# Patient Record
Sex: FEMALE | Race: WHITE | Employment: OTHER | URBAN - METROPOLITAN AREA
[De-identification: names, ages, dates, MRNs, and addresses within clinical notes are randomized per-mention and may not be internally consistent; named-entity substitution may affect disease eponyms.]

---

## 2017-04-21 ENCOUNTER — OFFICE VISIT (OUTPATIENT)
Dept: NEUROLOGY | Age: 82
End: 2017-04-21

## 2017-04-21 VITALS
OXYGEN SATURATION: 96 % | SYSTOLIC BLOOD PRESSURE: 122 MMHG | BODY MASS INDEX: 22.86 KG/M2 | HEART RATE: 60 BPM | DIASTOLIC BLOOD PRESSURE: 56 MMHG | HEIGHT: 63 IN | WEIGHT: 129 LBS

## 2017-04-21 DIAGNOSIS — G40.209 LOCALIZATION-RELATED FOCAL EPILEPSY WITH COMPLEX PARTIAL SEIZURES (HCC): ICD-10-CM

## 2017-04-21 DIAGNOSIS — G40.209 COMPLEX PARTIAL SEIZURE EVOLVING TO GENERALIZED SEIZURE (HCC): Primary | ICD-10-CM

## 2017-04-21 DIAGNOSIS — Z86.011 H/O MENINGIOMA OF THE BRAIN: ICD-10-CM

## 2017-04-21 RX ORDER — PHENYTOIN SODIUM 100 MG/1
200 CAPSULE, EXTENDED RELEASE ORAL 2 TIMES DAILY
Qty: 360 CAP | Refills: 3 | Status: SHIPPED | OUTPATIENT
Start: 2017-04-21 | End: 2018-06-25 | Stop reason: SDUPTHER

## 2017-04-21 NOTE — PATIENT INSTRUCTIONS

## 2017-04-21 NOTE — LETTER
4/21/2017 10:16 AM 
 
Patient:  Kodak Jenkins YOB: 1932 Date of Visit: 4/21/2017 Dear No Recipients: Thank you for referring Ms. Anna Weaver to me for evaluation/treatment. Below are the relevant portions of my assessment and plan of care. Consult Subjective:  
 
Kodak Jenkins is a 80 y.o. Right-handed  female seen for evaluation of seizures upon referral by Dr. Lanie De Leon. Patient last seen 2 years ago and missed her follow-up visit and is now out of medication and was worked in to get urgent medication refill because she is going to [facility] soon and needs her medication to prevent seizures. Her last Dilantin level on the chart was noted to be 19 1 year ago, and 2 years ago she had a CT scan of the head that was stable postop changes. She has had no new neurological symptoms in the interim, and no seizures and is doing well on her medications. She did have a right hip replacement 8 months ago and still is walking with a mild limp from that. She has had no other new medical problems. Patient had a meningioma  resected from the left cerebral hemisphere in 2002 when she presented with a seizure. She had another seizure in 2005 and her medication was increased to four Dilantin a day. She has remained without seizures since. In 2006 her EEG showed mild slowing in the left temporal area, but no other lesions are recorded seizures. MRI of the brain done in 2011 then had shown no recurrent tumor, just postoperative changes. She has had no clear side effects, and no recent blood levels. She takes her medications regularly. She's had no increase in headaches, no new focal weakness or sensory loss, no new visual changes, cognitive disability, trauma, fever, and gait issues, or memory loss. She has had no new medical problems. She takes vitamins and vitamin D.  
She has one daughter who has had seizures because of roseola as a child still needs medication. No other family history. She had recent metabolic screening showing normal CBC and differential CMP, vitamin D level and thyroid. Past Medical History:  
Diagnosis Date  Arthritis  Cancer [facility]) 2002  
 brain tumor, benign (meningioma)  Diabetes ([facility] [address] questionable?  Gastrointestinal disorder   
 diverticulitis  Hypothyroid 5/11/2015  Localization-related (focal) (partial) epilepsy and epileptic syndromes with complex partial seizures, without mention of intractable epilepsy 5/11/2015  Other ill-defined conditions   
 loss of vision in left eye  Seizures ([facility] [address] 2002  
 caused by brain tumor  Thyroid disease Past Surgical History:  
Procedure Laterality Date  COLORECTAL SCRN; HI RISK IND  8/26/2014  HX BACK SURGERY  1970s  HX ORTHOPAEDIC  1990s  
 right wrist  
 NEUROLOGICAL PROCEDURE UNLISTED  2002  
 brain tumor removed Family History Problem Relation Age of Onset  Cancer Mother   
  breast  
 Alzheimer Mother  Heart Disease Father  Kidney Disease Sister   
  dialysis  Seizures Child Social History Substance Use Topics  Smoking status: Never Smoker  Smokeless tobacco: Never Used  Alcohol use Yes Comment: glass of wine occasionally Current Outpatient Prescriptions:  
  phenytoin ER (DILANTIN ER) 100 mg ER capsule, Take 2 Caps by mouth two (2) times a day., Disp: 360 Cap, Rfl: 3 
  calcium carbonate (OS-ANGUS) 500 mg calcium (1,250 mg) tablet, Take 1 Tab by mouth two (2) times a day., Disp: , Rfl:  
  cholecalciferol (VITAMIN D3) 1,000 unit tablet, Take 1,000 Units by mouth two (2) times a day., Disp: , Rfl:  
  amLODIPine (NORVASC) 5 mg tablet, Take 5 mg by mouth daily. , Disp: , Rfl:  
  cranberry extract 450 mg tab, Take 1 Tab by mouth daily. , Disp: , Rfl:  
  levothyroxine (SYNTHROID) 88 mcg tablet, , Disp: , Rfl:  
  MULTI-VITAMIN PO, Take 1 Cap by mouth daily. , Disp: , Rfl:  
 
 
 Allergies Allergen Reactions  Penicillins Itching Patient not sure she is allergic  Sulfa (Sulfonamide Antibiotics) Nausea and Vomiting Review of Systems: A comprehensive review of systems was negative except for: Musculoskeletal: positive for myalgias, arthralgias and stiff joints Neurological: positive for seizures Vitals:  
 04/21/17 8400 BP: 122/56 Pulse: 60 SpO2: 96% Weight: 129 lb (58.5 kg) Height: 5' 3\" (1.6 m) Objective: I 
 
 
NEUROLOGICAL EXAM: 
 
Appearance: The patient is well developed, well nourished, provides a coherent history and is in no acute distress. Mental Status: Oriented to time, place and person and the president, cognitive function is normal, speech is fluent. Mood and affect appropriate. Cranial Nerves:   Intact visual fields. Fundi are benign on the right and poorly seen on the left. Pupil on the right reacts, the left does not react well. 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 Facial sensation is normal. Corneal reflexes are not tested. Facial movement is symmetric. Hearing is abnormal bilaterally. Palate is midline with normal sternocleidomastoid and trapezius muscles are normal. Tongue is midline. Neck without meningismus or bruits Motor:  5/5 strength in upper and lower proximal and distal muscles. Normal bulk and tone. No fasciculations. Reflexes:   Deep tendon reflexes 2+/4 and symmetrical. 
No babinski or clonus present Sensory:   Normal to touch, pinprick and vibration. DSS is intact Gait:  Normal gait except she walks with a mild limp because of her right hip surgery and pain . Tremor:   No tremor noted. Cerebellar:  No cerebellar signs present. Neurovascular:  Normal heart sounds and regular rhythm, peripheral pulses decreased, and no carotid bruits. Assessment: ICD-10-CM ICD-9-CM 1. Complex partial seizure 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 seizure ([facility] [address] G40.209 345.40 phenytoin ER (DILANTIN ER) 100 mg ER capsule PHENYTOIN, TOTAL & FREE  
2. H/O meningioma of the brain, left brain meningioma Z86.011 V12.41 phenytoin ER (DILANTIN ER) 100 mg ER capsule PHENYTOIN, TOTAL & FREE 3. Localization-related focal epilepsy with complex partial seizures (HCC) G40.209 345.40 phenytoin ER (DILANTIN ER) 100 mg ER capsule PHENYTOIN, TOTAL & FREE Plan:  
 
Patient needs blood levels to determine how therapeutic her medication is. She does not want to do MRI of the brain to rule out recurrent disease or other new structural brain lesions because she feels she is stable She also does not want to repeat an EEG and just wants to stay on her medication Patient will call for results of her tests, and continue her medication at its current dose in the interim The that she had a breakthrough seizure 3 years after her surgery with seemed to be a poor prognostic sign for complete withdrawal of her Dilantin and she agrees to take We will await the tests first, and advised the patient she has about a 50% chance of successfully discontinuing her medication She is to continue her vitamins and vitamin D. And remain mentally and physically active in the interim. We discussed new medications with less side effects, but she was somewhat equivocal about these Followup in 12 months time or earlier if needed depending on the results of her tests and desire for change in therapy I spent one hour reviewing all her records, her previous tests, and discussing her diagnosis, prognosis and further testing and evaluation and treatment Signed By: Tayo Lee MD   
 April 21, 2017 This note will not be viewable in [address] If you have questions, please do not hesitate to call me. I look forward to following MsColby Ellie Brenton along with you.  
 
 
 
Sincerely, 
 
 
 Bennie Rosario MD

## 2017-04-21 NOTE — PROGRESS NOTES
Consult    Subjective:     Edwar Govea is a 80 y.o. Right-handed  female seen for evaluation of seizures upon referral by Dr. Renato Rojo. Patient last seen 2 years ago and missed her follow-up visit and is now out of medication and was worked in to get urgent medication refill because she is going to [facility] soon and needs her medication to prevent seizures. Her last Dilantin level on the chart was noted to be 19 1 year ago, and 2 years ago she had a CT scan of the head that was stable postop changes. She has had no new neurological symptoms in the interim, and no seizures and is doing well on her medications. She did have a right hip replacement 8 months ago and still is walking with a mild limp from that. She has had no other new medical problems. Patient had a meningioma  resected from the left cerebral hemisphere in 2002 when she presented with a seizure. She had another seizure in 2005 and her medication was increased to four Dilantin a day. She has remained without seizures since. In 2006 her EEG showed mild slowing in the left temporal area, but no other lesions are recorded seizures. MRI of the brain done in 2011 then had shown no recurrent tumor, just postoperative changes. She has had no clear side effects, and no recent blood levels. She takes her medications regularly. She's had no increase in headaches, no new focal weakness or sensory loss, no new visual changes, cognitive disability, trauma, fever, and gait issues, or memory loss. She has had no new medical problems. She takes vitamins and vitamin D. She has one daughter who has had seizures because of roseola as a child still needs medication. No other family history. She had recent metabolic screening showing normal CBC and differential CMP, vitamin D level and thyroid. Past Medical History:   Diagnosis Date    Arthritis     Cancer [facility]) 2002    brain tumor, benign (meningioma)    Diabetes ([facility] [address]     questionable?     Gastrointestinal disorder     diverticulitis    Hypothyroid 5/11/2015    Localization-related (focal) (partial) epilepsy and epileptic syndromes with complex partial seizures, without mention of intractable epilepsy 5/11/2015    Other ill-defined conditions     loss of vision in left eye    Seizures ([address] 2002    caused by brain tumor    Thyroid disease       Past Surgical History:   Procedure Laterality Date    COLORECTAL SCRN; HI RISK IND  8/26/2014         HX BACK SURGERY  1970s    HX ORTHOPAEDIC  1990s    right wrist    NEUROLOGICAL PROCEDURE UNLISTED  2002    brain tumor removed     Family History   Problem Relation Age of Onset   [facility] Cancer Mother      breast    Alzheimer Mother     Heart Disease Father     Kidney Disease Sister      dialysis    Seizures Child       Social History   Substance Use Topics    Smoking status: Never Smoker    Smokeless tobacco: Never Used    Alcohol use Yes      Comment: glass of wine occasionally         Current Outpatient Prescriptions:     phenytoin ER (DILANTIN ER) 100 mg ER capsule, Take 2 Caps by mouth two (2) times a day., Disp: 360 Cap, Rfl: 3    calcium carbonate (OS-ANGUS) 500 mg calcium (1,250 mg) tablet, Take 1 Tab by mouth two (2) times a day., Disp: , Rfl:     cholecalciferol (VITAMIN D3) 1,000 unit tablet, Take 1,000 Units by mouth two (2) times a day., Disp: , Rfl:     amLODIPine (NORVASC) 5 mg tablet, Take 5 mg by mouth daily. , Disp: , Rfl:     cranberry extract 450 mg tab, Take 1 Tab by mouth daily. , Disp: , Rfl:     levothyroxine (SYNTHROID) 88 mcg tablet, , Disp: , Rfl:     MULTI-VITAMIN PO, Take 1 Cap by mouth daily. , Disp: , Rfl:         Allergies   Allergen Reactions    Penicillins Itching     Patient not sure she is allergic    Sulfa (Sulfonamide Antibiotics) Nausea and Vomiting        Review of Systems:  A comprehensive review of systems was negative except for: Musculoskeletal: positive for myalgias, arthralgias and stiff joints  Neurological: positive for seizures   Vitals:    04/21/17 0946   BP: 122/56   Pulse: 60   SpO2: 96%   Weight: 129 lb (58.5 kg)   Height: 5' 3\" (1.6 m)     Objective:     I      NEUROLOGICAL EXAM:    Appearance: The patient is well developed, well nourished, provides a coherent history and is in no acute distress. Mental Status: Oriented to time, place and person and the president, cognitive function is normal, speech is fluent. Mood and affect appropriate. Cranial Nerves:   Intact visual fields. Fundi are benign on the right and poorly seen on the left. Pupil on the right reacts, the left does not react well. 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 Facial sensation is normal. Corneal reflexes are not tested. Facial movement is symmetric. Hearing is abnormal bilaterally. Palate is midline with normal sternocleidomastoid and trapezius muscles are normal. Tongue is midline. Neck without meningismus or bruits   Motor:  5/5 strength in upper and lower proximal and distal muscles. Normal bulk and tone. No fasciculations. Reflexes:   Deep tendon reflexes 2+/4 and symmetrical.  No babinski or clonus present   Sensory:   Normal to touch, pinprick and vibration. DSS is intact   Gait:  Normal gait except she walks with a mild limp because of her right hip surgery and pain . Tremor:   No tremor noted. Cerebellar:  No cerebellar signs present. Neurovascular:  Normal heart sounds and regular rhythm, peripheral pulses decreased, and no carotid bruits. Assessment:       ICD-10-CM ICD-9-CM    1. Complex partial seizure 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 seizure ([facility] [address] G40.209 345.40 phenytoin ER (DILANTIN ER) 100 mg ER capsule      PHENYTOIN, TOTAL & FREE   2.  H/O meningioma of the brain, left brain meningioma Z86.011 V12.41 phenytoin ER (DILANTIN ER) 100 mg ER capsule      PHENYTOIN, TOTAL & FREE   3. Localization-related focal epilepsy with complex partial seizures ([facility] [address] G40.209 345.40 phenytoin ER (DILANTIN ER) 100 mg ER capsule      PHENYTOIN, TOTAL & FREE         Plan:     Patient needs blood levels to determine how therapeutic her medication is. She does not want to do MRI of the brain to rule out recurrent disease or other new structural brain lesions because she feels she is stable  She also does not want to repeat an EEG and just wants to stay on her medication  Patient will call for results of her tests, and continue her medication at its current dose in the interim  The that she had a breakthrough seizure 3 years after her surgery with seemed to be a poor prognostic sign for complete withdrawal of her Dilantin and she agrees to take  We will await the tests first, and advised the patient she has about a 50% chance of successfully discontinuing her medication  She is to continue her vitamins and vitamin D. And remain mentally and physically active in the interim. We discussed new medications with less side effects, but she was somewhat equivocal about these  Followup in 12 months time or earlier if needed depending on the results of her tests and desire for change in therapy  I spent one hour reviewing all her records, her previous tests, and discussing her diagnosis, prognosis and further testing and evaluation and treatment    Signed By: Ricardo Raines MD     April 21, 2017       This note will not be viewable in [address]

## 2017-04-21 NOTE — MR AVS SNAPSHOT
Visit Information Date & Time Provider Department Dept. Phone Encounter #  
 4/21/2017  9:40 AM Tayo Lee MD [facility] at [facility] [phone] [id number] Follow-up Instructions Return in about 1 year (around 4/21/2018). Upcoming Health Maintenance Date Due ZOSTER VACCINE AGE 60> 9/5/1992 GLAUCOMA SCREENING Q2Y 9/5/1997 Pneumococcal 65+ Low/Medium Risk (1 of 2 - PCV13) 9/5/1997 MEDICARE YEARLY EXAM 9/5/1997 DTaP/Tdap/Td series (1 - Tdap) 9/12/2010 INFLUENZA AGE 9 TO ADULT 8/1/2016 Allergies as of 4/21/2017  Review Complete On: 4/21/2017 By: Tayo Lee MD  
  
 Severity Noted Reaction Type Reactions Penicillins  09/01/2010    Itching Patient not sure she is allergic Sulfa (Sulfonamide Antibiotics)  09/01/2010    Nausea and Vomiting Current Immunizations  Reviewed on 8/17/2016 Name Date  
 TD Vaccine 9/11/2010  8:00 PM  
  
 Not reviewed this visit You Were Diagnosed With   
  
 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 
ICD-9-CM: V12.41 Localization-related focal epilepsy with complex partial seizures ([facility]     ICD-10-CM: H11.170 ICD-9-CM: 345.40 Vitals BP Pulse Height(growth percentile) Weight(growth percentile) SpO2 BMI  
 122/56 60 5' 3\" (1.6 m) 129 lb (58.5 kg) 96% 22.85 kg/m2 OB Status Smoking Status Postmenopausal Never Smoker Vitals History BMI and BSA Data Body Mass Index Body Surface Area  
 22.85 kg/m 2 1.61 m 2 Preferred Pharmacy Pharmacy Name Phone [address] [place] [address] [address] [place] [address] [phone] Your Updated Medication List  
  
   
This list is accurate as of: 4/21/17 10:06 AM.  Always use your most recent med list.  
  
  
  
  
 amLODIPine 5 mg tablet Commonly known as:  Benjiman Floro Take 5 mg by mouth daily. calcium carbonate 500 mg calcium (1,250 mg) tablet Commonly known as:  OS-ANGUS Take 1 Tab by mouth two (2) times a day. cranberry extract 450 mg Tab Take 1 Tab by mouth daily. levothyroxine 88 mcg tablet Commonly known as:  SYNTHROID  
  
 MULTI-VITAMIN PO Take 1 Cap by mouth daily. phenytoin  mg ER capsule Commonly known as:  DILANTIN ER Take 2 Caps by mouth two (2) times a day. VITAMIN D3 1,000 unit tablet Generic drug:  cholecalciferol Take 1,000 Units by mouth two (2) times a day. Prescriptions Sent to Pharmacy Refills  
 phenytoin ER (DILANTIN ER) 100 mg ER capsule 3 Sig: Take 2 Caps by mouth two (2) times a day. Class: Normal  
 Pharmacy: [address] [place] [address], [address] [place] [address] #: [phone] Route: Oral  
  
We Performed the Following PHENYTOIN, TOTAL & FREE [id number] CPT(R)] Follow-up Instructions Return in about 1 year (around 4/21/2018). Patient Instructions A Healthy Lifestyle: Care Instructions Your Care Instructions A healthy lifestyle can help you feel good, stay at a healthy weight, and have plenty of energy for both work and play. A healthy lifestyle is something you can share with your whole family. A healthy lifestyle also can lower your risk for serious health problems, such as high blood pressure, heart disease, and diabetes. You can follow a few steps listed below to improve your health and the health of your family. Follow-up care is a key part of your treatment and safety. Be sure to make and go to all appointments, and call your doctor if you are having problems. Its also a good idea to know your test results and keep a list of the medicines you take. How can you care for yourself at home? · Do not eat too much sugar, fat, or fast foods.  You can still have dessert and treats now and then. The goal is moderation. · Start small to improve your eating habits. Pay attention to portion sizes, drink less juice and soda pop, and eat more fruits and vegetables. ¨ Eat a healthy amount of food. A 3-ounce serving of meat, for example, is about the size of a deck of cards. Fill the rest of your plate with vegetables and whole grains. ¨ Limit the amount of soda and sports drinks you have every day. Drink more water when you are thirsty. ¨ Eat at least 5 servings of fruits and vegetables every day. It may seem like a lot, but it is not hard to reach this goal. A serving or helping is 1 piece of fruit, 1 cup of vegetables, or 2 cups of leafy, raw vegetables. Have an apple or some carrot sticks as an afternoon snack instead of a candy bar. Try to have fruits and/or vegetables at every meal. 
· Make exercise part of your daily routine. You may want to start with simple activities, such as walking, bicycling, or slow swimming. Try to be active 30 to 60 minutes every day. You do not need to do all 30 to 60 minutes all at once. For example, you can exercise 3 times a day for 10 or 20 minutes. Moderate exercise is safe for most people, but it is always a good idea to talk to your doctor before starting an exercise program. 
· Keep moving. [place] the lawn, work in the garden, or [facility]. Take the stairs instead of the elevator at work. · If you smoke, quit. People who smoke have an increased risk for heart attack, stroke, cancer, and other lung illnesses. Quitting is hard, but there are ways to boost your chance of quitting tobacco for good. ¨ Use nicotine gum, patches, or lozenges. ¨ Ask your doctor about stop-smoking programs and medicines. ¨ Keep trying.  
In addition to reducing your risk of diseases in the future, you will notice some benefits soon after you stop using tobacco. If you have shortness of breath or asthma symptoms, they will likely get better within a few weeks after you quit. · Limit how much alcohol you drink. Moderate amounts of alcohol (up to 2 drinks a day for men, 1 drink a day for women) are okay. But drinking too much can lead to liver problems, high blood pressure, and other health problems. Family health If you have a family, there are many things you can do together to improve your health. · Eat meals together as a family as often as possible. · Eat healthy foods. This includes fruits, vegetables, lean meats and dairy, and whole grains. · Include your family in your fitness plan. Most people think of activities such as jogging or tennis as the way to fitness, but there are many ways you and your family can be more active. Anything that makes you breathe hard and gets your heart pumping is exercise. Here are some tips: 
¨ Walk to do errands or to take your child to school or the bus. ¨ Go for a family bike ride after dinner instead of watching TV. Where can you learn more? Go to http://oneal-delisa.info/. Enter I465 in the search box to learn more about \"A Healthy Lifestyle: Care Instructions. \" Current as of: July 26, 2016 Content Version: 11.2 © 1259-4877 [employer]. Care instructions adapted under license by [employer] (which disclaims liability or warranty for this information). If you have questions about a medical condition or this instruction, always ask your healthcare professional. [place] [address] any warranty or liability for your use of this information. Introducing [facility]! Charisse Pandya introduces [employer] patient portal. Now you can access parts of your medical record, email your doctor's office, and request medication refills online. 1. In your internet browser, go to [email or web address] 2. Click on the First Time User? Click Here link in the Sign In box. You will see the New Member Sign Up page. 3. Enter your [employer] Access Code exactly as it appears below. You will not need to use this code after youve completed the sign-up process. If you do not sign up before the expiration date, you must request a new code. · [employer] Access Code: [email or web address] 
Expires: 7/20/2017  9:59 AM 
 
4. Enter the last four digits of your Social Security Number (xxxx) and Date of Birth (mm/dd/yyyy) as indicated and click Submit. You will be taken to the next sign-up page. 5. Create a [employer] ID. This will be your [employer] login ID and cannot be changed, so think of one that is secure and easy to remember. 6. Create a [employer] password. You can change your password at any time. 7. Enter your Password Reset Question and Answer. This can be used at a later time if you forget your password. 8. Enter your e-mail address. You will receive e-mail notification when new information is available in [address] 9. Click Sign Up. You can now view and download portions of your medical record. 10. Click the Download Summary menu link to download a portable copy of your medical information. If you have questions, please visit the Frequently Asked Questions section of the [employer] website. Remember, [employer] is NOT to be used for urgent needs. For medical emergencies, dial 911. Now available from your iPhone and Android! Please provide this summary of care documentation to your next provider. Your primary care clinician is listed as Michael Iraheta. If you have any questions after today's visit, please call [phone].

## 2017-04-25 LAB
PHENYTOIN FREE SERPL-MCNC: 1.1 UG/ML (ref 1–2)
PHENYTOIN SERPL-MCNC: 15.7 UG/ML (ref 10–20)

## 2017-06-21 ENCOUNTER — HOSPITAL ENCOUNTER (OUTPATIENT)
Dept: GENERAL RADIOLOGY | Age: 82
Discharge: HOME | End: 2017-06-21
Payer: MEDICARE

## 2017-06-21 DIAGNOSIS — R06.02 SOB (SHORTNESS OF BREATH): ICD-10-CM

## 2017-06-21 PROCEDURE — 71020 XR CHEST PA LAT: CPT

## 2018-06-25 DIAGNOSIS — G40.209 LOCALIZATION-RELATED FOCAL EPILEPSY WITH COMPLEX PARTIAL SEIZURES (HCC): ICD-10-CM

## 2018-06-25 DIAGNOSIS — G40.209 COMPLEX PARTIAL SEIZURE EVOLVING TO GENERALIZED SEIZURE (HCC): ICD-10-CM

## 2018-06-25 DIAGNOSIS — Z86.011 H/O MENINGIOMA OF THE BRAIN: ICD-10-CM

## 2018-06-28 RX ORDER — PHENYTOIN SODIUM 100 MG/1
200 CAPSULE, EXTENDED RELEASE ORAL 2 TIMES DAILY
Qty: 360 CAP | Refills: 0 | Status: SHIPPED | OUTPATIENT
Start: 2018-06-28 | End: 2018-10-26 | Stop reason: SDUPTHER

## 2018-06-28 NOTE — TELEPHONE ENCOUNTER
No future appointments. Last Appointment My Department:  4/21/17    Please advise of refill below. Requested Prescriptions     Pending Prescriptions Disp Refills    phenytoin ER (DILANTIN ER) 100 mg ER capsule [Pharmacy Med Name: PHENYTOIN SODIUM EXTENDED 100 MG Capsule] 360 Cap 0     Sig: Take 2 Caps by mouth two (2) times a day.  PATIENT MUST MAKE APPOINTMENT FOR FURTHER REFILLS

## 2018-10-25 DIAGNOSIS — G40.209 COMPLEX PARTIAL SEIZURE EVOLVING TO GENERALIZED SEIZURE (HCC): ICD-10-CM

## 2018-10-25 DIAGNOSIS — G40.209 LOCALIZATION-RELATED FOCAL EPILEPSY WITH COMPLEX PARTIAL SEIZURES (HCC): ICD-10-CM

## 2018-10-25 DIAGNOSIS — Z86.011 H/O MENINGIOMA OF THE BRAIN: ICD-10-CM

## 2018-10-26 RX ORDER — PHENYTOIN SODIUM 100 MG/1
CAPSULE, EXTENDED RELEASE ORAL
Qty: 360 CAP | Refills: 0 | OUTPATIENT
Start: 2018-10-26

## 2018-10-26 NOTE — TELEPHONE ENCOUNTER
No future appointments. Last Appointment My Department:  4/21/17    Please advise of refill below. Called and scheduled patient  Requested Prescriptions     Pending Prescriptions Disp Refills    phenytoin ER (DILANTIN ER) 100 mg ER capsule 360 Cap 1     Sig: Take 2 Caps by mouth two (2) times a day. Refused Prescriptions Disp Refills    phenytoin ER (DILANTIN ER) 100 mg ER capsule [Pharmacy Med Name: PHENYTOIN SODIUM EXTENDED 100 MG Capsule] 360 Cap 0     Sig: TAKE 2 CAPSULES TWICE DAILY.   MUST MAKE APPOINTMENT FOR FURTHER REFILLS     Refused By: Harry Shafer     Reason for Refusal: Patient never under prescriber care

## 2018-10-29 RX ORDER — PHENYTOIN SODIUM 100 MG/1
200 CAPSULE, EXTENDED RELEASE ORAL 2 TIMES DAILY
Qty: 360 CAP | Refills: 1 | Status: SHIPPED | OUTPATIENT
Start: 2018-10-29 | End: 2019-04-27 | Stop reason: SDUPTHER

## 2019-01-13 ENCOUNTER — APPOINTMENT (OUTPATIENT)
Dept: GENERAL RADIOLOGY | Age: 84
DRG: 481 | End: 2019-01-13
Attending: EMERGENCY MEDICINE
Payer: MEDICARE

## 2019-01-13 ENCOUNTER — HOSPITAL ENCOUNTER (INPATIENT)
Age: 84
LOS: 6 days | Discharge: NURSING, REHAB OR LONG-TERM CARE | DRG: 481 | End: 2019-01-19
Attending: EMERGENCY MEDICINE | Admitting: INTERNAL MEDICINE
Payer: MEDICARE

## 2019-01-13 DIAGNOSIS — S72.002A CLOSED FRACTURE OF LEFT HIP, INITIAL ENCOUNTER (HCC): Primary | ICD-10-CM

## 2019-01-13 PROBLEM — S72.92XA FEMUR FRACTURE, LEFT (HCC): Status: ACTIVE | Noted: 2019-01-13

## 2019-01-13 PROBLEM — E11.9 DIABETES (HCC): Status: ACTIVE | Noted: 2019-01-13

## 2019-01-13 LAB
ABO + RH BLD: NORMAL
ALBUMIN SERPL-MCNC: 3.8 G/DL (ref 3.5–5)
ALBUMIN/GLOB SERPL: 1.1 {RATIO} (ref 1.1–2.2)
ALP SERPL-CCNC: 89 U/L (ref 45–117)
ALT SERPL-CCNC: 20 U/L (ref 12–78)
ANION GAP SERPL CALC-SCNC: 6 MMOL/L (ref 5–15)
APPEARANCE UR: ABNORMAL
AST SERPL-CCNC: 19 U/L (ref 15–37)
ATRIAL RATE: 53 BPM
BACTERIA URNS QL MICRO: ABNORMAL /HPF
BASOPHILS # BLD: 0 K/UL (ref 0–0.1)
BASOPHILS NFR BLD: 0 % (ref 0–1)
BILIRUB SERPL-MCNC: 0.6 MG/DL (ref 0.2–1)
BILIRUB UR QL: NEGATIVE
BLOOD GROUP ANTIBODIES SERPL: NORMAL
BUN SERPL-MCNC: 14 MG/DL (ref 6–20)
BUN/CREAT SERPL: 19 (ref 12–20)
CALCIUM SERPL-MCNC: 9.1 MG/DL (ref 8.5–10.1)
CALCULATED P AXIS, ECG09: 70 DEGREES
CALCULATED R AXIS, ECG10: -42 DEGREES
CALCULATED T AXIS, ECG11: 83 DEGREES
CHLORIDE SERPL-SCNC: 105 MMOL/L (ref 97–108)
CO2 SERPL-SCNC: 28 MMOL/L (ref 21–32)
COLOR UR: ABNORMAL
CREAT SERPL-MCNC: 0.73 MG/DL (ref 0.55–1.02)
DIAGNOSIS, 93000: NORMAL
DIFFERENTIAL METHOD BLD: ABNORMAL
EOSINOPHIL # BLD: 0 K/UL (ref 0–0.4)
EOSINOPHIL NFR BLD: 0 % (ref 0–7)
EPITH CASTS URNS QL MICRO: ABNORMAL /LPF
ERYTHROCYTE [DISTWIDTH] IN BLOOD BY AUTOMATED COUNT: 12.7 % (ref 11.5–14.5)
GLOBULIN SER CALC-MCNC: 3.5 G/DL (ref 2–4)
GLUCOSE SERPL-MCNC: 119 MG/DL (ref 65–100)
GLUCOSE UR STRIP.AUTO-MCNC: NEGATIVE MG/DL
HCT VFR BLD AUTO: 40.5 % (ref 35–47)
HGB BLD-MCNC: 13.6 G/DL (ref 11.5–16)
HGB UR QL STRIP: NEGATIVE
HYALINE CASTS URNS QL MICRO: ABNORMAL /LPF (ref 0–5)
IMM GRANULOCYTES # BLD AUTO: 0 K/UL (ref 0–0.04)
IMM GRANULOCYTES NFR BLD AUTO: 0 % (ref 0–0.5)
INR PPP: 1 (ref 0.9–1.1)
KETONES UR QL STRIP.AUTO: NEGATIVE MG/DL
LEUKOCYTE ESTERASE UR QL STRIP.AUTO: NEGATIVE
LYMPHOCYTES # BLD: 0.9 K/UL (ref 0.8–3.5)
LYMPHOCYTES NFR BLD: 17 % (ref 12–49)
MCH RBC QN AUTO: 33.3 PG (ref 26–34)
MCHC RBC AUTO-ENTMCNC: 33.6 G/DL (ref 30–36.5)
MCV RBC AUTO: 99 FL (ref 80–99)
MONOCYTES # BLD: 0.4 K/UL (ref 0–1)
MONOCYTES NFR BLD: 8 % (ref 5–13)
NEUTS SEG # BLD: 3.7 K/UL (ref 1.8–8)
NEUTS SEG NFR BLD: 74 % (ref 32–75)
NITRITE UR QL STRIP.AUTO: POSITIVE
NRBC # BLD: 0 K/UL (ref 0–0.01)
NRBC BLD-RTO: 0 PER 100 WBC
P-R INTERVAL, ECG05: 134 MS
PH UR STRIP: 8 [PH] (ref 5–8)
PLATELET # BLD AUTO: 137 K/UL (ref 150–400)
PMV BLD AUTO: 12 FL (ref 8.9–12.9)
POTASSIUM SERPL-SCNC: 4.1 MMOL/L (ref 3.5–5.1)
PROT SERPL-MCNC: 7.3 G/DL (ref 6.4–8.2)
PROT UR STRIP-MCNC: ABNORMAL MG/DL
PROTHROMBIN TIME: 10.4 SEC (ref 9–11.1)
Q-T INTERVAL, ECG07: 446 MS
QRS DURATION, ECG06: 76 MS
QTC CALCULATION (BEZET), ECG08: 418 MS
RBC # BLD AUTO: 4.09 M/UL (ref 3.8–5.2)
RBC #/AREA URNS HPF: ABNORMAL /HPF (ref 0–5)
SODIUM SERPL-SCNC: 139 MMOL/L (ref 136–145)
SP GR UR REFRACTOMETRY: 1.01 (ref 1–1.03)
SPECIMEN EXP DATE BLD: NORMAL
TSH SERPL DL<=0.05 MIU/L-ACNC: 2.21 UIU/ML (ref 0.36–3.74)
UA: UC IF INDICATED,UAUC: ABNORMAL
UROBILINOGEN UR QL STRIP.AUTO: 0.2 EU/DL (ref 0.2–1)
VENTRICULAR RATE, ECG03: 53 BPM
WBC # BLD AUTO: 5 K/UL (ref 3.6–11)
WBC URNS QL MICRO: ABNORMAL /HPF (ref 0–4)

## 2019-01-13 PROCEDURE — 85025 COMPLETE CBC W/AUTO DIFF WBC: CPT

## 2019-01-13 PROCEDURE — 74011250636 HC RX REV CODE- 250/636

## 2019-01-13 PROCEDURE — 71045 X-RAY EXAM CHEST 1 VIEW: CPT

## 2019-01-13 PROCEDURE — 84443 ASSAY THYROID STIM HORMONE: CPT

## 2019-01-13 PROCEDURE — 87077 CULTURE AEROBIC IDENTIFY: CPT

## 2019-01-13 PROCEDURE — 81001 URINALYSIS AUTO W/SCOPE: CPT

## 2019-01-13 PROCEDURE — 85610 PROTHROMBIN TIME: CPT

## 2019-01-13 PROCEDURE — 94760 N-INVAS EAR/PLS OXIMETRY 1: CPT

## 2019-01-13 PROCEDURE — 86900 BLOOD TYPING SEROLOGIC ABO: CPT

## 2019-01-13 PROCEDURE — 74011250636 HC RX REV CODE- 250/636: Performed by: EMERGENCY MEDICINE

## 2019-01-13 PROCEDURE — 65270000029 HC RM PRIVATE

## 2019-01-13 PROCEDURE — 74011250636 HC RX REV CODE- 250/636: Performed by: INTERNAL MEDICINE

## 2019-01-13 PROCEDURE — 80053 COMPREHEN METABOLIC PANEL: CPT

## 2019-01-13 PROCEDURE — 73552 X-RAY EXAM OF FEMUR 2/>: CPT

## 2019-01-13 PROCEDURE — 87186 SC STD MICRODIL/AGAR DIL: CPT

## 2019-01-13 PROCEDURE — 87086 URINE CULTURE/COLONY COUNT: CPT

## 2019-01-13 PROCEDURE — 74011250636 HC RX REV CODE- 250/636: Performed by: NURSE PRACTITIONER

## 2019-01-13 PROCEDURE — 93005 ELECTROCARDIOGRAM TRACING: CPT

## 2019-01-13 PROCEDURE — 74011250637 HC RX REV CODE- 250/637: Performed by: NURSE PRACTITIONER

## 2019-01-13 PROCEDURE — 96374 THER/PROPH/DIAG INJ IV PUSH: CPT

## 2019-01-13 PROCEDURE — 96372 THER/PROPH/DIAG INJ SC/IM: CPT

## 2019-01-13 PROCEDURE — 36415 COLL VENOUS BLD VENIPUNCTURE: CPT

## 2019-01-13 PROCEDURE — 74011250637 HC RX REV CODE- 250/637: Performed by: INTERNAL MEDICINE

## 2019-01-13 PROCEDURE — 99284 EMERGENCY DEPT VISIT MOD MDM: CPT

## 2019-01-13 RX ORDER — HYDROMORPHONE HYDROCHLORIDE 1 MG/ML
0.5 INJECTION, SOLUTION INTRAMUSCULAR; INTRAVENOUS; SUBCUTANEOUS ONCE
Status: COMPLETED | OUTPATIENT
Start: 2019-01-13 | End: 2019-01-13

## 2019-01-13 RX ORDER — SODIUM CHLORIDE 9 MG/ML
75 INJECTION, SOLUTION INTRAVENOUS CONTINUOUS
Status: DISCONTINUED | OUTPATIENT
Start: 2019-01-13 | End: 2019-01-15

## 2019-01-13 RX ORDER — SODIUM CHLORIDE 0.9 % (FLUSH) 0.9 %
5-40 SYRINGE (ML) INJECTION AS NEEDED
Status: DISCONTINUED | OUTPATIENT
Start: 2019-01-13 | End: 2019-01-14

## 2019-01-13 RX ORDER — MELATONIN
1000 2 TIMES DAILY
Status: DISCONTINUED | OUTPATIENT
Start: 2019-01-13 | End: 2019-01-19 | Stop reason: HOSPADM

## 2019-01-13 RX ORDER — ACETAMINOPHEN 325 MG/1
650 TABLET ORAL EVERY 6 HOURS
Status: DISCONTINUED | OUTPATIENT
Start: 2019-01-13 | End: 2019-01-14

## 2019-01-13 RX ORDER — NALOXONE HYDROCHLORIDE 0.4 MG/ML
0.4 INJECTION, SOLUTION INTRAMUSCULAR; INTRAVENOUS; SUBCUTANEOUS AS NEEDED
Status: DISCONTINUED | OUTPATIENT
Start: 2019-01-13 | End: 2019-01-14

## 2019-01-13 RX ORDER — HYDROMORPHONE HYDROCHLORIDE 2 MG/ML
0.5 INJECTION, SOLUTION INTRAMUSCULAR; INTRAVENOUS; SUBCUTANEOUS
Status: DISCONTINUED | OUTPATIENT
Start: 2019-01-13 | End: 2019-01-14

## 2019-01-13 RX ORDER — ONDANSETRON 2 MG/ML
2 INJECTION INTRAMUSCULAR; INTRAVENOUS
Status: DISCONTINUED | OUTPATIENT
Start: 2019-01-13 | End: 2019-01-13

## 2019-01-13 RX ORDER — OXYCODONE HYDROCHLORIDE 5 MG/1
2.5 TABLET ORAL
Status: DISCONTINUED | OUTPATIENT
Start: 2019-01-13 | End: 2019-01-14

## 2019-01-13 RX ORDER — SODIUM CHLORIDE 0.9 % (FLUSH) 0.9 %
5-40 SYRINGE (ML) INJECTION EVERY 8 HOURS
Status: DISCONTINUED | OUTPATIENT
Start: 2019-01-13 | End: 2019-01-14

## 2019-01-13 RX ORDER — SODIUM CHLORIDE 0.9 % (FLUSH) 0.9 %
5-40 SYRINGE (ML) INJECTION EVERY 8 HOURS
Status: DISCONTINUED | OUTPATIENT
Start: 2019-01-13 | End: 2019-01-15

## 2019-01-13 RX ORDER — ONDANSETRON 2 MG/ML
4 INJECTION INTRAMUSCULAR; INTRAVENOUS
Status: COMPLETED | OUTPATIENT
Start: 2019-01-13 | End: 2019-01-13

## 2019-01-13 RX ORDER — PHENYTOIN SODIUM 100 MG/1
200 CAPSULE, EXTENDED RELEASE ORAL 2 TIMES DAILY
Status: DISCONTINUED | OUTPATIENT
Start: 2019-01-13 | End: 2019-01-19 | Stop reason: HOSPADM

## 2019-01-13 RX ORDER — NICOTINE 7MG/24HR
1 PATCH, TRANSDERMAL 24 HOURS TRANSDERMAL
Status: DISCONTINUED | OUTPATIENT
Start: 2019-01-13 | End: 2019-01-19 | Stop reason: HOSPADM

## 2019-01-13 RX ORDER — MORPHINE SULFATE 10 MG/ML
4 INJECTION, SOLUTION INTRAMUSCULAR; INTRAVENOUS
Status: COMPLETED | OUTPATIENT
Start: 2019-01-13 | End: 2019-01-13

## 2019-01-13 RX ORDER — AMLODIPINE BESYLATE 5 MG/1
5 TABLET ORAL DAILY
Status: DISCONTINUED | OUTPATIENT
Start: 2019-01-13 | End: 2019-01-19 | Stop reason: HOSPADM

## 2019-01-13 RX ORDER — BISACODYL 5 MG
5 TABLET, DELAYED RELEASE (ENTERIC COATED) ORAL DAILY PRN
Status: DISCONTINUED | OUTPATIENT
Start: 2019-01-13 | End: 2019-01-19 | Stop reason: HOSPADM

## 2019-01-13 RX ORDER — NALOXONE HYDROCHLORIDE 0.4 MG/ML
0.4 INJECTION, SOLUTION INTRAMUSCULAR; INTRAVENOUS; SUBCUTANEOUS AS NEEDED
Status: DISCONTINUED | OUTPATIENT
Start: 2019-01-13 | End: 2019-01-14 | Stop reason: SDUPTHER

## 2019-01-13 RX ORDER — ACETAMINOPHEN 500 MG
500 TABLET ORAL
Status: DISCONTINUED | OUTPATIENT
Start: 2019-01-13 | End: 2019-01-19 | Stop reason: HOSPADM

## 2019-01-13 RX ORDER — HYDROCODONE BITARTRATE AND ACETAMINOPHEN 5; 325 MG/1; MG/1
1 TABLET ORAL
Status: DISCONTINUED | OUTPATIENT
Start: 2019-01-13 | End: 2019-01-14 | Stop reason: SDUPTHER

## 2019-01-13 RX ORDER — LEVOTHYROXINE SODIUM 88 UG/1
88 TABLET ORAL
Status: DISCONTINUED | OUTPATIENT
Start: 2019-01-14 | End: 2019-01-19 | Stop reason: HOSPADM

## 2019-01-13 RX ORDER — OXYCODONE HYDROCHLORIDE 5 MG/1
5 TABLET ORAL
Status: DISCONTINUED | OUTPATIENT
Start: 2019-01-13 | End: 2019-01-14

## 2019-01-13 RX ORDER — CALCIUM CARBONATE 500(1250)
500 TABLET ORAL 2 TIMES DAILY
Status: DISCONTINUED | OUTPATIENT
Start: 2019-01-13 | End: 2019-01-15

## 2019-01-13 RX ORDER — ONDANSETRON 2 MG/ML
INJECTION INTRAMUSCULAR; INTRAVENOUS
Status: COMPLETED
Start: 2019-01-13 | End: 2019-01-13

## 2019-01-13 RX ORDER — AMOXICILLIN 250 MG
2 CAPSULE ORAL 2 TIMES DAILY
Status: DISCONTINUED | OUTPATIENT
Start: 2019-01-13 | End: 2019-01-14 | Stop reason: SDUPTHER

## 2019-01-13 RX ORDER — ONDANSETRON 2 MG/ML
4 INJECTION INTRAMUSCULAR; INTRAVENOUS
Status: DISCONTINUED | OUTPATIENT
Start: 2019-01-13 | End: 2019-01-15

## 2019-01-13 RX ORDER — DEXTROSE MONOHYDRATE AND SODIUM CHLORIDE 5; .45 G/100ML; G/100ML
100 INJECTION, SOLUTION INTRAVENOUS CONTINUOUS
Status: DISPENSED | OUTPATIENT
Start: 2019-01-13 | End: 2019-01-14

## 2019-01-13 RX ORDER — HYDROMORPHONE HYDROCHLORIDE 1 MG/ML
0.5 INJECTION, SOLUTION INTRAMUSCULAR; INTRAVENOUS; SUBCUTANEOUS
Status: COMPLETED | OUTPATIENT
Start: 2019-01-13 | End: 2019-01-13

## 2019-01-13 RX ADMIN — PHENYTOIN SODIUM 200 MG: 100 CAPSULE ORAL at 16:06

## 2019-01-13 RX ADMIN — Medication 10 ML: at 13:49

## 2019-01-13 RX ADMIN — SODIUM CHLORIDE 75 ML/HR: 900 INJECTION, SOLUTION INTRAVENOUS at 13:45

## 2019-01-13 RX ADMIN — CALCIUM 500 MG: 500 TABLET ORAL at 14:08

## 2019-01-13 RX ADMIN — STANDARDIZED SENNA CONCENTRATE AND DOCUSATE SODIUM 2 TABLET: 8.6; 5 TABLET, FILM COATED ORAL at 17:55

## 2019-01-13 RX ADMIN — HYDROMORPHONE HYDROCHLORIDE 0.5 MG: 1 INJECTION, SOLUTION INTRAMUSCULAR; INTRAVENOUS; SUBCUTANEOUS at 08:21

## 2019-01-13 RX ADMIN — ONDANSETRON 4 MG: 2 INJECTION INTRAMUSCULAR; INTRAVENOUS at 10:24

## 2019-01-13 RX ADMIN — Medication 10 ML: at 22:00

## 2019-01-13 RX ADMIN — CALCIUM 500 MG: 500 TABLET ORAL at 17:55

## 2019-01-13 RX ADMIN — VITAMIN D, TAB 1000IU (100/BT) 1000 UNITS: 25 TAB at 17:55

## 2019-01-13 RX ADMIN — VITAMIN D, TAB 1000IU (100/BT) 1000 UNITS: 25 TAB at 14:08

## 2019-01-13 RX ADMIN — STANDARDIZED SENNA CONCENTRATE AND DOCUSATE SODIUM 2 TABLET: 8.6; 5 TABLET, FILM COATED ORAL at 14:08

## 2019-01-13 RX ADMIN — HYDROMORPHONE HYDROCHLORIDE 0.5 MG: 2 INJECTION INTRAMUSCULAR; INTRAVENOUS; SUBCUTANEOUS at 18:36

## 2019-01-13 RX ADMIN — HYDROMORPHONE HYDROCHLORIDE 0.5 MG: 1 INJECTION, SOLUTION INTRAMUSCULAR; INTRAVENOUS; SUBCUTANEOUS at 12:28

## 2019-01-13 RX ADMIN — ONDANSETRON 4 MG: 2 INJECTION INTRAMUSCULAR; INTRAVENOUS at 17:51

## 2019-01-13 RX ADMIN — AMLODIPINE BESYLATE 5 MG: 5 TABLET ORAL at 11:07

## 2019-01-13 RX ADMIN — HYDROCODONE BITARTRATE AND ACETAMINOPHEN 1 TABLET: 5; 325 TABLET ORAL at 11:07

## 2019-01-13 RX ADMIN — PHENYTOIN SODIUM 200 MG: 100 CAPSULE ORAL at 20:05

## 2019-01-13 RX ADMIN — ACETAMINOPHEN 650 MG: 325 TABLET ORAL at 14:08

## 2019-01-13 RX ADMIN — MORPHINE SULFATE 4 MG: 10 INJECTION INTRAVENOUS at 07:45

## 2019-01-13 RX ADMIN — ACETAMINOPHEN 650 MG: 325 TABLET ORAL at 18:00

## 2019-01-13 RX ADMIN — ONDANSETRON 2 MG: 2 INJECTION INTRAMUSCULAR; INTRAVENOUS at 12:53

## 2019-01-13 NOTE — ED NOTES
Pt arrives via EMS c/o left leg/hip pain after GLF. Fall was mechanical. Pt has hx of sx on right hip after a fx. Pt usually uses cane at baseline. Pt A&Ox4. Will continue to monitor. Call bell within reach.

## 2019-01-13 NOTE — CONSULTS
ORTHOPAEDIC CONSULT NOTE    Subjective:     Date of Consultation:  January 13, 2019      Delano Tripp is a 80 y.o. female who is being seen for L hip pain s/p GLF this AM. Pt lives at home and is independent with ADLs. Work up has reveled a left intertroch fx. Pt's family at bedside during exam. Plan of care discussed with pt and family, all questions/concerns addressed and pt and family verbalized understanding of plan of care. Patient Active Problem List    Diagnosis Date Noted    Diabetes ([address] 01/13/2019    Femur fracture, left ([address] 01/13/2019    Complex partial seizure 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 seizure ([address] 04/21/2017    Closed right hip fracture ([address] 08/17/2016    Lumbar post-laminectomy syndrome 05/11/2015    Hypothyroid 05/11/2015    H/O meningioma of the brain, left brain meningioma 05/11/2015     Family History   Problem Relation Age of Onset    Cancer Mother         breast    Alzheimer Mother     Heart Disease Father     Kidney Disease Sister         dialysis    Seizures Child       Social History     Tobacco Use    Smoking status: Never Smoker    Smokeless tobacco: Never Used   Substance Use Topics    Alcohol use: Yes     Comment: glass of wine occasionally     Past Medical History:   Diagnosis Date    Arthritis     Cancer ([address] 2002    brain tumor, benign (meningioma)    Diabetes ([address]     questionable?     Gastrointestinal disorder     diverticulitis    Hypothyroid 5/11/2015    Localization-related (focal) (partial) epilepsy and epileptic syndromes with complex partial seizures, without mention of intractable epilepsy 5/11/2015    Other ill-defined conditions     loss of vision in left eye    Seizures ([address] 2002    caused by brain tumor    Thyroid disease       Past Surgical History:   Procedure Laterality Date    COLORECTAL SCRN; HI RISK IND  8/26/2014         HX BACK SURGERY  1970s    HX ORTHOPAEDIC  1990s    right wrist    NEUROLOGICAL PROCEDURE UNLISTED  2002 brain tumor removed      Prior to Admission medications    Medication Sig Start Date End Date Taking? Authorizing Provider   phenytoin ER (DILANTIN ER) 100 mg ER capsule Take 2 Caps by mouth two (2) times a day. 10/29/18   Gail Culver MD   calcium carbonate (OS-ANGUS) 500 mg calcium (1,250 mg) tablet Take 1 Tab by mouth two (2) times a day. Eloy Hernandez MD   cholecalciferol (VITAMIN D3) 1,000 unit tablet Take 1,000 Units by mouth two (2) times a day. Eloy Hernandez MD   amLODIPine (NORVASC) 5 mg tablet Take 5 mg by mouth daily. Eloy Hernandez MD   cranberry extract 450 mg tab Take 1 Tab by mouth daily. Eloy Hernandez MD   levothyroxine (SYNTHROID) 88 mcg tablet  7/28/15   Eloy Hernandez MD   MULTI-VITAMIN PO Take 1 Cap by mouth daily. Eloy Hernandez MD     Current Facility-Administered Medications   Medication Dose Route Frequency    amLODIPine (NORVASC) tablet 5 mg  5 mg Oral DAILY    calcium carbonate (OS-ANGUS) tablet 500 mg [elemental]  500 mg Oral BID    cholecalciferol (VITAMIN D3) tablet 1,000 Units  1,000 Units Oral BID    . PHARMACY TO SUBSTITUTE PER PROTOCOL (Reordered from: cranberry extract 450 mg tab)    Per Protocol    [START ON 1/14/2019] levothyroxine (SYNTHROID) tablet 88 mcg  88 mcg Oral 6am    . PHARMACY TO SUBSTITUTE PER PROTOCOL (Reordered from: MULTI-VITAMIN PO)    Per Protocol    phenytoin ER (DILANTIN ER) ER capsule 200 mg  200 mg Oral BID    sodium chloride (NS) flush 5-40 mL  5-40 mL IntraVENous Q8H    sodium chloride (NS) flush 5-40 mL  5-40 mL IntraVENous PRN    acetaminophen (TYLENOL) tablet 500 mg  500 mg Oral Q4H PRN    HYDROcodone-acetaminophen (NORCO) 5-325 mg per tablet 1 Tab  1 Tab Oral Q6H PRN    naloxone (NARCAN) injection 0.4 mg  0.4 mg IntraVENous PRN    ondansetron (ZOFRAN) injection 2 mg  2 mg IntraVENous Q6H PRN    bisacodyl (DULCOLAX) tablet 5 mg  5 mg Oral DAILY PRN    nicotine (NICODERM CQ) 7 mg/24 hr patch 1 Patch  1 Patch TransDERmal DAILY PRN    dextrose 5 % - 0.45% NaCl infusion  100 mL/hr IntraVENous CONTINUOUS    0.9% sodium chloride infusion  75 mL/hr IntraVENous CONTINUOUS    sodium chloride (NS) flush 5-40 mL  5-40 mL IntraVENous Q8H    sodium chloride (NS) flush 5-40 mL  5-40 mL IntraVENous PRN    acetaminophen (TYLENOL) tablet 650 mg  650 mg Oral Q6H    oxyCODONE IR (ROXICODONE) tablet 2.5 mg  2.5 mg Oral Q4H PRN    oxyCODONE IR (ROXICODONE) tablet 5 mg  5 mg Oral Q4H PRN    naloxone (NARCAN) injection 0.4 mg  0.4 mg IntraVENous PRN    ondansetron (ZOFRAN) injection 4 mg  4 mg IntraVENous Q6H PRN    senna-docusate (PERICOLACE) 8.6-50 mg per tablet 2 Tab  2 Tab Oral BID    HYDROmorphone (DILAUDID) injection 0.5 mg  0.5 mg IntraVENous Q3H PRN      Allergies   Allergen Reactions    Bactrim [Sulfamethoprim] Unable to Obtain    Penicillins Itching     Patient not sure she is allergic    Sulfa (Sulfonamide Antibiotics) Nausea and Vomiting        Review of Systems:  A comprehensive review of systems was negative except for that written in the HPI. Mental Status: no dementia    Objective:     Patient Vitals for the past 8 hrs:   BP Temp Pulse Resp SpO2 Height Weight   19 1130 158/48 97.5 °F (36.4 °C) (!) 53 16 99 % -- --   19 1045 153/52 -- -- 13 92 % -- --   19 1000 (!) 156/92 -- -- 15 96 % -- --   19 0930 162/49 -- -- 15 94 % -- --   19 0830 147/60 -- -- 17 97 % -- --   19 0715 171/71 -- -- 18 99 % -- --   19 0709 (!) 132/110 97.5 °F (36.4 °C) 70 18 100 % 5' 4\" (1.626 m) 56.7 kg (125 lb)     Temp (24hrs), Av.5 °F (36.4 °C), Min:97.5 °F (36.4 °C), Max:97.5 °F (36.4 °C)      Gen: Well-developed,  in no acute distress   Musc: LLE - in position of comfort, no skin tenting noted or open fracture, NVI    Skin: No skin breakdown noted.  Skin warm, pink, dry  Neuro: Cranial nerves are grossly intact, no focal motor weakness, follows commands appropriately   Psych: Good insight, oriented to person, place and time, alert    Imaging Review:   Final result                Impression:    IMPRESSION: There is an acute, comminuted intertrochanteric\subtrochanteric  fracture; there is one shaft width medial displacement of the distal fracture  fragment and the proximal fracture fragment is angled laterally. The osseous  structures are diffusely demineralized. Narrative:    INDICATION: Trauma. Ground-level fall, left lower extremity pain. AP and lateral views of the left femur.                          Labs:   Recent Results (from the past 24 hour(s))   CBC WITH AUTOMATED DIFF    Collection Time: 01/13/19  7:52 AM   Result Value Ref Range    WBC 5.0 3.6 - 11.0 K/uL    RBC 4.09 3.80 - 5.20 M/uL    HGB 13.6 11.5 - 16.0 g/dL    HCT 40.5 35.0 - 47.0 %    MCV 99.0 80.0 - 99.0 FL    MCH 33.3 26.0 - 34.0 PG    MCHC 33.6 30.0 - 36.5 g/dL    RDW 12.7 11.5 - 14.5 %    PLATELET 548 (L) 061 - 400 K/uL    MPV 12.0 8.9 - 12.9 FL    NRBC 0.0 0  WBC    ABSOLUTE NRBC 0.00 0.00 - 0.01 K/uL    NEUTROPHILS 74 32 - 75 %    LYMPHOCYTES 17 12 - 49 %    MONOCYTES 8 5 - 13 %    EOSINOPHILS 0 0 - 7 %    BASOPHILS 0 0 - 1 %    IMMATURE GRANULOCYTES 0 0.0 - 0.5 %    ABS. NEUTROPHILS 3.7 1.8 - 8.0 K/UL    ABS. LYMPHOCYTES 0.9 0.8 - 3.5 K/UL    ABS. MONOCYTES 0.4 0.0 - 1.0 K/UL    ABS. EOSINOPHILS 0.0 0.0 - 0.4 K/UL    ABS. BASOPHILS 0.0 0.0 - 0.1 K/UL    ABS. IMM.  GRANS. 0.0 0.00 - 0.04 K/UL    DF AUTOMATED     METABOLIC PANEL, COMPREHENSIVE    Collection Time: 01/13/19  7:52 AM   Result Value Ref Range    Sodium 139 136 - 145 mmol/L    Potassium 4.1 3.5 - 5.1 mmol/L    Chloride 105 97 - 108 mmol/L    CO2 28 21 - 32 mmol/L    Anion gap 6 5 - 15 mmol/L    Glucose 119 (H) 65 - 100 mg/dL    BUN 14 6 - 20 MG/DL    Creatinine 0.73 0.55 - 1.02 MG/DL    BUN/Creatinine ratio 19 12 - 20      GFR est AA >60 >60 ml/min/1.73m2    GFR est non-AA >60 >60 ml/min/1.73m2    Calcium 9.1 8.5 - 10.1 MG/DL    Bilirubin, total 0.6 0.2 - 1.0 MG/DL    ALT (SGPT) 20 12 - 78 U/L    AST (SGOT) 19 15 - 37 U/L    Alk.  phosphatase 89 45 - 117 U/L    Protein, total 7.3 6.4 - 8.2 g/dL    Albumin 3.8 3.5 - 5.0 g/dL    Globulin 3.5 2.0 - 4.0 g/dL    A-G Ratio 1.1 1.1 - 2.2     PROTHROMBIN TIME + INR    Collection Time: 01/13/19  7:52 AM   Result Value Ref Range    INR 1.0 0.9 - 1.1      Prothrombin time 10.4 9.0 - 11.1 sec   TYPE & SCREEN    Collection Time: 01/13/19  7:52 AM   Result Value Ref Range    Crossmatch Expiration 01/16/2019     ABO/Rh(D) O NEGATIVE     Antibody screen NEG    TSH 3RD GENERATION    Collection Time: 01/13/19  7:52 AM   Result Value Ref Range    TSH 2.21 0.36 - 3.74 uIU/mL   EKG, 12 LEAD, INITIAL    Collection Time: 01/13/19  9:05 AM   Result Value Ref Range    Ventricular Rate 53 BPM    Atrial Rate 53 BPM    P-R Interval 134 ms    QRS Duration 76 ms    Q-T Interval 446 ms    QTC Calculation (Bezet) 418 ms    Calculated P Axis 70 degrees    Calculated R Axis -42 degrees    Calculated T Axis 83 degrees    Diagnosis       Sinus bradycardia  Left axis deviation  When compared with ECG of 17-AUG-2016 12:32,  QT has shortened     URINALYSIS W/ REFLEX CULTURE    Collection Time: 01/13/19  9:45 AM   Result Value Ref Range    Color YELLOW/STRAW      Appearance CLOUDY (A) CLEAR      Specific gravity 1.010 1.003 - 1.030      pH (UA) 8.0 5.0 - 8.0      Protein TRACE (A) NEG mg/dL    Glucose NEGATIVE  NEG mg/dL    Ketone NEGATIVE  NEG mg/dL    Bilirubin NEGATIVE  NEG      Blood NEGATIVE  NEG      Urobilinogen 0.2 0.2 - 1.0 EU/dL    Nitrites POSITIVE (A) NEG      Leukocyte Esterase NEGATIVE  NEG      WBC 0-4 0 - 4 /hpf    RBC 0-5 0 - 5 /hpf    Epithelial cells FEW FEW /lpf    Bacteria 4+ (A) NEG /hpf    UA:UC IF INDICATED URINE CULTURE ORDERED (A) CNI      Hyaline cast 0-2 0 - 5 /lpf         Impression:     Patient Active Problem List    Diagnosis Date Noted    Diabetes ([address] 01/13/2019    Femur fracture, left ([address] 01/13/2019    Complex partial seizure 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 seizure ([facility] 04/21/2017    Closed right hip fracture ([facility] 08/17/2016    Lumbar post-laminectomy syndrome 05/11/2015    Hypothyroid 05/11/2015    H/O meningioma of the brain, left brain meningioma 05/11/2015     Principal Problem:    Femur fracture, left ([address] (1/13/2019)    Active Problems:    Hypothyroid (5/11/2015)      H/O meningioma of the brain, left brain meningioma (5/11/2015)      Complex partial seizure 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 seizure ([address] (4/21/2017)      Diabetes ([facility] (1/13/2019)      Overview: questionable? Plan:   -  Plan for TFN of femur fracture with Dr. Ester Walters in AM  -  NPO after midnight, bedrest, ortiz, IVF, pain management  -  Pt cleared per medicine   -  DVT Prophylaxis - SCDs pre op    Wilfred Mesa, NP  Orthopedic Nurse Practitioner   Karel Baker     This patient was seen and examined by be me personally with the findings as documented above by the NP/PA with the following changes/additions:    NONE - sig displaced L inter/sub troch hip fracture - plan for IMN. All pertinent medical history, medications, and test results and imaging were reviewed by me personally. Plan for treatment is as described and formulated by me after discussion with the patient and family personally.

## 2019-01-13 NOTE — H&P
History and Physical          Pt Name  Sonia Rizzo   Date of Birth 9/5/1932   Medical Record Number  [id number]      Age  80 y.o. PCP Travis Miller MD   Admit date:  1/13/2019    Room Number  ER25/25  @ [facility]   Date of Service  1/13/2019     Admission Diagnoses:  Femur fracture, left [facility])          Pre-op cardiac risk assessment:     PROCEED  with surgery without further cardiac testing if the following risk is acceptable by surgical team and/or anesthesiologist.            Revised Cardiac Risk Index   Type of Surgery Low      Calculated cardiovascular risk   Low     Risk of Major complications 1.4%   Patient/Family discussion This risk has been discussed with the patient and/or family   The patient/family wish to proceed. Other Further risk reduction will involve medical management of other comorbid conditions in the perioperative period. 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 · Admit to remote tele bed   · See above Pre-operative risk evaluation   · Stat Orthopedic consultation requested by ER doctors   · Bed rest   · IVF   · NPO   · IV pain medications   · Standard femur repair orders per orthopedic surgery protocol     Bradycardia -the pt was seen and evaluated by Dr. Merlinda Ferrara last year. Pt is completely asymptomatic.    · Monitor on remote tele bed   · Check TSH     Remote history of Meningioma s/p resection in 2002   Remote history of seizure from tumor -pt remains seizure free since 2002 - follows regularly with Dr. Alesia Larsen   · Continue phenytoin as was PTA   · Check level     Generalized muscle weakness -pt went through OP rehab at [facility]   · We will have PT OT evaluate her after repair of her femur     Hypothyroid -  · No change     HTN -  · Continue Norvasc as was PTA     Body mass index is 21.46 kg/m². -       CODE STATUS  Full         Functional Status  Pt lives with her son   She is able to ambulate with a bradley at home   She has had previous falls, and her Right femur was repaired about two years ago. Surrogate decision maker: Pt's son    Prophylaxis  Defer to orthopedic team; for now we will place her on SCDs    Discharge Plan: SNF/LTC,    There are currently no Active Isolations [place]or: [place] MED[place]ARE / Plan: [place] MEDICARE PART A & B / Product Type: Medicare /    Social issues  Date Comment    01/13/19   10:30 AM  I met with pt's son in the ER room who provided most of the clinical information and participated in plan of care. Prognosis  Fair      Subjective Data         History of Present Illness : The pt was in her usual state of health. Early this morning she went outside to help take care of her plans in the patio. She returned without any problem, but soon after she lost her balance and sustained a ground level fall in the living room. She didn't lose consciousness. This was a clear mechanical loss of balance leading to the fall.       Review of Systems - History obtained from child and the patient  General ROS: negative for - chills, fatigue, fever, weight gain or weight loss  Psychological ROS: negative  Ophthalmic ROS: negative for - blurry vision or decreased vision  Respiratory ROS: no cough, shortness of breath, or wheezing  Cardiovascular ROS: no chest pain or dyspnea on exertion  Gastrointestinal ROS: no abdominal pain, change in bowel habits, or black or bloody stools  Genito-Urinary ROS: no dysuria, trouble voiding, or hematuria  Musculoskeletal ROS: positive for - gait disturbance and muscular weakness; new hip pain obviously   negative for - joint swelling  Neurological ROS: no TIA or stroke symptoms  ROS       Past Medical History:   Diagnosis Date    Arthritis     [facility] 2002    brain tumor, benign (meningioma)    Diabetes ([facility]     questionable?  Gastrointestinal disorder     diverticulitis    Hypothyroid 5/11/2015    Localization-related (focal) (partial) epilepsy and epileptic syndromes with complex partial seizures, without mention of intractable epilepsy 5/11/2015    Other ill-defined conditions     loss of vision in left eye    Seizures ([facility] [facility] 2002    caused by brain tumor    Thyroid disease          Past Surgical History:   Procedure Laterality Date    COLORECTAL SCRN; HI RISK IND  8/26/2014         HX BACK SURGERY  1970s    HX ORTHOPAEDIC  1990s    right wrist    NEUROLOGICAL PROCEDURE UNLISTED  2002    brain tumor removed         Social History     Tobacco Use    Smoking status: Never Smoker    Smokeless tobacco: Never Used   Substance Use Topics    Alcohol use: Yes     Comment: glass of wine occasionally         Family History   Problem Relation Age of Onset    Cancer Mother         breast    Alzheimer Mother     Heart Disease Father     Kidney Disease Sister         dialysis    Seizures Child               Objective data     Comment:  elderly lady lying on her right lateral side; eyes closed. Partially sedated from recent pain medications   Her son is in the room      Patient Vitals for the past 24 hrs:   Temp   01/13/19 0709 97.5 °F (36.4 °C)      Patient Vitals for the past 24 hrs:   Pulse   01/13/19 0709 70      Patient Vitals for the past 24 hrs:   Resp   01/13/19 0830 17   01/13/19 0715 18   01/13/19 0709 18      Patient Vitals for the past 24 hrs:   BP   01/13/19 0830 147/60   01/13/19 0715 171/71   01/13/19 0709 (!) 132/110        SpO2 Readings from Last 6 Encounters:   01/13/19 97%   04/21/17 96%   08/20/16 90%   05/11/16 99%   10/14/15 99%   07/30/15 98%         O2 Device: Room air   Body mass index is 21.46 kg/m².  - Wt Readings from Last 10 Encounters: 01/13/19 56.7 kg (125 lb)   04/21/17 58.5 kg (129 lb)   08/17/16 61.7 kg (136 lb)   05/11/16 63 kg (138 lb 14.2 oz)   10/14/15 64.7 kg (142 lb 9.6 oz)   07/30/15 62.9 kg (138 lb 10.7 oz)   05/11/15 61.2 kg (135 lb)   08/26/14 61.3 kg (135 lb 4 oz)   06/11/12 61.6 kg (135 lb 12.9 oz)   12/15/10 61.4 kg (135 lb 5.8 oz)          Physical Exam:             General:  Cooperative,   well noursished,   well developed,   appears stated age    Ears/Eyes:  Hearing intact  Sclera anicteric. Pupils equal   Mouth/Throat:  Mucous membranes normal pink and dry   Oral pharynx clear    Neck:     Lungs:  Trachea midline  Chest excursion symmetrical   Auscultation B/L Symmetrical with   Vesicular breath sounds     No Crepitations noted   Percussion note resonant on mid Clavicular line; no sign of pneumothorax        CVS:  Regular rate and rhythm   no  murmur,   No click, rub or gallop  S1 normal   S2 normal   Pedal pulses  b/l symmetrical   Abdomen:    Soft, non-tender  Bowel sounds normal  No distension   Percussion note tympanitic   Extremities:    No cyanosis, jaundice  Trace  edema noted on both legs   No sign of DVT/cord like lesion on palpation  No sign of acute trauma   Age appropriate OA changes noted. Skin:    Skin color, texture, turgor normal. no acute rash or lesions    Lymph nodes:     Musculoskeletal I didn't move her lower extremities due to excruciating pain. Muscle bulk B/L symmetrical   Neuro Cranial nerves are intact,   motor movement b/l symmetrical,   Sensory evaluation b/l symmetrical    Psych:  Alert and oriented, normal mood & affect given the clinical scenario          Medications reviewed     No current facility-administered medications for this encounter. Current Outpatient Medications   Medication Sig    phenytoin ER (DILANTIN ER) 100 mg ER capsule Take 2 Caps by mouth two (2) times a day.  calcium carbonate (OS-ANGUS) 500 mg calcium (1,250 mg) tablet Take 1 Tab by mouth two (2) times a day.  cholecalciferol (VITAMIN D3) 1,000 unit tablet Take 1,000 Units by mouth two (2) times a day.  amLODIPine (NORVASC) 5 mg tablet Take 5 mg by mouth daily.  cranberry extract 450 mg tab Take 1 Tab by mouth daily.  levothyroxine (SYNTHROID) 88 mcg tablet     MULTI-VITAMIN PO Take 1 Cap by mouth daily. Relevant other informations: Other medical conditions listed in this following active hospital problem list section; all of these and other pertinent data were taken into consideration when treatment plan is developed and customized to this patient's unique overall circumstances and needs. We have reviewed available old medical records within the constraints of this admission process. 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 [facility]   H/O meningioma of the brain, left brain meningioma   Hypothyroid   Diabetes ([facility]   [facility]       Data Review:   Recent Days:  All Micro Results     Procedure Component Value Units Date/Time    CULTURE, URINE [[id number]] Collected:  01/13/19 0945    Order Status:  Completed Updated:  01/13/19 1002          Recent Labs     01/13/19  0752   WBC 5.0   HGB 13.6   HCT 40.5   *     Recent Labs     01/13/19  0752      K 4.1      CO2 28   *   BUN 14   CREA 0.73   CA 9.1   ALB 3.8   TBILI 0.6   SGOT 19   ALT 20   INR 1.0      No results found for: TSH, TSHEXT, TSHEXT      Care Plan discussed with: Patient/Family and Nurse   Other medical conditions are listed in the active hospital problem list section; these and other pertinent data were taken into consideration when the treatment plan was developed and customized to this patient's unique overall circumstances and needs. High complexity decision making was performed for this patient who is at high risk for decompensation with multiple organ involvement.      Today total floor/unit time was 65 minutes while caring for this patient and greater than 50% of that time was spent with patient (and/or family) coordinating patients clinical issues.      Polly Mcwilliams MD MPH  FACP                               1/13/2019       __________________________________________________________   FOR SOUND PHYSICIAN ADMINISTRATIVE USE ONLY   MDM       INPT 223     Matty Villanueva MD MPH FACP   10:21 AM  1/13/2019

## 2019-01-13 NOTE — ED PROVIDER NOTES
EMERGENCY DEPARTMENT HISTORY AND PHYSICAL EXAM      Date: 1/13/2019  Patient Name: Hao Mullen    History of Presenting Illness     Chief Complaint   Patient presents with    Fall     possible left femur/hip fx; pt fell in living room; pt denies syncope; was mechanical fall       History Provided By: Patient    HPI: Hao Mullen, 80 y.o. female with PMHx significant for DM, seizures, meningioma, R hip arthroplasty presents via EMS to the ED with cc of sudden onset, persistent left hip pain s/p GLF this AM. Pt states she was on her porch this morning to pull her flower arrangements and did not realize there was snow on the porch, causing pt to loose her balance and fall onto her left side. Pt denies hitting her head or LOC. She specifically denies any weakness, tingling, or numbness. There are no other complaints, changes, or physical findings at this time. PCP: Angeline Kaur MD    No current facility-administered medications on file prior to encounter. Current Outpatient Medications on File Prior to Encounter   Medication Sig Dispense Refill    phenytoin ER (DILANTIN ER) 100 mg ER capsule Take 2 Caps by mouth two (2) times a day. 360 Cap 1    calcium carbonate (OS-ANGUS) 500 mg calcium (1,250 mg) tablet Take 1 Tab by mouth two (2) times a day.  cholecalciferol (VITAMIN D3) 1,000 unit tablet Take 1,000 Units by mouth two (2) times a day.  amLODIPine (NORVASC) 5 mg tablet Take 5 mg by mouth daily.  cranberry extract 450 mg tab Take 1 Tab by mouth daily.  levothyroxine (SYNTHROID) 88 mcg tablet       MULTI-VITAMIN PO Take 1 Cap by mouth daily. Past History     Past Medical History:  Past Medical History:   Diagnosis Date    Arthritis     Cancer ([facility] [address] 2002    brain tumor, benign (meningioma)    Diabetes ([facility]     questionable?     Gastrointestinal disorder     diverticulitis    Hypothyroid 5/11/2015    Localization-related (focal) (partial) epilepsy and epileptic syndromes with complex partial seizures, without mention of intractable epilepsy 5/11/2015    Other ill-defined conditions     loss of vision in left eye    Seizures ([facility] [address] 2002    caused by brain tumor    Thyroid disease        Past Surgical History:  Past Surgical History:   Procedure Laterality Date    COLORECTAL SCRN; HI RISK IND  8/26/2014         HX BACK SURGERY  1970s    HX ORTHOPAEDIC  1990s    right wrist    NEUROLOGICAL PROCEDURE UNLISTED  2002    brain tumor removed       Family History:  Family History   Problem Relation Age of Onset    Cancer Mother         breast    Alzheimer Mother     Heart Disease Father     Kidney Disease Sister         dialysis    Seizures Child        Social History:  Social History     Tobacco Use    Smoking status: Never Smoker    Smokeless tobacco: Never Used   Substance Use Topics    Alcohol use: Yes     Comment: glass of wine occasionally    Drug use: No       Allergies: Allergies   Allergen Reactions    Bactrim [Sulfamethoprim] Unable to Obtain    Penicillins Itching     Patient not sure she is allergic    Sulfa (Sulfonamide Antibiotics) Nausea and Vomiting         Review of Systems   Review of Systems   Constitutional: Negative. Negative for chills, diaphoresis and fever. HENT: Negative. Negative for congestion, sore throat and trouble swallowing. Eyes: Negative. Negative for photophobia, pain and redness. Respiratory: Negative. Negative for cough, chest tightness, shortness of breath and wheezing. Cardiovascular: Negative. Negative for chest pain and palpitations. Gastrointestinal: Negative. Negative for abdominal pain, blood in stool, constipation, diarrhea, nausea and vomiting. Genitourinary: Negative for difficulty urinating, dysuria and frequency. Musculoskeletal: Positive for arthralgias (L hip). Negative for myalgias, neck pain and neck stiffness. Skin: Negative. Neurological: Negative.   Negative for dizziness, tremors, seizures, syncope, speech difficulty, weakness, light-headedness, numbness and headaches. - LOC   Psychiatric/Behavioral: Negative. Negative for confusion. The patient is not nervous/anxious. All other systems reviewed and are negative. Physical Exam   Physical Exam   Constitutional: She is oriented to person, place, and time. She appears well-developed and well-nourished. Patient laying on right side in fetal position   HENT:   Head: Normocephalic and atraumatic. Eyes: Conjunctivae and EOM are normal.   Neck: Normal range of motion. Neck supple. Cardiovascular: Normal rate and regular rhythm. Pulmonary/Chest: Effort normal and breath sounds normal. No respiratory distress. Abdominal: Soft. She exhibits no distension. There is no tenderness. Musculoskeletal:   Swelling to left thigh with obvious deformity. Unable to move left hip   Neurological: She is alert and oriented to person, place, and time. Skin: Skin is warm and dry. Psychiatric: She has a normal mood and affect. Nursing note and vitals reviewed. Diagnostic Study Results     Labs -     Recent Results (from the past 12 hour(s))   CBC WITH AUTOMATED DIFF    Collection Time: 01/13/19  7:52 AM   Result Value Ref Range    WBC 5.0 3.6 - 11.0 K/uL    RBC 4.09 3.80 - 5.20 M/uL    HGB 13.6 11.5 - 16.0 g/dL    HCT 40.5 35.0 - 47.0 %    MCV 99.0 80.0 - 99.0 FL    MCH 33.3 26.0 - 34.0 PG    MCHC 33.6 30.0 - 36.5 g/dL    RDW 12.7 11.5 - 14.5 %    PLATELET 063 (L) 345 - 400 K/uL    MPV 12.0 8.9 - 12.9 FL    NRBC 0.0 0  WBC    ABSOLUTE NRBC 0.00 0.00 - 0.01 K/uL    NEUTROPHILS 74 32 - 75 %    LYMPHOCYTES 17 12 - 49 %    MONOCYTES 8 5 - 13 %    EOSINOPHILS 0 0 - 7 %    BASOPHILS 0 0 - 1 %    IMMATURE GRANULOCYTES 0 0.0 - 0.5 %    ABS. NEUTROPHILS 3.7 1.8 - 8.0 K/UL    ABS. LYMPHOCYTES 0.9 0.8 - 3.5 K/UL    ABS. MONOCYTES 0.4 0.0 - 1.0 K/UL    ABS. EOSINOPHILS 0.0 0.0 - 0.4 K/UL    ABS. BASOPHILS 0.0 0.0 - 0.1 K/UL    ABS. IMM. GRANS. 0.0 0.00 - 0.04 K/UL    DF AUTOMATED     METABOLIC PANEL, COMPREHENSIVE    Collection Time: 01/13/19  7:52 AM   Result Value Ref Range    Sodium 139 136 - 145 mmol/L    Potassium 4.1 3.5 - 5.1 mmol/L    Chloride 105 97 - 108 mmol/L    CO2 28 21 - 32 mmol/L    Anion gap 6 5 - 15 mmol/L    Glucose 119 (H) 65 - 100 mg/dL    BUN 14 6 - 20 MG/DL    Creatinine 0.73 0.55 - 1.02 MG/DL    BUN/Creatinine ratio 19 12 - 20      GFR est AA >60 >60 ml/min/1.73m2    GFR est non-AA >60 >60 ml/min/1.73m2    Calcium 9.1 8.5 - 10.1 MG/DL    Bilirubin, total 0.6 0.2 - 1.0 MG/DL    ALT (SGPT) 20 12 - 78 U/L    AST (SGOT) 19 15 - 37 U/L    Alk. phosphatase 89 45 - 117 U/L    Protein, total 7.3 6.4 - 8.2 g/dL    Albumin 3.8 3.5 - 5.0 g/dL    Globulin 3.5 2.0 - 4.0 g/dL    A-G Ratio 1.1 1.1 - 2.2     PROTHROMBIN TIME + INR    Collection Time: 01/13/19  7:52 AM   Result Value Ref Range    INR 1.0 0.9 - 1.1      Prothrombin time 10.4 9.0 - 11.1 sec   EKG, 12 LEAD, INITIAL    Collection Time: 01/13/19  9:05 AM   Result Value Ref Range    Ventricular Rate 53 BPM    Atrial Rate 53 BPM    P-R Interval 134 ms    QRS Duration 76 ms    Q-T Interval 446 ms    QTC Calculation (Bezet) 418 ms    Calculated P Axis 70 degrees    Calculated R Axis -42 degrees    Calculated T Axis 83 degrees    Diagnosis       Sinus bradycardia  Left axis deviation  When compared with ECG of 17-AUG-2016 12:32,  QT has shortened         Radiologic Studies -   XR FEMUR LT 2 V   Final Result   IMPRESSION: There is an acute, comminuted intertrochanteric\subtrochanteric   fracture; there is one shaft width medial displacement of the distal fracture   fragment and the proximal fracture fragment is angled laterally. The osseous   structures are diffusely demineralized. XR CHEST SNGL V   Final Result   IMPRESSION: Grossly clear lungs.          XR PELV AP ONLY    (Results Pending)     CXR Results  (Last 48 hours)               01/13/19 0854  XR CHEST SNGL V Final result Impression:  IMPRESSION: Grossly clear lungs. Narrative:  INDICATION: Preop. Fall. Portable AP views of the chest.       Direct comparison made to prior chest x-ray dated June 2017. Cardiomediastinal silhouette is stable, given the degree of patient rotation. The lungs are grossly clear bilaterally. No pleural fluid is visualized. There   is no pneumothorax. Osseous structures are diffusely demineralized. Medical Decision Making   I am the first provider for this patient. I reviewed the vital signs, available nursing notes, past medical history, past surgical history, family history and social history. Vital Signs-Reviewed the patient's vital signs. Patient Vitals for the past 12 hrs:   Temp Pulse Resp BP SpO2   01/13/19 0830 -- -- 17 147/60 97 %   01/13/19 0715 -- -- 18 171/71 99 %   01/13/19 0709 97.5 °F (36.4 °C) 70 18 (!) 132/110 100 %       Pulse Oximetry Analysis - 99% on RA    Cardiac Monitor:   Rate: 70 bpm  Rhythm: Normal Sinus Rhythm      EKG interpretation: (Preliminary) 0905  Rhythm: sinus bradycardia; and regular . Rate (approx.): 53; Axis: normal; OR interval: normal; QRS interval: normal ; ST/T wave: normal  Written by Ann Banks ED scribe, as dictated by Salazar Newman M.D    Records Reviewed: Nursing Notes, Old Medical Records and Ambulance Run Sheet    Provider Notes (Medical Decision Making):   Patient presents after fall with left hip pain. Will get imaging to further evaluate for fracture vs. Dislocation vs. Contusion. Will treat with analgesics at this time and continue to monitor for changes in mentation. I have discussed with the patient how to reduce likelihood of falling at home by removing throw rugs, loose wires or other objects from floor, installing hand-rails in bathrooms, tubs and halls, and leaving some lights on at night. ED Course:   Initial assessment performed.  The patients presenting problems have been discussed, and they are in agreement with the care plan formulated and outlined with them. I have encouraged them to ask questions as they arise throughout their visit. Medications   morphine 10 mg/ml injection 4 mg (4 mg IntraMUSCular Given 1/13/19 6876)   HYDROmorphone (PF) (DILAUDID) injection 0.5 mg (0.5 mg IntraVENous Given 1/13/19 5127)     CONSULT NOTE:   9:09 AM  Severiano Shim, M.D spoke with Sarah Hall NP   Specialty: ortho  Discussed pt's hx, disposition, and available diagnostic and imaging results. Reviewed care plans. Consultant agrees with plans as outlined. Sarah Hall NP states to admit pt to medicine and she will come evaluate. Written by COLETTE Call, as dictated by Severiano Shim, M.D.    Kassi Santana NOTE:   Carlos Eduardo Rangel M.D spoke with Malika Hargrove MD   Specialty: Hospitalist  Discussed pt's hx, disposition, and available diagnostic and imaging results. Reviewed care plans. Consultant will evaluate pt for admission. Written by COLETTE Call, as dictated by Severiano Shim, M.D. Critical Care Time: 0    Disposition:  ADMIT NOTE:  9:16 AM  The patient is being admitted to the hospital by Malika Hargrove MD.  The results of their tests and reasons for their admission have been discussed with the patient and/or available family. They convey agreement and understanding for the need to be admitted and for their admission diagnosis. PLAN:  1. Admit to hospitalist     Diagnosis     Clinical Impression:   1. Closed fracture of left hip, initial encounter [facility])        Attestations: This note is prepared by Mustapha Gonzales, acting as Scribe for Severiano Shim, MD Severiano Shim, MD: The scribe's documentation has been prepared under my direction and personally reviewed by me in its entirety. I confirm that the note above accurately reflects all work, treatment, procedures, and medical decision making performed by me.

## 2019-01-13 NOTE — PROGRESS NOTES
Reason for Admission:  Femur fracture, left                     RRAT Score: 10                    Plan for utilizing home health: Pending PT/OT evaluation post-operation  - HH vs SNF vs Inpatient Rehab                       Likelihood of Readmission: Low                         Transition of Care Plan:  Pending PT/OT evaluation post-operation, follow-up care appointments    Pt is an 81 yo  female admitted on 1/13/19 for a Left Femur Fracture. Pt reportedly had gone out on the front deck, slipped on slow/ice and fell. Pt lives in a one-level house (4 ANDREW front, 5 ANDREW back) in [place] with her son Drew Wei and daughter Torri Wilson. DTR is able to provide 24/7 supervision, son reported he's home \"90% of the time\" outside of work. PTA, pt independent in ADLs/IADLs to include driving. Pt has hx of [facility] services ([facility]) and Outpatient therapy ([facility]), no reported hx of SNF, acute inpatient rehab. Pt has a cane, crutches, RW, and BSC at home. Pt's family able to provide transportation at discharge pending progress/dispostion (possible BLS). Pt's family to provide transportation to follow-up care appointments. Pt's referred Rx is [facility] ([address]), however son informed CM that particular pharmacy will be closing down on 2/13/19 (will transfer to another [place] [address] on [address]). CM met with pt to verify demographic info and complete initial assessment, dc planning. Pt currently asleep in room, son is at bedside. Pt scheduled for surgery tomorrow. Pt sees Dr. Camelia Mooney (PCP) and Dr. Christofer Parson (Neurology) outpatient. PT/OT consults pending at this time, to evaluate after surgery for any discharge needs. Pt's son reported that they would prefer for her to discharge home if possible, will have 24/7 supervision. CM will continue to follow-up to ensure additional CM needs are met.                   Care Management Interventions  PCP Verified by CM: Yes  Palliative Care Criteria Met (RRAT>21 & CHF Dx)?: No  Mode of Transport at Discharge:  Other (see comment)(BLS vs by private vehicle with family pending progress)  Transition of Care Consult (CM Consult): Discharge Planning  Discharge Durable Medical Equipment: No(Cane, crutches, RW, BSC at home)  Health Maintenance Reviewed: Yes  Physical Therapy Consult: Yes  Occupational Therapy Consult: Yes  Speech Therapy Consult: No  Current Support Network: Lives with Caregiver, Family Lives [place], Other(One-level house (4 ANDREW front, 5 ANDREW back) with son and DTR, DTR able to provide 24/7 supervision)  Confirm Follow Up Transport: Family  Plan discussed with Pt/Family/Caregiver: Yes  Discharge Location  Discharge Placement: (TBD)    CHARMAINE Thao Supervisee in Social Work, [address] [place] [address]  [phone]

## 2019-01-13 NOTE — ED NOTES
TRANSFER - OUT REPORT:    Verbal report given to Javier Henderson RN (name) on Hao Mullen  being transferred to [facility] (unit) for routine progression of care       Report consisted of patients Situation, Background, Assessment and   Recommendations(SBAR). Information from the following report(s) SBAR was reviewed with the receiving nurse. Lines:   Peripheral IV 01/13/19 Left Antecubital (Active)   Site Assessment Clean, dry, & intact 1/13/2019  8:01 AM   Phlebitis Assessment 0 1/13/2019  8:01 AM   Infiltration Assessment 0 1/13/2019  8:01 AM   Dressing Status Clean, dry, & intact 1/13/2019  8:01 AM        Opportunity for questions and clarification was provided.

## 2019-01-13 NOTE — PROGRESS NOTES
TRANSFER - IN REPORT:    Verbal report received from DayanRN(name) on Rosana Coleman  being received from ER(unit) for routine progression of care      Report consisted of patients Situation, Background, Assessment and   Recommendations(SBAR). Information from the following report(s) SBAR, Kardex, ED Summary, Intake/Output, MAR and Recent Results was reviewed with the receiving nurse. Opportunity for questions and clarification was provided. Assessment completed upon patients arrival to unit and care assumed.

## 2019-01-13 NOTE — ED NOTES
Pt is requesting to be \"left alone for a little while\" after receiving her pain medications-radiology is aware. Will continue to monitor.

## 2019-01-13 NOTE — ED NOTES
Bedside shift change report given to Yasmine Rojas RN (oncoming nurse) by Ani Miguel RN (offgoing nurse). Report included the following information SBAR, Kardex, ED Summary, Intake/Output, MAR and Recent Results.

## 2019-01-14 ENCOUNTER — ANESTHESIA EVENT (OUTPATIENT)
Dept: SURGERY | Age: 84
DRG: 481 | End: 2019-01-14
Payer: MEDICARE

## 2019-01-14 ENCOUNTER — ANESTHESIA (OUTPATIENT)
Dept: SURGERY | Age: 84
DRG: 481 | End: 2019-01-14
Payer: MEDICARE

## 2019-01-14 ENCOUNTER — APPOINTMENT (OUTPATIENT)
Dept: GENERAL RADIOLOGY | Age: 84
DRG: 481 | End: 2019-01-14
Attending: ORTHOPAEDIC SURGERY
Payer: MEDICARE

## 2019-01-14 LAB
ANION GAP SERPL CALC-SCNC: 9 MMOL/L (ref 5–15)
BUN SERPL-MCNC: 17 MG/DL (ref 6–20)
BUN/CREAT SERPL: 25 (ref 12–20)
CALCIUM SERPL-MCNC: 9.4 MG/DL (ref 8.5–10.1)
CHLORIDE SERPL-SCNC: 105 MMOL/L (ref 97–108)
CO2 SERPL-SCNC: 22 MMOL/L (ref 21–32)
CREAT SERPL-MCNC: 0.68 MG/DL (ref 0.55–1.02)
GLUCOSE SERPL-MCNC: 116 MG/DL (ref 65–100)
POTASSIUM SERPL-SCNC: 4.5 MMOL/L (ref 3.5–5.1)
SODIUM SERPL-SCNC: 136 MMOL/L (ref 136–145)

## 2019-01-14 PROCEDURE — 0QS736Z REPOSITION LEFT UPPER FEMUR WITH INTRAMEDULLARY INTERNAL FIXATION DEVICE, PERCUTANEOUS APPROACH: ICD-10-PCS | Performed by: ORTHOPAEDIC SURGERY

## 2019-01-14 PROCEDURE — 74011250637 HC RX REV CODE- 250/637: Performed by: NURSE PRACTITIONER

## 2019-01-14 PROCEDURE — C1713 ANCHOR/SCREW BN/BN,TIS/BN: HCPCS | Performed by: ORTHOPAEDIC SURGERY

## 2019-01-14 PROCEDURE — 74011250636 HC RX REV CODE- 250/636

## 2019-01-14 PROCEDURE — 74011000250 HC RX REV CODE- 250

## 2019-01-14 PROCEDURE — 36415 COLL VENOUS BLD VENIPUNCTURE: CPT

## 2019-01-14 PROCEDURE — 76060000034 HC ANESTHESIA 1.5 TO 2 HR: Performed by: ORTHOPAEDIC SURGERY

## 2019-01-14 PROCEDURE — 76210000017 HC OR PH I REC 1.5 TO 2 HR: Performed by: ORTHOPAEDIC SURGERY

## 2019-01-14 PROCEDURE — 76010000153 HC OR TIME 1.5 TO 2 HR: Performed by: ORTHOPAEDIC SURGERY

## 2019-01-14 PROCEDURE — 74011250636 HC RX REV CODE- 250/636: Performed by: PHYSICIAN ASSISTANT

## 2019-01-14 PROCEDURE — 65270000029 HC RM PRIVATE

## 2019-01-14 PROCEDURE — 73552 X-RAY EXAM OF FEMUR 2/>: CPT

## 2019-01-14 PROCEDURE — 77030020788: Performed by: ORTHOPAEDIC SURGERY

## 2019-01-14 PROCEDURE — 77030018846 HC SOL IRR STRL H20 ICUM -A: Performed by: ORTHOPAEDIC SURGERY

## 2019-01-14 PROCEDURE — C1769 GUIDE WIRE: HCPCS | Performed by: ORTHOPAEDIC SURGERY

## 2019-01-14 PROCEDURE — 77030031139 HC SUT VCRL2 J&J -A: Performed by: ORTHOPAEDIC SURGERY

## 2019-01-14 PROCEDURE — 76000 FLUOROSCOPY <1 HR PHYS/QHP: CPT

## 2019-01-14 PROCEDURE — 74011250637 HC RX REV CODE- 250/637: Performed by: INTERNAL MEDICINE

## 2019-01-14 PROCEDURE — 77030032490 HC SLV COMPR SCD KNE COVD -B: Performed by: ORTHOPAEDIC SURGERY

## 2019-01-14 PROCEDURE — 77030008467 HC STPLR SKN COVD -B: Performed by: ORTHOPAEDIC SURGERY

## 2019-01-14 PROCEDURE — 77030014405 HC GD ROD RMR SYNT -C: Performed by: ORTHOPAEDIC SURGERY

## 2019-01-14 PROCEDURE — 80048 BASIC METABOLIC PNL TOTAL CA: CPT

## 2019-01-14 PROCEDURE — 77030020782 HC GWN BAIR PAWS FLX 3M -B

## 2019-01-14 PROCEDURE — 74011000258 HC RX REV CODE- 258

## 2019-01-14 PROCEDURE — 77030022704 HC SUT VLOC COVD -B: Performed by: ORTHOPAEDIC SURGERY

## 2019-01-14 PROCEDURE — 74011250636 HC RX REV CODE- 250/636: Performed by: ORTHOPAEDIC SURGERY

## 2019-01-14 PROCEDURE — 77030008684 HC TU ET CUF COVD -B: Performed by: ANESTHESIOLOGY

## 2019-01-14 PROCEDURE — 77030018836 HC SOL IRR NACL ICUM -A: Performed by: ORTHOPAEDIC SURGERY

## 2019-01-14 PROCEDURE — 77030000031 HC BIT DRL QC SYNT -C: Performed by: ORTHOPAEDIC SURGERY

## 2019-01-14 PROCEDURE — 74011250636 HC RX REV CODE- 250/636: Performed by: NURSE PRACTITIONER

## 2019-01-14 PROCEDURE — 74011250637 HC RX REV CODE- 250/637: Performed by: PHYSICIAN ASSISTANT

## 2019-01-14 PROCEDURE — 77030026438 HC STYL ET INTUB CARD -A: Performed by: ANESTHESIOLOGY

## 2019-01-14 DEVICE — NAIL IM L360MM DIA11MM 130DEG LNG L PROX FEM GRN TI CANN: Type: IMPLANTABLE DEVICE | Site: FEMUR | Status: FUNCTIONAL

## 2019-01-14 DEVICE — SCREW BNE L100MM DIA10.35MM G TI CANN PERF FOR PROX FEM: Type: IMPLANTABLE DEVICE | Site: FEMUR | Status: FUNCTIONAL

## 2019-01-14 DEVICE — SCREW BNE L48MM DIA5MM LAT FEM LT GRN TI ST LOK FULL THRD: Type: IMPLANTABLE DEVICE | Site: FEMUR | Status: FUNCTIONAL

## 2019-01-14 DEVICE — SCREW BNE L40MM DIA5MM TIB LT GRN TI ST CANN LOK FULL THRD: Type: IMPLANTABLE DEVICE | Site: FEMUR | Status: FUNCTIONAL

## 2019-01-14 DEVICE — CABLE SURG DIA1.7MM S STL HA CERCLAGE W/ CRMP 29880101S] DEPUY SYNTHES USA]: Type: IMPLANTABLE DEVICE | Site: FEMUR | Status: FUNCTIONAL

## 2019-01-14 RX ORDER — SODIUM CHLORIDE 0.9 % (FLUSH) 0.9 %
5-40 SYRINGE (ML) INJECTION EVERY 8 HOURS
Status: DISCONTINUED | OUTPATIENT
Start: 2019-01-14 | End: 2019-01-15

## 2019-01-14 RX ORDER — CEFAZOLIN SODIUM 1 G/3ML
2 INJECTION, POWDER, FOR SOLUTION INTRAMUSCULAR; INTRAVENOUS ONCE
Status: COMPLETED | OUTPATIENT
Start: 2019-01-14 | End: 2019-01-14

## 2019-01-14 RX ORDER — MORPHINE SULFATE 4 MG/ML
1 INJECTION INTRAVENOUS
Status: ACTIVE | OUTPATIENT
Start: 2019-01-14 | End: 2019-01-15

## 2019-01-14 RX ORDER — LIDOCAINE HYDROCHLORIDE 10 MG/ML
0.1 INJECTION, SOLUTION EPIDURAL; INFILTRATION; INTRACAUDAL; PERINEURAL AS NEEDED
Status: DISCONTINUED | OUTPATIENT
Start: 2019-01-14 | End: 2019-01-19 | Stop reason: HOSPADM

## 2019-01-14 RX ORDER — ROCURONIUM BROMIDE 10 MG/ML
INJECTION, SOLUTION INTRAVENOUS AS NEEDED
Status: DISCONTINUED | OUTPATIENT
Start: 2019-01-14 | End: 2019-01-14 | Stop reason: HOSPADM

## 2019-01-14 RX ORDER — DEXAMETHASONE SODIUM PHOSPHATE 4 MG/ML
10 INJECTION, SOLUTION INTRA-ARTICULAR; INTRALESIONAL; INTRAMUSCULAR; INTRAVENOUS; SOFT TISSUE DAILY
Status: COMPLETED | OUTPATIENT
Start: 2019-01-15 | End: 2019-01-15

## 2019-01-14 RX ORDER — FENTANYL CITRATE 50 UG/ML
INJECTION, SOLUTION INTRAMUSCULAR; INTRAVENOUS AS NEEDED
Status: DISCONTINUED | OUTPATIENT
Start: 2019-01-14 | End: 2019-01-14 | Stop reason: HOSPADM

## 2019-01-14 RX ORDER — HYDROMORPHONE HYDROCHLORIDE 1 MG/ML
0.2 INJECTION, SOLUTION INTRAMUSCULAR; INTRAVENOUS; SUBCUTANEOUS
Status: DISCONTINUED | OUTPATIENT
Start: 2019-01-14 | End: 2019-01-14 | Stop reason: HOSPADM

## 2019-01-14 RX ORDER — SODIUM CHLORIDE, SODIUM LACTATE, POTASSIUM CHLORIDE, CALCIUM CHLORIDE 600; 310; 30; 20 MG/100ML; MG/100ML; MG/100ML; MG/100ML
INJECTION, SOLUTION INTRAVENOUS
Status: DISCONTINUED | OUTPATIENT
Start: 2019-01-14 | End: 2019-01-14 | Stop reason: HOSPADM

## 2019-01-14 RX ORDER — SODIUM CHLORIDE, SODIUM LACTATE, POTASSIUM CHLORIDE, CALCIUM CHLORIDE 600; 310; 30; 20 MG/100ML; MG/100ML; MG/100ML; MG/100ML
25 INJECTION, SOLUTION INTRAVENOUS CONTINUOUS
Status: DISCONTINUED | OUTPATIENT
Start: 2019-01-14 | End: 2019-01-14 | Stop reason: HOSPADM

## 2019-01-14 RX ORDER — FERROUS SULFATE, DRIED 160(50) MG
1 TABLET, EXTENDED RELEASE ORAL
Status: DISCONTINUED | OUTPATIENT
Start: 2019-01-15 | End: 2019-01-19 | Stop reason: HOSPADM

## 2019-01-14 RX ORDER — DEXAMETHASONE SODIUM PHOSPHATE 100 MG/10ML
INJECTION INTRAMUSCULAR; INTRAVENOUS AS NEEDED
Status: DISCONTINUED | OUTPATIENT
Start: 2019-01-14 | End: 2019-01-14 | Stop reason: HOSPADM

## 2019-01-14 RX ORDER — ACETAMINOPHEN 325 MG/1
650 TABLET ORAL EVERY 6 HOURS
Status: DISCONTINUED | OUTPATIENT
Start: 2019-01-14 | End: 2019-01-19 | Stop reason: HOSPADM

## 2019-01-14 RX ORDER — CEFAZOLIN SODIUM/WATER 2 G/20 ML
2 SYRINGE (ML) INTRAVENOUS EVERY 8 HOURS
Status: COMPLETED | OUTPATIENT
Start: 2019-01-14 | End: 2019-01-15

## 2019-01-14 RX ORDER — FENTANYL CITRATE 50 UG/ML
25 INJECTION, SOLUTION INTRAMUSCULAR; INTRAVENOUS
Status: DISCONTINUED | OUTPATIENT
Start: 2019-01-14 | End: 2019-01-14 | Stop reason: HOSPADM

## 2019-01-14 RX ORDER — FACIAL-BODY WIPES
10 EACH TOPICAL DAILY PRN
Status: DISCONTINUED | OUTPATIENT
Start: 2019-01-16 | End: 2019-01-19 | Stop reason: HOSPADM

## 2019-01-14 RX ORDER — ONDANSETRON 2 MG/ML
4 INJECTION INTRAMUSCULAR; INTRAVENOUS
Status: DISPENSED | OUTPATIENT
Start: 2019-01-14 | End: 2019-01-15

## 2019-01-14 RX ORDER — SUCCINYLCHOLINE CHLORIDE 20 MG/ML
INJECTION INTRAMUSCULAR; INTRAVENOUS AS NEEDED
Status: DISCONTINUED | OUTPATIENT
Start: 2019-01-14 | End: 2019-01-14 | Stop reason: HOSPADM

## 2019-01-14 RX ORDER — DIPHENHYDRAMINE HYDROCHLORIDE 50 MG/ML
12.5 INJECTION, SOLUTION INTRAMUSCULAR; INTRAVENOUS AS NEEDED
Status: DISCONTINUED | OUTPATIENT
Start: 2019-01-14 | End: 2019-01-14 | Stop reason: HOSPADM

## 2019-01-14 RX ORDER — GUAIFENESIN 100 MG/5ML
81 LIQUID (ML) ORAL 2 TIMES DAILY
Status: DISCONTINUED | OUTPATIENT
Start: 2019-01-14 | End: 2019-01-19 | Stop reason: HOSPADM

## 2019-01-14 RX ORDER — SODIUM CHLORIDE 0.9 % (FLUSH) 0.9 %
5-40 SYRINGE (ML) INJECTION AS NEEDED
Status: DISCONTINUED | OUTPATIENT
Start: 2019-01-14 | End: 2019-01-15

## 2019-01-14 RX ORDER — POLYETHYLENE GLYCOL 3350 17 G/17G
17 POWDER, FOR SOLUTION ORAL DAILY
Status: DISCONTINUED | OUTPATIENT
Start: 2019-01-15 | End: 2019-01-15

## 2019-01-14 RX ORDER — SODIUM CHLORIDE 0.9 % (FLUSH) 0.9 %
5-40 SYRINGE (ML) INJECTION EVERY 8 HOURS
Status: DISCONTINUED | OUTPATIENT
Start: 2019-01-14 | End: 2019-01-14 | Stop reason: HOSPADM

## 2019-01-14 RX ORDER — FAMOTIDINE 20 MG/1
20 TABLET, FILM COATED ORAL 2 TIMES DAILY
Status: DISCONTINUED | OUTPATIENT
Start: 2019-01-14 | End: 2019-01-19 | Stop reason: HOSPADM

## 2019-01-14 RX ORDER — PROPOFOL 10 MG/ML
INJECTION, EMULSION INTRAVENOUS AS NEEDED
Status: DISCONTINUED | OUTPATIENT
Start: 2019-01-14 | End: 2019-01-14 | Stop reason: HOSPADM

## 2019-01-14 RX ORDER — HYDROMORPHONE HYDROCHLORIDE 2 MG/ML
INJECTION, SOLUTION INTRAMUSCULAR; INTRAVENOUS; SUBCUTANEOUS AS NEEDED
Status: DISCONTINUED | OUTPATIENT
Start: 2019-01-14 | End: 2019-01-14 | Stop reason: HOSPADM

## 2019-01-14 RX ORDER — OXYCODONE HYDROCHLORIDE 5 MG/1
5 TABLET ORAL
Status: DISCONTINUED | OUTPATIENT
Start: 2019-01-14 | End: 2019-01-19 | Stop reason: HOSPADM

## 2019-01-14 RX ORDER — SODIUM CHLORIDE 9 MG/ML
125 INJECTION, SOLUTION INTRAVENOUS CONTINUOUS
Status: DISPENSED | OUTPATIENT
Start: 2019-01-14 | End: 2019-01-15

## 2019-01-14 RX ORDER — DIPHENHYDRAMINE HCL 12.5MG/5ML
12.5 ELIXIR ORAL
Status: DISPENSED | OUTPATIENT
Start: 2019-01-14 | End: 2019-01-15

## 2019-01-14 RX ORDER — AMOXICILLIN 250 MG
1 CAPSULE ORAL 2 TIMES DAILY
Status: DISCONTINUED | OUTPATIENT
Start: 2019-01-14 | End: 2019-01-19 | Stop reason: HOSPADM

## 2019-01-14 RX ORDER — SODIUM CHLORIDE 0.9 % (FLUSH) 0.9 %
5-40 SYRINGE (ML) INJECTION EVERY 8 HOURS
Status: DISCONTINUED | OUTPATIENT
Start: 2019-01-14 | End: 2019-01-19 | Stop reason: HOSPADM

## 2019-01-14 RX ORDER — POLYETHYLENE GLYCOL 3350 17 G/17G
17 POWDER, FOR SOLUTION ORAL DAILY
Status: DISCONTINUED | OUTPATIENT
Start: 2019-01-15 | End: 2019-01-19 | Stop reason: HOSPADM

## 2019-01-14 RX ORDER — FACIAL-BODY WIPES
10 EACH TOPICAL DAILY PRN
Status: DISCONTINUED | OUTPATIENT
Start: 2019-01-16 | End: 2019-01-14 | Stop reason: SDUPTHER

## 2019-01-14 RX ORDER — ADHESIVE BANDAGE
30 BANDAGE TOPICAL DAILY PRN
Status: DISCONTINUED | OUTPATIENT
Start: 2019-01-15 | End: 2019-01-19 | Stop reason: HOSPADM

## 2019-01-14 RX ORDER — OXYCODONE HYDROCHLORIDE 5 MG/1
2.5 TABLET ORAL
Status: DISCONTINUED | OUTPATIENT
Start: 2019-01-14 | End: 2019-01-15 | Stop reason: ALTCHOICE

## 2019-01-14 RX ORDER — FENTANYL CITRATE 50 UG/ML
INJECTION, SOLUTION INTRAMUSCULAR; INTRAVENOUS
Status: COMPLETED
Start: 2019-01-14 | End: 2019-01-14

## 2019-01-14 RX ORDER — NALOXONE HYDROCHLORIDE 0.4 MG/ML
0.4 INJECTION, SOLUTION INTRAMUSCULAR; INTRAVENOUS; SUBCUTANEOUS AS NEEDED
Status: DISCONTINUED | OUTPATIENT
Start: 2019-01-14 | End: 2019-01-19 | Stop reason: HOSPADM

## 2019-01-14 RX ORDER — SODIUM CHLORIDE 0.9 % (FLUSH) 0.9 %
5-40 SYRINGE (ML) INJECTION AS NEEDED
Status: DISCONTINUED | OUTPATIENT
Start: 2019-01-14 | End: 2019-01-19 | Stop reason: HOSPADM

## 2019-01-14 RX ORDER — ONDANSETRON 4 MG/1
4 TABLET, ORALLY DISINTEGRATING ORAL
Status: DISCONTINUED | OUTPATIENT
Start: 2019-01-14 | End: 2019-01-19 | Stop reason: HOSPADM

## 2019-01-14 RX ORDER — SODIUM CHLORIDE 0.9 % (FLUSH) 0.9 %
5-40 SYRINGE (ML) INJECTION AS NEEDED
Status: DISCONTINUED | OUTPATIENT
Start: 2019-01-14 | End: 2019-01-14 | Stop reason: HOSPADM

## 2019-01-14 RX ORDER — ACETAMINOPHEN 10 MG/ML
INJECTION, SOLUTION INTRAVENOUS AS NEEDED
Status: DISCONTINUED | OUTPATIENT
Start: 2019-01-14 | End: 2019-01-14 | Stop reason: HOSPADM

## 2019-01-14 RX ADMIN — AMLODIPINE BESYLATE 5 MG: 5 TABLET ORAL at 08:27

## 2019-01-14 RX ADMIN — HYDROMORPHONE HYDROCHLORIDE 0.5 MG: 2 INJECTION INTRAMUSCULAR; INTRAVENOUS; SUBCUTANEOUS at 05:38

## 2019-01-14 RX ADMIN — STANDARDIZED SENNA CONCENTRATE AND DOCUSATE SODIUM 1 TABLET: 8.6; 5 TABLET, FILM COATED ORAL at 17:21

## 2019-01-14 RX ADMIN — ONDANSETRON 4 MG: 2 INJECTION INTRAMUSCULAR; INTRAVENOUS at 05:39

## 2019-01-14 RX ADMIN — ACETAMINOPHEN 1000 MG: 10 INJECTION, SOLUTION INTRAVENOUS at 10:39

## 2019-01-14 RX ADMIN — ONDANSETRON 4 MG: 2 INJECTION INTRAMUSCULAR; INTRAVENOUS at 16:15

## 2019-01-14 RX ADMIN — MULTIPLE VITAMINS W/ MINERALS TAB 1 TABLET: TAB at 08:27

## 2019-01-14 RX ADMIN — ACETAMINOPHEN 650 MG: 325 TABLET ORAL at 17:21

## 2019-01-14 RX ADMIN — FAMOTIDINE 20 MG: 20 TABLET, FILM COATED ORAL at 17:21

## 2019-01-14 RX ADMIN — Medication 2 G: at 16:15

## 2019-01-14 RX ADMIN — FENTANYL CITRATE 25 MCG: 50 INJECTION, SOLUTION INTRAMUSCULAR; INTRAVENOUS at 10:46

## 2019-01-14 RX ADMIN — PROPOFOL 150 MG: 10 INJECTION, EMULSION INTRAVENOUS at 10:24

## 2019-01-14 RX ADMIN — CEFAZOLIN 2 G: 1 INJECTION, POWDER, FOR SOLUTION INTRAMUSCULAR; INTRAVENOUS; PARENTERAL at 10:30

## 2019-01-14 RX ADMIN — OXYCODONE HYDROCHLORIDE 5 MG: 5 TABLET ORAL at 02:36

## 2019-01-14 RX ADMIN — PHENYTOIN SODIUM 200 MG: 100 CAPSULE ORAL at 20:37

## 2019-01-14 RX ADMIN — FENTANYL CITRATE 25 MCG: 50 INJECTION, SOLUTION INTRAMUSCULAR; INTRAVENOUS at 13:23

## 2019-01-14 RX ADMIN — SUCCINYLCHOLINE CHLORIDE 100 MG: 20 INJECTION INTRAMUSCULAR; INTRAVENOUS at 10:25

## 2019-01-14 RX ADMIN — ACETAMINOPHEN 650 MG: 325 TABLET ORAL at 06:06

## 2019-01-14 RX ADMIN — CALCIUM 500 MG: 500 TABLET ORAL at 17:21

## 2019-01-14 RX ADMIN — ROCURONIUM BROMIDE 10 MG: 10 INJECTION, SOLUTION INTRAVENOUS at 10:23

## 2019-01-14 RX ADMIN — FENTANYL CITRATE 25 MCG: 50 INJECTION, SOLUTION INTRAMUSCULAR; INTRAVENOUS at 12:33

## 2019-01-14 RX ADMIN — PHENYTOIN SODIUM 200 MG: 100 CAPSULE ORAL at 08:26

## 2019-01-14 RX ADMIN — LEVOTHYROXINE SODIUM 88 MCG: 88 TABLET ORAL at 06:07

## 2019-01-14 RX ADMIN — Medication 10 ML: at 14:37

## 2019-01-14 RX ADMIN — FENTANYL CITRATE 25 MCG: 50 INJECTION, SOLUTION INTRAMUSCULAR; INTRAVENOUS at 11:30

## 2019-01-14 RX ADMIN — SODIUM CHLORIDE, SODIUM LACTATE, POTASSIUM CHLORIDE, CALCIUM CHLORIDE: 600; 310; 30; 20 INJECTION, SOLUTION INTRAVENOUS at 10:18

## 2019-01-14 RX ADMIN — SODIUM CHLORIDE 125 ML/HR: 900 INJECTION, SOLUTION INTRAVENOUS at 14:38

## 2019-01-14 RX ADMIN — ASPIRIN 81 MG 81 MG: 81 TABLET ORAL at 17:21

## 2019-01-14 RX ADMIN — HYDROMORPHONE HYDROCHLORIDE 0.2 MG: 2 INJECTION, SOLUTION INTRAMUSCULAR; INTRAVENOUS; SUBCUTANEOUS at 12:14

## 2019-01-14 RX ADMIN — ACETAMINOPHEN 650 MG: 325 TABLET ORAL at 00:39

## 2019-01-14 RX ADMIN — HYDROMORPHONE HYDROCHLORIDE 0.2 MG: 2 INJECTION, SOLUTION INTRAMUSCULAR; INTRAVENOUS; SUBCUTANEOUS at 10:18

## 2019-01-14 RX ADMIN — DEXAMETHASONE SODIUM PHOSPHATE 5 MG: 100 INJECTION INTRAMUSCULAR; INTRAVENOUS at 10:33

## 2019-01-14 RX ADMIN — Medication 10 ML: at 06:08

## 2019-01-14 RX ADMIN — FENTANYL CITRATE 50 MCG: 50 INJECTION, SOLUTION INTRAMUSCULAR; INTRAVENOUS at 10:23

## 2019-01-14 RX ADMIN — CALCIUM 500 MG: 500 TABLET ORAL at 08:27

## 2019-01-14 RX ADMIN — VITAMIN D, TAB 1000IU (100/BT) 1000 UNITS: 25 TAB at 17:21

## 2019-01-14 RX ADMIN — VITAMIN D, TAB 1000IU (100/BT) 1000 UNITS: 25 TAB at 08:27

## 2019-01-14 NOTE — ANESTHESIA PREPROCEDURE EVALUATION
Anesthetic History No history of anesthetic complications Review of Systems / Medical History Patient summary reviewed, nursing notes reviewed and pertinent labs reviewed Pulmonary Within defined limits Neuro/Psych  
 
seizures Cardiovascular Within defined limits Exercise tolerance: >4 METS 
  
GI/Hepatic/Renal 
Within defined limits Endo/Other Diabetes Hypothyroidism Arthritis and cancer (brain tumor) Other Findings Comments: Lumbar post-laminectomy syndrome H/O meningioma of the brain, left brain meningioma Physical Exam 
 
Airway Mallampati: III 
TM Distance: < 4 cm Neck ROM: decreased range of motion Mouth opening: Diminished (comment) Cardiovascular Regular rate and rhythm,  S1 and S2 normal,  no murmur, click, rub, or gallop Dental 
 
Dentition: Caps/crowns and [facility] Pulmonary Breath sounds clear to auscultation Abdominal 
GI exam deferred Other Findings Anesthetic Plan ASA: 3 Anesthesia type: general 
 
Monitoring Plan: BIS Anesthetic plan and risks discussed with: Patient Have glidescope available

## 2019-01-14 NOTE — PROGRESS NOTES
Bedside shift change report given to [facility] (oncoming nurse) by Shellie Caruso (offgoing nurse). Report included the following information SBAR, Kardex, Procedure Summary, Intake/Output, MAR and Recent Results.

## 2019-01-14 NOTE — PROGRESS NOTES
Primary nurse was unable to attain [facility]. Thus, the charge nurse and other staff also tried but were unsuccessful.  Will report to day shift  Nurse

## 2019-01-14 NOTE — PERIOP NOTES
Discussed patient's allergy and antibiotic order with Dr. Zhang Joseph. New order given for Ancef 2 gm on call to OR and dc vancomycin order. Orders entered into [facility] care. Bumper pads to emerald.

## 2019-01-14 NOTE — PROGRESS NOTES
Occupational Therapy Note:    Orders acknowledged and chart reviewed. Note pt currently on bed rest and plan for surgery per ortho NP. Will continue to follow and attempt OT evaluation when cleared for therapy with specific weightbearing status. Thank you.     Anne-Marie Ramirez OTR/L

## 2019-01-14 NOTE — PROGRESS NOTES
Bedside shift change report given to Nila (oncoming nurse) by Colton Corbett (offgoing nurse). Report included the following information SBAR, Kardex, Intake/Output, MAR, Accordion, Recent Results and Med Rec Status.

## 2019-01-14 NOTE — PROGRESS NOTES
Problem: Falls - Risk of  Goal: *Absence of Falls  Document Mendez Fall Risk and appropriate interventions in the flowsheet. Outcome: Progressing Towards Goal  Fall Risk Interventions:  Mobility Interventions: Patient to call before getting OOB         Medication Interventions: Patient to call before getting OOB    Elimination Interventions: Call light in reach, Toileting schedule/hourly rounds    History of Falls Interventions:  Investigate reason for fall

## 2019-01-14 NOTE — PERIOP NOTES
TRANSFER - IN REPORT:    Verbal report received from [address] on [place]  being received from 3243 for ordered procedure      Report consisted of patients Situation, Background, Assessment and   Recommendations(SBAR). Information from the following report(s) SBAR, ED Summary, Intake/Output and MAR was reviewed with the receiving nurse. Opportunity for questions and clarification was provided. Assessment completed upon patients arrival to unit and care assumed.

## 2019-01-14 NOTE — ANESTHESIA POSTPROCEDURE EVALUATION
Procedure(s): LEFT FEMUR INSERTION INTRAMEDULLARY NAIL. Anesthesia Post Evaluation Patient location during evaluation: PACU Note status: Adequate. Level of consciousness: responsive to verbal stimuli and sleepy but conscious Pain management: satisfactory to patient Airway patency: patent Anesthetic complications: no 
Cardiovascular status: acceptable Respiratory status: acceptable Hydration status: acceptable Comments: +Post-Anesthesia Evaluation and Assessment Patient: Jane Curtis MRN: [id number]  SSN: [id number] YOB: 1932  Age: 80 y.o. Sex: female Cardiovascular Function/Vital Signs /65   Pulse 82   Temp 36.5 °C (97.7 °F)   Resp 13   Ht 5' 4\" (1.626 m)   Wt 56.7 kg (125 lb)   SpO2 97%   BMI 21.46 kg/m² Patient is status post Procedure(s): LEFT FEMUR INSERTION INTRAMEDULLARY NAIL. Nausea/Vomiting: Controlled. Postoperative hydration reviewed and adequate. Pain: 
Pain Scale 1: FLACC (01/14/19 1215) Pain Intensity 1: 0 (01/14/19 0954) Managed. Neurological Status:  
Neuro (WDL): Within Defined Limits (01/14/19 1008) At baseline. Mental Status and Level of Consciousness: Arousable. Pulmonary Status:  
O2 Device: Nasal cannula (01/14/19 1217) Adequate oxygenation and airway patent. Complications related to anesthesia: None Post-anesthesia assessment completed. No concerns. Signed By: Wm Oliavs MD  
 1/14/2019 Post anesthesia nausea and vomiting:  controlled Visit Vitals /65 Pulse 82 Temp 36.5 °C (97.7 °F) Resp 13 Ht 5' 4\" (1.626 m) Wt 56.7 kg (125 lb) SpO2 97% BMI 21.46 kg/m²

## 2019-01-14 NOTE — PROGRESS NOTES
Initial Nutrition Assessment:    INTERVENTIONS/RECOMMENDATIONS:   · Continue regular diet  · Ensure TID (strawberry)     ASSESSMENT:   Chart reviewed, medically noted for s/p INSERTION INTRAMEDULLARY NAIL and PMH shown below. Pt was sleeping during visit attempt. Family member in room reports pt consumed 2-3 Ensure shakes per day at home, will add to diet order. Past Medical History:   Diagnosis Date    Arthritis     Cancer [facility]) 2002    brain tumor, benign (meningioma)    Diabetes ([facility] [address] [address]     questionable? 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   Pertinent Medications: [x]Reviewed: NS, os-alisa, vit D3, MVI w/ Fe, zofran  Pertinent Labs: [x]Reviewed:   Food Allergies: [x]NKFA  []Other   Last BM: 1/12  Edema:        []RUE   []LUE   []RLE   []LLE      Pressure Injury:      [] Stage I   [] Stage II   [] Stage III   [] Stage IV      Wt Readings from Last 30 Encounters:   01/13/19 56.7 kg (125 lb)   04/21/17 58.5 kg (129 lb)   08/17/16 61.7 kg (136 lb)   05/11/16 63 kg (138 lb 14.2 oz)   10/14/15 64.7 kg (142 lb 9.6 oz)   07/30/15 62.9 kg (138 lb 10.7 oz)   05/11/15 61.2 kg (135 lb)   08/26/14 61.3 kg (135 lb 4 oz)   06/11/12 61.6 kg (135 lb 12.9 oz)   12/15/10 61.4 kg (135 lb 5.8 oz)   09/11/10 61.2 kg (135 lb)   09/01/10 61.3 kg (135 lb 2.3 oz)       Anthropometrics:   Height: 5' 4\" (162.6 cm) Weight: 56.7 kg (125 lb)   IBW (%IBW):   ( ) UBW (%UBW):   (  %)   Last Weight Metrics:  Weight Loss Metrics 1/13/2019 4/21/2017 8/17/2016 5/11/2016 10/14/2015 7/30/2015 5/11/2015   Today's Wt 125 lb 129 lb 136 lb 138 lb 14.2 oz 142 lb 9.6 oz 138 lb 10.7 oz 135 lb   BMI 21.46 kg/m2 22.85 kg/m2 24.09 kg/m2 24.21 kg/m2 25.27 kg/m2 24.57 kg/m2 23.92 kg/m2       BMI: Body mass index is 21.46 kg/m². This BMI is indicative of:   []Underweight    [x]Normal    []Overweight    [] Obesity   [] Extreme Obesity (BMI>40)     Estimated Nutrition Needs (Based on):   1300 Kcals/day(BMR: 1000 x 1.3) , 65 g(1.2 g/kg) Protein  Carbohydrate: At Least 130 g/day  Fluids: 1300 mL/day (1ml/kcal) or per primary team    NUTRITION DIAGNOSES:   Problem:  Increased nutrient needs      Etiology: related to fracture healing      Signs/Symptoms: as evidenced by s/p LEFT FEMUR INSERTION INTRAMEDULLARY NAIL. NUTRITION INTERVENTIONS:  Meals/Snacks: General/healthful diet   Supplements: Commercial supplement              GOAL:   consume >50% of meals and ONS in 3-5 days    LEARNING NEEDS (Diet, Food/Nutrient-Drug Interaction):    [x] None Identified   [] Identified and Education Provided/Documented   [] Identified and Pt declined/was not appropriate     Cultureal, Spiritism, OR Ethnic Dietary Needs:    [x] None Identified   [] Identified and Addressed     [x] Interdisciplinary Care Plan Reviewed/Documented    [x] Discharge Planning:  General healthy diet, continue home ensure consumption      MONITORING /EVALUATION:      Food/Nutrient Intake Outcomes:  Total energy intake  Physical Signs/Symptoms Outcomes: Weight/weight change, Electrolyte and renal profile, GI    NUTRITION RISK:    [] High              [x] Moderate     to      [x]  Low  []  Minimal/Uncompromised    PT SEEN FOR:    [x]  MD Consult: []Calorie Count      []Diabetic Diet Education        []Diet Education     []Electrolyte Management     [x]General Nutrition Management and Supplements     []Management of Tube Feeding     []TPN Recommendations    []  RN Referral:  []MST score >=2     []Enteral/Parenteral Nutrition PTA     []Pregnant: Gestational DM or Multigestation     []Pressure Ulcer/Wound Care needs        []  Low BMI  []  LOS Referral       Nadir JALEN BaezN  Pager [phone]  Weekend Pager [phone]

## 2019-01-14 NOTE — PROGRESS NOTES
Order received and acknowledged from hospitalist. Pt bed rest and planned for surgery per ortho NP note. Will await for new order from ortho with specific weightbearing status.

## 2019-01-15 PROBLEM — S72.22XA CLOSED LEFT SUBTROCHANTERIC FEMUR FRACTURE, INITIAL ENCOUNTER (HCC): Status: ACTIVE | Noted: 2019-01-15

## 2019-01-15 LAB
ANION GAP SERPL CALC-SCNC: 6 MMOL/L (ref 5–15)
BACTERIA SPEC CULT: ABNORMAL
BASOPHILS # BLD: 0 K/UL (ref 0–0.1)
BASOPHILS NFR BLD: 0 % (ref 0–1)
BUN SERPL-MCNC: 17 MG/DL (ref 6–20)
BUN/CREAT SERPL: 30 (ref 12–20)
CALCIUM SERPL-MCNC: 8.5 MG/DL (ref 8.5–10.1)
CC UR VC: ABNORMAL
CHLORIDE SERPL-SCNC: 104 MMOL/L (ref 97–108)
CO2 SERPL-SCNC: 27 MMOL/L (ref 21–32)
CREAT SERPL-MCNC: 0.57 MG/DL (ref 0.55–1.02)
DIFFERENTIAL METHOD BLD: ABNORMAL
EOSINOPHIL # BLD: 0 K/UL (ref 0–0.4)
EOSINOPHIL NFR BLD: 0 % (ref 0–7)
ERYTHROCYTE [DISTWIDTH] IN BLOOD BY AUTOMATED COUNT: 12.6 % (ref 11.5–14.5)
GLUCOSE SERPL-MCNC: 140 MG/DL (ref 65–100)
HCT VFR BLD AUTO: 23.5 % (ref 35–47)
HCT VFR BLD AUTO: 25.1 % (ref 35–47)
HGB BLD-MCNC: 7.9 G/DL (ref 11.5–16)
HGB BLD-MCNC: 8.4 G/DL (ref 11.5–16)
IMM GRANULOCYTES # BLD AUTO: 0 K/UL (ref 0–0.04)
IMM GRANULOCYTES NFR BLD AUTO: 0 % (ref 0–0.5)
LYMPHOCYTES # BLD: 1.2 K/UL (ref 0.8–3.5)
LYMPHOCYTES NFR BLD: 13 % (ref 12–49)
MCH RBC QN AUTO: 33.5 PG (ref 26–34)
MCHC RBC AUTO-ENTMCNC: 33.5 G/DL (ref 30–36.5)
MCV RBC AUTO: 100 FL (ref 80–99)
MONOCYTES # BLD: 1.3 K/UL (ref 0–1)
MONOCYTES NFR BLD: 14 % (ref 5–13)
NEUTS SEG # BLD: 6.4 K/UL (ref 1.8–8)
NEUTS SEG NFR BLD: 72 % (ref 32–75)
NRBC # BLD: 0 K/UL (ref 0–0.01)
NRBC BLD-RTO: 0 PER 100 WBC
PLATELET # BLD AUTO: 145 K/UL (ref 150–400)
PMV BLD AUTO: 12.4 FL (ref 8.9–12.9)
POTASSIUM SERPL-SCNC: 3.9 MMOL/L (ref 3.5–5.1)
RBC # BLD AUTO: 2.51 M/UL (ref 3.8–5.2)
SERVICE CMNT-IMP: ABNORMAL
SODIUM SERPL-SCNC: 137 MMOL/L (ref 136–145)
WBC # BLD AUTO: 8.9 K/UL (ref 3.6–11)

## 2019-01-15 PROCEDURE — 74011250637 HC RX REV CODE- 250/637: Performed by: INTERNAL MEDICINE

## 2019-01-15 PROCEDURE — 97530 THERAPEUTIC ACTIVITIES: CPT

## 2019-01-15 PROCEDURE — 74011250637 HC RX REV CODE- 250/637: Performed by: HOSPITALIST

## 2019-01-15 PROCEDURE — 74011000250 HC RX REV CODE- 250: Performed by: PHYSICIAN ASSISTANT

## 2019-01-15 PROCEDURE — 65270000029 HC RM PRIVATE

## 2019-01-15 PROCEDURE — 36415 COLL VENOUS BLD VENIPUNCTURE: CPT

## 2019-01-15 PROCEDURE — 97162 PT EVAL MOD COMPLEX 30 MIN: CPT

## 2019-01-15 PROCEDURE — 85018 HEMOGLOBIN: CPT

## 2019-01-15 PROCEDURE — 74011250636 HC RX REV CODE- 250/636: Performed by: PHYSICIAN ASSISTANT

## 2019-01-15 PROCEDURE — 74011250637 HC RX REV CODE- 250/637: Performed by: ORTHOPAEDIC SURGERY

## 2019-01-15 PROCEDURE — 94760 N-INVAS EAR/PLS OXIMETRY 1: CPT

## 2019-01-15 PROCEDURE — 97165 OT EVAL LOW COMPLEX 30 MIN: CPT | Performed by: OCCUPATIONAL THERAPIST

## 2019-01-15 PROCEDURE — 97535 SELF CARE MNGMENT TRAINING: CPT | Performed by: OCCUPATIONAL THERAPIST

## 2019-01-15 PROCEDURE — 97530 THERAPEUTIC ACTIVITIES: CPT | Performed by: OCCUPATIONAL THERAPIST

## 2019-01-15 PROCEDURE — 77010033678 HC OXYGEN DAILY

## 2019-01-15 PROCEDURE — 80048 BASIC METABOLIC PNL TOTAL CA: CPT

## 2019-01-15 PROCEDURE — 74011250637 HC RX REV CODE- 250/637: Performed by: PHYSICIAN ASSISTANT

## 2019-01-15 PROCEDURE — 85025 COMPLETE CBC W/AUTO DIFF WBC: CPT

## 2019-01-15 RX ORDER — LORAZEPAM 0.5 MG/1
0.5 TABLET ORAL ONCE
Status: COMPLETED | OUTPATIENT
Start: 2019-01-15 | End: 2019-01-15

## 2019-01-15 RX ORDER — LORAZEPAM 0.5 MG/1
0.5 TABLET ORAL
Status: DISCONTINUED | OUTPATIENT
Start: 2019-01-15 | End: 2019-01-15

## 2019-01-15 RX ORDER — LANOLIN ALCOHOL/MO/W.PET/CERES
1.5 CREAM (GRAM) TOPICAL
Status: DISCONTINUED | OUTPATIENT
Start: 2019-01-15 | End: 2019-01-16

## 2019-01-15 RX ORDER — OXYCODONE HCL 5 MG/5 ML
2.5 SOLUTION, ORAL ORAL
Status: DISCONTINUED | OUTPATIENT
Start: 2019-01-15 | End: 2019-01-19 | Stop reason: HOSPADM

## 2019-01-15 RX ADMIN — ACETAMINOPHEN 650 MG: 325 TABLET ORAL at 11:44

## 2019-01-15 RX ADMIN — Medication 10 ML: at 05:28

## 2019-01-15 RX ADMIN — Medication 10 ML: at 00:18

## 2019-01-15 RX ADMIN — CALCIUM CARBONATE 500 MG (1,250 MG)-VITAMIN D3 200 UNIT TABLET 1 TABLET: at 08:23

## 2019-01-15 RX ADMIN — ASPIRIN 81 MG 81 MG: 81 TABLET ORAL at 08:23

## 2019-01-15 RX ADMIN — LORAZEPAM 0.5 MG: 0.5 TABLET ORAL at 07:11

## 2019-01-15 RX ADMIN — Medication 10 ML: at 15:16

## 2019-01-15 RX ADMIN — POLYETHYLENE GLYCOL (3350) 17 G: 17 POWDER, FOR SOLUTION ORAL at 08:22

## 2019-01-15 RX ADMIN — PHENYTOIN SODIUM 200 MG: 100 CAPSULE ORAL at 09:54

## 2019-01-15 RX ADMIN — ACETAMINOPHEN 650 MG: 325 TABLET ORAL at 00:17

## 2019-01-15 RX ADMIN — CALCIUM CARBONATE 500 MG (1,250 MG)-VITAMIN D3 200 UNIT TABLET 1 TABLET: at 17:17

## 2019-01-15 RX ADMIN — OXYCODONE HYDROCHLORIDE 5 MG: 5 TABLET ORAL at 05:25

## 2019-01-15 RX ADMIN — SODIUM CHLORIDE 125 ML/HR: 900 INJECTION, SOLUTION INTRAVENOUS at 03:01

## 2019-01-15 RX ADMIN — OXYCODONE HYDROCHLORIDE 2.5 MG: 5 SOLUTION ORAL at 20:19

## 2019-01-15 RX ADMIN — MULTIPLE VITAMINS W/ MINERALS TAB 1 TABLET: TAB at 08:24

## 2019-01-15 RX ADMIN — STANDARDIZED SENNA CONCENTRATE AND DOCUSATE SODIUM 1 TABLET: 8.6; 5 TABLET, FILM COATED ORAL at 08:24

## 2019-01-15 RX ADMIN — VITAMIN D, TAB 1000IU (100/BT) 1000 UNITS: 25 TAB at 08:23

## 2019-01-15 RX ADMIN — ASPIRIN 81 MG 81 MG: 81 TABLET ORAL at 17:17

## 2019-01-15 RX ADMIN — DIPHENHYDRAMINE HYDROCHLORIDE 12.5 MG: 25 SOLUTION ORAL at 02:46

## 2019-01-15 RX ADMIN — LEVOTHYROXINE SODIUM 88 MCG: 88 TABLET ORAL at 05:25

## 2019-01-15 RX ADMIN — VITAMIN D, TAB 1000IU (100/BT) 1000 UNITS: 25 TAB at 17:17

## 2019-01-15 RX ADMIN — Medication 2 G: at 00:18

## 2019-01-15 RX ADMIN — PHENYTOIN SODIUM 200 MG: 100 CAPSULE ORAL at 20:48

## 2019-01-15 RX ADMIN — ACETAMINOPHEN 650 MG: 325 TABLET ORAL at 17:17

## 2019-01-15 RX ADMIN — CALCIUM CARBONATE 500 MG (1,250 MG)-VITAMIN D3 200 UNIT TABLET 1 TABLET: at 11:44

## 2019-01-15 RX ADMIN — AMLODIPINE BESYLATE 5 MG: 5 TABLET ORAL at 08:22

## 2019-01-15 RX ADMIN — STANDARDIZED SENNA CONCENTRATE AND DOCUSATE SODIUM 1 TABLET: 8.6; 5 TABLET, FILM COATED ORAL at 17:17

## 2019-01-15 RX ADMIN — FAMOTIDINE 20 MG: 20 TABLET, FILM COATED ORAL at 17:17

## 2019-01-15 RX ADMIN — DEXAMETHASONE SODIUM PHOSPHATE 10 MG: 4 INJECTION, SOLUTION INTRAMUSCULAR; INTRAVENOUS at 10:03

## 2019-01-15 RX ADMIN — FAMOTIDINE 20 MG: 20 TABLET, FILM COATED ORAL at 08:23

## 2019-01-15 NOTE — PROGRESS NOTES
Hospitalist Progress Note    NAME: Wally Jimenez   :  1932   MRN:  [id number]       Assessment / Plan:  Acute intertrochanteric LEFT femur fracture   GLF at home with Hx of prior falls  POD 0 s/p Lt femur intramedullary nail  Pain control and DVT PPx per ortho  PT/OT/case mgmnt consulted  Pain currently well-controlled     Hx Bradycardia   seen and evaluated by Dr. Venkata Noguera last year. Pt is completely asymptomatic. Monitor on remote tele bed   TSH nL, rate has been intermittently regular vs tachycardic  monitor     Remote history of Meningioma s/p resection in    Remote history of seizure from tumor   seizure free since  - follows with Dr. Emily Reece phenytoin as was PTA      Generalized muscle weakness   went [facility] at [facility]      Hypothyroid   Cont home synthroid     HTN   Continue Norvasc as was PTA      Body mass index is 21.46 kg/m². Subjective:     Chief Complaint / Reason for Physician Visit  Mild pain as expected postop, well-controlled at rest. No N/V. No other acute complaints    Review of Systems:  Symptom Y/N Comments  Symptom Y/N Comments   Fever/Chills n   Chest Pain n    Poor Appetite n   Edema n    Cough n   Abdominal Pain n    Sputum n   Joint Pain     SOB/DORSEY n   Pruritis/Rash     Nausea/vomit n   Tolerating PT/OT     Diarrhea n   Tolerating Diet     Constipation n   Other       Could NOT obtain due to:      Objective:     VITALS:   Last 24hrs VS reviewed since prior progress note.  Most recent are:  Patient Vitals for the past 24 hrs:   Temp Pulse Resp BP SpO2   19 1951 99.5 °F (37.5 °C) 88 16 127/58 97 %   19 1718 97.6 °F (36.4 °C) 90 16 130/73 100 %   19 1603 98 °F (36.7 °C) 88 16 125/70 100 %   19 1510 97.8 °F (36.6 °C) 90 16 128/55 100 %   19 1439 97.8 °F (36.6 °C) 73 14 121/59 100 %   19 1414 97.9 °F (36.6 °C) 80 12 130/53 97 %   19 1353 -- 99 17 -- 97 %   19 1345 -- 64 10 132/52 --   19 1330 -- 82 13 124/59 97 %   01/14/19 1323 -- 87 14 -- 98 %   01/14/19 1320 -- 95 15 131/65 96 %   01/14/19 1315 -- 94 16 142/56 --   01/14/19 1310 97.7 °F (36.5 °C) (!) 103 20 127/59 99 %   01/14/19 1305 -- 87 13 142/60 99 %   01/14/19 1300 -- 83 12 140/62 100 %   01/14/19 1255 -- 83 12 139/55 100 %   01/14/19 1250 -- 88 16 139/58 100 %   01/14/19 1245 -- 87 18 137/54 100 %   01/14/19 1240 -- 78 13 142/53 100 %   01/14/19 1235 -- 85 10 134/61 98 %   01/14/19 1234 -- 100 18 -- 100 %   01/14/19 1230 -- (!) 104 15 156/73 100 %   01/14/19 1225 -- (!) 105 18 146/79 100 %   01/14/19 1220 -- (!) 102 17 160/69 100 %   01/14/19 1217 98.5 °F (36.9 °C) 99 16 154/75 99 %   01/14/19 1216 -- (!) 101 16 154/75 99 %   01/14/19 1215 -- (!) 103 17 -- 98 %   01/14/19 0954 99 °F (37.2 °C) 85 17 158/70 --   01/14/19 0811 98.9 °F (37.2 °C) 83 16 141/50 97 %   01/14/19 0504 98.4 °F (36.9 °C) 82 16 149/55 98 %   01/14/19 0049 97.6 °F (36.4 °C) 95 16 157/83 98 %   01/14/19 0040 98.7 °F (37.1 °C) 78 16 147/62 100 %       Intake/Output Summary (Last 24 hours) at 1/14/2019 2319  Last data filed at 1/14/2019 1345  Gross per 24 hour   Intake 750 ml   Output 650 ml   Net 100 ml        PHYSICAL EXAM:  General: WD, WN. Alert, cooperative, no acute distress    EENT:  EOMI. Anicteric sclerae. MMM  Resp:  CTA bilaterally, no wheezing or rales. No accessory muscle use  CV:  Regular  rhythm,  No edema  GI:  Soft, Non distended, Non tender.  +Bowel sounds  Neurologic:  Alert and oriented X 3, normal speech,   Psych:   Good insight. Not anxious nor agitated  Skin:  No rashes.   No jaundice    Reviewed most current lab test results and cultures  YES  Reviewed most current radiology test results   YES  Review and summation of old records today    NO  Reviewed patient's current orders and MAR    YES  PMH/SH reviewed - no change compared to H&P  ________________________________________________________________________  Care Plan discussed with:    Comments   Patient x Family  x    RN x    Care Manager     Consultant                        Multidiciplinary team rounds were held today with , nursing, pharmacist and clinical coordinator. Patient's plan of care was discussed; medications were reviewed and discharge planning was addressed. ________________________________________________________________________  Total NON critical care TIME:  25   Minutes    Total CRITICAL CARE TIME Spent:   Minutes non procedure based      Comments   >50% of visit spent in counseling and coordination of care x    ________________________________________________________________________  Keri Better, DO     Procedures: see electronic medical records for all procedures/Xrays and details which were not copied into this note but were reviewed prior to creation of Plan. LABS:  I reviewed today's most current labs and imaging studies.   Pertinent labs include:  Recent Labs     01/13/19  0752   WBC 5.0   HGB 13.6   HCT 40.5   *     Recent Labs     01/14/19  0530 01/13/19  0752    139   K 4.5 4.1    105   CO2 22 28   * 119*   BUN 17 14   CREA 0.68 0.73   CA 9.4 9.1   ALB  --  3.8   TBILI  --  0.6   SGOT  --  19   ALT  --  20   INR  --  1.0       Signed: Keri Better, DO

## 2019-01-15 NOTE — CDMP QUERY
Account Number: [de-identified] MRN: [id number] Patient: Renita Landin Created: [id number]:00 Clinician Name: Bella De Leon : 
Please clarify if this patient is (was) being treated/managed for:  
 
=> Urinary tract infection, as evidenced by ua:  4+bact, +nit, few epith, cx: gnr req ivfs, labs 
=> Other explanation of clinical findings 
=> Clinically Undetermined (no explanation for clinical findings) The medical record reflects the following clinical findings, treatment, and risk factors. Risk Factors:  86F adm w/ left hip fx Clinical Indicators:  ua:  4+bact, +nit, few epith, cx: gnr 
Treatment:ivfs, labs Please clarify and document your clinical opinion in the progress notes and discharge summary including the definitive and/or presumptive diagnosis, (suspected or probable), related to the above clinical findings. Please include clinical findings supporting your diagnosis. Thank Sandoval Porter [id number]

## 2019-01-15 NOTE — PROGRESS NOTES
Problem: Mobility Impaired (Adult and Pediatric)  Goal: *Acute Goals and Plan of Care (Insert Text)  Physical Therapy Goals  Initiated 1/15/2019  1. Patient will move from supine to sit and sit to supine  in bed with minimal assistance/contact guard assist within 7 day(s). 2.  Patient will transfer from bed to chair and chair to bed with minimal assistance/contact guard assist using the least restrictive device within 7 day(s). 3.  Patient will perform sit to stand with minimal assistance/contact guard assist within 7 day(s). 4.  Patient will ambulate with minimal assistance/contact guard assist for 25 feet Toe touch wt bearing only with the least restrictive device within 7 day(s). 5.  Patient will need only supervision for basic isometric hip exercises and active ankle exercises within 7 days. physical Therapy EVALUATION  Patient: Bernabe Lizarraga (1 y.o. female)  Date: 1/15/2019  Primary Diagnosis: Femur fracture, left (HCC)  Procedure(s) (LRB):  LEFT FEMUR INSERTION INTRAMEDULLARY NAIL (Left) 1 Day Post-Op   Precautions: pt only has R eye,  TTWB    ASSESSMENT :Based on the objective data described below, the patient presents with significant deficits in strength, balance, transfers and gait post -op ORIF L hip fx. Pt fell at home. PMhx of hip fx R in 2016 and old benigh brain tumor with resection and loss of L eye. Pt was ambulating with a cane in home before her fall. She lives at home with her son and her daughter with a 3 step entry. Her son would really like for her to come home with home health, but understands she may need a brief rehab stay to enable them to more safely care for her at home. Patient was very confused today having had ativan at [address] [place] [address], therefore PT/OT worked with pt in supine>sit>stand with toe touch wb L. Pt stood with RW for approx 2 min,and returned to supine. While up, RN assisted with changing wet sheets and applying a brief on pt.   Pt will need max A given wb status, but did fairly well today given limitations of confusion and pain. Patient will benefit from skilled intervention to address the above impairments. Patients rehabilitation potential is considered to be Fair  Factors which may influence rehabilitation potential include:   []         None noted  []         Mental ability/status  [x]         Medical condition  [x]         Home/family situation and support systems  [x]         Safety awareness  [x]         Pain tolerance/management  []         Other:      PLAN :  Recommendations and Planned Interventions:  [x]           Bed Mobility Training             []    Neuromuscular Re-Education  [x]           Transfer Training                   []    Orthotic/Prosthetic Training  [x]           Gait Training                         []    Modalities  [x]           Therapeutic Exercises           []    Edema Management/Control  [x]           Therapeutic Activities            [x]    Patient and Family Training/Education  []           Other (comment):    Frequency/Duration: Patient will be followed by physical therapy  daily to address goals. Discharge Recommendations: Rehab and Skilled Nursing Facility  Further Equipment Recommendations for Discharge: to be determined by rehab facility     SUBJECTIVE:   Patient stated I'm at home  (pt confused).     OBJECTIVE DATA SUMMARY:   HISTORY:    Past Medical History:   Diagnosis Date    Arthritis     Cancer ([facility] [facility] 2002    brain tumor, benign (meningioma)    Diabetes ([facility] [address] [facility]     questionable?    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    Seizures ([facility] [address] 2002    caused by brain tumor    Thyroid disease      Past Surgical History:   Procedure Laterality Date    COLORECTAL SCRN; HI RISK IND  8/26/2014         HX BACK SURGERY  1970s    HX ORTHOPAEDIC  1990s    right wrist    NEUROLOGICAL PROCEDURE UNLISTED  2002    brain tumor removed     Prior Level of Function/Home Situation: independent - used cane after hip fx in 2016 (after RW)  Personal factors and/or comorbidities impacting plan of care:     Home Situation  Home Environment: Private residence  # Steps to Enter: 3  Rails to Enter: Yes  Hand Rails : Bilateral  One/Two Story Residence: One story  Living Alone: No  Support Systems: Family member(s)  Patient Expects to be Discharged to[de-identified] Private residence  Current DME Used/Available at Home: Commode, bedside, Crutches, Sherita Reek, rolling, Tommy Ma, quad, Grab bars  Tub or Shower Type: Shower    EXAMINATION/PRESENTATION/DECISION MAKING:   Critical Behavior:  Neurologic State: Alert, Confused  Orientation Level: Disoriented to place, Disoriented to time, Disoriented to situation, Oriented to person  Cognition: Decreased attention/concentration, Follows commands, Impaired decision making, Poor safety awareness  Safety/Judgement: Decreased awareness of environment, Decreased insight into deficits, Decreased awareness of need for safety, Decreased awareness of need for assistance  Hearing: Auditory  Auditory Impairment: None  Skin:  Wound L hip - non draining  Edema: min ankle L  Range Of Motion:  AROM: Generally decreased, functional                       Strength:    Strength: Generally decreased, functional(R hip/groin painful per son) this is pts previous hip fx which never regained full strength.                     Tone & Sensation:   Tone: Normal                              Coordination: nt     Vision:      Functional Mobility:  Bed Mobility:  Rolling: Maximum assistance;Assist x2  Supine to Sit: Maximum assistance;Assist x2  Sit to Supine: Assist x2;Maximum assistance  Scooting: Maximum assistance  Transfers:  Sit to Stand: Maximum assistance;Assist x2(PT needed to hold L LE)  Stand to Sit: Moderate assistance;Assist x2                       Balance:   Sitting: Impaired  Sitting - Static: Fair (occasional)  Sitting - Dynamic: Poor (constant support)  Standing: Impaired  Standing - Static: Poor  Standing - Dynamic : Poor  Ambulation/Gait Training:       n/a              Therapeutic Exercises: Ankle pumps B x 20    Functional Measure:  Barthel Index:    Bathin  Bladder: 0  Bowels: 5  Groomin  Dressin  Feedin  Mobility: 0  Stairs: 0  Toilet Use: 0  Transfer (Bed to Chair and Back): 5  Total: 15         The Barthel ADL Index: Guidelines  1. The index should be used as a record of what a patient does, not as a record of what a patient could do. 2. The main aim is to establish degree of independence from any help, physical or verbal, however minor and for whatever reason. 3. The need for supervision renders the patient not independent. 4. A patient's performance should be established using the best available evidence. Asking the patient, friends/relatives and nurses are the usual sources, but direct observation and common sense are also important. However direct testing is not needed. 5. Usually the patient's performance over the preceding 24-48 hours is important, but occasionally longer periods will be relevant. 6. Middle categories imply that the patient supplies over 50 per cent of the effort. 7. Use of aids to be independent is allowed. Rubio Hamm., Barthel, D.W. ([id number]). Functional evaluation: the Barthel Index. [facility] (14)2. CHILANGO Pearson, [place] Vira., Vince Lowe., [place], [address] [place] [address] (). Measuring the change indisability after inpatient rehabilitation; comparison of the responsiveness of the Barthel Index and Functional [place] Measure. Journal of Neurology, Neurosurgery, and Psychiatry, 66(4), 809-233.   Zaida Keene, N.J.A, Sirisha Castro  WColbyJ.ELO, & Cordell Todd MColbyA. (2004.) Assessment of post-stroke quality of life in cost-effectiveness studies: The usefulness of the Barthel Index and the EuroQoL-5D. Quality of Life Research, 15, 008-03          Physical Therapy Evaluation Charge Determination   History Examination Presentation Decision-Making   MEDIUM  Complexity : 1-2 comorbidities / personal factors will impact the outcome/ POC  MEDIUM Complexity : 3 Standardized tests and measures addressing body structure, function, activity limitation and / or participation in recreation  MEDIUM Complexity : Evolving with changing characteristics  MEDIUM Complexity : FOTO score of 26-74      Based on the above components, the patient evaluation is determined to be of the following complexity level: MEDIUM    Pain:  Pain Scale 1: Visual  Pain Intensity 1: 0  Pain Location 1: Hip  Pain Orientation 1: Left  Pain Description 1: Aching  Pain Intervention(s) 1: Declines  Activity Tolerance:   Good, vitals stable  Please refer to the flowsheet for vital signs taken during this treatment. After treatment:   []         Patient left in no apparent distress sitting up in chair  [x]         Patient left in no apparent distress in bed  [x]         Call bell left within reach  [x]         Nursing notified  [x]         Caregiver present  []         Bed alarm activated    COMMUNICATION/EDUCATION:   The patients plan of care was discussed with: Occupational Therapist, Registered Nurse and . [x]         Fall prevention education was provided and the patient/caregiver indicated understanding. []         Patient/family have participated as able in goal setting and plan of care. []         Patient/family agree to work toward stated goals and plan of care. []         Patient understands intent and goals of therapy, but is neutral about his/her participation. [x]         Patient is unable to participate in goal setting and plan of care.     Thank you for this referral.  Melani Cuevas, PT

## 2019-01-15 NOTE — PROGRESS NOTES
Bedside shift change report given to Blair Em RN (oncoming nurse) by Javier Goldsmith RN (offgoing nurse). Report included the following information SBAR, Kardex, OR Summary, Procedure Summary, Intake/Output, MAR, Accordion, Recent Results and Cardiac Rhythm NSR.

## 2019-01-15 NOTE — PROGRESS NOTES
Problem: Pressure Injury - Risk of  Goal: *Prevention of pressure injury  Document Jan Scale and appropriate interventions in the flowsheet. Outcome: Progressing Towards Goal  Pressure Injury Interventions:  Sensory Interventions: Assess changes in LOC    Moisture Interventions: Absorbent underpads    Activity Interventions: Pressure redistribution bed/mattress(bed type)    Mobility Interventions: Pressure redistribution bed/mattress (bed type)    Nutrition Interventions: Document food/fluid/supplement intake    Friction and Shear Interventions: Lift sheet               Problem: Falls - Risk of  Goal: *Absence of Falls  Document Mendez Fall Risk and appropriate interventions in the flowsheet. Outcome: Progressing Towards Goal  Fall Risk Interventions:  Mobility Interventions: Patient to call before getting OOB         Medication Interventions: Patient to call before getting OOB    Elimination Interventions: Call light in reach    History of Falls Interventions:  Investigate reason for fall

## 2019-01-15 NOTE — PROGRESS NOTES
Problem: Self Care Deficits Care Plan (Adult)  Goal: *Acute Goals and Plan of Care (Insert Text)  Occupational Therapy Goals  Initiated 1/15/2019  1. Patient will perform grooming seated EOB with supervision/set-up within 7 day(s). 2.  Patient will perform lower body bathing with moderate assistance  within 7 day(s). 3.  Patient will perform lower body dressing with moderate assistance  within 7 day(s). 4.  Patient will perform toilet transfers with moderate assistance  within 7 day(s). 5.  Patient will perform all aspects of toileting with moderate assistance  within 7 day(s). 6.  Patient will adhere to precautions and utilize ADL strategies with only Min cues during ADL within 7 day(s). Occupational Therapy EVALUATION  Patient: Rustam Cuellar (1 y.o. female)  Date: 1/15/2019  Primary Diagnosis: Femur fracture, left (HCC)  Procedure(s) (LRB):  LEFT FEMUR INSERTION INTRAMEDULLARY NAIL (Left) 1 Day Post-Op   Precautions:  TTWB    ASSESSMENT :  Based on the objective data described below, the patient presents with significant deficits in self-care, primarily due to decreased activity tolerance, pain, confusion, decreased sitting and standing balance, difficulty with new TTWB status for L LE, and general weakness. She lives at home with her son and her daughter. Her son would really like for her to come home with home health, but understands she may need a brief rehab stay to enable them to more safely care for her at home. Patient will benefit from skilled OT treatment to maximize independence and safety in basic ADL. Patient will benefit from skilled intervention to address the above impairments.   Patients rehabilitation potential is considered to be Good  Factors which may influence rehabilitation potential include:   []             None noted  []             Mental ability/status  []             Medical condition  []             Home/family situation and support systems  []             Safety awareness  []             Pain tolerance/management  []             Other:      PLAN :  Recommendations and Planned Interventions:  []               Self Care Training                  []        Therapeutic Activities  []               Functional Mobility Training    []        Cognitive Retraining  []               Therapeutic Exercises           []        Endurance Activities  []               Balance Training                   []        Neuromuscular Re-Education  []               Visual/Perceptual Training     []   Home Safety Training  []               Patient Education                 []        Family Training/Education  []               Other (comment):    Frequency/Duration: Patient will be followed by occupational therapy 5 times a week to address goals. Discharge Recommendations: Rehab (likely) vs Home Health (son's preference)  Further Equipment Recommendations for Discharge: To be determined     SUBJECTIVE:   Patient stated Thressa Grise do people keep coming in here today?     OBJECTIVE DATA SUMMARY:   HISTORY:   Past Medical History:   Diagnosis Date    Arthritis     Cancer ([facility] [address] 2002    brain tumor, benign (meningioma)    Diabetes ([facility] [address]     questionable? 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    Seizures ([address] 2002    caused by brain tumor    Thyroid disease      Past Surgical History:   Procedure Laterality Date    COLORECTAL SCRN; HI RISK IND  8/26/2014         HX BACK SURGERY  1970s    HX ORTHOPAEDIC  1990s    right wrist    NEUROLOGICAL PROCEDURE UNLISTED  2002    brain tumor removed       Prior Level of Function/Environment/Context: Independent with ADL and IADL, including driving (per chart as patient is a poor historian).   Has 24 hour supervision available; son and daughter both live with her. Expanded or extensive additional review of patient history:     Home Situation  Home Environment: Private residence  # Steps to Enter: 3  Rails to Enter: Yes  Hand Rails : Bilateral  One/Two Story Residence: One story  Living Alone: No  Support Systems: Family member(s)  Patient Expects to be Discharged to[de-identified] Private residence  Current DME Used/Available at Home: Commode, bedside, Crutches, Walker, rolling, Cane, quad, Grab bars  Tub or Shower Type: Shower    Hand dominance: Right    EXAMINATION OF PERFORMANCE DEFICITS:  Cognitive/Behavioral Status:  Neurologic State: Alert;Confused  Orientation Level: Disoriented to place; Disoriented to time;Disoriented to situation;Oriented to person  Cognition: Decreased attention/concentration; Follows commands; Impaired decision making;Poor safety awareness     Perseveration: No perseveration noted  Safety/Judgement: Decreased awareness of environment;Decreased insight into deficits; Decreased awareness of need for safety;Decreased awareness of need for assistance    Range of Motion:  AROM: Generally decreased, functional(except L LE)                         Strength:  Strength: Generally decreased, functional(except L LE)                Coordination:     Fine Motor Skills-Upper: Left Intact; Right Intact    Gross Motor Skills-Upper: Left Intact; Right Intact    Tone & Sensation:  Tone: Normal                         Balance:  Sitting: Impaired  Sitting - Static: Fair (occasional)  Sitting - Dynamic: Poor (constant support)  Standing: Impaired  Standing - Static: Poor  Standing - Dynamic : Poor    Functional Mobility and Transfers for ADLs:  Bed Mobility:  Rolling: Maximum assistance;Assist x2; Additional time  Supine to Sit: Maximum assistance;Assist x2  Sit to Supine: Maximum assistance;Assist x2  Scooting: Maximum assistance;Assist x2; Additional time    Transfers:  Sit to Stand: Maximum assistance;Assist x2  Stand to Sit: Maximum assistance;Assist x2  Toilet Transfer : Total assistance    ADL Assessment:  Feeding: Minimum assistance    Oral Facial Hygiene/Grooming: Minimum assistance    Bathing: Maximum assistance    Upper Body Dressing: Moderate assistance    Lower Body Dressing: Total assistance    Toileting: Total assistance;Assist x2                ADL Intervention and task modifications:  Educated on TTWB precaution. Patient indicates understanding, but requires cues to maintain during activity. She was incontinent of urine in the bed (purewick was not properly suctioning). She said she needed to use the bathroom. Attempted to have her roll to place the bedpan, but she was unable to tolerate rolling in either direction. Supine to sit completed with Max A of 2. Cleaned her up once she was sitting edge of bed. Initiated discussion/education of compensatory ADL techniques. Returned to supine at the end of the session. Left with phone and call bell in reach; son present. Cognitive Retraining  Safety/Judgement: Decreased awareness of environment;Decreased insight into deficits; Decreased awareness of need for safety;Decreased awareness of need for assistance    Functional Measure:  Barthel Index:    Bathin  Bladder: 0  Bowels: 5  Groomin  Dressin  Feedin  Mobility: 0  Stairs: 0  Toilet Use: 0  Transfer (Bed to Chair and Back): 5  Total: 15        The Barthel ADL Index: Guidelines  1. The index should be used as a record of what a patient does, not as a record of what a patient could do. 2. The main aim is to establish degree of independence from any help, physical or verbal, however minor and for whatever reason. 3. The need for supervision renders the patient not independent. 4. A patient's performance should be established using the best available evidence. Asking the patient, friends/relatives and nurses are the usual sources, but direct observation and common sense are also important. However direct testing is not needed. 5. Usually the patient's performance over the preceding 24-48 hours is important, but occasionally longer periods will be relevant. 6. Middle categories imply that the patient supplies over 50 per cent of the effort. 7. Use of aids to be independent is allowed. Georges Solorzano., Barthel, D.W. ([id number]). Functional evaluation: the Barthel Index. [facility] (14)2. CHILANGO Sylvester, [place]., Horace Wynne., Shivam Jobs, [facility] (1999). Measuring the change indisability after inpatient rehabilitation; comparison of the responsiveness of the Barthel Index and Functional [place] Measure. Journal of Neurology, Neurosurgery, and Psychiatry, 66(4), 124-468. Mayda Gage, N.J.A, DIONNE Min, & Danette Quintero MCELINA. (2004.) Assessment of post-stroke quality of life in cost-effectiveness studies: The usefulness of the Barthel Index and the EuroQoL-5D. Quality of Life Research, 15, 314-20       Occupational Therapy Evaluation Charge Determination   History Examination Decision-Making   LOW Complexity : Brief history review  HIGH Complexity : 5 or more performance deficits relating to physical, cognitive , or psychosocial skils that result in activity limitations and / or participation restrictions MEDIUM Complexity : Patient may present with comorbidities that affect occupational performnce. Miniml to moderate modification of tasks or assistance (eg, physical or verbal ) with assesment(s) is necessary to enable patient to complete evaluation       Based on the above components, the patient evaluation is determined to be of the following complexity level: LOW   Pain:  Pain Scale 1: Visual  Pain Intensity 1: 10 with movement  Pain Location 1: Hip  Pain Orientation 1: Left  Pain Description 1: Aching  Pain Intervention(s) 1: Nursing aware    Activity Tolerance:   Fair  Please refer to the flowsheet for vital signs taken during this treatment.   After treatment:   [] Patient left in no apparent distress sitting up in chair  [x] Patient left in no apparent distress in bed  [x] Call bell left within reach  [x] Nursing notified  [x] Caregiver present  [] Bed alarm activated    COMMUNICATION/EDUCATION:   The patients plan of care was discussed with: Physical Therapist and Registered Nurse.  [] Home safety education was provided and the patient/caregiver indicated understanding. [] Patient/family have participated as able in goal setting and plan of care. [] Patient/family agree to work toward stated goals and plan of care. [] Patient understands intent and goals of therapy, but is neutral about his/her participation. [x] Patient is unable to participate in goal setting and plan of care. This patients plan of care is appropriate for delegation to [facility].     Thank you for this referral.  Staci Quintanilla, OTR/L  Time Calculation: 39 mins

## 2019-01-15 NOTE — PROGRESS NOTES
Physical Therapy  Attempted to see patient this AM and she is quite confused telling the nurses they are doing Anabapt[employer]. Pt had ativan this am.  Spoke to son who informed this Patrick Billing of home situation. Pt lives with daughter and supportive son with all needed equipment and they hope to take patient home with them as they did after previous hipfx in 2016. Will see in afternoon.

## 2019-01-15 NOTE — PROGRESS NOTES
0800 - IV infiltrated    0830 - 2 orthopedic RNs attempted to place IV. Attempts unsuccessful. [id number] - PICC team called to attempt IV access.  Awaiting arrival.

## 2019-01-15 NOTE — PROGRESS NOTES
Orthopedic NP Progress Note  Post Op day: 1 Day Post-Op    January 15, 2019 [address]    Attending Physician: Treatment Team: Attending Provider: Xavier Zhang DO; Consulting Provider: Della Sanchez NP; Consulting Provider: Reema Patten MD; Consulting Provider: Esther Peña MD; Care Manager: Aung Almeida; Utilization Review: Gayla Ranies; Surgeon: Esther Peña MD     Vital Signs:    Patient Vitals for the past 8 hrs:   BP Temp Pulse Resp SpO2 Weight   01/15/19 0810 119/71 98.3 °F (36.8 °C) 93 16 97 % --   01/15/19 0604 126/50 98.4 °F (36.9 °C) 85 16 94 % --   01/15/19 0514 -- -- -- -- -- 57.8 kg (127 lb 6.8 oz)     BMI (calculated): 21.9 (01/15/19 0514)    Intake/Output:  No intake/output data recorded. 01/13 1901 - 01/15 0700  In: 750 [I.V.:750]  Out: 1700 [Urine:1400]    Pain Control:   Pain Assessment  Pain Scale 1: Visual  Pain Intensity 1: 0  Pain Onset 1: movement  Pain Location 1: Hip  Pain Orientation 1: Left  Pain Description 1: Aching  Pain Intervention(s) 1: Declines    LAB:    Recent Labs     01/15/19  0609   HCT 25.1*   HGB 8.4*     Lab Results   Component Value Date/Time    Sodium 137 01/15/2019 06:09 AM    Potassium 3.9 01/15/2019 06:09 AM    Chloride 104 01/15/2019 06:09 AM    CO2 27 01/15/2019 06:09 AM    Glucose 140 (H) 01/15/2019 06:09 AM    BUN 17 01/15/2019 06:09 AM    Creatinine 0.57 01/15/2019 06:09 AM    Calcium 8.5 01/15/2019 06:09 AM       Subjective:  Myesha Gunderson is a 80 y.o. female s/p a  Procedure(s):  LEFT FEMUR INSERTION INTRAMEDULLARY NAIL   Procedure(s):  LEFT FEMUR INSERTION INTRAMEDULLARY NAIL. Tolerating diet. C/o expected post op pain in LLE. + flatus      Objective: General: alert, cooperative, no distress. .  Gastrointestinal:  Soft, non-tender. Neuro/Vascular: CNS Intact. Sensation stable. Brisk cap refill, + pulses UE/LE  Musculoskeletal:  +ROM UE/LE. Aditya's sign negative in bilateral lower extremities.   Calves soft, supple, non-tender upon palpation or with passive stretch. Skin: Incision - clean, dry and intact. No significant erythema or swelling. Dressing: clean, dry, and intact    Rios - n  Drain - n       PT/OT:   Gait:                      Assessment:    s/p Procedure(s):  LEFT FEMUR INSERTION INTRAMEDULLARY NAIL    Principal Problem:    Femur fracture, left ([facility] (1/13/2019)    Active Problems:    Hypothyroid (5/11/2015)      H/O meningioma of the brain, left brain meningioma (5/11/2015)      Complex partial seizure 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 seizure ([facility] (4/21/2017)      Diabetes ([facility] (1/13/2019)      Overview: questionable? Plan:     -  Continue PT/OT - TTWB  -  Continue established methods of pain control  -  VTE Prophylaxes - TEDS &/or SCDs with ASA BID  -  GI Prophylaxes - pepcid       Discharge To:   Will likely need SNF       Signed By: Beata Romero NP    Orthopedic Nurse Practitioner

## 2019-01-15 NOTE — PROGRESS NOTES
Hospitalist Progress Note    NAME: Roseline Mcgraw   :  1932   MRN:  [id number]       Assessment / Plan:  Acute intertrochanteric LEFT femur fracture   Post op blood loss anemia  Hb noted 8.4 from 13.6 pre-op, cont to monitor, transfuse as needed  GLF at home with Hx of prior falls  POD 1 s/p Lt femur intramedullary nail  Pain control and DVT PPx per ortho  PT/OT/case mgmnt consulted  Still having some breakthrough pain  Hx of Rt closed hip fracture 2016 s/p Rt hip hemiarthroplasty 16     Hx Bradycardia   seen and evaluated by Dr. Jose G Orourke last year. Pt is completely asymptomatic. 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 Feldman Rivakatie phenytoin as was PTA      Generalized muscle weakness   went through OP rehab at [facility]   PT/OT pending, likely will require placement for short-term rehab     Hypothyroid   Cont home synthroid     HTN   Continue Norvasc as was PTA      Body mass index is 21.46 kg/m². Subjective:     Chief Complaint / Reason for Physician Visit  Patient confused this AM. Pt reportedly unable to achieve restful sleep last night and became agitated (claustrophobic per son). Review of Systems:  Symptom Y/N Comments  Symptom Y/N Comments   Fever/Chills n   Chest Pain n    Poor Appetite n   Edema n    Cough n   Abdominal Pain n    Sputum n   Joint Pain     SOB/DORSEY n   Pruritis/Rash     Nausea/vomit n   Tolerating PT/OT     Diarrhea n   Tolerating Diet     Constipation n   Other       Could NOT obtain due to:      Objective:     VITALS:   Last 24hrs VS reviewed since prior progress note.  Most recent are:  Patient Vitals for the past 24 hrs:   Temp Pulse Resp BP SpO2   01/15/19 0810 98.3 °F (36.8 °C) 93 16 119/71 97 %   01/15/19 0604 98.4 °F (36.9 °C) 85 16 126/50 94 %   01/15/19 0017 98 °F (36.7 °C) 100 16 127/52 100 %   01/14/19 1951 99.5 °F (37.5 °C) 88 16 127/58 97 %   01/14/19 1718 97.6 °F (36.4 °C) 90 16 130/73 100 %   01/14/19 1603 98 °F (36.7 °C) 88 16 125/70 100 %   01/14/19 1510 97.8 °F (36.6 °C) 90 16 128/55 100 %   01/14/19 1439 97.8 °F (36.6 °C) 73 14 121/59 100 %   01/14/19 1414 97.9 °F (36.6 °C) 80 12 130/53 97 %   01/14/19 1353 -- 99 17 -- 97 %   01/14/19 1345 -- 64 10 132/52 --   01/14/19 1330 -- 82 13 124/59 97 %   01/14/19 1323 -- 87 14 -- 98 %   01/14/19 1320 -- 95 15 131/65 96 %   01/14/19 1315 -- 94 16 142/56 --   01/14/19 1310 97.7 °F (36.5 °C) (!) 103 20 127/59 99 %   01/14/19 1305 -- 87 13 142/60 99 %   01/14/19 1300 -- 83 12 140/62 100 %   01/14/19 1255 -- 83 12 139/55 100 %   01/14/19 1250 -- 88 16 139/58 100 %   01/14/19 1245 -- 87 18 137/54 100 %   01/14/19 1240 -- 78 13 142/53 100 %   01/14/19 1235 -- 85 10 134/61 98 %   01/14/19 1234 -- 100 18 -- 100 %   01/14/19 1230 -- (!) 104 15 156/73 100 %   01/14/19 1225 -- (!) 105 18 146/79 100 %   01/14/19 1220 -- (!) 102 17 160/69 100 %   01/14/19 1217 98.5 °F (36.9 °C) 99 16 154/75 99 %   01/14/19 1216 -- (!) 101 16 154/75 99 %   01/14/19 1215 -- (!) 103 17 -- 98 %       Intake/Output Summary (Last 24 hours) at 1/15/2019 1021  Last data filed at 1/15/2019 0609  Gross per 24 hour   Intake 750 ml   Output 1500 ml   Net -750 ml        PHYSICAL EXAM:  General: WD, WN. Alert, cooperative, no acute distress    EENT:  EOMI. Anicteric sclerae. MMM  Resp:  CTA bilaterally, no wheezing or rales. No accessory muscle use  CV:  Regular  rhythm,  No edema  GI:  Soft, Non distended, Non tender.  +Bowel sounds  Neurologic:  Alert and oriented X 3, normal speech,   Psych:   Good insight. Not anxious nor agitated  Skin:  No rashes.   No jaundice    Reviewed most current lab test results and cultures  YES  Reviewed most current radiology test results   YES  Review and summation of old records today    NO  Reviewed patient's current orders and MAR    YES  PMH/SH reviewed - no change compared to H&P  ________________________________________________________________________  Care Plan discussed with:    Comments   Patient x    Family  x    RN x    Care Manager     Consultant                        Multidiciplinary team rounds were held today with , nursing, pharmacist and clinical coordinator. Patient's plan of care was discussed; medications were reviewed and discharge planning was addressed. ________________________________________________________________________  Total NON critical care TIME:  25   Minutes    Total CRITICAL CARE TIME Spent:   Minutes non procedure based      Comments   >50% of visit spent in counseling and coordination of care x    ________________________________________________________________________  Duncan Escudero DO     Procedures: see electronic medical records for all procedures/Xrays and details which were not copied into this note but were reviewed prior to creation of Plan. LABS:  I reviewed today's most current labs and imaging studies.   Pertinent labs include:  Recent Labs     01/15/19  0609 01/13/19  0752   WBC 8.9 5.0   HGB 8.4* 13.6   HCT 25.1* 40.5   * 137*     Recent Labs     01/15/19  0609 01/14/19  0530 01/13/19  0752    136 139   K 3.9 4.5 4.1    105 105   CO2 27 22 28   * 116* 119*   BUN 17 17 14   CREA 0.57 0.68 0.73   CA 8.5 9.4 9.1   ALB  --   --  3.8   TBILI  --   --  0.6   SGOT  --   --  19   ALT  --   --  20   INR  --   --  1.0       Signed: Duncan Escudero DO

## 2019-01-15 NOTE — PROGRESS NOTES
Bedside and Verbal shift change report given to Korina Lopez (oncoming nurse) by Opal Persaud RN (offgoing nurse). Report included the following information SBAR, Kardex, Intake/Output, MAR and Recent Results.

## 2019-01-16 LAB
ANION GAP SERPL CALC-SCNC: 6 MMOL/L (ref 5–15)
BASOPHILS # BLD: 0 K/UL (ref 0–0.1)
BASOPHILS NFR BLD: 0 % (ref 0–1)
BUN SERPL-MCNC: 12 MG/DL (ref 6–20)
BUN/CREAT SERPL: 26 (ref 12–20)
CALCIUM SERPL-MCNC: 8.2 MG/DL (ref 8.5–10.1)
CHLORIDE SERPL-SCNC: 107 MMOL/L (ref 97–108)
CO2 SERPL-SCNC: 27 MMOL/L (ref 21–32)
CREAT SERPL-MCNC: 0.46 MG/DL (ref 0.55–1.02)
DIFFERENTIAL METHOD BLD: ABNORMAL
EOSINOPHIL # BLD: 0 K/UL (ref 0–0.4)
EOSINOPHIL NFR BLD: 0 % (ref 0–7)
ERYTHROCYTE [DISTWIDTH] IN BLOOD BY AUTOMATED COUNT: 12.7 % (ref 11.5–14.5)
GLUCOSE SERPL-MCNC: 135 MG/DL (ref 65–100)
HCT VFR BLD AUTO: 22.3 % (ref 35–47)
HGB BLD-MCNC: 7.2 G/DL (ref 11.5–16)
IMM GRANULOCYTES # BLD AUTO: 0 K/UL (ref 0–0.04)
IMM GRANULOCYTES NFR BLD AUTO: 0 % (ref 0–0.5)
LYMPHOCYTES # BLD: 1.2 K/UL (ref 0.8–3.5)
LYMPHOCYTES NFR BLD: 13 % (ref 12–49)
MCH RBC QN AUTO: 33.2 PG (ref 26–34)
MCHC RBC AUTO-ENTMCNC: 32.3 G/DL (ref 30–36.5)
MCV RBC AUTO: 102.8 FL (ref 80–99)
MONOCYTES # BLD: 1.2 K/UL (ref 0–1)
MONOCYTES NFR BLD: 13 % (ref 5–13)
NEUTS SEG # BLD: 6.9 K/UL (ref 1.8–8)
NEUTS SEG NFR BLD: 74 % (ref 32–75)
NRBC # BLD: 0 K/UL (ref 0–0.01)
NRBC BLD-RTO: 0 PER 100 WBC
PLATELET # BLD AUTO: 113 K/UL (ref 150–400)
PMV BLD AUTO: 12.7 FL (ref 8.9–12.9)
POTASSIUM SERPL-SCNC: 3.5 MMOL/L (ref 3.5–5.1)
RBC # BLD AUTO: 2.17 M/UL (ref 3.8–5.2)
SODIUM SERPL-SCNC: 140 MMOL/L (ref 136–145)
WBC # BLD AUTO: 9.3 K/UL (ref 3.6–11)

## 2019-01-16 PROCEDURE — 94760 N-INVAS EAR/PLS OXIMETRY 1: CPT

## 2019-01-16 PROCEDURE — 74011000250 HC RX REV CODE- 250: Performed by: PHYSICIAN ASSISTANT

## 2019-01-16 PROCEDURE — 85025 COMPLETE CBC W/AUTO DIFF WBC: CPT

## 2019-01-16 PROCEDURE — 80048 BASIC METABOLIC PNL TOTAL CA: CPT

## 2019-01-16 PROCEDURE — 36415 COLL VENOUS BLD VENIPUNCTURE: CPT

## 2019-01-16 PROCEDURE — 74011250637 HC RX REV CODE- 250/637: Performed by: ORTHOPAEDIC SURGERY

## 2019-01-16 PROCEDURE — 74011250637 HC RX REV CODE- 250/637: Performed by: INTERNAL MEDICINE

## 2019-01-16 PROCEDURE — 74011250637 HC RX REV CODE- 250/637: Performed by: PHYSICIAN ASSISTANT

## 2019-01-16 PROCEDURE — 65270000029 HC RM PRIVATE

## 2019-01-16 RX ORDER — LANOLIN ALCOHOL/MO/W.PET/CERES
5 CREAM (GRAM) TOPICAL
Status: DISCONTINUED | OUTPATIENT
Start: 2019-01-16 | End: 2019-01-19 | Stop reason: HOSPADM

## 2019-01-16 RX ORDER — OXYCODONE HYDROCHLORIDE 5 MG/1
5 TABLET ORAL
Qty: 30 TAB | Refills: 0 | Status: SHIPPED | OUTPATIENT
Start: 2019-01-16 | End: 2019-01-19

## 2019-01-16 RX ORDER — OXYCODONE HCL 5 MG/5 ML
2.5 SOLUTION, ORAL ORAL
Qty: 40 ML | Refills: 0 | Status: SHIPPED | OUTPATIENT
Start: 2019-01-16 | End: 2019-01-17

## 2019-01-16 RX ORDER — GUAIFENESIN 100 MG/5ML
81 LIQUID (ML) ORAL 2 TIMES DAILY
Qty: 100 TAB | Refills: 0 | Status: SHIPPED | OUTPATIENT
Start: 2019-01-16 | End: 2019-06-03

## 2019-01-16 RX ADMIN — ACETAMINOPHEN 650 MG: 325 TABLET ORAL at 17:49

## 2019-01-16 RX ADMIN — POLYETHYLENE GLYCOL 3350 17 G: 17 POWDER, FOR SOLUTION ORAL at 10:01

## 2019-01-16 RX ADMIN — MULTIPLE VITAMINS W/ MINERALS TAB 1 TABLET: TAB at 10:02

## 2019-01-16 RX ADMIN — MELATONIN TAB 3 MG 4.5 MG: 3 TAB at 22:57

## 2019-01-16 RX ADMIN — PHENYTOIN SODIUM 200 MG: 100 CAPSULE ORAL at 10:01

## 2019-01-16 RX ADMIN — ACETAMINOPHEN 650 MG: 325 TABLET ORAL at 06:06

## 2019-01-16 RX ADMIN — ASPIRIN 81 MG 81 MG: 81 TABLET ORAL at 17:49

## 2019-01-16 RX ADMIN — PHENYTOIN SODIUM 200 MG: 100 CAPSULE ORAL at 20:36

## 2019-01-16 RX ADMIN — VITAMIN D, TAB 1000IU (100/BT) 1000 UNITS: 25 TAB at 17:49

## 2019-01-16 RX ADMIN — ACETAMINOPHEN 650 MG: 325 TABLET ORAL at 00:00

## 2019-01-16 RX ADMIN — ACETAMINOPHEN 650 MG: 325 TABLET ORAL at 11:33

## 2019-01-16 RX ADMIN — BISACODYL 10 MG: 10 SUPPOSITORY RECTAL at 21:47

## 2019-01-16 RX ADMIN — Medication 10 ML: at 22:58

## 2019-01-16 RX ADMIN — OXYCODONE HYDROCHLORIDE 2.5 MG: 5 SOLUTION ORAL at 13:57

## 2019-01-16 RX ADMIN — CALCIUM CARBONATE 500 MG (1,250 MG)-VITAMIN D3 200 UNIT TABLET 1 TABLET: at 17:49

## 2019-01-16 RX ADMIN — BISACODYL 5 MG: 5 TABLET, COATED ORAL at 15:04

## 2019-01-16 RX ADMIN — ONDANSETRON 4 MG: 4 TABLET, ORALLY DISINTEGRATING ORAL at 10:07

## 2019-01-16 RX ADMIN — OXYCODONE HYDROCHLORIDE 2.5 MG: 5 SOLUTION ORAL at 10:03

## 2019-01-16 RX ADMIN — FAMOTIDINE 20 MG: 20 TABLET, FILM COATED ORAL at 17:49

## 2019-01-16 RX ADMIN — LEVOTHYROXINE SODIUM 88 MCG: 88 TABLET ORAL at 06:06

## 2019-01-16 RX ADMIN — AMLODIPINE BESYLATE 5 MG: 5 TABLET ORAL at 10:02

## 2019-01-16 RX ADMIN — ASPIRIN 81 MG 81 MG: 81 TABLET ORAL at 10:02

## 2019-01-16 RX ADMIN — VITAMIN D, TAB 1000IU (100/BT) 1000 UNITS: 25 TAB at 10:02

## 2019-01-16 RX ADMIN — Medication 10 ML: at 13:57

## 2019-01-16 RX ADMIN — Medication 10 ML: at 10:02

## 2019-01-16 RX ADMIN — STANDARDIZED SENNA CONCENTRATE AND DOCUSATE SODIUM 1 TABLET: 8.6; 5 TABLET, FILM COATED ORAL at 10:02

## 2019-01-16 RX ADMIN — FAMOTIDINE 20 MG: 20 TABLET, FILM COATED ORAL at 10:02

## 2019-01-16 RX ADMIN — CALCIUM CARBONATE 500 MG (1,250 MG)-VITAMIN D3 200 UNIT TABLET 1 TABLET: at 10:02

## 2019-01-16 RX ADMIN — STANDARDIZED SENNA CONCENTRATE AND DOCUSATE SODIUM 1 TABLET: 8.6; 5 TABLET, FILM COATED ORAL at 17:49

## 2019-01-16 RX ADMIN — CALCIUM CARBONATE 500 MG (1,250 MG)-VITAMIN D3 200 UNIT TABLET 1 TABLET: at 11:33

## 2019-01-16 NOTE — PROGRESS NOTES
Occupational Therapy  Attempted to see patient for OT treatment this morning. Patient's son indicates that he just assisted her back to bed about 3 minutes prior. Recommended that he call a staff member to assist with transfers, especially considering her TTWB status. Attempted to have her work with therapy and then she could go back to bed, but she refused, despite encouragement. She never opened her eyes and indicated that she is too tired. Will defer for now but continue to follow.

## 2019-01-16 NOTE — PROGRESS NOTES
Bedside shift change report given to Leonid Hernandez RN (oncoming nurse) by Yadira Sanches RN (offgoing nurse). Report included the following information SBAR, Kardex, Procedure Summary, Intake/Output, MAR, Accordion, Recent Results and Cardiac Rhythm NSR. Bed alarm turned on. Patient no longer drowsy and reporting 6/10 pain. Pain medication given and ice packs applied.

## 2019-01-16 NOTE — PROGRESS NOTES
10:00 AM- CM met with pt and pt son to discuss discharge planning. CM educated pt and pt son that the recommendation is for SNF placement. Pt and pt son want pt to go home with [place]. Pt and pt son requesting to work with PT again this morning. PT informed. Pt son requesting for [facility] transportation to be arranged at time of discharge. CM informed PA of conversation with pt and pt family. 1:15 PM- CM met with pt and pt son to discuss discharge planning. CM again educated pt and pt son on the states of d/c home vs. Going to rehab. Pt and pt son understand and are requesting [facility] as it is close to their home. FOC completed. Referral sent. Waiting on response.       Cara Valentin, CHARMAINE, [address]   [phone]

## 2019-01-16 NOTE — PROGRESS NOTES
Attempted to see pt for PT tx. Discussed with nursing and cleared to mobilize pt. Nurse reported she had mobilized pt to the chair (son wanted to assist, but nurse insisted that only staff assist pt). Upon entering the room pt was back on bed. .son reported he has transferred her back top the bed and could not verify that she was able to maintain her TTWB, he only pointed to the walker and said they used that. Pt did not even open her eyes while talking to PT/OT. She refused to mobilize and reported \"I am too tired\" each time Isidor Poster encouraged and educated her on OOB mobility. Pt and son believe they can manage at home even though they do not have a wheelchair and have stairs to negotiate. Pt's son stated they were going to get ambulance transport to get pt home and that he could get a wheelchair. Pt refused multiple times to participate with therapy even when son encouraged. Session aborted/defered.

## 2019-01-16 NOTE — PROGRESS NOTES
Bedside shift change report given to [facility] (oncoming nurse) by Randi Llanos (offgoing nurse). Report included the following information SBAR, Kardex, Procedure Summary, Intake/Output, MAR and Recent Results.

## 2019-01-16 NOTE — PROGRESS NOTES
Problem: Pressure Injury - Risk of  Goal: *Prevention of pressure injury  Document Jan Scale and appropriate interventions in the flowsheet. Outcome: Progressing Towards Goal  Pressure Injury Interventions:  Sensory Interventions: Assess changes in LOC, Float heels, Keep linens dry and wrinkle-free, Minimize linen layers, Pressure redistribution bed/mattress (bed type)    Moisture Interventions: Absorbent underpads, Internal/External urinary devices, Check for incontinence Q2 hours and as needed    Activity Interventions: PT/OT evaluation, Pressure redistribution bed/mattress(bed type), Increase time out of bed    Mobility Interventions: Pressure redistribution bed/mattress (bed type), Float heels, PT/OT evaluation    Nutrition Interventions: Document food/fluid/supplement intake    Friction and Shear Interventions: Lift sheet, Lift team/patient mobility team, Minimize layers               Problem: Falls - Risk of  Goal: *Absence of Falls  Document Mendez Fall Risk and appropriate interventions in the flowsheet.   Outcome: Progressing Towards Goal  Fall Risk Interventions:  Mobility Interventions: Assess mobility with egress test, Communicate number of staff needed for ambulation/transfer, OT consult for ADLs, PT Consult for mobility concerns, Utilize walker, cane, or other assistive device, Utilize gait belt for transfers/ambulation    Mentation Interventions: Adequate sleep, hydration, pain control, Reorient patient    Medication Interventions: Patient to call before getting OOB, Evaluate medications/consider consulting pharmacy    Elimination Interventions: Call light in reach, Toileting schedule/hourly rounds    History of Falls Interventions: Consult care management for discharge planning, Door open when patient unattended, Bed/chair exit alarm, Room close to nurse's station, Investigate reason for fall, Utilize gait belt for transfer/ambulation, Evaluate medications/consider consulting pharmacy

## 2019-01-17 LAB
ERYTHROCYTE [DISTWIDTH] IN BLOOD BY AUTOMATED COUNT: 13.1 % (ref 11.5–14.5)
HCT VFR BLD AUTO: 19.5 % (ref 35–47)
HGB BLD-MCNC: 6.5 G/DL (ref 11.5–16)
MCH RBC QN AUTO: 33.9 PG (ref 26–34)
MCHC RBC AUTO-ENTMCNC: 33.3 G/DL (ref 30–36.5)
MCV RBC AUTO: 101.6 FL (ref 80–99)
NRBC # BLD: 0 K/UL (ref 0–0.01)
NRBC BLD-RTO: 0 PER 100 WBC
PLATELET # BLD AUTO: 116 K/UL (ref 150–400)
RBC # BLD AUTO: 1.92 M/UL (ref 3.8–5.2)
WBC # BLD AUTO: 10.6 K/UL (ref 3.6–11)

## 2019-01-17 PROCEDURE — 74011250637 HC RX REV CODE- 250/637: Performed by: INTERNAL MEDICINE

## 2019-01-17 PROCEDURE — 74011250637 HC RX REV CODE- 250/637: Performed by: PHYSICIAN ASSISTANT

## 2019-01-17 PROCEDURE — 65270000029 HC RM PRIVATE

## 2019-01-17 PROCEDURE — P9016 RBC LEUKOCYTES REDUCED: HCPCS

## 2019-01-17 PROCEDURE — P9040 RBC LEUKOREDUCED IRRADIATED: HCPCS

## 2019-01-17 PROCEDURE — 86923 COMPATIBILITY TEST ELECTRIC: CPT

## 2019-01-17 PROCEDURE — 97116 GAIT TRAINING THERAPY: CPT

## 2019-01-17 PROCEDURE — 74011000250 HC RX REV CODE- 250: Performed by: PHYSICIAN ASSISTANT

## 2019-01-17 PROCEDURE — 86900 BLOOD TYPING SEROLOGIC ABO: CPT

## 2019-01-17 PROCEDURE — 36430 TRANSFUSION BLD/BLD COMPNT: CPT

## 2019-01-17 PROCEDURE — 30233N1 TRANSFUSION OF NONAUTOLOGOUS RED BLOOD CELLS INTO PERIPHERAL VEIN, PERCUTANEOUS APPROACH: ICD-10-PCS | Performed by: INTERNAL MEDICINE

## 2019-01-17 PROCEDURE — 36415 COLL VENOUS BLD VENIPUNCTURE: CPT

## 2019-01-17 PROCEDURE — 85027 COMPLETE CBC AUTOMATED: CPT

## 2019-01-17 PROCEDURE — 97530 THERAPEUTIC ACTIVITIES: CPT | Performed by: OCCUPATIONAL THERAPIST

## 2019-01-17 PROCEDURE — 97535 SELF CARE MNGMENT TRAINING: CPT | Performed by: OCCUPATIONAL THERAPIST

## 2019-01-17 PROCEDURE — 97530 THERAPEUTIC ACTIVITIES: CPT

## 2019-01-17 RX ORDER — SODIUM CHLORIDE 9 MG/ML
250 INJECTION, SOLUTION INTRAVENOUS AS NEEDED
Status: DISCONTINUED | OUTPATIENT
Start: 2019-01-17 | End: 2019-01-19 | Stop reason: HOSPADM

## 2019-01-17 RX ORDER — CIPROFLOXACIN 500 MG/1
250 TABLET ORAL EVERY 12 HOURS
Status: DISCONTINUED | OUTPATIENT
Start: 2019-01-17 | End: 2019-01-17 | Stop reason: CLARIF

## 2019-01-17 RX ORDER — LEVOFLOXACIN 250 MG/1
250 TABLET ORAL EVERY 24 HOURS
Status: DISCONTINUED | OUTPATIENT
Start: 2019-01-17 | End: 2019-01-19 | Stop reason: HOSPADM

## 2019-01-17 RX ORDER — OXYCODONE HCL 5 MG/5 ML
2.5 SOLUTION, ORAL ORAL
Qty: 40 ML | Refills: 0 | Status: SHIPPED | OUTPATIENT
Start: 2019-01-17 | End: 2019-01-19

## 2019-01-17 RX ADMIN — ACETAMINOPHEN 650 MG: 325 TABLET ORAL at 06:11

## 2019-01-17 RX ADMIN — CALCIUM CARBONATE 500 MG (1,250 MG)-VITAMIN D3 200 UNIT TABLET 1 TABLET: at 09:35

## 2019-01-17 RX ADMIN — OXYCODONE HYDROCHLORIDE 5 MG: 5 TABLET ORAL at 18:06

## 2019-01-17 RX ADMIN — LEVOTHYROXINE SODIUM 88 MCG: 88 TABLET ORAL at 06:12

## 2019-01-17 RX ADMIN — OXYCODONE HYDROCHLORIDE 5 MG: 5 TABLET ORAL at 23:55

## 2019-01-17 RX ADMIN — OXYCODONE HYDROCHLORIDE 5 MG: 5 TABLET ORAL at 02:16

## 2019-01-17 RX ADMIN — CALCIUM CARBONATE 500 MG (1,250 MG)-VITAMIN D3 200 UNIT TABLET 1 TABLET: at 18:05

## 2019-01-17 RX ADMIN — POLYETHYLENE GLYCOL 3350 17 G: 17 POWDER, FOR SOLUTION ORAL at 09:35

## 2019-01-17 RX ADMIN — VITAMIN D, TAB 1000IU (100/BT) 1000 UNITS: 25 TAB at 18:05

## 2019-01-17 RX ADMIN — ASPIRIN 81 MG 81 MG: 81 TABLET ORAL at 09:35

## 2019-01-17 RX ADMIN — PHENYTOIN SODIUM 200 MG: 100 CAPSULE ORAL at 09:35

## 2019-01-17 RX ADMIN — FAMOTIDINE 20 MG: 20 TABLET, FILM COATED ORAL at 09:35

## 2019-01-17 RX ADMIN — PHENYTOIN SODIUM 200 MG: 100 CAPSULE ORAL at 19:42

## 2019-01-17 RX ADMIN — AMLODIPINE BESYLATE 5 MG: 5 TABLET ORAL at 09:35

## 2019-01-17 RX ADMIN — VITAMIN D, TAB 1000IU (100/BT) 1000 UNITS: 25 TAB at 09:35

## 2019-01-17 RX ADMIN — MULTIPLE VITAMINS W/ MINERALS TAB 1 TABLET: TAB at 09:35

## 2019-01-17 RX ADMIN — Medication 10 ML: at 22:16

## 2019-01-17 RX ADMIN — MELATONIN TAB 3 MG 4.5 MG: 3 TAB at 22:12

## 2019-01-17 RX ADMIN — ACETAMINOPHEN 650 MG: 325 TABLET ORAL at 18:05

## 2019-01-17 RX ADMIN — ACETAMINOPHEN 650 MG: 325 TABLET ORAL at 23:55

## 2019-01-17 RX ADMIN — LEVOFLOXACIN 250 MG: 250 TABLET, FILM COATED ORAL at 18:05

## 2019-01-17 RX ADMIN — OXYCODONE HYDROCHLORIDE 5 MG: 5 TABLET ORAL at 06:13

## 2019-01-17 RX ADMIN — CALCIUM CARBONATE 500 MG (1,250 MG)-VITAMIN D3 200 UNIT TABLET 1 TABLET: at 11:40

## 2019-01-17 RX ADMIN — ASPIRIN 81 MG 81 MG: 81 TABLET ORAL at 18:05

## 2019-01-17 RX ADMIN — Medication 10 ML: at 18:06

## 2019-01-17 RX ADMIN — ACETAMINOPHEN 650 MG: 325 TABLET ORAL at 11:40

## 2019-01-17 RX ADMIN — STANDARDIZED SENNA CONCENTRATE AND DOCUSATE SODIUM 1 TABLET: 8.6; 5 TABLET, FILM COATED ORAL at 18:05

## 2019-01-17 RX ADMIN — Medication 10 ML: at 06:12

## 2019-01-17 RX ADMIN — FAMOTIDINE 20 MG: 20 TABLET, FILM COATED ORAL at 18:05

## 2019-01-17 RX ADMIN — OXYCODONE HYDROCHLORIDE 5 MG: 5 TABLET ORAL at 11:40

## 2019-01-17 RX ADMIN — STANDARDIZED SENNA CONCENTRATE AND DOCUSATE SODIUM 1 TABLET: 8.6; 5 TABLET, FILM COATED ORAL at 09:35

## 2019-01-17 RX ADMIN — ACETAMINOPHEN 650 MG: 325 TABLET ORAL at 00:41

## 2019-01-17 NOTE — PROGRESS NOTES
Problem: Falls - Risk of  Goal: *Absence of Falls  Document Mendez Fall Risk and appropriate interventions in the flowsheet.   Outcome: Progressing Towards Goal  Fall Risk Interventions:  Mobility Interventions: Assess mobility with egress test    Mentation Interventions: Adequate sleep, hydration, pain control    Medication Interventions: Patient to call before getting OOB    Elimination Interventions: Call light in reach    History of Falls Interventions: Consult care management for discharge planning

## 2019-01-17 NOTE — PROGRESS NOTES
Problem: Mobility Impaired (Adult and Pediatric)  Goal: *Acute Goals and Plan of Care (Insert Text)  Physical Therapy Goals  Initiated 1/15/2019  1. Patient will move from supine to sit and sit to supine  in bed with minimal assistance/contact guard assist within 7 day(s). 2.  Patient will transfer from bed to chair and chair to bed with minimal assistance/contact guard assist using the least restrictive device within 7 day(s). 3.  Patient will perform sit to stand with minimal assistance/contact guard assist within 7 day(s). 4.  Patient will ambulate with minimal assistance/contact guard assist for 25 feet Toe touch wt bearing only with the least restrictive device within 7 day(s). 5.  Patient will need only supervision for basic isometric hip exercises and active ankle exercises within 7 days. physical Therapy TREATMENT  Patient: Lobo Lam (75 y.o. female)  Date: 1/17/2019  Diagnosis: Femur fracture, left ([facility] Closed left subtrochanteric femur fracture, initial encounter ([facility]  Procedure(s) (LRB):  LEFT FEMUR INSERTION INTRAMEDULLARY NAIL (Left) 3 Days Post-Op  Precautions: TTWB  Chart, physical therapy assessment, plan of care and goals were reviewed. ASSESSMENT:  Pt was received in supine and cleared by nursing to mobilize. She required min A and additional time to come to the EOB. Good sitting balance noted. Height of the bed was needed to be raised significantly. Mod A x 2 with RW to stand, pt with difficulty maintaining TTWB. She was able to take a few steps to the chair, she needed mod A and a significant amount of cues with RW. She was left sitting up in the chair and showed LE exercises. Not even 2 minutes after pt asking about what she can do in the chair. Within the same of 2 minutes she also asked 3 times what the worst thing she could do was, author responded by telling her to not put weight through there LLE. Recommending SNF.   Progression toward goals:  []    Improving appropriately and progressing toward goals  [x]    Improving slowly and progressing toward goals  []    Not making progress toward goals and plan of care will be adjusted     PLAN:  Patient continues to benefit from skilled intervention to address the above impairments. Continue treatment per established plan of care. Discharge Recommendations:  Philip Velazquez  Further Equipment Recommendations for Discharge:  TBD     SUBJECTIVE:   Patient stated what can I do sitting up here.     OBJECTIVE DATA SUMMARY:   Critical Behavior:  Neurologic State: Alert  Orientation Level: Oriented X4  Cognition: Decreased attention/concentration, Follows commands  Safety/Judgement: Decreased awareness of environment, Decreased insight into deficits, Decreased awareness of need for safety, Decreased awareness of need for assistance  Functional Mobility Training:  Bed Mobility:  Rolling: Minimum assistance  Supine to Sit: Minimum assistance     Scooting: Contact guard assistance        Transfers:  Sit to Stand: Moderate assistance;Assist x2(height of bed elevated)  Stand to Sit: Minimum assistance                             Balance:  Sitting: Impaired  Sitting - Static: Good (unsupported)  Sitting - Dynamic: Fair (occasional)  Standing: Impaired  Standing - Static: Fair;Constant support  Standing - Dynamic : Fair  Ambulation/Gait Training:  Distance (ft): 2 Feet (ft)  Assistive Device: Gait belt;Walker, rolling  Ambulation - Level of Assistance: Minimal assistance        Gait Abnormalities: Decreased step clearance; Antalgic; Step to gait        Base of Support: Shift to right     Speed/Brook: Pace decreased (<100 feet/min)  Step Length: Left shortened;Right shortened                   Therapeutic Exercises:    Ankle pumps, LAQ, marches  Pain:  Pain Scale 1: Numeric (0 - 10)  Pain Intensity 1: 8  Pain Location 1: Hip  Pain Orientation 1: Left  Pain Description 1: Aching  Pain Intervention(s) 1: Medication (see MAR)  Activity Tolerance:   WFL  Please refer to the flowsheet for vital signs taken during this treatment.   After treatment:   [x]    Patient left in no apparent distress sitting up in chair  []    Patient left in no apparent distress in bed  [x]    Call bell left within reach  [x]    Nursing notified  []    Caregiver present  []    Bed alarm activated    COMMUNICATION/COLLABORATION:   The patients plan of care was discussed with: Occupational Therapist and Registered Nurse    Ranjith Garvin, PT, DPT   Time Calculation: 23 mins

## 2019-01-17 NOTE — PROGRESS NOTES
Bedside shift change report given to Sherice Blackman (oncoming nurse) by Mechelle Enamorado (offgoing nurse). Report included the following information SBAR, Kardex, ED Summary, OR Summary, Procedure Summary, Intake/Output, MAR, Accordion, Recent Results, Med Rec Status, Alarm Parameters , Pre Procedure Checklist, Procedure Verification and Quality Measures.

## 2019-01-17 NOTE — PROGRESS NOTES
Spiritual Care Assessment/Progress Note  [facility]      NAME: Jimena Castillo      MRN: [id number]  AGE: 80 y.o.  SEX: female  Baptism Affiliation: No Denominational   Language: English     1/17/2019     Total Time (in minutes): 25     Spiritual Assessment begun in [facility] ORTHOPEDICS through conversation with:         [x]Patient        [] Family    [] Friend(s)        Reason for Consult: Affirmation of kenisha, Initial/Spiritual assessment, patient floor, Request by staff     Spiritual beliefs: (Please include comment if needed)     [x] Identifies with a kenisha tradition:  Gnosticist       [] Supported by a kenisha community:            [] Claims no spiritual orientation:           [] Seeking spiritual identity:                [] Adheres to an individual form of spirituality:           [] Not able to assess:                           Identified resources for coping:      [x] Prayer                               [] Music                  [] Guided Imagery     [x] Family/friends                 [] Pet visits     [] Devotional reading                         [] Unknown     [] Other:                                               Interventions offered during this visit: (See comments for more details)    Patient Interventions: Affirmation of emotions/emotional suffering, Affirmation of kenisha, Initial/Spiritual assessment, patient floor, Normalization of emotional/spiritual concerns           Plan of Care:     [] Support spiritual and/or cultural needs    [] Support AMD and/or advance care planning process      [] Support grieving process   [] Coordinate Rites and/or Rituals    [] Coordination with community clergy   [] No spiritual needs identified at this time   [x] Detailed Plan of Care below (See Comments)  [] Make referral to Music Therapy  [] Make referral to Pet Therapy     [] Make referral to Addiction services  [] Make referral to [facility]  [] Make referral to Spiritual Care Partner  [] No future visits requested        [] Follow up visits as needed     Comments: Initial spiritual assessment in orthopedics. Patient shared about being tired, broken bones, spiritual issues, and concerns. Provided spiritual care, effective listening, and emotional support. Consulted with Noé Cm and patient. Advised of  Availability. Plan of care is to follow up with patient on January 18, 2019. STEVE Matamoros  Paging Service [phone])

## 2019-01-17 NOTE — PROGRESS NOTES
Feels tired and weak today. Pain managed. hgb 6.5    Patient Vitals for the past 24 hrs:   Temp Pulse Resp BP SpO2   01/17/19 0733 98.7 °F (37.1 °C) 90 15 117/54 98 %   01/17/19 0400 98.4 °F (36.9 °C) 88 16 (!) 98/33 97 %   01/17/19 0041 98.8 °F (37.1 °C) 85 16 118/56 93 %   01/16/19 2040 98.9 °F (37.2 °C) -- -- -- --   01/16/19 2008 99.9 °F (37.7 °C) 98 16 142/58 96 %   01/16/19 1746 100.1 °F (37.8 °C) 97 15 154/55 100 %   01/16/19 1249 -- -- -- 148/65 --   01/16/19 1137 98.1 °F (36.7 °C) 98 16 (!) 135/35 100 %     Dressing clean and dry  Musculoskeletal: Aditya's sign negative in bilateral lower extremities. Calves soft, supple, non-tender upon palpation or with passive stretch.      Pt  ttwb  Will transfuse 1 uprbc  Plan to dc after

## 2019-01-17 NOTE — PROGRESS NOTES
5:06 PM- Pt has been accepted to go to [facility]. CM informed that pt had suicidal ideations and a psych consult was placed as well as a sitter. Pt will need to be sitter free for 24 hours prior to d/c. CM informed attending and RN. CM will continue to follow pt for discharge planning.      CHARMAINE Woodard, [employer]   [phone]

## 2019-01-17 NOTE — PROGRESS NOTES
Late entry, pt sedated . Not able to work on mobility today. Family wanting to take home but feeling is she will need to much assistance ,. Needs inpatient rehab.     Vs reviewed  Lab stable\      Encourage in pt  Plan for dc tomorrow

## 2019-01-17 NOTE — PROGRESS NOTES
Hospitalist Progress Note    NAME: Lyubov Weir   :  1932   MRN:  [id number]       Assessment / Plan:  Acute intertrochanteric LEFT femur fracture   Post op blood loss anemia  Hb noted 8.4 from 13.6 pre-op, cont to monitor, transfuse as needed  GLF at home with Hx of prior falls  POD 2 s/p Lt femur intramedullary nail  Pain control and DVT PPx per ortho  PT/OT/case mgmnt consulted  Still having some breakthrough pain  Hx of Rt closed hip fracture 2016 s/p Rt hip hemiarthroplasty 16     Hx Bradycardia   seen and evaluated by Dr. Jorge Phillips last year. Pt is completely asymptomatic. 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 Mendel Abquentin phenytoin as was PTA      Generalized muscle weakness   went through OP re[facility] at [facility]   PT/OT pending, likely will require placement for short-term rehab     Hypothyroid   Cont home synthroid     HTN   Continue Norvasc as was PTA      Body mass index is 21.46 kg/m². Subjective:     Chief Complaint / Reason for Physician Visit  Pt more alert this AM. No acute events overnight. Discussed with nursing. Review of Systems:  Symptom Y/N Comments  Symptom Y/N Comments   Fever/Chills n   Chest Pain n    Poor Appetite n   Edema n    Cough n   Abdominal Pain n    Sputum n   Joint Pain     SOB/DORSEY n   Pruritis/Rash     Nausea/vomit n   Tolerating PT/OT     Diarrhea n   Tolerating Diet     Constipation n   Other       Could NOT obtain due to:      Objective:     VITALS:   Last 24hrs VS reviewed since prior progress note.  Most recent are:  Patient Vitals for the past 24 hrs:   Temp Pulse Resp BP SpO2   19 2040 98.9 °F (37.2 °C) -- -- -- --   01/16/19 2008 99.9 °F (37.7 °C) 98 16 142/58 96 %   01/16/19 1746 100.1 °F (37.8 °C) 97 15 154/55 100 %   01/16/19 1249 -- -- -- 148/65 --   01/16/19 1137 98.1 °F (36.7 °C) 98 16 (!) 135/35 100 %   01/16/19 0748 98.3 °F (36.8 °C) 100 15 162/58 95 %   01/16/19 0415 98 °F (36.7 °C) (!) 103 16 146/51 90 %     No intake or output data in the 24 hours ending 01/16/19 7521     PHYSICAL EXAM:  General: WD, WN. Alert, cooperative, no acute distress    EENT:  EOMI. Anicteric sclerae. MMM  Resp:  CTA bilaterally, no wheezing or rales. No accessory muscle use  CV:  Regular  rhythm,  No edema  GI:  Soft, Non distended, Non tender.  +Bowel sounds  Neurologic:  Alert and oriented X 3, normal speech,   Psych:   Good insight. Not anxious nor agitated  Skin:  No rashes. No jaundice    Reviewed most current lab test results and cultures  YES  Reviewed most current radiology test results   YES  Review and summation of old records today    NO  Reviewed patient's current orders and MAR    YES  PMH/ reviewed - no change compared to H&P  ________________________________________________________________________  Care Plan discussed with:    Comments   Patient x    Family  x    RN x    Care Manager     Consultant                        Multidiciplinary team rounds were held today with , nursing, pharmacist and clinical coordinator. Patient's plan of care was discussed; medications were reviewed and discharge planning was addressed.      ________________________________________________________________________  Total NON critical care TIME:  25   Minutes    Total CRITICAL CARE TIME Spent:   Minutes non procedure based      Comments   >50% of visit spent in counseling and coordination of care x    ________________________________________________________________________  Keyur Pals, DO     Procedures: see electronic medical records for all procedures/Xrays and details which were not copied into this note but were reviewed prior to creation of Plan. LABS:  I reviewed today's most current labs and imaging studies.   Pertinent labs include:  Recent Labs     01/16/19  0644 01/15/19  1701 01/15/19  0609   WBC 9.3  --  8.9   HGB 7.2* 7.9* 8.4*   HCT 22.3* 23.5* 25.1*   *  --  145*     Recent Labs     01/16/19  0644 01/15/19  0609 01/14/19  0530    137 136   K 3.5 3.9 4.5    104 105   CO2 27 27 22   * 140* 116*   BUN 12 17 17   CREA 0.46* 0.57 0.68   CA 8.2* 8.5 9.4       Signed: Urvashi Mcgovern, DO

## 2019-01-17 NOTE — PROGRESS NOTES
Problem: Self Care Deficits Care Plan (Adult)  Goal: *Acute Goals and Plan of Care (Insert Text)  Occupational Therapy Goals  Initiated 1/15/2019  1. Patient will perform grooming seated EOB with supervision/set-up within 7 day(s). 2.  Patient will perform lower body bathing with moderate assistance  within 7 day(s). 3.  Patient will perform lower body dressing with moderate assistance  within 7 day(s). 4.  Patient will perform toilet transfers with moderate assistance  within 7 day(s). 5.  Patient will perform all aspects of toileting with moderate assistance  within 7 day(s). 6.  Patient will adhere to precautions and utilize ADL strategies with only Min cues during ADL within 7 day(s). Occupational Therapy TREATMENT  Patient: Sammy Garrett (43 y.o. female)  Date: 1/17/2019  Diagnosis: Femur fracture, left ([facility] Closed left subtrochanteric femur fracture, [facility]  Procedure(s) (LRB):  LEFT FEMUR INSERTION INTRAMEDULLARY NAIL (Left) 3 Days Post-Op  Precautions: TTWB  Chart, occupational therapy assessment, plan of care, and goals were reviewed. ASSESSMENT:  Patient was agreeable to therapy and apologized for declining yesterday, indicating she does not remember it. She requires A LOT of cues throughout to maintain TTWB on L LE. Patient's son was present during treatment. Patient asked the same series of questions at least 3 times this session. Informed her son that he will need to remind her throughout the day. Continue to recommend SNF for rehab to enable her to safely return home with family. Progression toward goals:  []       Improving appropriately and progressing toward goals  [x]       Improving slowly and progressing toward goals  []       Not making progress toward goals and plan of care will be adjusted     PLAN:  Patient continues to benefit from skilled intervention to address the above impairments. Continue treatment per established plan of care.   Discharge Recommendations:  Skilled Nursing Facility  Further Equipment Recommendations for Discharge:  Defer to facility     SUBJECTIVE:   Patient stated Diane Hoffman is the worst thing I can do that would harm my leg.     OBJECTIVE DATA SUMMARY:   Cognitive/Behavioral Status:  Neurologic State: Alert;Confused  Orientation Level: Oriented to person;Oriented to place;Oriented to situation  Cognition: Decreased attention/concentration; Follows commands; Impaired decision making;Memory loss;Poor safety awareness     Perseveration: No perseveration noted  Safety/Judgement: Awareness of environment;Decreased awareness of need for assistance;Decreased awareness of need for safety;Decreased insight into deficits    Functional Mobility and Transfers for ADLs:  Bed Mobility:  Rolling: Minimum assistance  Supine to Sit: Minimum assistance  Scooting: Contact guard assistance    Transfers:  Sit to Stand: Moderate assistance;Assist x2(height of bed elevated)          Balance:  Sitting: Impaired  Sitting - Static: Good (unsupported)  Sitting - Dynamic: Fair (occasional)  Standing: Impaired  Standing - Static: Fair;Constant support  Standing - Dynamic : Fair    ADL Intervention:       Grooming  Brushing/Combing Hair: Supervision/set-up(seated)  Cues: Verbal cues provided;Visual cues provided    Lower Body Dressing Assistance  Slip on Shoes with Back: Total assistance (dependent)  Position Performed: Supine         Cognitive Retraining  Problem Solving: Identifying the problem  Organizing/Sequencing: Breaking task down  Attention to Task: Distractibility  Following Commands:  Follows one step commands/directions  Safety/Judgement: Awareness of environment;Decreased awareness of need for assistance;Decreased awareness of need for safety;Decreased insight into deficits  Cues: Verbal cues provided;Visual cues provided    Pain:  Pain Scale 1: Numeric (0 - 10)  Pain Intensity 1: 8  Pain Location 1: Hip  Pain Orientation 1: Left  Pain Description 1: Aching    Activity Tolerance:   Fair  After treatment:   [x] Patient left in no apparent distress sitting up in chair  [] Patient left in no apparent distress in bed  [x] Call bell left within reach  [x] Nursing notified  [x] Caregiver present  [] Bed alarm activated    COMMUNICATION/COLLABORATION:   The patients plan of care was discussed with: Physical Therapist and Registered Nurse    Jeannette Quintero OTR/L  Time Calculation: 23 mins

## 2019-01-17 NOTE — PROGRESS NOTES
While in the room with the patient, the patient stated that she wished to commit suicide. When asked what her thoughts were she stated,\"I just want to know if I kill myself, can I still go to [facility] and be with my ? \" When asked if she had these thoughts before, she denied stating she had a wonderful life but recent events have become more than she can bear. Dr. Maria Antonia Reynoso was immediately paged, CCL was made aware of patient concerns. Patient was placed on suicide precaution, spiritual care was called to see the patient. Will continue to monitor.

## 2019-01-17 NOTE — PROGRESS NOTES
Pharmacy Automatic Renal Dosing Protocol - Antimicrobials  Indication for Antimicrobials: UTI    Current Regimen of Each Antimicrobial:  Cipro 250 mg po q 12h   (Start Date ; Day # 1 of 3)    Previous Antimicrobial Therapy:   to 1.15 - Perioperative Cefazolin    Significant Cultures:   19   E coli - (resistant to Ampicillin/Ampicillin-Sulbactam, Sensitive to Levaquin)    Radiology / Imaging results: (X-ray, CT scan or MRI):     Paralysis, amputations, malnutrition: NA    Labs:  Recent Labs     19  0520 19  0644 01/15/19  0609   CREA  --  0.46* 0.57   BUN  --  12 17   WBC 10.6 9.3 8.9     Temp (24hrs), Av °F (37.2 °C), Min:98.1 °F (36.7 °C), Max:100.1 °F (37.8 °C)    Creatinine Clearance (mL/min) or Dialysis: 76    Impression/Plan:   · Will change Cipro over to formulary Flouroquinolone Levaquin 250 mg po Daily as appropriate for current renal function, indication, and culture results. · Antimicrobial stop date:  3 days      Pharmacy will follow daily and adjust medications as appropriate for renal function and/or serum levels.     Thank you,  Kaycee Byrne, [facility]

## 2019-01-17 NOTE — PROGRESS NOTES
Nutrition Assessment:    INTERVENTIONS/RECOMMENDATIONS:   Continue current diet  Continue ensure TID     ASSESSMENT:   Chart reviewed. Pt reports poor meal consumption however she is consuming 100% of ensure TID (1050 kcal and 60g protein). This meets ~80% and 90% of estimated kcal and protein needs respectively. Pt asked me the same questions as she did to RN about if she killed herself, would she still go to [facility]. This has already been reported to MD and pt placed on suicide precautions. Will also add this to diet order. Pt had sitter in room. Diet Order: Regular  % Eaten:    Patient Vitals for the past 72 hrs:   % Diet Eaten   01/16/19 0845 90 %     Pertinent Medications: [x]Reviewed: os-alisa, VitD3, pepcid, MVI, miralax, pericolace  Pertinent Labs: [x]Reviewed:   Food Allergies: [x]NKFA  []Other   Last BM:  1/17  Edema:      []RUE   []LUE   []RLE   []LLE      Pressure Ulcer:      [] Stage I   [] Stage II   [] Stage III   [] Stage IV      Anthropometrics: Height: 5' 4\" (162.6 cm) Weight: 63 kg (138 lb 14.2 oz)    IBW (%IBW):   ( ) UBW (%UBW):   (  %)    BMI: Body mass index is 23.84 kg/m². This BMI is indicative of:  []Underweight   [x]Normal   []Overweight   [] Obesity   [] Extreme Obesity (BMI>40)  Last Weight Metrics:  Weight Loss Metrics 1/16/2019 4/21/2017 8/17/2016 5/11/2016 10/14/2015 7/30/2015 5/11/2015   Today's Wt 138 lb 14.2 oz 129 lb 136 lb 138 lb 14.2 oz 142 lb 9.6 oz 138 lb 10.7 oz 135 lb   BMI 23.84 kg/m2 22.85 kg/m2 24.09 kg/m2 24.21 kg/m2 25.27 kg/m2 24.57 kg/m2 23.92 kg/m2       Estimated Nutrition Needs (Based on): 1300 Kcals/day(BMR: 1000 x 1.3) , 65 g(1.2 g/kg) Protein  Carbohydrate: At Least 130 g/day  Fluids: 1300 mL/day or per primary team    NUTRITION DIAGNOSES:   Problem:  Increased nutrient needs      Etiology: related to fracture healing      Signs/Symptoms: as evidenced by s/p LEFT FEMUR INSERTION INTRAMEDULLARY NAIL.     Previous Nutrition Dx:  [] Resolved  [] Unresolved [x] Progressing    NUTRITION INTERVENTIONS:  Meals/Snacks: General/healthful diet   Supplements: Commercial supplement              GOAL:   consume >50% of meals and ONS in 3-5 days    NUTRITION MONITORING AND EVALUATION      Food/Nutrient Intake Outcomes:  Total energy intake  Physical Signs/Symptoms Outcomes: Weight/weight change, Electrolyte and renal profile, GI    Previous Goal Met:   [] Met              [x] Progressing Towards Goal              [] Not Progressing Towards Goal   Previous Recommendations:   [x] Implemented          [] Not Implemented          [] Not Applicable    LEARNING NEEDS (Diet, Food/Nutrient-Drug Interaction):    [x] None Identified   [] Identified and Education Provided/Documented   [] Identified and Pt declined/was not appropriate     Cultural, Mormonism, OR Ethnic Dietary Needs:    [x] None Identified   [] Identified and Addressed     [x] Interdisciplinary Care Plan Reviewed/Documented    [x] Discharge Planning: General healthy diet, continue ONS 2-3x per day    [] Participated in Interdisciplinary Rounds    NUTRITION RISK:    [] High              [x] Moderate           []  Low  []  Minimal/Uncompromised      Salvador Haddad RDN  Pager [phone]  Weekend Pager [phone]

## 2019-01-18 ENCOUNTER — APPOINTMENT (OUTPATIENT)
Dept: ULTRASOUND IMAGING | Age: 84
DRG: 481 | End: 2019-01-18
Attending: INTERNAL MEDICINE
Payer: MEDICARE

## 2019-01-18 LAB
ABO + RH BLD: NORMAL
ALBUMIN SERPL-MCNC: 2.8 G/DL (ref 3.5–5)
ALBUMIN/GLOB SERPL: 0.8 {RATIO} (ref 1.1–2.2)
ALP SERPL-CCNC: 85 U/L (ref 45–117)
ALT SERPL-CCNC: 30 U/L (ref 12–78)
ANION GAP SERPL CALC-SCNC: 9 MMOL/L (ref 5–15)
AST SERPL-CCNC: 37 U/L (ref 15–37)
BASOPHILS # BLD: 0 K/UL (ref 0–0.1)
BASOPHILS NFR BLD: 0 % (ref 0–1)
BILIRUB SERPL-MCNC: 1 MG/DL (ref 0.2–1)
BLD PROD TYP BPU: NORMAL
BLOOD GROUP ANTIBODIES SERPL: NORMAL
BPU ID: NORMAL
BUN SERPL-MCNC: 18 MG/DL (ref 6–20)
BUN/CREAT SERPL: 26 (ref 12–20)
CALCIUM SERPL-MCNC: 8.3 MG/DL (ref 8.5–10.1)
CHLORIDE SERPL-SCNC: 101 MMOL/L (ref 97–108)
CO2 SERPL-SCNC: 26 MMOL/L (ref 21–32)
CREAT SERPL-MCNC: 0.7 MG/DL (ref 0.55–1.02)
CROSSMATCH RESULT,%XM: NORMAL
DIFFERENTIAL METHOD BLD: ABNORMAL
EOSINOPHIL # BLD: 0 K/UL (ref 0–0.4)
EOSINOPHIL NFR BLD: 0 % (ref 0–7)
ERYTHROCYTE [DISTWIDTH] IN BLOOD BY AUTOMATED COUNT: 17.1 % (ref 11.5–14.5)
GLOBULIN SER CALC-MCNC: 3.5 G/DL (ref 2–4)
GLUCOSE BLD STRIP.AUTO-MCNC: 115 MG/DL (ref 65–100)
GLUCOSE BLD STRIP.AUTO-MCNC: 117 MG/DL (ref 65–100)
GLUCOSE BLD STRIP.AUTO-MCNC: 179 MG/DL (ref 65–100)
GLUCOSE BLD STRIP.AUTO-MCNC: 205 MG/DL (ref 65–100)
GLUCOSE SERPL-MCNC: 160 MG/DL (ref 65–100)
HCT VFR BLD AUTO: 29.1 % (ref 35–47)
HGB BLD-MCNC: 9.6 G/DL (ref 11.5–16)
IMM GRANULOCYTES # BLD AUTO: 0.1 K/UL (ref 0–0.04)
IMM GRANULOCYTES NFR BLD AUTO: 1 % (ref 0–0.5)
LYMPHOCYTES # BLD: 1.5 K/UL (ref 0.8–3.5)
LYMPHOCYTES NFR BLD: 12 % (ref 12–49)
MAGNESIUM SERPL-MCNC: 2.1 MG/DL (ref 1.6–2.4)
MCH RBC QN AUTO: 32.2 PG (ref 26–34)
MCHC RBC AUTO-ENTMCNC: 33 G/DL (ref 30–36.5)
MCV RBC AUTO: 97.7 FL (ref 80–99)
MONOCYTES # BLD: 1 K/UL (ref 0–1)
MONOCYTES NFR BLD: 8 % (ref 5–13)
NEUTS SEG # BLD: 9.5 K/UL (ref 1.8–8)
NEUTS SEG NFR BLD: 79 % (ref 32–75)
NRBC # BLD: 0.04 K/UL (ref 0–0.01)
NRBC BLD-RTO: 0.3 PER 100 WBC
PLATELET # BLD AUTO: 207 K/UL (ref 150–400)
PMV BLD AUTO: 12.9 FL (ref 8.9–12.9)
POTASSIUM SERPL-SCNC: 3.6 MMOL/L (ref 3.5–5.1)
PROT SERPL-MCNC: 6.3 G/DL (ref 6.4–8.2)
RBC # BLD AUTO: 2.98 M/UL (ref 3.8–5.2)
SERVICE CMNT-IMP: ABNORMAL
SODIUM SERPL-SCNC: 136 MMOL/L (ref 136–145)
SPECIMEN EXP DATE BLD: NORMAL
STATUS OF UNIT,%ST: NORMAL
UNIT DIVISION, %UDIV: 0
WBC # BLD AUTO: 12.1 K/UL (ref 3.6–11)

## 2019-01-18 PROCEDURE — 74011250637 HC RX REV CODE- 250/637: Performed by: PHYSICIAN ASSISTANT

## 2019-01-18 PROCEDURE — 93970 EXTREMITY STUDY: CPT

## 2019-01-18 PROCEDURE — 74011250637 HC RX REV CODE- 250/637: Performed by: INTERNAL MEDICINE

## 2019-01-18 PROCEDURE — 85025 COMPLETE CBC W/AUTO DIFF WBC: CPT

## 2019-01-18 PROCEDURE — 82962 GLUCOSE BLOOD TEST: CPT

## 2019-01-18 PROCEDURE — 74011000250 HC RX REV CODE- 250: Performed by: PHYSICIAN ASSISTANT

## 2019-01-18 PROCEDURE — 36415 COLL VENOUS BLD VENIPUNCTURE: CPT

## 2019-01-18 PROCEDURE — 97530 THERAPEUTIC ACTIVITIES: CPT

## 2019-01-18 PROCEDURE — 65270000029 HC RM PRIVATE

## 2019-01-18 PROCEDURE — 97535 SELF CARE MNGMENT TRAINING: CPT

## 2019-01-18 PROCEDURE — 80053 COMPREHEN METABOLIC PANEL: CPT

## 2019-01-18 PROCEDURE — 83735 ASSAY OF MAGNESIUM: CPT

## 2019-01-18 RX ORDER — HYDROMORPHONE HYDROCHLORIDE 2 MG/ML
0.5 INJECTION, SOLUTION INTRAMUSCULAR; INTRAVENOUS; SUBCUTANEOUS
Status: DISCONTINUED | OUTPATIENT
Start: 2019-01-18 | End: 2019-01-19 | Stop reason: HOSPADM

## 2019-01-18 RX ADMIN — AMLODIPINE BESYLATE 5 MG: 5 TABLET ORAL at 08:15

## 2019-01-18 RX ADMIN — VITAMIN D, TAB 1000IU (100/BT) 1000 UNITS: 25 TAB at 08:15

## 2019-01-18 RX ADMIN — STANDARDIZED SENNA CONCENTRATE AND DOCUSATE SODIUM 1 TABLET: 8.6; 5 TABLET, FILM COATED ORAL at 17:36

## 2019-01-18 RX ADMIN — CALCIUM CARBONATE 500 MG (1,250 MG)-VITAMIN D3 200 UNIT TABLET 1 TABLET: at 11:17

## 2019-01-18 RX ADMIN — PHENYTOIN SODIUM 200 MG: 100 CAPSULE ORAL at 08:15

## 2019-01-18 RX ADMIN — POLYETHYLENE GLYCOL 3350 17 G: 17 POWDER, FOR SOLUTION ORAL at 08:15

## 2019-01-18 RX ADMIN — VITAMIN D, TAB 1000IU (100/BT) 1000 UNITS: 25 TAB at 17:36

## 2019-01-18 RX ADMIN — LEVOFLOXACIN 250 MG: 250 TABLET, FILM COATED ORAL at 14:45

## 2019-01-18 RX ADMIN — Medication 10 ML: at 14:45

## 2019-01-18 RX ADMIN — Medication 10 ML: at 22:31

## 2019-01-18 RX ADMIN — ACETAMINOPHEN 650 MG: 325 TABLET ORAL at 06:06

## 2019-01-18 RX ADMIN — OXYCODONE HYDROCHLORIDE 5 MG: 5 TABLET ORAL at 18:29

## 2019-01-18 RX ADMIN — ASPIRIN 81 MG 81 MG: 81 TABLET ORAL at 08:15

## 2019-01-18 RX ADMIN — OXYCODONE HYDROCHLORIDE 5 MG: 5 TABLET ORAL at 22:29

## 2019-01-18 RX ADMIN — OXYCODONE HYDROCHLORIDE 5 MG: 5 TABLET ORAL at 14:45

## 2019-01-18 RX ADMIN — STANDARDIZED SENNA CONCENTRATE AND DOCUSATE SODIUM 1 TABLET: 8.6; 5 TABLET, FILM COATED ORAL at 08:15

## 2019-01-18 RX ADMIN — CALCIUM CARBONATE 500 MG (1,250 MG)-VITAMIN D3 200 UNIT TABLET 1 TABLET: at 08:15

## 2019-01-18 RX ADMIN — ACETAMINOPHEN 650 MG: 325 TABLET ORAL at 17:36

## 2019-01-18 RX ADMIN — PHENYTOIN SODIUM 200 MG: 100 CAPSULE ORAL at 19:59

## 2019-01-18 RX ADMIN — LEVOTHYROXINE SODIUM 88 MCG: 88 TABLET ORAL at 06:06

## 2019-01-18 RX ADMIN — FAMOTIDINE 20 MG: 20 TABLET, FILM COATED ORAL at 08:15

## 2019-01-18 RX ADMIN — ASPIRIN 81 MG 81 MG: 81 TABLET ORAL at 17:36

## 2019-01-18 RX ADMIN — MELATONIN TAB 3 MG 4.5 MG: 3 TAB at 21:21

## 2019-01-18 RX ADMIN — OXYCODONE HYDROCHLORIDE 5 MG: 5 TABLET ORAL at 11:17

## 2019-01-18 RX ADMIN — CALCIUM CARBONATE 500 MG (1,250 MG)-VITAMIN D3 200 UNIT TABLET 1 TABLET: at 17:36

## 2019-01-18 RX ADMIN — ACETAMINOPHEN 500 MG: 500 TABLET ORAL at 19:59

## 2019-01-18 RX ADMIN — Medication 10 ML: at 06:08

## 2019-01-18 RX ADMIN — FAMOTIDINE 20 MG: 20 TABLET, FILM COATED ORAL at 17:36

## 2019-01-18 RX ADMIN — ACETAMINOPHEN 650 MG: 325 TABLET ORAL at 11:17

## 2019-01-18 RX ADMIN — MULTIPLE VITAMINS W/ MINERALS TAB 1 TABLET: TAB at 08:15

## 2019-01-18 NOTE — PROGRESS NOTES
Bedside shift change report given to Cydney (oncoming nurse) by Luther Viveros (offgoing nurse).  Report included the following information SBAR, Kardex, Procedure Summary, Intake/Output, MAR, Accordion, Recent Results, Med Rec Status, Alarm Parameters  and Pre Procedure Checklist.

## 2019-01-18 NOTE — PROGRESS NOTES
Hospitalist Progress Note    NAME: Karin Watson   :  1932   MRN:  [id number]       Assessment / Plan:  Acute intertrochanteric LEFT femur fracture   Post op blood loss anemia  Hb noted 8.4 from 13.6 pre-op, cont to monitor, transfuse as needed  S/p transfusion 1unit RBCs   GLF at home with Hx of prior falls  POD 1 s/p Lt femur intramedullary nail  Pain control and DVT PPx per ortho  PT/OT/case mgmnt consulted  Not in distress pain is well managed  Hx of Rt closed hip fracture  s/p Rt hip hemiarthroplasty 16  Left leg is edematous will get Duplex to R/O DVT  Major depression/SI had been ruled out  Patient expressed intent to ingest pills at home  \"I've had a great life, a great , great children, and now this\"  Suicide precautions D/Joe, Psychiatry clear her  Hx Bradycardia   seen and evaluated by Dr. Bacilio Carrillo last year. Pt is completely asymptomatic. Monitor on remote tele bed   TSH nL, rate has been intermittently regular vs tachycardic  Monitor  UTI: c/w LVQ, E. Coli isolated. Anxiety/sleeplessness  Will try ativan qhs for restful sleep while in hospital  Fall precautions in patient with history of prior fall admitted with hip fracture/GLF  Remote history of Meningioma s/p resection in    Remote history of seizure from tumor   seizure free since  - follows with Dr. Demetri Carolina phenytoin as was PTA   Generalized muscle weakness   went through OP rehab at [facility]   PT/OT pending, likely will require placement for short-term rehab  Hypothyroid  Cont home synthroid  HTN  Continue Norvasc as was PTA   Body mass index is 21.46 kg/m².     D/C plan for tomorrow to SNF     Subjective:     Chief Complaint / Reason for Physician Visit  \"I havea great life, I do not want to kill myself\"    Review of Systems:  Symptom Y/N Comments  Symptom Y/N Comments   Fever/Chills n   Chest Pain n    Poor Appetite n   Edema n    Cough n   Abdominal Pain n    Sputum n   Joint Pain     SOB/DORSEY n Pruritis/Rash     Nausea/vomit n   Tolerating PT/OT     Diarrhea n   Tolerating Diet y    Constipation n   Other       Could NOT obtain due to:      Objective:     VITALS:   Last 24hrs VS reviewed since prior progress note. Most recent are:  Patient Vitals for the past 24 hrs:   Temp Pulse Resp BP SpO2   01/18/19 1208 97.3 °F (36.3 °C) 73 18 142/59 97 %   01/18/19 0812 97.9 °F (36.6 °C) 76 20 164/72 97 %   01/18/19 0304 97.8 °F (36.6 °C) 61 17 141/80 92 %   01/17/19 2325 98.3 °F (36.8 °C) 82 16 134/56 96 %   01/17/19 1942 98.4 °F (36.9 °C) 81 16 125/53 98 %   01/17/19 1837 -- 96 16 125/47 100 %   01/17/19 1808 98.9 °F (37.2 °C) 93 15 131/65 97 %   01/17/19 1738 99.1 °F (37.3 °C) 89 14 118/53 99 %   01/17/19 1727 99.4 °F (37.4 °C) 60 -- 135/56 99 %   01/17/19 1659 98.7 °F (37.1 °C) 90 15 93/52 100 %     No intake or output data in the 24 hours ending 01/18/19 1234     PHYSICAL EXAM:  General: WD, WN. Alert, cooperative, no acute distress    EENT:  EOMI. Anicteric sclerae. MMM  Resp:  CTA bilaterally, no wheezing or rales. No accessory muscle use  CV:  Regular  rhythm,  No edema  GI:  Soft, Non distended, Non tender.  +Bowel sounds  Neurologic:  Alert and oriented X 3, normal speech,   Psych:   Good insight. Not anxious nor agitated  Skin:  No rashes. No jaundice    Reviewed most current lab test results and cultures  YES  Reviewed most current radiology test results   YES  Review and summation of old records today    NO  Reviewed patient's current orders and MAR    YES  PMH/SH reviewed - no change compared to H&P  ________________________________________________________________________  Care Plan discussed with:    Comments   Patient x    Family  x    RN x    Care Manager     Consultant                        Multidiciplinary team rounds were held today with , nursing, pharmacist and clinical coordinator.   Patient's plan of care was discussed; medications were reviewed and discharge planning was addressed. ________________________________________________________________________  Total NON critical care TIME:  25   Minutes    Total CRITICAL CARE TIME Spent:   Minutes non procedure based      Comments   >50% of visit spent in counseling and coordination of care x    ________________________________________________________________________  Clement Delgadillo MD     Procedures: see electronic medical records for all procedures/Xrays and details which were not copied into this note but were reviewed prior to creation of Plan. LABS:  I reviewed today's most current labs and imaging studies.   Pertinent labs include:  Recent Labs     01/18/19  0833 01/17/19  0520 01/16/19  0644   WBC 12.1* 10.6 9.3   HGB 9.6* 6.5* 7.2*   HCT 29.1* 19.5* 22.3*    116* 113*     Recent Labs     01/18/19  0833 01/16/19  0644    140   K 3.6 3.5    107   CO2 26 27   * 135*   BUN 18 12   CREA 0.70 0.46*   CA 8.3* 8.2*   MG 2.1  --    ALB 2.8*  --    TBILI 1.0  --    SGOT 37  --    ALT 30  --        Signed: Clement Delgadillo MD

## 2019-01-18 NOTE — PROGRESS NOTES
Problem: Mobility Impaired (Adult and Pediatric)  Goal: *Acute Goals and Plan of Care (Insert Text)  Physical Therapy Goals  Initiated 1/15/2019  1. Patient will move from supine to sit and sit to supine  in bed with minimal assistance/contact guard assist within 7 day(s). 2.  Patient will transfer from bed to chair and chair to bed with minimal assistance/contact guard assist using the least restrictive device within 7 day(s). 3.  Patient will perform sit to stand with minimal assistance/contact guard assist within 7 day(s). 4.  Patient will ambulate with minimal assistance/contact guard assist for 25 feet Toe touch wt bearing only with the least restrictive device within 7 day(s). 5.  Patient will need only supervision for basic isometric hip exercises and active ankle exercises within 7 days. physical Therapy TREATMENT  Patient: Lobo Lam (24 y.o. female)  Date: 1/18/2019  Diagnosis: Femur fracture, left ([facility] Closed left subtrochanteric [facility]  Procedure(s) (LRB):  LEFT FEMUR INSERTION INTRAMEDULLARY NAIL (Left) 4 Days Post-Op  Precautions: TTWB  Chart, physical therapy assessment, plan of care and goals were reviewed. ASSESSMENT:  Pt was received in supine and continues to progress with therapy. Bed mobility required increased time to come to the EOB She was able to perform multiple transfers with min/mod a x 1-2 with RW. Went to chair then [facility] and then back to chair. She was left sitting up in the chair at the end of the session. Recommending SNF. Progression toward goals:  []    Improving appropriately and progressing toward goals  [x]    Improving slowly and progressing toward goals  []    Not making progress toward goals and plan of care will be adjusted     PLAN:  Patient continues to benefit from skilled intervention to address the above impairments. Continue treatment per established plan of care.   Discharge Recommendations:  Skilled Nursing Facility  Further Equipment Recommendations for Discharge:  TBD     SUBJECTIVE:   Patient stated Leida Bowens is the worst thing I could possible do to this leg.     OBJECTIVE DATA SUMMARY:   Critical Behavior:  Neurologic State: Alert  Orientation Level: Oriented to person, Oriented to place, Oriented to situation  Cognition: Follows commands  Safety/Judgement: Awareness of environment, Decreased awareness of need for assistance, Decreased awareness of need for safety, Decreased insight into deficits  Functional Mobility Training:  Bed Mobility:  Rolling: Minimum assistance  Supine to Sit: Minimum assistance     Scooting: Contact guard assistance        Transfers:  Sit to Stand: Moderate assistance;Assist x2  Stand to Sit: Minimum assistance;Assist x2                             Balance:  Sitting: Intact  Sitting - Static: Good (unsupported)  Sitting - Dynamic: Fair (occasional)  Standing: Impaired  Standing - Static: Good;Constant support  Standing - Dynamic : Fair  Ambulation/Gait Training:  Distance (ft): 3 Feet (ft)  Assistive Device: Gait belt;Walker, rolling  Ambulation - Level of Assistance: Minimal assistance; Moderate assistance        Gait Abnormalities: Decreased step clearance        Base of Support: Shift to right     Speed/Brook: Pace decreased (<100 feet/min)  Step Length: Left shortened;Right shortened                    Pain:  Pain Scale 1: Numeric (0 - 10)  Pain Intensity 1: 5  Pain Location 1: Hip  Pain Orientation 1: Left  Pain Description 1: Aching  Pain Intervention(s) 1: Declines  Activity Tolerance:   fair  Please refer to the flowsheet for vital signs taken during this treatment.   After treatment:   [x]    Patient left in no apparent distress sitting up in chair  []    Patient left in no apparent distress in bed  [x]    Call bell left within reach  [x]    Nursing notified  []    Caregiver present  []    Bed alarm activated    COMMUNICATION/COLLABORATION:   The patients plan of care was discussed with: Occupational Therapist    Le Bravo PT, DPT   Time Calculation: 41 mins

## 2019-01-18 NOTE — PROGRESS NOTES
Patient has been stating that she wants to kill herself once she leaves the hospital since midnight.  Patient does have a consult for Psych and Psych should be coming in to speak with her today

## 2019-01-18 NOTE — CONSULTS
Psychiatry Consult Note    Subjective:   Patient:  Rakesh Figueroa Age:  80 y.o. :  1932  Date of Evaluation:  2019    Reason for Referral:  Rakesh Figueroa was admitted for fall with left femur fracture and psych conmsulted for SI    History of Presenting Problem: Pt seen. She is alert and OX3. She has voiced SI stating she will take pills if she goes home. Pt mood is better today and stated she was overwhelmed with her current situation but does not mean to harm self. She has several positive things in her life and she loves herself too much. She has no previous self harm behavior and denies feeling hopeless. Her son is at the bedside who has no concern about her safety and she lives with them. She denies feeling worthless and helpless. Denies johan or psychosis. attention and recall is appropriate. Denies SI/HI/AVH. Social History:   Social History     Socioeconomic History    Marital status:      Spouse name: Not on file    Number of children: Not on file    Years of education: Not on file    Highest education level: Not on file   Tobacco Use    Smoking status: Never Smoker    Smokeless tobacco: Never Used   Substance and Sexual Activity    Alcohol use: Yes     Comment: glass of wine occasionally    Drug use: No       Legal: none    Family History:     Family History   Problem Relation Age of Onset    Cancer Mother         breast    Alzheimer Mother     Heart Disease Father     Kidney Disease Sister         dialysis    Seizures Child          Medical History:   Past Medical History:   Diagnosis Date    Arthritis     Cancer ([facility] 2002    brain tumor, benign (meningioma)    Diabetes ([facility]     questionable?     Gastrointestinal disorder     diverticulitis    Hypothyroid 2015    Localization-related (focal) (partial) epilepsy and epileptic syndromes with complex partial seizures, without mention of intractable epilepsy 2015    Other ill-defined conditions(799.89) loss of vision in left eye    Seizures ([address] 2002    caused by brain tumor    Thyroid disease        Psychiatric History: none reported      Substance Use History:   Social History     Substance and Sexual Activity   Alcohol Use Yes    Comment: glass of wine occasionally     Social History     Substance and Sexual Activity   Drug Use No           Objective:     Vitals/Physical Assessment:        Patient Vitals for the past 8 hrs:   BP Temp Pulse Resp SpO2   01/18/19 0812 164/72 97.9 °F (36.6 °C) 76 20 97 %   01/18/19 0304 141/80 97.8 °F (36.6 °C) 61 17 92 %           MENTAL STATUS EXAM:   FINDINGS WITHIN NORMAL LIMITS (WNL) UNLESS OTHERWISE STATED BELOW:    Sensorium  oriented to time, place and person   Relations passive   Appearance:  age appropriate   Motor Behavior:  within normal limits   Speech:  monotone   Thought Process: goal directed   Thought Content free of delusions, free of hallucinations and preoccupations   Suicidal ideations denies   Homicidal ideations none   Mood:  anxious, euthymic   Affect:  euthymic and mood-congruent   Memory recent  adequate   Memory remote:  adequate   Concentration:  adequate   Abstraction:  abstract   Insight:  age appropriate   Reliability fair   Judgment:  poor          Pertinant Data:  Patient Vitals for the past 8 hrs:   Temp Pulse Resp BP SpO2   01/18/19 0812 97.9 °F (36.6 °C) 76 20 164/72 97 %   01/18/19 0304 97.8 °F (36.6 °C) 61 17 141/80 92 %       Recent Results (from the past 24 hour(s))   TYPE + CROSSMATCH    Collection Time: 01/17/19 12:38 PM   Result Value Ref Range    Crossmatch Expiration 01/20/2019     ABO/Rh(D) O NEGATIVE     Antibody screen NEG     Unit number [id number]     Blood component type  LRIR     Unit division 00     Status of unit TRANSFUSED     Crossmatch result Compatible    GLUCOSE, POC    Collection Time: 01/18/19  8:10 AM   Result Value Ref Range    Glucose (POC) 179 (H) 65 - 100 mg/dL    Performed by Joseph Dasilva      No results found. Impression:      Principal Problem:    Closed left subtrochanteric femur fracture, initial encounter ([facility] (1/15/2019)    Active Problems:    Hypothyroid (5/11/2015)      H/O meningioma of the brain, left brain meningioma (5/11/2015)      Complex partial seizure 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 seizure ([facility] (4/21/2017)      Diabetes ([facility] (1/13/2019)      Overview: questionable?       Femur fracture, left (HCC) (1/13/2019)        Adjustment disorder with depressed mood    Plan:       Medications:  Current Facility-Administered Medications   Medication Dose Route Frequency    levoFLOXacin (LEVAQUIN) tablet 250 mg  250 mg Oral Q24H    0.9% sodium chloride infusion 250 mL  250 mL IntraVENous PRN    melatonin tablet 4.5 mg  4.5 mg Oral QHS    oxyCODONE (ROXICODONE) 5 mg/5 mL oral solution 2.5 mg  2.5 mg Oral Q4H PRN    lidocaine (PF) (XYLOCAINE) 10 mg/mL (1 %) injection 0.1 mL  0.1 mL SubCUTAneous PRN    sodium chloride (NS) flush 5-40 mL  5-40 mL IntraVENous Q8H    sodium chloride (NS) flush 5-40 mL  5-40 mL IntraVENous PRN    acetaminophen (TYLENOL) tablet 650 mg  650 mg Oral Q6H    oxyCODONE IR (ROXICODONE) tablet 5 mg  5 mg Oral Q4H PRN    naloxone (NARCAN) injection 0.4 mg  0.4 mg IntraVENous PRN    ondansetron (ZOFRAN ODT) tablet 4 mg  4 mg Oral Q4H PRN    calcium-vitamin D (OS-ANGUS) 500 mg-200 unit tablet  1 Tab Oral TID WITH MEALS    senna-docusate (PERICOLACE) 8.6-50 mg per tablet 1 Tab  1 Tab Oral BID    bisacodyl (DULCOLAX) suppository 10 mg  10 mg Rectal DAILY PRN    famotidine (PEPCID) tablet 20 mg  20 mg Oral BID    magnesium hydroxide (MILK OF MAGNESIA) 400 mg/5 mL oral suspension 30 mL  30 mL Oral DAILY PRN    polyethylene glycol (MIRALAX) packet 17 g  17 g Oral DAILY    aspirin chewable tablet 81 mg  81 mg Oral BID    amLODIPine (NORVASC) tablet 5 mg  5 mg Oral DAILY    cholecalciferol (VITAMIN D3) tablet 1,000 Units  1,000 Units Oral BID    levothyroxine (SYNTHROID) tablet 88 mcg  88 mcg Oral 6am    multivitamin, tx-iron-ca-min (THERA-M w/ IRON) tablet 1 Tab  1 Tab Oral DAILY    phenytoin ER (DILANTIN ER) ER capsule 200 mg  200 mg Oral BID    acetaminophen (TYLENOL) tablet 500 mg  500 mg Oral Q4H PRN    bisacodyl (DULCOLAX) tablet 5 mg  5 mg Oral DAILY PRN    nicotine (NICODERM CQ) 7 mg/24 hr patch 1 Patch  1 Patch TransDERmal DAILY PRN        Scheduled Medications:   Current Facility-Administered Medications   Medication Dose Route Frequency    levoFLOXacin (LEVAQUIN) tablet 250 mg  250 mg Oral Q24H    melatonin tablet 4.5 mg  4.5 mg Oral QHS    sodium chloride (NS) flush 5-40 mL  5-40 mL IntraVENous Q8H    acetaminophen (TYLENOL) tablet 650 mg  650 mg Oral Q6H    calcium-vitamin D (OS-ANGUS) 500 mg-200 unit tablet  1 Tab Oral TID WITH MEALS    senna-docusate (PERICOLACE) 8.6-50 mg per tablet 1 Tab  1 Tab Oral BID    famotidine (PEPCID) tablet 20 mg  20 mg Oral BID    polyethylene glycol (MIRALAX) packet 17 g  17 g Oral DAILY    aspirin chewable tablet 81 mg  81 mg Oral BID    amLODIPine (NORVASC) tablet 5 mg  5 mg Oral DAILY    cholecalciferol (VITAMIN D3) tablet 1,000 Units  1,000 Units Oral BID    levothyroxine (SYNTHROID) tablet 88 mcg  88 mcg Oral 6am    multivitamin, tx-iron-ca-min (THERA-M w/ IRON) tablet 1 Tab  1 Tab Oral DAILY    phenytoin ER (DILANTIN ER) ER capsule 200 mg  200 mg Oral BID            Interventions:  As per tx team        Recommendations for Treatment/Conditions:  Pt denies SI/HI/AVH and states she made comments out of frustration. Contracts for safety and her son with whom she lives agrees that his mother is safe. Dc 1:1- offer counseling but pt declines. Not appropriate for Inpatient psych.     Referral To:   Clay Walsh MD  1/18/2019 8:18 AM

## 2019-01-18 NOTE — PROGRESS NOTES
Problem: Self Care Deficits Care Plan (Adult)  Goal: *Acute Goals and Plan of Care (Insert Text)  Occupational Therapy Goals  Initiated 1/15/2019  1. Patient will perform grooming seated EOB with supervision/set-up within 7 day(s). 2.  Patient will perform lower body bathing with moderate assistance  within 7 day(s). 3.  Patient will perform lower body dressing with moderate assistance  within 7 day(s). 4.  Patient will perform toilet transfers with moderate assistance  within 7 day(s). 5.  Patient will perform all aspects of toileting with moderate assistance  within 7 day(s). 6.  Patient will adhere to precautions and utilize ADL strategies with only Min cues during ADL within 7 day(s). Occupational Therapy TREATMENT  Patient: Sonia Rizzo (78 y.o. female)  Date: 1/18/2019  Diagnosis: [facility]ur fracture[facility] Closed left subtrochanteric femur fracture, initial encounter ([facility]  Procedure(s) (LRB):  LEFT FEMUR INSERTION INTRAMEDULLARY NAIL (Left) 4 Days Post-Op  Precautions: TTWB, fall  Chart, occupational therapy assessment, plan of care, and goals were reviewed. ASSESSMENT:  Nursing cleared for therapy. Patient pleasant and jovial, joking with therapists through out session. Son present and supportive. Seen for toileting training, completed transfer x 2 trials secondary to need for attempt for BM. Able to urinate, no BM. SPT at [address] [place] [address] with min-mod A x2, demo increased independence with additional trials of transfer. Recommend SNF, patient and son in agreement. Patient reporting to therapists she made comment yesterday about wanting to die due to frustration of situation. Her dtr has had multiple surgeries, son has surgery and their cat has been ill. She reports desire and motivation for therapy to return home and states \"I am a doer. \"     Recommend with nursing patient to complete as able in order to maintain strength, endurance and independence: UB care, feeding with supervision/setup, OOB to chair 3x/day and mobilizing to the [facility] at RW level with mod A. Thank you for your assistance. Progression toward goals:  []       Improving appropriately and progressing toward goals  [x]       Improving slowly and progressing toward goals  []       Not making progress toward goals and plan of care will be adjusted     PLAN:  Patient continues to benefit from skilled intervention to address the above impairments. Continue treatment per established plan of care. Discharge Recommendations:  Skilled Nursing Facility  Further Equipment Recommendations for Discharge:  TBD     SUBJECTIVE:   Patient stated Pamula Door had a good time, how many patient's say that with therapy?     OBJECTIVE DATA SUMMARY:   Cognitive/Behavioral Status:  Neurologic State: Alert  Orientation Level: Oriented to person;Oriented to place;Oriented to situation  Cognition: Follows commands       Functional Mobility and Transfers for ADLs:  Bed Mobility:  Supine to Sit: Minimum assistance    Transfers:  Sit to Stand: Minimum assistance; Moderate assistance;Assist x2  Functional Transfers  Toilet Transfer : Moderate assistance(RW)       Balance:  Sitting: Intact  Sitting - Static: Good (unsupported)  Sitting - Dynamic: Fair (occasional)  Standing: Impaired  Standing - Static: Constant support; Fair  Standing - Dynamic : Fair    ADL Intervention:     Education on hand placement, body mechanics, TTWB LLE, sequencing and environmental set up for [facility] transfers. Completed bed > BSC> chair with mod Ax 2 then reporting potential need for BM, completed chair > BSC>chair with min A X 2 with increased independence. standing with BUE support for hygiene- total A. Left up in chair with son present. Lower Body Dressing Assistance  Slip on Shoes with Back: Total assistance (dependent)    Toileting  Bladder Hygiene: Total assistance (dependent)(standing)  Bowel Hygiene:  Total assistance (dependent)(standing)      Pain:  Pain Scale 1: Numeric (0 - 10)  Pain Intensity 1: 5  Pain Location 1: Hip  Pain Orientation 1: Left  Pain Description 1: Aching  Pain Intervention(s) 1: Declines    Activity Tolerance:   Good for toileting to [facility] transfer x 2 trials    After treatment:   [x] Patient left in no apparent distress sitting up in chair  [] Patient left in no apparent distress in bed  [x] Call bell left within reach  [x] Nursing notified  [] Caregiver present  [] Bed alarm activated    COMMUNICATION/COLLABORATION:   The patients plan of care was discussed with: Physical Therapist and Registered Nurse    Renato Trimble OT  Time Calculation: 40 mins

## 2019-01-18 NOTE — PROGRESS NOTES
Bedside shift change report given to [facility] (oncoming nurse) by Kam Mabry (offgoing nurse). Report included the following information SBAR, Kardex, OR Summary, Procedure Summary, Intake/Output and MAR.

## 2019-01-18 NOTE — PROGRESS NOTES
Hospitalist Progress Note    NAME: Hao Mullen   :  1932   MRN:  [id number]       Assessment / Plan:  Acute intertrochanteric LEFT femur fracture   Post op blood loss anemia  Hb noted 8.4 from 13.6 pre-op, cont to monitor, transfuse as needed  S/p transfusion 1unit RBCs   GLF at home with Hx of prior falls  POD 1 s/p Lt femur intramedullary nail  Pain control and DVT PPx per ortho  PT/OT/case mgmnt consulted  Still having some breakthrough pain  Hx of Rt closed hip fracture 2016 s/p Rt hip hemiarthroplasty 16    Major depression/SI  Patient expressed intent to ingest pills at home  \"I've had a great life, a great , great children, and now this\"  Suicide precautions, sitter  Psych consult requested     Hx Bradycardia   seen and evaluated by Dr. Ximena Goins last year. Pt is completely asymptomatic. 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 Stacy Shook phenytoin as was PTA      Generalized muscle weakness   went through OP rehab at [facility]   PT/OT pending, likely will require placement for short-term rehab     Hypothyroid   Cont home synthroid     HTN   Continue Norvasc as was PTA      Body mass index is 21.46 kg/m². Subjective:     Chief Complaint / Reason for Physician Visit  Patient denies acute pain. She admits depression, thoughts of self-harm.  Has no immediate plan but notes she has thought about \"ending things\" by taking pills at home    Review of Systems:  Symptom Y/N Comments  Symptom Y/N Comments   Fever/Chills n   Chest Pain n    Poor Appetite n   Edema n    Cough n   Abdominal Pain n    Sputum n   Joint Pain SOB/DORSEY n   Pruritis/Rash     Nausea/vomit n   Tolerating PT/OT     Diarrhea n   Tolerating Diet     Constipation n   Other       Could NOT obtain due to:      Objective:     VITALS:   Last 24hrs VS reviewed since prior progress note. Most recent are:  Patient Vitals for the past 24 hrs:   Temp Pulse Resp BP SpO2   01/17/19 2325 98.3 °F (36.8 °C) 82 16 134/56 96 %   01/17/19 1942 98.4 °F (36.9 °C) 81 16 125/53 98 %   01/17/19 1837 -- 96 16 125/47 100 %   01/17/19 1808 98.9 °F (37.2 °C) 93 15 131/65 97 %   01/17/19 1738 99.1 °F (37.3 °C) 89 14 118/53 99 %   01/17/19 1727 99.4 °F (37.4 °C) 60 -- 135/56 99 %   01/17/19 1659 98.7 °F (37.1 °C) 90 15 93/52 100 %   01/17/19 1146 100 °F (37.8 °C) 71 16 110/41 100 %   01/17/19 0733 98.7 °F (37.1 °C) 90 15 117/54 98 %   01/17/19 0400 98.4 °F (36.9 °C) 88 16 (!) 98/33 97 %   01/17/19 0041 98.8 °F (37.1 °C) 85 16 118/56 93 %     No intake or output data in the 24 hours ending 01/17/19 2347     PHYSICAL EXAM:  General: WD, WN. Alert, cooperative, no acute distress    EENT:  EOMI. Anicteric sclerae. MMM  Resp:  CTA bilaterally, no wheezing or rales. No accessory muscle use  CV:  Regular  rhythm,  No edema  GI:  Soft, Non distended, Non tender.  +Bowel sounds  Neurologic:  Alert and oriented X 3, normal speech,   Psych:   Good insight. Not anxious nor agitated  Skin:  No rashes. No jaundice    Reviewed most current lab test results and cultures  YES  Reviewed most current radiology test results   YES  Review and summation of old records today    NO  Reviewed patient's current orders and MAR    YES  PMH/SH reviewed - no change compared to H&P  ________________________________________________________________________  Care Plan discussed with:    Comments   Patient x    Family  x    RN x    Care Manager     Consultant                        Multidiciplinary team rounds were held today with , nursing, pharmacist and clinical coordinator.   Patient's plan of care was discussed; medications were reviewed and discharge planning was addressed. ________________________________________________________________________  Total NON critical care TIME:  25   Minutes    Total CRITICAL CARE TIME Spent:   Minutes non procedure based      Comments   >50% of visit spent in counseling and coordination of care x    ________________________________________________________________________  Ysabel Biswas DO     Procedures: see electronic medical records for all procedures/Xrays and details which were not copied into this note but were reviewed prior to creation of Plan. LABS:  I reviewed today's most current labs and imaging studies.   Pertinent labs include:  Recent Labs     01/17/19  0520 01/16/19  0644 01/15/19  1701 01/15/19  0609   WBC 10.6 9.3  --  8.9   HGB 6.5* 7.2* 7.9* 8.4*   HCT 19.5* 22.3* 23.5* 25.1*   * 113*  --  145*     Recent Labs     01/16/19  0644 01/15/19  0609    137   K 3.5 3.9    104   CO2 27 27   * 140*   BUN 12 17   CREA 0.46* 0.57   CA 8.2* 8.5       Signed: Ysabel Biswas DO

## 2019-01-18 NOTE — PROGRESS NOTES
0244 Patient informed the nurse that she was feeling very anxious and has been feeling like this all week . Nurse tried to reassure  and engage in therapeutic communication with the patient to facilitate a more comfortable astrosphere. However, patient still feel anxious. Therefore, a message was  sent to the tele hospitalist and requested a med for anxiety.    [id number] tele hospitalist call back and recommending not adding benzo to her due the fact that she is taking opioids

## 2019-01-18 NOTE — PROGRESS NOTES
11:00 AM- CM informed by attending pt is medically stable to d/c today to [facility]. CM spoke with [facility] who states they are unable to accept pt until she is 24 hours sitter free. [facility] not sure that they will have a bed available tomorrow but to contact in the morning. CM to meet with pt and pt son to discuss additional SNF's in the event [address] accept tomorrow. 5:00 PM- Pt family does not want pt to go elsewhere. Pt family has paid to reserve a room. [facility] willing to accept pt tomorrow. [facility] transportation arranged for 2:00 PM tomorrow 1/19/19. [facility] paperwork placed on chart. Pt and pt son aware. RN to cancel transportation if pt is no longer medically stable. RN call report number is [phone]. No further CM needs identified. Care Management Interventions  PCP Verified by CM: Yes  Palliative Care Criteria Met (RRAT>21 & CHF Dx)?: No  Mode of Transport at Discharge:  Other (see comment)(BLS vs by private vehicle with family pending progress)  Transition of Care Consult (CM Consult): Discharge Planning  Discharge Durable Medical Equipment: No(Cane, crutches, RW, BSC at home)  Health Maintenance Reviewed: Yes  Physical Therapy Consult: Yes  Occupational Therapy Consult: Yes  Speech Therapy Consult: No  Current Support Network: Lives with Caregiver, Family Lives [place], Other(One-level house (4 ANDREW front, 5 ANDREW back) with son and DTR, DTR able to provide 24/7 supervision)  Confirm Follow Up Transport: Family  Plan discussed with Pt/Family/Caregiver: Yes  Freedom of Choice Offered: Yes  Discharge Location  Discharge Placement: Skilled nursing facility    CHARMAINE Tate, [address] [facility]   [phone]

## 2019-01-18 NOTE — PROGRESS NOTES
ORTHO - Progress Note  Post Op day: 4 Days [address]     [id number]  female    80 y.o.    9/5/1932    Admit date: 1/13/2019  Date of Surgery: 1/14/2019   Procedures: Procedure(s):LEFT FEMUR INSERTION INTRAMEDULLARY NAIL  Admitting Physician: Babatunde Kitchen MD   Surgeon:  Gisel Chau) and Role:   Mariely Lopez MD - Primary    Consulting Physician(s): Treatment Team: Attending Provider: Tanna Estevez MD; Consulting Provider: Saul Andrade NP; Consulting Provider: Nelson Lira MD; Consulting Provider: Monse Samano MD; Care Manager: Enrique Escudero; Utilization Review: Reji Salazar; Surgeon: Monse Samano MD; Care Manager: Ronny Saha; Consulting Provider: Spenser Johnson MD    SUBJECTIVE:     Sammy Garrett is a 80 y.o. female s/p a LEFT FEMUR INSERTION INTRAMEDULLARY NAIL resting in the bed. Patient has complaints of appropriate post-op pain, tolerating PO pain medications OXY prn. State the reporting of SI was just her being overwhelmed and she has no intention of hurting herself and would like to forget it!! Denies F/C, nausea, vomiting, dizziness, lightheadedness, chest pain, or shortness of breath. OBJECTIVE:       Physical Exam:  General: alert, cooperative, no distress. Gastrointestinal:  non-distended . Cardiovascular: equal pulses in the lower extremities,  Brisk cap refill in all distal extremities   Genitourinary: Voiding independently   Respiratory: No respiratory distress   Neurological:Neurovascular exam within normal limits. Senstion intact: LE bilat. Motor: + DF/PF/EHL. Musculoskeletal: Aditya's sign negative in bilateral lower extremities. Calves soft, supple, non-tender upon palpation or with passive stretch. Dressing/Wound:  Clean, dry and intact. No significant erythema or swelling.     Vital Signs:         Patient Vitals for the past 8 hrs:   BP Temp Pulse Resp SpO2   01/18/19 1545 131/63 97.9 °F (36.6 °C) 73 18 97 %   01/18/19 1208 142/59 97.3 °F (36.3 °C) 73 18 97 %   19 0812 164/72 97.9 °F (36.6 °C) 76 20 97 %                                          Temp (24hrs), Av.4 °F (36.9 °C), Min:97.3 °F (36.3 °C), Max:99.4 °F (37.4 °C)       Labs:        Recent Labs     19  0833   HCT 29.1*   HGB 9.6*     PT/OT:        Gait  Base of Support: Shift to right  Speed/Brook: Pace decreased (<100 feet/min)  Step Length: Left shortened, Right shortened  Gait Abnormalities: Decreased step clearance  Ambulation - Level of Assistance: Minimal assistance, Moderate assistance  Distance (ft): 3 Feet (ft)  Assistive Device: Gait belt, Walker, rolling     ASSESSMENT / PLAN:   Principal Problem:    Closed left subtrochanteric femur fracture, initial encounter ([facility] (1/15/2019)    Active Problems:    Hypothyroid (2015)      H/O meningioma of the brain, left brain meningioma (2015)      Complex partial seizure 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 seizure ([facility] (2017)      Diabetes ([facility] (2019)      Overview: questionable?       Femur fracture, left ([facility] (2019)       -  HB 9.6 after x-fusion   -  Continue PT/OT TTWB  -  DVT prophylaxis- SCD w/ ASA 81MG BID  -  DC planning - SNF --- NO additional ortho concerns     Signed By: Cindy Becker PA-C

## 2019-01-18 NOTE — PROGRESS NOTES
Problem: Pressure Injury - Risk of  Goal: *Prevention of pressure injury  Document Jan Scale and appropriate interventions in the flowsheet.   Outcome: Progressing Towards Goal  Pressure Injury Interventions:  Sensory Interventions: Assess changes in LOC    Moisture Interventions: Absorbent underpads    Activity Interventions: PT/OT evaluation    Mobility Interventions: PT/OT evaluation    Nutrition Interventions: Document food/fluid/supplement intake    Friction and Shear Interventions: Minimize layers

## 2019-01-19 ENCOUNTER — TELEPHONE (OUTPATIENT)
Dept: CASE MANAGEMENT | Age: 84
End: 2019-01-19

## 2019-01-19 VITALS
BODY MASS INDEX: 23.71 KG/M2 | WEIGHT: 138.89 LBS | HEART RATE: 90 BPM | SYSTOLIC BLOOD PRESSURE: 156 MMHG | OXYGEN SATURATION: 100 % | HEIGHT: 64 IN | DIASTOLIC BLOOD PRESSURE: 59 MMHG | RESPIRATION RATE: 18 BRPM | TEMPERATURE: 98.1 F

## 2019-01-19 LAB
ALBUMIN SERPL-MCNC: 2.4 G/DL (ref 3.5–5)
ALBUMIN/GLOB SERPL: 0.7 {RATIO} (ref 1.1–2.2)
ALP SERPL-CCNC: 74 U/L (ref 45–117)
ALT SERPL-CCNC: 26 U/L (ref 12–78)
ANION GAP SERPL CALC-SCNC: 7 MMOL/L (ref 5–15)
AST SERPL-CCNC: 28 U/L (ref 15–37)
BASOPHILS # BLD: 0 K/UL (ref 0–0.1)
BASOPHILS NFR BLD: 0 % (ref 0–1)
BILIRUB SERPL-MCNC: 0.9 MG/DL (ref 0.2–1)
BUN SERPL-MCNC: 15 MG/DL (ref 6–20)
BUN/CREAT SERPL: 38 (ref 12–20)
CALCIUM SERPL-MCNC: 8.1 MG/DL (ref 8.5–10.1)
CHLORIDE SERPL-SCNC: 104 MMOL/L (ref 97–108)
CO2 SERPL-SCNC: 24 MMOL/L (ref 21–32)
CREAT SERPL-MCNC: 0.4 MG/DL (ref 0.55–1.02)
DIFFERENTIAL METHOD BLD: ABNORMAL
EOSINOPHIL # BLD: 0 K/UL (ref 0–0.4)
EOSINOPHIL NFR BLD: 0 % (ref 0–7)
ERYTHROCYTE [DISTWIDTH] IN BLOOD BY AUTOMATED COUNT: 16.7 % (ref 11.5–14.5)
GLOBULIN SER CALC-MCNC: 3.4 G/DL (ref 2–4)
GLUCOSE SERPL-MCNC: 119 MG/DL (ref 65–100)
HCT VFR BLD AUTO: 27.3 % (ref 35–47)
HGB BLD-MCNC: 9 G/DL (ref 11.5–16)
IMM GRANULOCYTES # BLD AUTO: 0.1 K/UL (ref 0–0.04)
IMM GRANULOCYTES NFR BLD AUTO: 1 % (ref 0–0.5)
LYMPHOCYTES # BLD: 1.3 K/UL (ref 0.8–3.5)
LYMPHOCYTES NFR BLD: 14 % (ref 12–49)
MAGNESIUM SERPL-MCNC: 2.1 MG/DL (ref 1.6–2.4)
MCH RBC QN AUTO: 32.8 PG (ref 26–34)
MCHC RBC AUTO-ENTMCNC: 33 G/DL (ref 30–36.5)
MCV RBC AUTO: 99.6 FL (ref 80–99)
MONOCYTES # BLD: 1 K/UL (ref 0–1)
MONOCYTES NFR BLD: 11 % (ref 5–13)
NEUTS SEG # BLD: 6.9 K/UL (ref 1.8–8)
NEUTS SEG NFR BLD: 75 % (ref 32–75)
NRBC # BLD: 0 K/UL (ref 0–0.01)
NRBC BLD-RTO: 0 PER 100 WBC
PLATELET # BLD AUTO: 178 K/UL (ref 150–400)
PMV BLD AUTO: 12.5 FL (ref 8.9–12.9)
POTASSIUM SERPL-SCNC: 3.7 MMOL/L (ref 3.5–5.1)
PROT SERPL-MCNC: 5.8 G/DL (ref 6.4–8.2)
RBC # BLD AUTO: 2.74 M/UL (ref 3.8–5.2)
SODIUM SERPL-SCNC: 135 MMOL/L (ref 136–145)
WBC # BLD AUTO: 9.2 K/UL (ref 3.6–11)

## 2019-01-19 PROCEDURE — 36415 COLL VENOUS BLD VENIPUNCTURE: CPT

## 2019-01-19 PROCEDURE — 74011250637 HC RX REV CODE- 250/637: Performed by: INTERNAL MEDICINE

## 2019-01-19 PROCEDURE — 74011250637 HC RX REV CODE- 250/637: Performed by: PHYSICIAN ASSISTANT

## 2019-01-19 PROCEDURE — 74011250636 HC RX REV CODE- 250/636: Performed by: INTERNAL MEDICINE

## 2019-01-19 PROCEDURE — 85025 COMPLETE CBC W/AUTO DIFF WBC: CPT

## 2019-01-19 PROCEDURE — 80053 COMPREHEN METABOLIC PANEL: CPT

## 2019-01-19 PROCEDURE — 83735 ASSAY OF MAGNESIUM: CPT

## 2019-01-19 RX ORDER — AMLODIPINE BESYLATE 5 MG/1
5 TABLET ORAL DAILY
Qty: 30 TAB | Refills: 1 | Status: ON HOLD | OUTPATIENT
Start: 2019-01-19 | End: 2019-06-03 | Stop reason: SDUPTHER

## 2019-01-19 RX ORDER — FAMOTIDINE 20 MG/1
20 TABLET, FILM COATED ORAL 2 TIMES DAILY
Qty: 60 TAB | Refills: 1 | Status: SHIPPED | OUTPATIENT
Start: 2019-01-19 | End: 2019-06-03

## 2019-01-19 RX ORDER — OXYCODONE HYDROCHLORIDE 5 MG/1
5 TABLET ORAL
Qty: 30 TAB | Refills: 0 | Status: SHIPPED | OUTPATIENT
Start: 2019-01-19 | End: 2020-02-28 | Stop reason: ALTCHOICE

## 2019-01-19 RX ORDER — NICOTINE 7MG/24HR
1 PATCH, TRANSDERMAL 24 HOURS TRANSDERMAL
Qty: 30 PATCH | Refills: 0 | Status: SHIPPED | OUTPATIENT
Start: 2019-01-19 | End: 2019-02-18

## 2019-01-19 RX ORDER — ACETAMINOPHEN 325 MG/1
650 TABLET ORAL
Qty: 40 TAB | Refills: 0 | Status: SHIPPED | OUTPATIENT
Start: 2019-01-19

## 2019-01-19 RX ORDER — POLYETHYLENE GLYCOL 3350 17 G/17G
17 POWDER, FOR SOLUTION ORAL DAILY
Qty: 30 EACH | Refills: 1 | Status: SHIPPED | OUTPATIENT
Start: 2019-01-19 | End: 2022-08-10 | Stop reason: ALTCHOICE

## 2019-01-19 RX ORDER — LEVOFLOXACIN 250 MG/1
250 TABLET ORAL EVERY 24 HOURS
Qty: 5 TAB | Refills: 0 | Status: SHIPPED | OUTPATIENT
Start: 2019-01-19 | End: 2019-01-24

## 2019-01-19 RX ADMIN — ACETAMINOPHEN 650 MG: 325 TABLET ORAL at 06:02

## 2019-01-19 RX ADMIN — HYDROMORPHONE HYDROCHLORIDE 0.5 MG: 2 INJECTION INTRAMUSCULAR; INTRAVENOUS; SUBCUTANEOUS at 01:36

## 2019-01-19 RX ADMIN — OXYCODONE HYDROCHLORIDE 5 MG: 5 TABLET ORAL at 08:38

## 2019-01-19 RX ADMIN — Medication 10 ML: at 06:03

## 2019-01-19 RX ADMIN — POLYETHYLENE GLYCOL 3350 17 G: 17 POWDER, FOR SOLUTION ORAL at 08:36

## 2019-01-19 RX ADMIN — LEVOTHYROXINE SODIUM 88 MCG: 88 TABLET ORAL at 06:02

## 2019-01-19 RX ADMIN — ASPIRIN 81 MG 81 MG: 81 TABLET ORAL at 08:37

## 2019-01-19 RX ADMIN — FAMOTIDINE 20 MG: 20 TABLET, FILM COATED ORAL at 08:37

## 2019-01-19 RX ADMIN — PHENYTOIN SODIUM 200 MG: 100 CAPSULE ORAL at 08:36

## 2019-01-19 RX ADMIN — AMLODIPINE BESYLATE 5 MG: 5 TABLET ORAL at 08:37

## 2019-01-19 NOTE — PROGRESS NOTES
Bedside and Verbal shift change report given to Ying ESCALANTE (oncoming nurse) by Trina Rutledge (offgoing nurse). Report included the following information SBAR, Kardex, Intake/Output, MAR and Recent Results.

## 2019-01-19 NOTE — DISCHARGE INSTRUCTIONS
[facility]    Discharge Instruction Sheet: Hip Fracture Repair    Dr. Kali Gilmore    Blood Clot Prevention  ASA 81mg BID    Pain control:  Medications   Typically, we will prescribe a narcotic usually 1 tab every  Four- six  hours as needed for your pain. Most patients need this only for the first week. You should discontinue this as the pain decreases.  Some of these medications contain a large dose of Tylenol (acetaminophen). Therefore, you should consult your pharmacist before taking any additional Tylenol at the same time. You should not drive while taking any narcotic pain medications. Take your pain medications with food to prevent nausea! Your last dose of pain medication in the hospital was given @ :__________    [address] will be discharged home with ice/gel packs.  Continue using these regularly to minimize pain and swelling, especially after physical activity. Constipation   Pain medication and anesthesia can be constipating-this can be prevented by gentle physical activity and drinking plenty of fluid.  It should be treated with over-the-counter medications such as Dulcolax, Miralax, Magnesium Citrate and/or Fleets enema.  You should have a bowel movement at least every other day following surgery. Incision care   You will be discharged with a transparent and waterproof dressing over your incision. o This should remain in place for 5-7 days after discharge.   You will be given one additional dressing to use at home in 5-7 days if you are still having drainage   You may shower with this dressing in place after you are discharged. o After showering pat the dressing/incision dry.  When the incision is no longer draining, it may be left open to the air.  DO NOT rub the incision.  DO NOT take a tub bath or go swimming until cleared by your doctor.     DO NOT apply lotions, oils, or creams to incision. To increase and promote healing:   Stop Smoking (or at least cut back on smoking).  Eat a well-balanced diet (high in protein and vitamin C)   If your appetite is poor, consider nutritional supplements like Ensure, Glucerna, or [employer] Instant Breakfast.   If you are diabetic, controlling you blood sugars is very important to prevent infection and promote wound healing. Nutrition:   If you were on a supplement such as Ensure or Glucerna) while in the hospital, please continue using them with each meal for the next 30 days.  Eat a well-balanced diet - High in protein, high in vitamins and minerals, especially vitamin C and zinc.     Home Health:   If you have staples a home health nurse will remove them in about 10 days.  Physical Therapy will come to your home to continue training for walking, transferring and exercises    Physical Activity     You can weight bear as tolerated on your new hip toe touch weight bear .  Bed-rest may slow your recovery.  Use your walker and take short walks about every 2 hours.  Increase your distance each day.  NO DRIVING until told to do so. Warning signs: Please call your physician immediately at [phone] if you have   Bleeding from incision that is constant.  Change in mental status (unusual behavior or confusion)   If your incision develops redness or swelling   Change in wound drainage (increase in amount, color, or foul odor)   Temperature over 101.5 degrees Fahrenheit    Pain in the calf of your leg   Tenderness or redness in the calf of your leg   Increased swelling of the thigh, ankle, calf, or foot.     Emergency: CALL 911 if you have   Shortness of breath   Chest pain   Localized chest pain when coughing or taking a deep breath    Follow-up    Please call your surgeons office at [phone] for a follow up appointment.   o You should call as soon as you get home or the next day after discharge. o Ask to make an appointment in 2 weeks.  You can return to work when cleared by a physician. During normal business hours you may reach Dr. Porsha Darnell' team directly at [phone] if you have concerns or questions. HOSPITALIST DISCHARGE INSTRUCTIONS    NAME: Lyubov Weir   :  1932   MRN:  [id number]     Date/Time:  2019 8:24 AM    ADMIT DATE: 2019   DISCHARGE DATE: 2019     Attending Physician: La Page MD    DISCHARGE DIAGNOSIS:  Left Hip Fracture, Depression, H/O Bradycardia, UTI, Anxiety, H/O Meningioma, H/O Seizures, Weakness, Hypothyroidism      Medications: Per above medication reconciliation. Pain Management: per above medications    Recommended diet: Regular Diet    Recommended activity: Activity as tolerated    Wound care: See surgical/procedure care instructions    Indwelling devices:  None    Supplemental Oxygen: None    Required Lab work: Per SNF routine    Glucose management:  None    Code status: Full        Outside physician follow up: Follow-up Information     Follow up With Specialties Details Why Contact Info    [address] [place] [address] [address] [facility]  [phone]      Julia Ambrose MD [address]  [phone]          F/U PCP  F/U Orthopedics       Skilled nursing facility/ SNF MD responsible for above on discharge. Information obtained by :  I understand that if any problems occur once I am at home I am to contact my physician. I understand and acknowledge receipt of the instructions indicated above.                                                                                                                                            Physician's or R.N.'s Signature                                                                  Date/Time Patient or Repres

## 2019-01-19 NOTE — OP NOTES
Varsha Valerio MD - Adult Reconstruction and Total Joint Replacement    Thelma Escudero - MRN [id number] - : 1932 (80 y.o.)      Date: 2019    PREOPERATIVE DIAGNOSIS: Left subtrochanteric hip fracture, comminuted, closed    POSTOPERATIVE DIAGNOSIS: same    OPERATIVE PROCEDURE: Intramedullary nail fixation    IMPLANTS USED:   Implant Name Type Inv. Item Serial No.  Lot No. LRB No. Used Action   NAIL SPEEDY 130D [id number] LT -- TFNA STRL - SN/A  NAIL SPEEDY 130D [id number] LT -- TFNA STRL N/A [employer] [place] [id number] Left 1 Implanted   CABLE W CRIMP [id number] SS -- STRL - SN/A  CABLE W CRIMP [id number] SS -- STRL N/A [employer] [place] [id number] Left 2 Implanted   SCR FEN 100MM GLD STRL -- TFNA - SN/A  SCR FEN 100MM GLD STRL -- TFNA N/A [employer] [place] [id number] Left 1 Implanted   SCR BNE LCK T25 F/IM NL 5X40MM --  - SN/A  SCR BNE LCK T25 F/IM NL 5X40MM --  N/A [employer] [place] N/A Left 1 Implanted   SCR BNE LCK T25 F/IM NL 5X48 S --  - SN/A  SCR BNE LCK T25 F/IM NL [id number] S --  N/A [employer] [place] N/A Left 1 Implanted       SURGEON: Varsha Valerio MD    ASSISTANT: ISIDRA Young    ANESTHESIA: General    ESTIMATED BLOOD LOSS: 275. DRAINS: None. SPECIMENS: None. COMPLICATIONS: None. CONDITION: Stable to PACU. INDICATIONS: Displaced hip fracture. The risks, benefits, and alternatives to IM nail fixation were explained to the patient and family and they wished to proceed. They understood no guarantees could be given about the outcome of the procedure. DESCRIPTION OF PROCEDURE: After being identified in the preoperative holding area and having their operative site marked, the patient was brought back to the operating room where the  underwent anesthesia to good effect. They were transferred to the fracture table. Preoperative antibiotics were administered. The operative extremity was placed in a traction boot set-up.  The uninjured lower extremity was placed in the well-leg díaz, being sure to pad all bony prominences, paying special attention to the area of the peroneal nerve. A preoperative reduction maneuver was performed under fluoroscopy. We then prepped and draped the operative extremity in the usual fashion using sterile technique. An appropriate time-out was performed. We began by marking the lateral trajectory of the femur using fluoroscopy and a guidewire. We then returned to the A/P view. A stab incision was made proximal to the tip of the greater trochanter. The curved cannulated awl was inserted into the proximal femur. The fracture was opened laterally and reduced. The guidewire was passed distally. We then used a 1-step reamer. The indirect measuring guide was used to determine nail length. Cerclage was placed. The long IM nail was inserted over the guidewire to the appropriate depth. The stacked trocars were used to place the guide pin for the lag screw in the center/center position. The indirect measuring guide was used to determine lag screw length. We drilled for this and placed the lag screw. We then removed the guide pin. Perfect circles technique was used for the distal interlocks,  placed in a static fashion. Final hardware placement was confirmed on orthogonal views. The wounds were then closed in layers with Vicryl. Final skin closure was performed with skin staples. Sterile compressive dressings were applied. The patient was awakened, moved to the stretcher, and taken to the recovery room in stable condition. At the conclusion of the procedure all counts were correct. There were no immediate complications.

## 2019-01-19 NOTE — TELEPHONE ENCOUNTER
CM received report from [facility] of pt discharging this morning from [facility]. Received report that pt's son left and took pt with him in her gown. Per third party report, CM informed that son had verbalized some concerning and inappropriate remarks and requests from facility. CM discussed with CM Supervisor. Recommendations provided to both Admissions Coordinator and Nursing Manager to make APS report, as the writer was not present. Care Transition Coordinator and NP also contacted.     CHARMAINE Grissom Supervisee in Social Work, [employer]  [phone]

## 2019-01-19 NOTE — PROGRESS NOTES
11:50 PM  Patient c/o pain 9/10 in left leg. Oxycodone 5 mg given, but patient states that it did not help with pain.  Order for dilaudid received from tele hospitalist.

## 2019-01-19 NOTE — PROGRESS NOTES
Report called to [address], all questions answered. IV access removed. Family at bedside aware of transport.     Laura Whalen RN

## 2019-01-19 NOTE — DISCHARGE SUMMARY
Hospitalist Discharge Summary     Patient ID:  Jose Guadalupe Hurley  [id number]  80 y.o.  9/5/1932    PCP on record: Bhavani Walker MD    Admit date: 1/13/2019  Discharge date and time: 1/19/2019      DISCHARGE DIAGNOSIS:    Left Hip Fracture, Depression, H/O Bradycardia, UTI, Anxiety, H/O Meningioma, H/O Seizures, Weakness, Hypothyroidism      CONSULTATIONS:  IP CONSULT TO ORTHOPEDIC SURGERY  IP CONSULT TO PSYCHIATRY    Excerpted HPI from H&P of Liv Sears MD:  The pt was in her usual state of health. Early this morning she went outside to help take care of her plans in the patio. She returned without any problem, but soon after she lost her balance and sustained a ground level fall in the living room. She didn't lose consciousness. This was a clear mechanical loss of balance leading to the fall.     ______________________________________________________________________  DISCHARGE SUMMARY/HOSPITAL COURSE:  for full details see H&P, daily progress notes, labs, consult notes. Acute intertrochanteric LEFT femur fracture   Post op blood loss anemia  Hb noted 8.4 from 13.6 pre-op, cont to monitor, transfuse as needed  S/p transfusion 1unit RBCs 1/17  GLF at home with Hx of prior falls  POD 1 s/p Lt femur intramedullary nail  Pain control and DVT PPx per ortho  PT/OT/case mgmnt consulted  Not in distress pain is well managed  Hx of Rt closed hip fracture 2016 s/p Rt hip hemiarthroplasty 8/18/16  Left leg is edematous,  Duplex negative for DVT  Major depression/SI had been ruled out  Patient expressed intent to ingest pills at home  \"I've had a great life, a great , great children, and now this\"  Suicide precautions D/Joe, Psychiatry clear her  Hx Bradycardia   seen and evaluated by Dr. Raymundo Ware last year. Pt is completely asymptomatic. Monitor on remote tele bed   TSH nL, rate has been intermittently regular vs tachycardic  Monitor  UTI: c/w LVQ, E. Coli isolated.   Anxiety/sleeplessness  Will try ativan qhs for restful sleep while in hospital  Fall precautions in patient with history of prior fall admitted with hip fracture/GLF  Remote history of Meningioma s/p resection in 2002   Remote history of seizure from tumor   seizure free since 2002 - follows with Dr. Darien Zhao phenytoin as was PTA   Generalized muscle weakness   went through OP re[facility]   PT/OT pending, likely will require placement for short-term rehab  Hypothyroid  Cont home synthroid  HTN  Continue Norvasc as was PTA   Body mass index is 21.46 kg/m².     D/C to SNF today and F/U with PCP and Orthopedics  _______________________________________________________________________  Patient seen and examined by me on discharge day. Pertinent Findings:  Gen:    Not in distress  Chest: Clear lungs  CVS:   Regular rhythm. No edema  Abd:  Soft, not distended, not tender  Neuro:  Alert, GCS 15  _______________________________________________________________________  DISCHARGE MEDICATIONS:   Current Discharge Medication List      START taking these medications    Details   acetaminophen (TYLENOL) 325 mg tablet Take 2 Tabs by mouth every six (6) hours as needed for Pain or Fever. Qty: 40 Tab, Refills: 0      famotidine (PEPCID) 20 mg tablet Take 1 Tab by mouth two (2) times a day. Qty: 60 Tab, Refills: 1      levoFLOXacin (LEVAQUIN) 250 mg tablet Take 1 Tab by mouth every twenty-four (24) hours for 5 days. Qty: 5 Tab, Refills: 0      nicotine (NICODERM CQ) 7 mg/24 hr 1 Patch by TransDERmal route daily as needed (Tobacco crave) for up to 30 days. Qty: 30 Patch, Refills: 0      polyethylene glycol (MIRALAX) 17 gram packet Take 1 Packet by mouth daily. Qty: 30 Each, Refills: 1      oxyCODONE (ROXICODONE) 5 mg/5 mL solution Take 2.5 mL by mouth every four (4) hours as needed for Severe Pain.  Max Daily Amount: 15 mg.  Qty: 40 mL, Refills: 0    Associated Diagnoses: Closed fracture of left hip, initial encounter ([facility])      aspirin 81 mg chewable tablet Take 1 Tab by mouth two (2) times a day. Qty: 100 Tab, Refills: 0      oxyCODONE IR (ROXICODONE) 5 mg immediate release tablet Take 1 Tab by mouth every four (4) hours as needed. Max Daily Amount: 30 mg.  Qty: 30 Tab, Refills: 0    Associated Diagnoses: Closed fracture of left hip, initial encounter ([facility] [address]         CONTINUE these medications which have CHANGED    Details   amLODIPine (NORVASC) 5 mg tablet Take 1 Tab by mouth daily. Qty: 30 Tab, Refills: 1         CONTINUE these medications which have NOT CHANGED    Details   calcium carbonate (OS-ANGUS) 500 mg calcium (1,250 mg) tablet Take 1 Tab by mouth two (2) times a day. cholecalciferol (VITAMIN D3) 1,000 unit tablet Take 1,000 Units by mouth two (2) times a day. cranberry extract 450 mg tab Take 1 Tab by mouth daily. levothyroxine (SYNTHROID) 88 mcg tablet       MULTI-VITAMIN PO Take 1 Cap by mouth daily. phenytoin ER (DILANTIN ER) 100 mg ER capsule Take 2 Caps by mouth two (2) times a day. Qty: 360 Cap, Refills: 1    Associated Diagnoses: Localization-related focal epilepsy with complex partial seizures ([facility] [address] Complex partial seizure 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 seizure ([facility] [address] H/O meningioma of the brain             My Recommended Diet, Activity, Wound Care, and follow-up labs are listed in the patient's Discharge Insturctions which I have personally completed and reviewed.     ______________________________________________________________________    Risk of deterioration: Moderate    Condition at Discharge:  Stable  ______________________________________________________________________    Disposition  SNF/LTC  ______________________________________________________________________    Care Plan discussed with:   Patient, Family, RN, Care Manager, Consultant    ______________________________________________________________________    Code Status: Full Code  ______________________________________________________________________      Follow up with:   PCP : Max Welch MD  Follow-up Information     Follow up With Specialties Details Why Contact [address]  [address]  [address] [place] [address], [address] [place] [address] Way, MD [address]   [address] [facility] [address]  [facility]  [phone]        F/U PCP  F/U Orthopedics        Total time in minutes spent coordinating this discharge (includes going over instructions, follow-up, prescriptions, and preparing report for sign off to her PCP) :  35 minutes    Signed:  Kae Baca MD

## 2019-01-21 ENCOUNTER — HOME HEALTH ADMISSION (OUTPATIENT)
Dept: HOME HEALTH SERVICES | Facility: HOME HEALTH | Age: 84
End: 2019-01-21
Payer: MEDICARE

## 2019-01-22 ENCOUNTER — HOSPITAL ENCOUNTER (EMERGENCY)
Age: 84
Discharge: HOME | End: 2019-01-22
Attending: EMERGENCY MEDICINE
Payer: MEDICARE

## 2019-01-22 ENCOUNTER — HOME CARE VISIT (OUTPATIENT)
Dept: SCHEDULING | Facility: HOME HEALTH | Age: 84
End: 2019-01-22
Payer: MEDICARE

## 2019-01-22 VITALS
DIASTOLIC BLOOD PRESSURE: 55 MMHG | SYSTOLIC BLOOD PRESSURE: 146 MMHG | OXYGEN SATURATION: 98 % | HEART RATE: 99 BPM | RESPIRATION RATE: 23 BRPM | TEMPERATURE: 97.8 F

## 2019-01-22 VITALS
TEMPERATURE: 97.9 F | OXYGEN SATURATION: 97 % | RESPIRATION RATE: 16 BRPM | HEART RATE: 48 BPM | DIASTOLIC BLOOD PRESSURE: 60 MMHG | SYSTOLIC BLOOD PRESSURE: 124 MMHG

## 2019-01-22 DIAGNOSIS — R53.83 FATIGUE, UNSPECIFIED TYPE: Primary | ICD-10-CM

## 2019-01-22 DIAGNOSIS — R00.1 BRADYCARDIA: ICD-10-CM

## 2019-01-22 LAB
ALBUMIN SERPL-MCNC: 2.9 G/DL (ref 3.5–5)
ALBUMIN/GLOB SERPL: 0.8 {RATIO} (ref 1.1–2.2)
ALP SERPL-CCNC: 98 U/L (ref 45–117)
ALT SERPL-CCNC: 26 U/L (ref 12–78)
ANION GAP SERPL CALC-SCNC: 8 MMOL/L (ref 5–15)
AST SERPL-CCNC: 25 U/L (ref 15–37)
BASOPHILS # BLD: 0 K/UL (ref 0–0.1)
BASOPHILS NFR BLD: 0 % (ref 0–1)
BILIRUB SERPL-MCNC: 0.8 MG/DL (ref 0.2–1)
BUN SERPL-MCNC: 15 MG/DL (ref 6–20)
BUN/CREAT SERPL: 25 (ref 12–20)
CALCIUM SERPL-MCNC: 8.8 MG/DL (ref 8.5–10.1)
CHLORIDE SERPL-SCNC: 105 MMOL/L (ref 97–108)
CO2 SERPL-SCNC: 25 MMOL/L (ref 21–32)
COMMENT, HOLDF: NORMAL
CREAT SERPL-MCNC: 0.6 MG/DL (ref 0.55–1.02)
DIFFERENTIAL METHOD BLD: ABNORMAL
EOSINOPHIL # BLD: 0 K/UL (ref 0–0.4)
EOSINOPHIL NFR BLD: 0 % (ref 0–7)
ERYTHROCYTE [DISTWIDTH] IN BLOOD BY AUTOMATED COUNT: 17.4 % (ref 11.5–14.5)
GLOBULIN SER CALC-MCNC: 3.6 G/DL (ref 2–4)
GLUCOSE SERPL-MCNC: 115 MG/DL (ref 65–100)
HCT VFR BLD AUTO: 33.2 % (ref 35–47)
HGB BLD-MCNC: 10.7 G/DL (ref 11.5–16)
IMM GRANULOCYTES # BLD AUTO: 0.1 K/UL (ref 0–0.04)
IMM GRANULOCYTES NFR BLD AUTO: 1 % (ref 0–0.5)
LYMPHOCYTES # BLD: 1.1 K/UL (ref 0.8–3.5)
LYMPHOCYTES NFR BLD: 11 % (ref 12–49)
MCH RBC QN AUTO: 33.3 PG (ref 26–34)
MCHC RBC AUTO-ENTMCNC: 32.2 G/DL (ref 30–36.5)
MCV RBC AUTO: 103.4 FL (ref 80–99)
MONOCYTES # BLD: 0.9 K/UL (ref 0–1)
MONOCYTES NFR BLD: 9 % (ref 5–13)
NEUTS SEG # BLD: 7.9 K/UL (ref 1.8–8)
NEUTS SEG NFR BLD: 79 % (ref 32–75)
NRBC # BLD: 0 K/UL (ref 0–0.01)
NRBC BLD-RTO: 0 PER 100 WBC
PHENYTOIN SERPL-MCNC: 8.1 UG/ML (ref 10–20)
PLATELET # BLD AUTO: 307 K/UL (ref 150–400)
PMV BLD AUTO: 11.4 FL (ref 8.9–12.9)
POTASSIUM SERPL-SCNC: 4.1 MMOL/L (ref 3.5–5.1)
PROT SERPL-MCNC: 6.5 G/DL (ref 6.4–8.2)
RBC # BLD AUTO: 3.21 M/UL (ref 3.8–5.2)
SAMPLES BEING HELD,HOLD: NORMAL
SODIUM SERPL-SCNC: 138 MMOL/L (ref 136–145)
TROPONIN I SERPL-MCNC: 0.11 NG/ML
TROPONIN I SERPL-MCNC: 0.12 NG/ML
WBC # BLD AUTO: 10.1 K/UL (ref 3.6–11)

## 2019-01-22 PROCEDURE — 80185 ASSAY OF PHENYTOIN TOTAL: CPT

## 2019-01-22 PROCEDURE — 80053 COMPREHEN METABOLIC PANEL: CPT

## 2019-01-22 PROCEDURE — G0151 HHCP-SERV OF PT,EA 15 MIN: HCPCS

## 2019-01-22 PROCEDURE — 400013 HH SOC

## 2019-01-22 PROCEDURE — 3331090001 HH PPS REVENUE CREDIT

## 2019-01-22 PROCEDURE — 99285 EMERGENCY DEPT VISIT HI MDM: CPT

## 2019-01-22 PROCEDURE — 84484 ASSAY OF TROPONIN QUANT: CPT

## 2019-01-22 PROCEDURE — 3331090002 HH PPS REVENUE DEBIT

## 2019-01-22 PROCEDURE — 93005 ELECTROCARDIOGRAM TRACING: CPT

## 2019-01-22 PROCEDURE — 85025 COMPLETE CBC W/AUTO DIFF WBC: CPT

## 2019-01-22 PROCEDURE — 36415 COLL VENOUS BLD VENIPUNCTURE: CPT

## 2019-01-22 RX ORDER — LORAZEPAM 0.5 MG/1
0.5 TABLET ORAL
Qty: 20 TAB | Refills: 0 | Status: SHIPPED | OUTPATIENT
Start: 2019-01-22 | End: 2020-02-28 | Stop reason: ALTCHOICE

## 2019-01-22 NOTE — PROGRESS NOTES
Date of previous inpatient admission/ ED visit? Patient last admitted 1/13-1/19 for Femur fracture, left at [facility]. RRAT score is 13. Patient with 1 ED visit in the past 6 months. What brought the patient back to ED? Chart reviewed. Triage note indicated that patient got home from [facility] 2 days ago for fall L femur fx, patient seen by nurse today for blood work to be drawn, was told she had low HR. Patient only c/o fatigue. Did patient decline recommended services during last admission/ ED visit (if yes, what)? NO Has patient seen a provider since their last inpatient admission/ED visit (if yes, when)? Patient seen at home by home health agency PT ([address] [place] [address]) today and referred to the ER. CM Interventions: 
From previous inpatient admission/ED visit:  Patient discharged to [facility] for skilled services on Saturday 1/19/18. Follow-up placed on AVS.  Transport provided by patient's family to facility. From current inpatient admission/ED visit: Patient currently in the ED being evaluated and needs assessed. There are no current CM consults or needs at this time. Disposition needs TBD/subject to change pending recommendations. Anticipated discharge plan is for patient to discharge home with home health services with [address] [place] [address]. CM will continue to follow and assist with disposition needs as they arise. Updates: CM spoke with Admissions Team representative, Yarelis Mail [phone] with [facility] and was informed that patient arrived at facility on Saturday 1/19/19 with family after discharge from [facility]. Family not pleased with facility and decided to take patient home within the hour. Patient left facility with no services in place. Facility indicated that patient's son, contacted CM at [facility] to provide updates. CM spoke with patient's son, Daisy Ordoñez.   Son was able to contact orthopedic surgeon and request for patient to be followed at home with home health services. Patient is currently open to [address] [place] [address] for PT/OT. CM verified with hospital liaison. Care Management Interventions PCP Verified by CM: Yes([facility] and [address] [place] [address] Physicians : [phone]) Last Visit to PCP: 06/06/18 Palliative Care Criteria Met (RRAT>21 & CHF Dx)?: No 
Mode of Transport at Discharge: S Transition of Care Consult (CM Consult): (There are no current CM consults or needs at this time.) Discharge Durable Medical Equipment: No(DME in the home consist of cane, crutches, and RW) Physical Therapy Consult: No 
Occupational Therapy Consult: No 
Speech Therapy Consult: No 
Current Support Network: Own Home, Lives with Caregiver(Nilo Isaac [phone]/ One level home 4 steps to enter) Confirm Follow Up Transport: Family(Son at bedside) Plan discussed with Pt/Family/Caregiver: Yes Discharge Location Discharge Placement: Home with home health([address] PT/OT) CHARMAINE Romano/TING Care Management 3:16 PM

## 2019-01-22 NOTE — ED TRIAGE NOTES
Triage: Pt got home from [facility] 2 days ago for fall L femur fx, pt seen by nurse today for blood work to be drawn, was told she had low HR. Pt only c/o fatigue.

## 2019-01-22 NOTE — ED PROVIDER NOTES
80 y.o. 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 Patient states that she visited [facility] for a femur fracture 9 days ago. She subsequently had a left femur insertion intramedullary nail procedure 8 days ago. Patient was discharged from [facility] 2 days ago and states that she was given 1 unit of blood prior to discharge. Today, during physical therapy with her home health nurse, patient reportedly began experiencing bradycardia. Per EMS, the home health nurse noted that the pulse was \"50 bpm on the pulse oxygen machine and measured to be 60 bpm manually. \"  EMS reports that they found the patient's pulse to be \"80 bpm upon arrival.\"  While in the ED, patient complains of fatigue and LLE pain at the site of her recent surgery. She states that she has \"hardly been able to lift her feet\" due to her fatigue. Of note, patient states that she is able to ambulate with a walker. She lives with her children. Patient denies SOB, syncope, chest pain, abdominal pain, nausea, vomiting, diarrhea, and constipation. There are no other acute medical concerns at this time. Social hx: negative tobacco use; occasional alcohol use; negative drug use PCP: Julia Ambrose MD 
 
Note written by Jasmina Cabrera, as dictated by Anil Schmidt MD 2:13 PM 
 
 
 
The history is provided by the patient and the EMS personnel. No  was used. Past Medical History:  
Diagnosis Date  Arthritis  Cancer [facility]) 2002  
 brain tumor, benign (meningioma)  Diabetes ([facility] [address] questionable?  Gastrointestinal disorder   
 diverticulitis  Hypothyroid 5/11/2015  Localization-related (focal) (partial) epilepsy and epileptic syndromes with complex partial seizures, without mention of intractable epilepsy 5/11/2015  Other ill-defined conditions(799.89)   
 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  
 caused by brain tumor  Thyroid disease Past Surgical History:  
Procedure Laterality Date  COLORECTAL SCRN; HI RISK IND  8/26/2014  HX BACK SURGERY  1970s  HX ORTHOPAEDIC  1990s  
 right wrist  
 NEUROLOGICAL PROCEDURE UNLISTED  2002  
 brain tumor removed Family History:  
Problem Relation Age of Onset  Cancer Mother   
     breast  
 Alzheimer Mother  Heart Disease Father  Kidney Disease Sister   
     dialysis  Seizures Child Social History Socioeconomic History  Marital status:  Spouse name: Not on file  Number of children: Not on file  Years of education: Not on file  Highest education level: Not on file Social Needs  Financial resource strain: Not on file  Food insecurity - worry: Not on file  Food insecurity - inability: Not on file  Transportation needs - medical: Not on file  Transportation needs - non-medical: Not on file Occupational History  Not on file Tobacco Use  Smoking status: Never Smoker  Smokeless tobacco: Never Used Substance and Sexual Activity  Alcohol use: Yes Comment: glass of wine occasionally  Drug use: No  
 Sexual activity: Not on file Other Topics Concern  Not on file Social History Narrative  Not on file ALLERGIES: Bactrim [sulfamethoprim]; Penicillins; and Sulfa (sulfonamide antibiotics) Review of Systems Constitutional: Positive for fatigue. Negative for chills and fever. Eyes: Negative for visual disturbance. Respiratory: Negative for cough, chest tightness, shortness of breath and wheezing. Cardiovascular: Negative for chest pain and leg swelling. Gastrointestinal: Negative for abdominal pain, constipation, diarrhea, nausea and vomiting. Genitourinary: Negative for dysuria. Musculoskeletal: Positive for myalgias (LLE pain). Negative for back pain and neck stiffness. Skin: Negative for rash. Neurological: Negative. Negative for syncope and headaches. Psychiatric/Behavioral: Negative for confusion. All other systems reviewed and are negative. Vitals:  
 01/22/19 1415 BP: 131/48 Pulse: 76 Resp: 13 Temp: 98.5 °F (36.9 °C) SpO2: 90% Physical Exam  
Constitutional: She appears well-developed and well-nourished. No distress. HENT:  
Head: Normocephalic. Eyes: Pupils are equal, round, and reactive to light. Neck: Normal range of motion. Cardiovascular: Normal rate and normal pulses. An irregular rhythm present. No murmur heard. Heart rhythm occasionally irregular due to PVCs Pulmonary/Chest: Effort normal and breath sounds normal. No respiratory distress. Abdominal: Soft. There is no tenderness. Musculoskeletal: Normal range of motion. She exhibits no edema. Left femur surgical incision appears normal  
Neurological: She is alert. She has normal strength. No cranial nerve deficit. Skin: Skin is warm and dry. Psychiatric: She has a normal mood and affect. Her behavior is normal.  
Nursing note and vitals reviewed. Note written by Jasmina Klein, as dictated by Nikos Soliz MD 2:13 PM 
 
 
MDM Procedures ED EKG interpretation: 
Rhythm: normal sinus rhythm; Rate (approx.): 72 bpm; ST/T wave: non-specific ST changes;   
Note written by Jasmina Klein, as dictated by Nikos Soliz MD 2:43 PM

## 2019-01-22 NOTE — DISCHARGE INSTRUCTIONS
Patient Education        Bradycardia: Care Instructions  Your Care Instructions    Bradycardia is a slow heart rate. If your heart beats too slowly, it can't supply your body with enough blood. This can make you weak or dizzy. Or it may make you pass out. Sometimes medicine can cause this problem. If this happens, your doctor may have you adjust one of your medicines. If a medicine is not the problem, your doctor may recommend a pacemaker. It is important to treat bradycardia so that you don't get more serious health problems. Your doctor will want to see you on a routine schedule to make sure that your heartbeat is normal.  Follow-up care is a key part of your treatment and safety. Be sure to make and go to all appointments, and call your doctor if you are having problems. It's also a good idea to know your test results and keep a list of the medicines you take. How can you care for yourself at home? · Take your medicines exactly as prescribed. Call your doctor if you think you are having a problem with your medicine. If your bradycardia is caused by another disease, your doctor will try to treat the disease. If it is caused by heart medicines, he or she will adjust your medicines. · Make lifestyle changes to improve your heart health. ? Get regular exercise. Try for 30 minutes on most days of the week. If you do not have other heart problems, you likely do not have limits on the type or level of activity that you can do. You may want to walk, swim, bike, or do other activities. Ask your doctor what level of exercise is safe for you. ? To control your cholesterol, avoid foods with a lot of fat, saturated fat, or sodium. Try to eat more fiber. And if your doctor says it's okay, get some exercise on most days. ? Do not smoke. Smoking can make your heart condition worse. If you need help quitting, talk to your doctor about stop-smoking programs and medicines.  These can increase your chances of quitting for good.  ? Limit alcohol to 2 drinks a day for men and 1 drink a day for women. Too much alcohol can cause health problems. Pacemaker  If you have a pacemaker, you will get more specific information about it. Be sure to:  · Check your pulse as your doctor tells you. · Have your pacemaker checked as often as your doctor recommends. You may be able to do this over the phone or computer. · Avoid strong magnetic or electrical fields. These include MRIs, welding equipment, and generators. · You will be checked several times right after you get your pacemaker and when it is time to have the battery changed. Batteries last for 5 to 15 years. · You can talk on a cell phone. But keep it 6 inches away from your pacemaker. · Microwaves, TVs, radios, and kitchen and bathroom appliances won't harm you. When should you call for help? Call 911 anytime you think you may need emergency care. For example, call if:    · You have symptoms of sudden heart failure. These may include:  ? Severe trouble breathing. ? A fast or irregular heartbeat. ? Coughing up pink, foamy mucus. ? You passed out.     · You have symptoms of a stroke. These may include:  ? Sudden numbness, tingling, weakness, or loss of movement in your face, arm, or leg, especially on only one side of your body. ? Sudden vision changes. ? Sudden trouble speaking. ? Sudden confusion or trouble understanding simple statements. ? Sudden problems with walking or balance. ? A sudden, severe headache that is different from past headaches.    Call your doctor now or seek immediate medical care if:    · You have new or changed symptoms of heart failure, such as:  ? New or increased shortness of breath. ? New or worse swelling in your legs, ankles, or feet. ? Sudden weight gain, such as more than 2 to 3 pounds in a day or 5 pounds in a week. (Your doctor may suggest a different range of weight gain.)  ? Feeling dizzy or lightheaded or like you may faint. ?  Feeling so tired or weak that you cannot do your usual activities. ? Not sleeping well. Shortness of breath wakes you at night. You need extra pillows to prop yourself up to breathe easier.    Watch closely for changes in your health, and be sure to contact your doctor if:    · You do not get better as expected. Where can you learn more? Go to http://oneal-delisa.info/. Enter H057 in the search box to learn more about \"Bradycardia: Care Instructions. \"  Current as of: July 22, 2018  Content Version: 11.9  © 3250-6819 [employer]. Care instructions adapted under license by [employer] at work (which disclaims liability or warranty for this information). If you have questions about a medical condition or this instruction, always ask your healthcare professional. [address] any warranty or liability for your use of this information. Patient Education        Fatigue: Care Instructions  Your Care Instructions    Fatigue is a feeling of tiredness, exhaustion, or lack of energy. You may feel fatigue because of too much or not enough activity. It can also come from stress, lack of sleep, boredom, and poor diet. Many medical problems, such as viral infections, can cause fatigue. Emotional problems, especially depression, are often the cause of fatigue. Fatigue is most often a symptom of another problem. Treatment for fatigue depends on the cause. For example, if you have fatigue because you have a certain health problem, treating this problem also treats your fatigue. If depression or anxiety is the cause, treatment may help. Follow-up care is a key part of your treatment and safety. Be sure to make and go to all appointments, and call your doctor if you are having problems. It's also a good idea to know your test results and keep a list of the medicines you take. How can you care for yourself at home? · Get regular exercise. But don't overdo it.  Go back and forth between rest and exercise. · Get plenty of rest.  · Eat a healthy diet. Do not skip meals, especially breakfast.  · Reduce your use of caffeine, tobacco, and alcohol. Caffeine is most often found in coffee, tea, cola drinks, and chocolate. · Limit medicines that can cause fatigue. This includes tranquilizers and cold and allergy medicines. When should you call for help? Watch closely for changes in your health, and be sure to contact your doctor if:    · You have new symptoms such as fever or a rash.     · Your fatigue gets worse.     · You have been feeling down, depressed, or hopeless. Or you may have lost interest in things that you usually enjoy.     · You are not getting better as expected. Where can you learn more? Go to http://oneal-delisa.info/. Enter X987 in the search box to learn more about \"Fatigue: Care Instructions. \"  Current as of: September 23, 2018  Content Version: 11.9  © 0292-6984 [employer]. Care instructions adapted under license by [employer] (which disclaims liability or warranty for this information). If you have questions about a medical condition or this instruction, always ask your healthcare professional. [place] [address] any warranty or liability for your use of this information.

## 2019-01-22 NOTE — ED NOTES
MD reviewed discharge instructions and options with patient and patient verbalized understanding. RN reviewed discharge instructions using teachback method. Pt wheeled to exit without difficulty and in no signs of acute distress escorted by , and he  will drive home. No complaints or needs expressed at this time. Patient was counseled on medications prescribed at discharge. VSS, verbalized relief from most intense pain. Patient to call Dr. Ramirez Left in the morning for appointment.

## 2019-01-23 LAB
ATRIAL RATE: 77 BPM
CALCULATED P AXIS, ECG09: 62 DEGREES
CALCULATED R AXIS, ECG10: -31 DEGREES
CALCULATED T AXIS, ECG11: 78 DEGREES
DIAGNOSIS, 93000: NORMAL
P-R INTERVAL, ECG05: 112 MS
Q-T INTERVAL, ECG07: 404 MS
QRS DURATION, ECG06: 74 MS
QTC CALCULATION (BEZET), ECG08: 442 MS
VENTRICULAR RATE, ECG03: 72 BPM

## 2019-01-23 PROCEDURE — 3331090002 HH PPS REVENUE DEBIT

## 2019-01-23 PROCEDURE — 3331090001 HH PPS REVENUE CREDIT

## 2019-01-24 ENCOUNTER — PATIENT OUTREACH (OUTPATIENT)
Dept: CASE MANAGEMENT | Age: 84
End: 2019-01-24

## 2019-01-24 ENCOUNTER — HOME CARE VISIT (OUTPATIENT)
Dept: SCHEDULING | Facility: HOME HEALTH | Age: 84
End: 2019-01-24
Payer: MEDICARE

## 2019-01-24 VITALS
DIASTOLIC BLOOD PRESSURE: 60 MMHG | HEART RATE: 78 BPM | RESPIRATION RATE: 19 BRPM | TEMPERATURE: 97.5 F | OXYGEN SATURATION: 97 % | SYSTOLIC BLOOD PRESSURE: 122 MMHG

## 2019-01-24 PROCEDURE — 3331090002 HH PPS REVENUE DEBIT

## 2019-01-24 PROCEDURE — 3331090001 HH PPS REVENUE CREDIT

## 2019-01-24 PROCEDURE — G0151 HHCP-SERV OF PT,EA 15 MIN: HCPCS

## 2019-01-24 NOTE — PROGRESS NOTES
Community Care Team documentation for patient in [facility] Initial Follow Up Patient was discharged to [facility]. Information included in this progress note has been provided to SNF. Hospital Admission and Diagnosis: [facility]M 1/13-1/19/19 Left Hip Fracture RRAT Score:  13 Advance Care Planning: Not on file PCP : Alana Rothman MD 
 
Spoke with SNF team. Ensured patient arrived to SNF safely with admission packet in order. Pt admitted and discharged same day 1/19/19, per request. Pt discharged home with son. CCT to sign off at this time. Community Care Team will follow up weekly with [facility] until discharge. CCT to sign off at this time as pt has been discharged from skilled nursing facility. Medications were not reconciled and general patient assessment was not completed during this [facility] outreach.

## 2019-01-25 PROCEDURE — 3331090001 HH PPS REVENUE CREDIT

## 2019-01-25 PROCEDURE — 3331090002 HH PPS REVENUE DEBIT

## 2019-01-26 PROCEDURE — 3331090001 HH PPS REVENUE CREDIT

## 2019-01-26 PROCEDURE — 3331090002 HH PPS REVENUE DEBIT

## 2019-01-27 PROCEDURE — 3331090002 HH PPS REVENUE DEBIT

## 2019-01-27 PROCEDURE — 3331090001 HH PPS REVENUE CREDIT

## 2019-01-28 PROCEDURE — 3331090002 HH PPS REVENUE DEBIT

## 2019-01-28 PROCEDURE — 3331090001 HH PPS REVENUE CREDIT

## 2019-01-29 ENCOUNTER — HOME CARE VISIT (OUTPATIENT)
Dept: SCHEDULING | Facility: HOME HEALTH | Age: 84
End: 2019-01-29
Payer: MEDICARE

## 2019-01-29 VITALS
TEMPERATURE: 98.5 F | SYSTOLIC BLOOD PRESSURE: 135 MMHG | DIASTOLIC BLOOD PRESSURE: 86 MMHG | OXYGEN SATURATION: 97 % | RESPIRATION RATE: 20 BRPM

## 2019-01-29 PROCEDURE — 3331090002 HH PPS REVENUE DEBIT

## 2019-01-29 PROCEDURE — G0151 HHCP-SERV OF PT,EA 15 MIN: HCPCS

## 2019-01-29 PROCEDURE — 3331090001 HH PPS REVENUE CREDIT

## 2019-01-30 PROCEDURE — 3331090001 HH PPS REVENUE CREDIT

## 2019-01-30 PROCEDURE — 3331090002 HH PPS REVENUE DEBIT

## 2019-01-31 ENCOUNTER — HOME CARE VISIT (OUTPATIENT)
Dept: SCHEDULING | Facility: HOME HEALTH | Age: 84
End: 2019-01-31
Payer: MEDICARE

## 2019-01-31 VITALS
HEART RATE: 77 BPM | RESPIRATION RATE: 20 BRPM | DIASTOLIC BLOOD PRESSURE: 78 MMHG | TEMPERATURE: 98.1 F | SYSTOLIC BLOOD PRESSURE: 134 MMHG | OXYGEN SATURATION: 98 %

## 2019-01-31 PROCEDURE — 3331090001 HH PPS REVENUE CREDIT

## 2019-01-31 PROCEDURE — 3331090002 HH PPS REVENUE DEBIT

## 2019-01-31 PROCEDURE — G0151 HHCP-SERV OF PT,EA 15 MIN: HCPCS

## 2019-02-01 PROCEDURE — 3331090001 HH PPS REVENUE CREDIT

## 2019-02-01 PROCEDURE — 3331090002 HH PPS REVENUE DEBIT

## 2019-02-02 PROCEDURE — 3331090002 HH PPS REVENUE DEBIT

## 2019-02-02 PROCEDURE — 3331090001 HH PPS REVENUE CREDIT

## 2019-02-03 PROCEDURE — 3331090001 HH PPS REVENUE CREDIT

## 2019-02-03 PROCEDURE — 3331090002 HH PPS REVENUE DEBIT

## 2019-02-04 PROCEDURE — 3331090002 HH PPS REVENUE DEBIT

## 2019-02-04 PROCEDURE — 3331090001 HH PPS REVENUE CREDIT

## 2019-02-05 ENCOUNTER — HOME CARE VISIT (OUTPATIENT)
Dept: SCHEDULING | Facility: HOME HEALTH | Age: 84
End: 2019-02-05
Payer: MEDICARE

## 2019-02-05 VITALS
DIASTOLIC BLOOD PRESSURE: 78 MMHG | SYSTOLIC BLOOD PRESSURE: 131 MMHG | RESPIRATION RATE: 18 BRPM | TEMPERATURE: 98.1 F | OXYGEN SATURATION: 97 % | HEART RATE: 76 BPM

## 2019-02-05 PROCEDURE — G0151 HHCP-SERV OF PT,EA 15 MIN: HCPCS

## 2019-02-05 PROCEDURE — 3331090001 HH PPS REVENUE CREDIT

## 2019-02-05 PROCEDURE — 3331090002 HH PPS REVENUE DEBIT

## 2019-02-05 PROCEDURE — A4649 SURGICAL SUPPLIES: HCPCS

## 2019-02-06 PROCEDURE — 3331090002 HH PPS REVENUE DEBIT

## 2019-02-06 PROCEDURE — 3331090001 HH PPS REVENUE CREDIT

## 2019-02-07 PROCEDURE — 3331090001 HH PPS REVENUE CREDIT

## 2019-02-07 PROCEDURE — 3331090002 HH PPS REVENUE DEBIT

## 2019-02-08 ENCOUNTER — HOME CARE VISIT (OUTPATIENT)
Dept: SCHEDULING | Facility: HOME HEALTH | Age: 84
End: 2019-02-08
Payer: MEDICARE

## 2019-02-08 VITALS
TEMPERATURE: 98.6 F | OXYGEN SATURATION: 97 % | RESPIRATION RATE: 17 BRPM | SYSTOLIC BLOOD PRESSURE: 129 MMHG | HEART RATE: 88 BPM | DIASTOLIC BLOOD PRESSURE: 76 MMHG

## 2019-02-08 PROCEDURE — 3331090001 HH PPS REVENUE CREDIT

## 2019-02-08 PROCEDURE — G0151 HHCP-SERV OF PT,EA 15 MIN: HCPCS

## 2019-02-08 PROCEDURE — 3331090002 HH PPS REVENUE DEBIT

## 2019-02-09 PROCEDURE — 3331090002 HH PPS REVENUE DEBIT

## 2019-02-09 PROCEDURE — 3331090001 HH PPS REVENUE CREDIT

## 2019-02-10 PROCEDURE — 3331090001 HH PPS REVENUE CREDIT

## 2019-02-10 PROCEDURE — 3331090002 HH PPS REVENUE DEBIT

## 2019-02-11 PROCEDURE — 3331090002 HH PPS REVENUE DEBIT

## 2019-02-11 PROCEDURE — 3331090001 HH PPS REVENUE CREDIT

## 2019-02-12 ENCOUNTER — HOME CARE VISIT (OUTPATIENT)
Dept: SCHEDULING | Facility: HOME HEALTH | Age: 84
End: 2019-02-12
Payer: MEDICARE

## 2019-02-12 VITALS
OXYGEN SATURATION: 97 % | SYSTOLIC BLOOD PRESSURE: 135 MMHG | RESPIRATION RATE: 19 BRPM | HEART RATE: 76 BPM | TEMPERATURE: 98.5 F | DIASTOLIC BLOOD PRESSURE: 82 MMHG

## 2019-02-12 PROCEDURE — 3331090001 HH PPS REVENUE CREDIT

## 2019-02-12 PROCEDURE — 3331090002 HH PPS REVENUE DEBIT

## 2019-02-12 PROCEDURE — G0151 HHCP-SERV OF PT,EA 15 MIN: HCPCS

## 2019-02-13 PROCEDURE — 3331090002 HH PPS REVENUE DEBIT

## 2019-02-13 PROCEDURE — 3331090001 HH PPS REVENUE CREDIT

## 2019-02-14 ENCOUNTER — HOME CARE VISIT (OUTPATIENT)
Dept: SCHEDULING | Facility: HOME HEALTH | Age: 84
End: 2019-02-14
Payer: MEDICARE

## 2019-02-14 VITALS
TEMPERATURE: 98.6 F | SYSTOLIC BLOOD PRESSURE: 128 MMHG | DIASTOLIC BLOOD PRESSURE: 67 MMHG | RESPIRATION RATE: 17 BRPM | HEART RATE: 76 BPM

## 2019-02-14 PROCEDURE — 3331090002 HH PPS REVENUE DEBIT

## 2019-02-14 PROCEDURE — G0151 HHCP-SERV OF PT,EA 15 MIN: HCPCS

## 2019-02-14 PROCEDURE — 3331090001 HH PPS REVENUE CREDIT

## 2019-02-15 PROCEDURE — 3331090001 HH PPS REVENUE CREDIT

## 2019-02-15 PROCEDURE — 3331090002 HH PPS REVENUE DEBIT

## 2019-02-16 ENCOUNTER — HOME CARE VISIT (OUTPATIENT)
Dept: SCHEDULING | Facility: HOME HEALTH | Age: 84
End: 2019-02-16
Payer: MEDICARE

## 2019-02-16 PROCEDURE — G0152 HHCP-SERV OF OT,EA 15 MIN: HCPCS

## 2019-02-16 PROCEDURE — 3331090002 HH PPS REVENUE DEBIT

## 2019-02-16 PROCEDURE — 3331090001 HH PPS REVENUE CREDIT

## 2019-02-17 VITALS
OXYGEN SATURATION: 98 % | DIASTOLIC BLOOD PRESSURE: 75 MMHG | RESPIRATION RATE: 16 BRPM | TEMPERATURE: 98.6 F | SYSTOLIC BLOOD PRESSURE: 124 MMHG | HEART RATE: 83 BPM

## 2019-02-17 PROCEDURE — 3331090001 HH PPS REVENUE CREDIT

## 2019-02-17 PROCEDURE — 3331090002 HH PPS REVENUE DEBIT

## 2019-02-18 PROCEDURE — 3331090002 HH PPS REVENUE DEBIT

## 2019-02-18 PROCEDURE — 3331090001 HH PPS REVENUE CREDIT

## 2019-02-19 ENCOUNTER — HOME CARE VISIT (OUTPATIENT)
Dept: SCHEDULING | Facility: HOME HEALTH | Age: 84
End: 2019-02-19
Payer: MEDICARE

## 2019-02-19 VITALS
RESPIRATION RATE: 19 BRPM | TEMPERATURE: 97.9 F | HEART RATE: 75 BPM | DIASTOLIC BLOOD PRESSURE: 73 MMHG | OXYGEN SATURATION: 98 % | SYSTOLIC BLOOD PRESSURE: 129 MMHG

## 2019-02-19 PROCEDURE — 3331090001 HH PPS REVENUE CREDIT

## 2019-02-19 PROCEDURE — G0158 HHC OT ASSISTANT EA 15: HCPCS

## 2019-02-19 PROCEDURE — 3331090002 HH PPS REVENUE DEBIT

## 2019-02-19 PROCEDURE — G0151 HHCP-SERV OF PT,EA 15 MIN: HCPCS

## 2019-02-20 PROCEDURE — 3331090001 HH PPS REVENUE CREDIT

## 2019-02-20 PROCEDURE — 3331090002 HH PPS REVENUE DEBIT

## 2019-02-21 ENCOUNTER — HOME CARE VISIT (OUTPATIENT)
Dept: HOME HEALTH SERVICES | Facility: HOME HEALTH | Age: 84
End: 2019-02-21
Payer: MEDICARE

## 2019-02-21 VITALS
OXYGEN SATURATION: 98 % | SYSTOLIC BLOOD PRESSURE: 132 MMHG | TEMPERATURE: 98.2 F | DIASTOLIC BLOOD PRESSURE: 68 MMHG | HEART RATE: 66 BPM

## 2019-02-21 PROCEDURE — 3331090001 HH PPS REVENUE CREDIT

## 2019-02-21 PROCEDURE — 3331090002 HH PPS REVENUE DEBIT

## 2019-02-22 ENCOUNTER — HOME CARE VISIT (OUTPATIENT)
Dept: HOME HEALTH SERVICES | Facility: HOME HEALTH | Age: 84
End: 2019-02-22
Payer: MEDICARE

## 2019-02-22 PROCEDURE — 3331090001 HH PPS REVENUE CREDIT

## 2019-02-22 PROCEDURE — 3331090002 HH PPS REVENUE DEBIT

## 2019-02-23 PROCEDURE — 3331090001 HH PPS REVENUE CREDIT

## 2019-02-23 PROCEDURE — 3331090002 HH PPS REVENUE DEBIT

## 2019-02-24 PROCEDURE — 3331090002 HH PPS REVENUE DEBIT

## 2019-02-24 PROCEDURE — 3331090001 HH PPS REVENUE CREDIT

## 2019-02-25 ENCOUNTER — HOME CARE VISIT (OUTPATIENT)
Dept: SCHEDULING | Facility: HOME HEALTH | Age: 84
End: 2019-02-25
Payer: MEDICARE

## 2019-02-25 PROCEDURE — G0158 HHC OT ASSISTANT EA 15: HCPCS

## 2019-02-25 PROCEDURE — 3331090002 HH PPS REVENUE DEBIT

## 2019-02-25 PROCEDURE — 3331090001 HH PPS REVENUE CREDIT

## 2019-02-26 VITALS
TEMPERATURE: 98 F | OXYGEN SATURATION: 95 % | HEART RATE: 98 BPM | DIASTOLIC BLOOD PRESSURE: 70 MMHG | SYSTOLIC BLOOD PRESSURE: 132 MMHG | RESPIRATION RATE: 18 BRPM

## 2019-02-26 PROCEDURE — 3331090001 HH PPS REVENUE CREDIT

## 2019-02-26 PROCEDURE — 3331090002 HH PPS REVENUE DEBIT

## 2019-02-27 ENCOUNTER — HOME CARE VISIT (OUTPATIENT)
Dept: SCHEDULING | Facility: HOME HEALTH | Age: 84
End: 2019-02-27
Payer: MEDICARE

## 2019-02-27 VITALS
RESPIRATION RATE: 18 BRPM | HEART RATE: 76 BPM | TEMPERATURE: 98.1 F | OXYGEN SATURATION: 97 % | DIASTOLIC BLOOD PRESSURE: 67 MMHG | SYSTOLIC BLOOD PRESSURE: 123 MMHG

## 2019-02-27 PROCEDURE — G0158 HHC OT ASSISTANT EA 15: HCPCS

## 2019-02-27 PROCEDURE — 3331090002 HH PPS REVENUE DEBIT

## 2019-02-27 PROCEDURE — 3331090001 HH PPS REVENUE CREDIT

## 2019-02-27 PROCEDURE — G0151 HHCP-SERV OF PT,EA 15 MIN: HCPCS

## 2019-02-28 VITALS
SYSTOLIC BLOOD PRESSURE: 132 MMHG | DIASTOLIC BLOOD PRESSURE: 78 MMHG | RESPIRATION RATE: 18 BRPM | TEMPERATURE: 98.3 F | HEART RATE: 60 BPM | OXYGEN SATURATION: 98 %

## 2019-02-28 PROCEDURE — 3331090002 HH PPS REVENUE DEBIT

## 2019-02-28 PROCEDURE — 3331090001 HH PPS REVENUE CREDIT

## 2019-03-01 ENCOUNTER — HOME CARE VISIT (OUTPATIENT)
Dept: SCHEDULING | Facility: HOME HEALTH | Age: 84
End: 2019-03-01
Payer: MEDICARE

## 2019-03-01 VITALS
SYSTOLIC BLOOD PRESSURE: 124 MMHG | HEART RATE: 76 BPM | TEMPERATURE: 98.7 F | RESPIRATION RATE: 19 BRPM | DIASTOLIC BLOOD PRESSURE: 76 MMHG | OXYGEN SATURATION: 98 %

## 2019-03-01 PROCEDURE — 3331090002 HH PPS REVENUE DEBIT

## 2019-03-01 PROCEDURE — 3331090001 HH PPS REVENUE CREDIT

## 2019-03-01 PROCEDURE — G0151 HHCP-SERV OF PT,EA 15 MIN: HCPCS

## 2019-03-02 PROCEDURE — 3331090002 HH PPS REVENUE DEBIT

## 2019-03-02 PROCEDURE — 3331090001 HH PPS REVENUE CREDIT

## 2019-03-03 PROCEDURE — 3331090002 HH PPS REVENUE DEBIT

## 2019-03-03 PROCEDURE — 3331090001 HH PPS REVENUE CREDIT

## 2019-03-04 ENCOUNTER — HOME CARE VISIT (OUTPATIENT)
Dept: SCHEDULING | Facility: HOME HEALTH | Age: 84
End: 2019-03-04
Payer: MEDICARE

## 2019-03-04 VITALS
DIASTOLIC BLOOD PRESSURE: 76 MMHG | RESPIRATION RATE: 19 BRPM | TEMPERATURE: 98.5 F | HEART RATE: 87 BPM | OXYGEN SATURATION: 97 % | SYSTOLIC BLOOD PRESSURE: 131 MMHG

## 2019-03-04 PROCEDURE — 3331090001 HH PPS REVENUE CREDIT

## 2019-03-04 PROCEDURE — 3331090002 HH PPS REVENUE DEBIT

## 2019-03-04 PROCEDURE — G0151 HHCP-SERV OF PT,EA 15 MIN: HCPCS

## 2019-03-05 ENCOUNTER — HOME CARE VISIT (OUTPATIENT)
Dept: SCHEDULING | Facility: HOME HEALTH | Age: 84
End: 2019-03-05
Payer: MEDICARE

## 2019-03-05 PROCEDURE — 3331090002 HH PPS REVENUE DEBIT

## 2019-03-05 PROCEDURE — 3331090001 HH PPS REVENUE CREDIT

## 2019-03-05 PROCEDURE — G0158 HHC OT ASSISTANT EA 15: HCPCS

## 2019-03-06 PROCEDURE — 3331090001 HH PPS REVENUE CREDIT

## 2019-03-06 PROCEDURE — 3331090002 HH PPS REVENUE DEBIT

## 2019-03-07 ENCOUNTER — HOME CARE VISIT (OUTPATIENT)
Dept: SCHEDULING | Facility: HOME HEALTH | Age: 84
End: 2019-03-07
Payer: MEDICARE

## 2019-03-07 VITALS
TEMPERATURE: 98.2 F | SYSTOLIC BLOOD PRESSURE: 122 MMHG | HEART RATE: 73 BPM | OXYGEN SATURATION: 94 % | DIASTOLIC BLOOD PRESSURE: 78 MMHG | RESPIRATION RATE: 18 BRPM

## 2019-03-07 PROCEDURE — 3331090001 HH PPS REVENUE CREDIT

## 2019-03-07 PROCEDURE — G0151 HHCP-SERV OF PT,EA 15 MIN: HCPCS

## 2019-03-07 PROCEDURE — 3331090002 HH PPS REVENUE DEBIT

## 2019-03-08 ENCOUNTER — HOME CARE VISIT (OUTPATIENT)
Dept: SCHEDULING | Facility: HOME HEALTH | Age: 84
End: 2019-03-08
Payer: MEDICARE

## 2019-03-08 VITALS
DIASTOLIC BLOOD PRESSURE: 76 MMHG | RESPIRATION RATE: 19 BRPM | HEART RATE: 65 BPM | SYSTOLIC BLOOD PRESSURE: 124 MMHG | OXYGEN SATURATION: 98 % | TEMPERATURE: 98.5 F

## 2019-03-08 PROCEDURE — 3331090002 HH PPS REVENUE DEBIT

## 2019-03-08 PROCEDURE — 3331090001 HH PPS REVENUE CREDIT

## 2019-03-08 PROCEDURE — G0158 HHC OT ASSISTANT EA 15: HCPCS

## 2019-03-09 PROCEDURE — 3331090002 HH PPS REVENUE DEBIT

## 2019-03-09 PROCEDURE — 3331090001 HH PPS REVENUE CREDIT

## 2019-03-10 PROCEDURE — 3331090002 HH PPS REVENUE DEBIT

## 2019-03-10 PROCEDURE — 3331090001 HH PPS REVENUE CREDIT

## 2019-03-11 ENCOUNTER — HOME CARE VISIT (OUTPATIENT)
Dept: SCHEDULING | Facility: HOME HEALTH | Age: 84
End: 2019-03-11
Payer: MEDICARE

## 2019-03-11 VITALS
SYSTOLIC BLOOD PRESSURE: 132 MMHG | DIASTOLIC BLOOD PRESSURE: 70 MMHG | OXYGEN SATURATION: 96 % | RESPIRATION RATE: 18 BRPM | HEART RATE: 74 BPM | TEMPERATURE: 98 F

## 2019-03-11 VITALS
DIASTOLIC BLOOD PRESSURE: 76 MMHG | TEMPERATURE: 98.5 F | OXYGEN SATURATION: 97 % | RESPIRATION RATE: 20 BRPM | SYSTOLIC BLOOD PRESSURE: 131 MMHG | HEART RATE: 77 BPM

## 2019-03-11 PROCEDURE — 3331090001 HH PPS REVENUE CREDIT

## 2019-03-11 PROCEDURE — G0151 HHCP-SERV OF PT,EA 15 MIN: HCPCS

## 2019-03-11 PROCEDURE — 3331090002 HH PPS REVENUE DEBIT

## 2019-03-12 ENCOUNTER — HOME CARE VISIT (OUTPATIENT)
Dept: SCHEDULING | Facility: HOME HEALTH | Age: 84
End: 2019-03-12
Payer: MEDICARE

## 2019-03-12 ENCOUNTER — HOME CARE VISIT (OUTPATIENT)
Dept: HOME HEALTH SERVICES | Facility: HOME HEALTH | Age: 84
End: 2019-03-12
Payer: MEDICARE

## 2019-03-12 PROCEDURE — G0158 HHC OT ASSISTANT EA 15: HCPCS

## 2019-03-12 PROCEDURE — 3331090002 HH PPS REVENUE DEBIT

## 2019-03-12 PROCEDURE — 3331090001 HH PPS REVENUE CREDIT

## 2019-03-13 ENCOUNTER — HOME CARE VISIT (OUTPATIENT)
Dept: SCHEDULING | Facility: HOME HEALTH | Age: 84
End: 2019-03-13
Payer: MEDICARE

## 2019-03-13 VITALS
RESPIRATION RATE: 18 BRPM | SYSTOLIC BLOOD PRESSURE: 142 MMHG | TEMPERATURE: 97.2 F | DIASTOLIC BLOOD PRESSURE: 78 MMHG | OXYGEN SATURATION: 98 % | HEART RATE: 81 BPM

## 2019-03-13 PROCEDURE — 3331090002 HH PPS REVENUE DEBIT

## 2019-03-13 PROCEDURE — G0152 HHCP-SERV OF OT,EA 15 MIN: HCPCS

## 2019-03-13 PROCEDURE — 3331090001 HH PPS REVENUE CREDIT

## 2019-03-14 VITALS
HEART RATE: 90 BPM | OXYGEN SATURATION: 92 % | SYSTOLIC BLOOD PRESSURE: 140 MMHG | DIASTOLIC BLOOD PRESSURE: 80 MMHG | RESPIRATION RATE: 18 BRPM | TEMPERATURE: 97 F

## 2019-03-14 PROCEDURE — 3331090002 HH PPS REVENUE DEBIT

## 2019-03-14 PROCEDURE — 3331090001 HH PPS REVENUE CREDIT

## 2019-03-15 ENCOUNTER — HOME CARE VISIT (OUTPATIENT)
Dept: SCHEDULING | Facility: HOME HEALTH | Age: 84
End: 2019-03-15
Payer: MEDICARE

## 2019-03-15 PROCEDURE — 3331090002 HH PPS REVENUE DEBIT

## 2019-03-15 PROCEDURE — G0151 HHCP-SERV OF PT,EA 15 MIN: HCPCS

## 2019-03-15 PROCEDURE — 3331090001 HH PPS REVENUE CREDIT

## 2019-03-16 PROCEDURE — 3331090002 HH PPS REVENUE DEBIT

## 2019-03-16 PROCEDURE — 3331090001 HH PPS REVENUE CREDIT

## 2019-03-17 VITALS
DIASTOLIC BLOOD PRESSURE: 72 MMHG | SYSTOLIC BLOOD PRESSURE: 138 MMHG | RESPIRATION RATE: 18 BRPM | HEART RATE: 74 BPM | OXYGEN SATURATION: 92 % | TEMPERATURE: 98.5 F

## 2019-03-17 PROCEDURE — 3331090002 HH PPS REVENUE DEBIT

## 2019-03-17 PROCEDURE — 3331090001 HH PPS REVENUE CREDIT

## 2019-03-18 ENCOUNTER — HOME CARE VISIT (OUTPATIENT)
Dept: SCHEDULING | Facility: HOME HEALTH | Age: 84
End: 2019-03-18
Payer: MEDICARE

## 2019-03-18 VITALS
DIASTOLIC BLOOD PRESSURE: 76 MMHG | RESPIRATION RATE: 17 BRPM | TEMPERATURE: 97.9 F | SYSTOLIC BLOOD PRESSURE: 131 MMHG | OXYGEN SATURATION: 94 % | HEART RATE: 73 BPM

## 2019-03-18 PROCEDURE — 3331090001 HH PPS REVENUE CREDIT

## 2019-03-18 PROCEDURE — 3331090002 HH PPS REVENUE DEBIT

## 2019-03-18 PROCEDURE — G0151 HHCP-SERV OF PT,EA 15 MIN: HCPCS

## 2019-03-19 PROCEDURE — 3331090001 HH PPS REVENUE CREDIT

## 2019-03-19 PROCEDURE — 3331090002 HH PPS REVENUE DEBIT

## 2019-03-20 PROCEDURE — 3331090002 HH PPS REVENUE DEBIT

## 2019-03-20 PROCEDURE — 3331090001 HH PPS REVENUE CREDIT

## 2019-03-21 ENCOUNTER — HOME CARE VISIT (OUTPATIENT)
Dept: SCHEDULING | Facility: HOME HEALTH | Age: 84
End: 2019-03-21
Payer: MEDICARE

## 2019-03-21 PROCEDURE — G0151 HHCP-SERV OF PT,EA 15 MIN: HCPCS

## 2019-03-21 PROCEDURE — 3331090002 HH PPS REVENUE DEBIT

## 2019-03-21 PROCEDURE — 3331090003 HH PPS REVENUE ADJ

## 2019-03-21 PROCEDURE — 3331090001 HH PPS REVENUE CREDIT

## 2019-03-22 PROCEDURE — 3331090001 HH PPS REVENUE CREDIT

## 2019-03-22 PROCEDURE — 3331090002 HH PPS REVENUE DEBIT

## 2019-03-22 PROCEDURE — 3331090003 HH PPS REVENUE ADJ

## 2019-03-24 VITALS
OXYGEN SATURATION: 97 % | HEART RATE: 75 BPM | RESPIRATION RATE: 18 BRPM | DIASTOLIC BLOOD PRESSURE: 80 MMHG | TEMPERATURE: 98.6 F | SYSTOLIC BLOOD PRESSURE: 129 MMHG

## 2019-03-25 ENCOUNTER — HOME CARE VISIT (OUTPATIENT)
Dept: HOME HEALTH SERVICES | Facility: HOME HEALTH | Age: 84
End: 2019-03-25

## 2019-04-03 ENCOUNTER — TELEPHONE (OUTPATIENT)
Dept: NEUROLOGY | Age: 84
End: 2019-04-03

## 2019-04-27 DIAGNOSIS — G40.209 COMPLEX PARTIAL SEIZURE EVOLVING TO GENERALIZED SEIZURE (HCC): ICD-10-CM

## 2019-04-27 DIAGNOSIS — Z86.011 H/O MENINGIOMA OF THE BRAIN: ICD-10-CM

## 2019-04-27 DIAGNOSIS — G40.209 LOCALIZATION-RELATED FOCAL EPILEPSY WITH COMPLEX PARTIAL SEIZURES (HCC): ICD-10-CM

## 2019-04-29 RX ORDER — PHENYTOIN SODIUM 100 MG/1
CAPSULE, EXTENDED RELEASE ORAL
Qty: 360 CAP | Refills: 1 | Status: SHIPPED | OUTPATIENT
Start: 2019-04-29 | End: 2019-12-02 | Stop reason: SDUPTHER

## 2019-06-01 ENCOUNTER — APPOINTMENT (OUTPATIENT)
Dept: CT IMAGING | Age: 84
DRG: 379 | End: 2019-06-01
Attending: INTERNAL MEDICINE
Payer: MEDICARE

## 2019-06-01 ENCOUNTER — HOSPITAL ENCOUNTER (INPATIENT)
Age: 84
LOS: 1 days | Discharge: HOME | DRG: 379 | End: 2019-06-03
Attending: INTERNAL MEDICINE | Admitting: INTERNAL MEDICINE
Payer: MEDICARE

## 2019-06-01 PROBLEM — K92.2 GI BLEED: Status: ACTIVE | Noted: 2019-06-01

## 2019-06-01 LAB
ALBUMIN SERPL-MCNC: 3.9 G/DL (ref 3.5–5)
ALBUMIN/GLOB SERPL: 1.3 {RATIO} (ref 1.1–2.2)
ALP SERPL-CCNC: 115 U/L (ref 45–117)
ALT SERPL-CCNC: 13 U/L (ref 12–78)
ANION GAP SERPL CALC-SCNC: 6 MMOL/L (ref 5–15)
APPEARANCE UR: CLEAR
AST SERPL-CCNC: 17 U/L (ref 15–37)
BACTERIA URNS QL MICRO: NEGATIVE /HPF
BILIRUB SERPL-MCNC: 0.3 MG/DL (ref 0.2–1)
BILIRUB UR QL: NEGATIVE
BUN SERPL-MCNC: 19 MG/DL (ref 6–20)
BUN/CREAT SERPL: 31 (ref 12–20)
CALCIUM SERPL-MCNC: 8.6 MG/DL (ref 8.5–10.1)
CHLORIDE SERPL-SCNC: 110 MMOL/L (ref 97–108)
CO2 SERPL-SCNC: 27 MMOL/L (ref 21–32)
COLOR UR: NORMAL
CREAT SERPL-MCNC: 0.62 MG/DL (ref 0.55–1.02)
EPITH CASTS URNS QL MICRO: NORMAL /LPF
ERYTHROCYTE [DISTWIDTH] IN BLOOD BY AUTOMATED COUNT: 12.9 % (ref 11.5–14.5)
GLOBULIN SER CALC-MCNC: 3 G/DL (ref 2–4)
GLUCOSE SERPL-MCNC: 99 MG/DL (ref 65–100)
GLUCOSE UR STRIP.AUTO-MCNC: NEGATIVE MG/DL
HCT VFR BLD AUTO: 35.6 % (ref 35–47)
HGB BLD-MCNC: 11.9 G/DL (ref 11.5–16)
HGB UR QL STRIP: NEGATIVE
HYALINE CASTS URNS QL MICRO: NORMAL /LPF (ref 0–5)
INR PPP: 1.1 (ref 0.9–1.1)
KETONES UR QL STRIP.AUTO: NEGATIVE MG/DL
LEUKOCYTE ESTERASE UR QL STRIP.AUTO: NEGATIVE
MCH RBC QN AUTO: 33.1 PG (ref 26–34)
MCHC RBC AUTO-ENTMCNC: 33.4 G/DL (ref 30–36.5)
MCV RBC AUTO: 98.9 FL (ref 80–99)
NITRITE UR QL STRIP.AUTO: NEGATIVE
NRBC # BLD: 0 K/UL (ref 0–0.01)
NRBC BLD-RTO: 0 PER 100 WBC
PH UR STRIP: 7.5 [PH] (ref 5–8)
PLATELET # BLD AUTO: 171 K/UL (ref 150–400)
PMV BLD AUTO: 11.9 FL (ref 8.9–12.9)
POTASSIUM SERPL-SCNC: 3.7 MMOL/L (ref 3.5–5.1)
PROT SERPL-MCNC: 6.9 G/DL (ref 6.4–8.2)
PROT UR STRIP-MCNC: NEGATIVE MG/DL
PROTHROMBIN TIME: 10.8 SEC (ref 9–11.1)
RBC # BLD AUTO: 3.6 M/UL (ref 3.8–5.2)
RBC #/AREA URNS HPF: NORMAL /HPF (ref 0–5)
SODIUM SERPL-SCNC: 143 MMOL/L (ref 136–145)
SP GR UR REFRACTOMETRY: 1.02 (ref 1–1.03)
UA: UC IF INDICATED,UAUC: NORMAL
UROBILINOGEN UR QL STRIP.AUTO: 0.2 EU/DL (ref 0.2–1)
WBC # BLD AUTO: 5.4 K/UL (ref 3.6–11)
WBC URNS QL MICRO: NORMAL /HPF (ref 0–4)

## 2019-06-01 PROCEDURE — 36415 COLL VENOUS BLD VENIPUNCTURE: CPT

## 2019-06-01 PROCEDURE — 81001 URINALYSIS AUTO W/SCOPE: CPT

## 2019-06-01 PROCEDURE — 85610 PROTHROMBIN TIME: CPT

## 2019-06-01 PROCEDURE — 80053 COMPREHEN METABOLIC PANEL: CPT

## 2019-06-01 PROCEDURE — 74011250637 HC RX REV CODE- 250/637: Performed by: INTERNAL MEDICINE

## 2019-06-01 PROCEDURE — 77030032490 HC SLV COMPR SCD KNE COVD -B

## 2019-06-01 PROCEDURE — 74011636320 HC RX REV CODE- 636/320: Performed by: INTERNAL MEDICINE

## 2019-06-01 PROCEDURE — 99218 HC RM OBSERVATION: CPT

## 2019-06-01 PROCEDURE — 85027 COMPLETE CBC AUTOMATED: CPT

## 2019-06-01 PROCEDURE — 74178 CT ABD&PLV WO CNTR FLWD CNTR: CPT

## 2019-06-01 PROCEDURE — 77030027138 HC INCENT SPIROMETER -A

## 2019-06-01 RX ORDER — ACETAMINOPHEN 325 MG/1
650 TABLET ORAL
Status: DISCONTINUED | OUTPATIENT
Start: 2019-06-01 | End: 2019-06-03 | Stop reason: HOSPADM

## 2019-06-01 RX ORDER — AMLODIPINE BESYLATE 2.5 MG/1
2.5 TABLET ORAL DAILY
Status: DISCONTINUED | OUTPATIENT
Start: 2019-06-02 | End: 2019-06-01

## 2019-06-01 RX ORDER — SODIUM CHLORIDE 0.9 % (FLUSH) 0.9 %
5-40 SYRINGE (ML) INJECTION AS NEEDED
Status: DISCONTINUED | OUTPATIENT
Start: 2019-06-01 | End: 2019-06-03 | Stop reason: HOSPADM

## 2019-06-01 RX ORDER — SODIUM CHLORIDE 0.9 % (FLUSH) 0.9 %
10 SYRINGE (ML) INJECTION
Status: COMPLETED | OUTPATIENT
Start: 2019-06-01 | End: 2019-06-01

## 2019-06-01 RX ORDER — PANTOPRAZOLE SODIUM 40 MG/1
40 TABLET, DELAYED RELEASE ORAL
Status: DISCONTINUED | OUTPATIENT
Start: 2019-06-02 | End: 2019-06-03 | Stop reason: HOSPADM

## 2019-06-01 RX ORDER — LISINOPRIL 5 MG/1
5 TABLET ORAL DAILY
Status: DISCONTINUED | OUTPATIENT
Start: 2019-06-02 | End: 2019-06-03 | Stop reason: HOSPADM

## 2019-06-01 RX ORDER — AMMONIUM LACTATE 12 G/100G
LOTION TOPICAL 2 TIMES DAILY
Status: DISCONTINUED | OUTPATIENT
Start: 2019-06-01 | End: 2019-06-03 | Stop reason: HOSPADM

## 2019-06-01 RX ORDER — PHENYTOIN SODIUM 100 MG/1
200 CAPSULE, EXTENDED RELEASE ORAL 2 TIMES DAILY
Status: DISCONTINUED | OUTPATIENT
Start: 2019-06-01 | End: 2019-06-03 | Stop reason: HOSPADM

## 2019-06-01 RX ORDER — ONDANSETRON 2 MG/ML
4 INJECTION INTRAMUSCULAR; INTRAVENOUS
Status: DISCONTINUED | OUTPATIENT
Start: 2019-06-01 | End: 2019-06-03 | Stop reason: HOSPADM

## 2019-06-01 RX ORDER — SODIUM CHLORIDE 0.9 % (FLUSH) 0.9 %
5-40 SYRINGE (ML) INJECTION EVERY 8 HOURS
Status: DISCONTINUED | OUTPATIENT
Start: 2019-06-01 | End: 2019-06-03 | Stop reason: HOSPADM

## 2019-06-01 RX ORDER — LEVOTHYROXINE SODIUM 88 UG/1
88 TABLET ORAL DAILY
Status: DISCONTINUED | OUTPATIENT
Start: 2019-06-02 | End: 2019-06-03 | Stop reason: HOSPADM

## 2019-06-01 RX ORDER — AMLODIPINE BESYLATE 5 MG/1
5 TABLET ORAL DAILY
Status: DISCONTINUED | OUTPATIENT
Start: 2019-06-02 | End: 2019-06-01

## 2019-06-01 RX ADMIN — Medication 10 ML: at 22:26

## 2019-06-01 RX ADMIN — Medication 10 ML: at 21:45

## 2019-06-01 RX ADMIN — IOPAMIDOL 100 ML: 755 INJECTION, SOLUTION INTRAVENOUS at 21:00

## 2019-06-01 RX ADMIN — PHENYTOIN SODIUM 200 MG: 100 CAPSULE ORAL at 22:26

## 2019-06-01 NOTE — H&P
Hospitalist Admission Note    NAME: Jessie Henley   :  1932   MRN:  [id number]     Date/Time:  2019 7:28 PM    Patient PCP: Claude Sierra MD  ______________________________________________________________________  Given the patient's current clinical presentation, I have a high level of concern for decompensation if discharged from the emergency department. Complex decision making was performed, which includes reviewing the patient's available past medical records, laboratory results, and x-ray films. My assessment of this patient's clinical condition and my plan of care is as follows. Assessment / Plan:  Melena without anemia in setting of chronic NSAID use: suspect gastritis, taking ibuprofen multiple times daily for R hip pain after replacement. Hg reported to be 12 at [facility] ER (they did not send complete records on transfer) with hemoccult positive stool. Son thinks last colonoscopy was with Dr. Neeta Long in .  - check CTA A/P  - serial Hg  - start PPI BID  - hold ASA. Discussed with Pt that she needs to stop taking Advil.  - discussed with Pt and her son today that if she remains stable with normal imaging, could potentially discharge home with outpatient EGD +/- colonoscopy later this week  - GI consulted  Hypertension: not currently on meds, but has been in the past  - start lisinopril tomorrow AM  - prn nitrobid  Hypothyroidism: con't synthroid  Seizure d/o in setting of remote history of meningioma s/p resection in : seizure-free since .  Follows with Dr. Patrice De Dios  - con't home phenytoin   Left intertrochanteric femur fracture s/p IM nail 2019 with Dr. Dawn Brown  Right closed hip fracture s/p hemiarthroplasty 2016 with Dr. Dawn Brown  - low dose oxycodone prn pain  - lac hydrin ordered for chronic dry skin in lower legs    Code Status: Discussed with Pt and her son, she wants DNR  Surrogate Decision Maker: son would be decision maker if needed  DVT Prophylaxis: lovenox    Baseline: lives with her son and dtr      Subjective:   CHIEF COMPLAINT: dark stool    HISTORY OF PRESENT ILLNESS:     Mirian Patient is a 80 y.o.  female who presents with above. Pt says that she was doing at baseline until 4 days ago. At that time, she had a sudden urge to defecate and had a large bowel movement that had darker blood mixed in with it. She did well the next day, but yesterday evening again had a BM with dark blood mixed. She had an additional bowel movement once today with dark blood so decided to go to [facility] ER. They report that she did not have any imaging there but Hg was 12. She requested transfer to [facility]. Pt denies dizziness or lightheadedness. No CP or SOB. No nausea. She does admit to taking ibuprofen every 4 hours every day due to chronic R hip pain after her hip replacement. She has chronic LE edema since her hip and femur surgeries which frustrate her - she was told by Dr. Boston Home that she should just wear CAMRON hose for these. We were asked to admit for work up and evaluation of the above problems. Past Medical History:   Diagnosis Date    Arthritis     Cancer [facility]) 2002    brain tumor, benign (meningioma)    Diabetes ([address]     questionable?    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    Seizures ([address] 2002    caused by brain tumor    Thyroid disease         Past Surgical History:   Procedure Laterality Date    COLORECTAL SCRN; HI RISK IND  8/26/2014         HX BACK SURGERY  1970s    HX ORTHOPAEDIC  1990s    right wrist    NEUROLOGICAL PROCEDURE UNLISTED  2002    brain tumor removed       Social History     Tobacco Use    Smoking status: Never Smoker    Smokeless tobacco: Never Used   Substance Use Topics    Alcohol use: Yes     Comment: glass of wine occasionally        Family History   Problem Relation Age of Onset    Cancer Mother         breast    Alzheimer Mother     Heart Disease Father     Kidney Disease Sister         dialysis    Seizures Child      Allergies   Allergen Reactions    Bactrim [Sulfamethoprim] Unable to Obtain    Penicillins Itching     Patient not sure she is allergic    Sulfa (Sulfonamide Antibiotics) Nausea and Vomiting        Prior to Admission medications    Medication Sig Start Date End Date Taking? Authorizing Provider   phenytoin ER (DILANTIN ER) 100 mg ER capsule TAKE 2 CAPSULES TWICE DAILY 4/29/19   Jo Ann Cadet MD   cephALEXin (KEFLEX) 500 mg capsule Take 1 Cap by mouth every eight (8) hours. Jace Montenegro MD   furosemide (LASIX) 20 mg tablet Take 1 Tab by mouth daily. take one tab daily for 5 days    Jesenia Persaud NP   methylPREDNISolone (MEDROL DOSEPACK) 4 mg tablet Take 1-6 Tabs by mouth Follow dosing instructions. instructions:  day 1: 2 tab beore breakfastm 1 after lunch and supper, 2 at bedtime  day2: 1 beore breakfast, 1 tablet after lunch and supper, and 2 at bed  day 3: 1 tab before breakfast, 1 after lunch, supper and bedtime  day 4: 1 tab before breakfast, after lunch, and bedtime  day 5: 1 tablet before breakfast and one at bedtime  day 6: 1 before breakfast    ISIDRA Mendoza   LORazepam (ATIVAN) 0.5 mg tablet Take 1 Tab by mouth every eight (8) hours as needed for up to 20 doses. Max Daily Amount: 1.5 mg. 1/22/19   Anup Reich MD   oxyCODONE (ROXICODONE) 5 mg/5 mL solution Take 2.5 mL by mouth every four (4) hours as needed for Pain. Jeni Nelson PA-C   biotin 5,000 mcg TbDi Take 1 Tab by mouth two (2) times a day. Cj Reddy MD   cholecalciferol (VITAMIN D3) 1,000 unit tablet Take 2 Tabs by mouth two (2) times a day.     Cj Reddy MD   amLODIPine (NORVASC) 5 mg tablet Take 1 Tab by mouth daily. 1/19/19   Georgi Osgood, MD   acetaminophen (TYLENOL) 325 mg tablet Take 2 Tabs by mouth every six (6) hours as needed for Pain or Fever. 1/19/19   Nelson Farley MD   famotidine (PEPCID) 20 mg tablet Take 1 Tab by mouth two (2) times a day. 1/19/19   Nelson Farley MD   polyethylene glycol (MIRALAX) 17 gram packet Take 1 Packet by mouth daily. 1/19/19   Nelson Farley MD   oxyCODONE IR (ROXICODONE) 5 mg immediate release tablet Take 1 Tab by mouth every six (6) hours as needed. Max Daily Amount: 20 mg. 1/19/19   Alondra Munoz PA-C   aspirin 81 mg chewable tablet Take 1 Tab by mouth two (2) times a day. 1/16/19   David Rai PA-C   calcium carbonate (OS-ANGUS) 500 mg calcium (1,250 mg) tablet Take 1 Tab by mouth two (2) times a day. Eloy Hernandez MD   cholecalciferol (VITAMIN D3) 1,000 unit tablet Take 1,000 Units by mouth two (2) times a day. Eloy Hernandez MD   cranberry extract 450 mg tab Take 1 Tab by mouth daily. Eloy Hernandez MD   levothyroxine (SYNTHROID) 88 mcg tablet Take 1 Tab by mouth daily. 7/28/15   Eloy Hernandez MD   MULTI-VITAMIN PO Take 1 Cap by mouth daily. Eloy Hernandez MD       REVIEW OF SYSTEMS:     I am not able to complete the review of systems because:    The patient is intubated and sedated    The patient has altered mental status due to his acute medical problems    The patient has baseline aphasia from prior stroke(s)    The patient has baseline dementia and is not reliable historian    The patient is in acute medical distress and unable to provide information           Total of 12 systems reviewed as follows:       POSITIVE= underlined text  Negative = text not underlined  General:  fever, chills, sweats, generalized weakness, weight loss/gain,      loss of appetite   Eyes:    blurred vision, eye pain, loss of vision, double vision  ENT:    rhinorrhea, pharyngitis   Respiratory:   cough, sputum production, SOB, DORSEY, wheezing, pleuritic pain   Cardiology: chest pain, palpitations, orthopnea, PND, edema, syncope   Gastrointestinal:  abdominal pain , N/V, diarrhea, dysphagia, constipation, bleeding   Genitourinary:  frequency, urgency, dysuria, hematuria, incontinence   Muskuloskeletal :  arthralgia, myalgia, back pain  Hematology:  easy bruising, nose or gum bleeding, lymphadenopathy   Dermatological: rash, ulceration, pruritis, color change / jaundice  Endocrine:   hot flashes or polydipsia   Neurological:  headache, dizziness, confusion, focal weakness, paresthesia,     Speech difficulties, memory loss, gait difficulty  Psychological: Feelings of anxiety, depression, agitation    Objective:   VITALS:    Visit Vitals  /79   Pulse 76   Temp 98.1 °F (36.7 °C)   Resp 16   SpO2 96%       PHYSICAL EXAM:    General:    Alert, cooperative, no distress, appears stated age. HEENT: Atraumatic, anicteric sclerae, pink conjunctivae     No oral ulcers, mucosa moist, throat clear, dentition fair  Neck:  Supple, symmetrical,  thyroid: non tender  Lungs:   Clear to auscultation bilaterally. No Wheezing or Rhonchi. No rales. Chest wall:  No tenderness  No Accessory muscle use. Heart:   Regular  rhythm,  No  murmur   2+ edema  Abdomen:   Soft, non-tender. Not distended. Bowel sounds normal  Extremities: No cyanosis. No clubbing,      Skin turgor normal, Capillary refill normal, Radial dial pulse 2+  Skin:     Not pale. Not Jaundiced  Bilateral LE erythema and dry scaling skin on LLE  Psych:  Fair insight. Not depressed. Not anxious or agitated. Neurologic: EOMs intact. No facial asymmetry. No aphasia or slurred speech. Symmetrical strength, Sensation grossly intact.  Alert and oriented X 4.     _______________________________________________________________________  Care Plan discussed with:    Comments   Patient x    Family  x Son at bedside   RN     Care Manager                    Consultant: _______________________________________________________________________  Expected  Disposition:   Home with Family x   HH/PT/OT/RN x   SNF/LTC    NIRANJAN    ________________________________________________________________________  TOTAL TIME:  48 Minutes    Critical Care Provided     Minutes non procedure based      Comments    x Reviewed previous records   >50% of visit spent in counseling and coordination of care x Discussion with patient and/or family and questions answered       ________________________________________________________________________  Signed: Queen Peter MD    Procedures: see electronic medical records for all procedures/Xrays and details which were not copied into this note but were reviewed prior to creation of Plan. LAB DATA REVIEWED:    No results found for this or any previous visit (from the past 24 hour(s)).

## 2019-06-01 NOTE — PROGRESS NOTES
7:05 PM Patient arrived to unit; ER registration notified; Attempted to notify MD at 21 [phone], but no answer; will try again in a few minutes    Addendum:    7:20 PM Dr. Cleveland Gage is on her way to see the patient.     Nicolas Sheffield RN

## 2019-06-02 PROBLEM — K92.2 GI BLEED DUE TO NSAIDS: Status: ACTIVE | Noted: 2019-06-02

## 2019-06-02 PROBLEM — T39.395A GI BLEED DUE TO NSAIDS: Status: ACTIVE | Noted: 2019-06-02

## 2019-06-02 LAB
ANION GAP SERPL CALC-SCNC: 6 MMOL/L (ref 5–15)
BUN SERPL-MCNC: 16 MG/DL (ref 6–20)
BUN/CREAT SERPL: 24 (ref 12–20)
CALCIUM SERPL-MCNC: 8.7 MG/DL (ref 8.5–10.1)
CHLORIDE SERPL-SCNC: 111 MMOL/L (ref 97–108)
CO2 SERPL-SCNC: 26 MMOL/L (ref 21–32)
CREAT SERPL-MCNC: 0.67 MG/DL (ref 0.55–1.02)
ERYTHROCYTE [DISTWIDTH] IN BLOOD BY AUTOMATED COUNT: 13 % (ref 11.5–14.5)
GLUCOSE SERPL-MCNC: 95 MG/DL (ref 65–100)
HCT VFR BLD AUTO: 35.8 % (ref 35–47)
HGB BLD-MCNC: 11.7 G/DL (ref 11.5–16)
MCH RBC QN AUTO: 32.4 PG (ref 26–34)
MCHC RBC AUTO-ENTMCNC: 32.7 G/DL (ref 30–36.5)
MCV RBC AUTO: 99.2 FL (ref 80–99)
NRBC # BLD: 0 K/UL (ref 0–0.01)
NRBC BLD-RTO: 0 PER 100 WBC
PLATELET # BLD AUTO: 163 K/UL (ref 150–400)
PMV BLD AUTO: 12.3 FL (ref 8.9–12.9)
POTASSIUM SERPL-SCNC: 4.1 MMOL/L (ref 3.5–5.1)
RBC # BLD AUTO: 3.61 M/UL (ref 3.8–5.2)
SODIUM SERPL-SCNC: 143 MMOL/L (ref 136–145)
WBC # BLD AUTO: 4.4 K/UL (ref 3.6–11)

## 2019-06-02 PROCEDURE — 65660000000 HC RM CCU STEPDOWN

## 2019-06-02 PROCEDURE — 85027 COMPLETE CBC AUTOMATED: CPT

## 2019-06-02 PROCEDURE — 80048 BASIC METABOLIC PNL TOTAL CA: CPT

## 2019-06-02 PROCEDURE — 36415 COLL VENOUS BLD VENIPUNCTURE: CPT

## 2019-06-02 PROCEDURE — 99218 HC RM OBSERVATION: CPT

## 2019-06-02 PROCEDURE — 74011250637 HC RX REV CODE- 250/637: Performed by: INTERNAL MEDICINE

## 2019-06-02 RX ADMIN — PANTOPRAZOLE SODIUM 40 MG: 40 TABLET, DELAYED RELEASE ORAL at 16:10

## 2019-06-02 RX ADMIN — PANTOPRAZOLE SODIUM 40 MG: 40 TABLET, DELAYED RELEASE ORAL at 06:52

## 2019-06-02 RX ADMIN — Medication 10 ML: at 06:52

## 2019-06-02 RX ADMIN — LISINOPRIL 5 MG: 5 TABLET ORAL at 09:00

## 2019-06-02 RX ADMIN — PHENYTOIN SODIUM 200 MG: 100 CAPSULE ORAL at 17:29

## 2019-06-02 RX ADMIN — PHENYTOIN SODIUM 200 MG: 100 CAPSULE ORAL at 08:44

## 2019-06-02 RX ADMIN — Medication: at 08:45

## 2019-06-02 RX ADMIN — Medication: at 17:29

## 2019-06-02 RX ADMIN — Medication 10 ML: at 21:08

## 2019-06-02 RX ADMIN — LEVOTHYROXINE SODIUM 88 MCG: 88 TABLET ORAL at 08:44

## 2019-06-02 RX ADMIN — Medication 10 ML: at 14:00

## 2019-06-02 NOTE — PROGRESS NOTES
Observation notice provided in writing to patient and/or caregiver as well as verbal explanation of the policy. Patients who are in outpatient status also receive the Observation notice.         Noah Everett [address]  [phone]

## 2019-06-02 NOTE — PROGRESS NOTES
Hospitalist Progress Note    NAME: Gabrielle Lopez   :  1932   MRN:  [id number]       Assessment / Plan:  Melena without anemia in setting of chronic NSAID use: suspect gastritis, taking ibuprofen multiple times daily for R hip pain after replacement. Hg reported to be 12 at [facility] ER (they did not send complete records on transfer) with hemoccult positive stool. Son thinks last colonoscopy was with Dr. Robin Lopez in .  - CTA A/P wnl   - Hb stable around 11g   - c/w  PPI BID  - hold ASA. Discussed with Pt that she needs to stop taking Advil.  - GI consulted : plan egd tomorrow     Hypertension: not currently on meds, but has been in the past  - started on  lisinopril   - prn nitrobid    Hypothyroidism: con't synthroid  Seizure d/o in setting of remote history of meningioma s/p resection in : seizure-free since . Follows with Dr. Oskar Nieves  - con't home phenytoin   Left intertrochanteric femur fracture s/p IM nail 2019 with Dr. Hany Lamb  Right closed hip fracture s/p hemiarthroplasty 2016 with Dr. Hany Lamb  - low dose oxycodone prn pain  - lac hydrin ordered for chronic dry skin in lower legs     Code Status: admitting physician  Discussed with Pt and her son, she wants DNR  Surrogate Decision Maker: son would be decision maker if needed  DVT Prophylaxis: lovenox     Baseline: lives with her son and dtr       Subjective:     Chief Complaint / Reason for Physician Visit  FU lower upper GI bleed . Reported small melanotic stools. She denies n/v /D . Discussed with RN events overnight. Review of Systems:  Symptom Y/N Comments  Symptom Y/N Comments   Fever/Chills n   Chest Pain n    Poor Appetite    Edema     Cough n   Abdominal Pain n    Sputum    Joint Pain     SOB/DORSEY n   Pruritis/Rash     Nausea/vomit n   Tolerating PT/OT     Diarrhea    Tolerating Diet y    Constipation    Other       Could NOT obtain due to:      Objective:     VITALS:   Last 24hrs VS reviewed since prior progress note.  Most recent are:  Patient Vitals for the past 24 hrs:   Temp Pulse Resp BP SpO2   06/02/19 0316 98 °F (36.7 °C) 78 16 128/51 98 %   06/01/19 2306 98.1 °F (36.7 °C) 75 16 135/49 96 %   06/01/19 1958 98.1 °F (36.7 °C) 78 16 146/55 98 %   06/01/19 1911 98.1 °F (36.7 °C) 76 16 130/79 96 %       Intake/Output Summary (Last 24 hours) at 6/2/2019 0744  Last data filed at 6/2/2019 0644  Gross per 24 hour   Intake --   Output 900 ml   Net -900 ml        PHYSICAL EXAM:  General: WD, WN. Alert, cooperative, no acute distress    EENT:  EOMI. Anicteric sclerae. MMM  Resp:  CTA bilaterally, no wheezing or rales. No accessory muscle use  CV:  Regular  rhythm,  No edema  GI:  Soft, Non distended, Non tender.  +Bowel sounds  Neurologic:  Alert and oriented X 3, normal speech,   Psych:   Good insight. Not anxious nor agitated  Skin:  No rashes. No jaundice    Reviewed most current lab test results and cultures  YES  Reviewed most current radiology test results   YES  Review and summation of old records today    NO  Reviewed patient's current orders and MAR    YES  PMH/ reviewed - no change compared to H&P  ________________________________________________________________________  Care Plan discussed with:    Comments   Patient x    Family  x son   RN x    Care Manager     Consultant                        Multidiciplinary team rounds were held today with , nursing, pharmacist and clinical coordinator. Patient's plan of care was discussed; medications were reviewed and discharge planning was addressed.      ________________________________________________________________________  Total NON critical care TIME:  20  Minutes    Total CRITICAL CARE TIME Spent:   Minutes non procedure based      Comments   >50% of visit spent in counseling and coordination of care     ________________________________________________________________________  Josh Tejeda MD     Procedures: see electronic medical records for all procedures/Xrays and details which were not copied into this note but were reviewed prior to creation of Plan. LABS:  I reviewed today's most current labs and imaging studies.   Pertinent labs include:  Recent Labs     06/02/19  0403 06/01/19  2004   WBC 4.4 5.4   HGB 11.7 11.9   HCT 35.8 35.6    171     Recent Labs     06/02/19 0403 06/01/19 2004    143   K 4.1 3.7   * 110*   CO2 26 27   GLU 95 99   BUN 16 19   CREA 0.67 0.62   CA 8.7 8.6   ALB  --  3.9   TBILI  --  0.3   SGOT  --  17   ALT  --  13   INR  --  1.1       Signed: Evgeny Zhou MD

## 2019-06-02 NOTE — PROGRESS NOTES
.General Surgery End of Shift Nursing Note    Bedside shift change report given to [facility]  (oncoming nurse) by Iva Venegas (offgoing nurse). Report included the following information SBAR, Kardex, Intake/Output, MAR and Recent Results. Shift worked:   [id number]   Summary of shift:    Patient is having stools formed with mucous, dark brown with blood. She is very anxious over results of EGD. Monitor labs. Issues for physician to address:   Have EGD and read results to patient and son. Wanting discharge. Number times ambulated in hallway past shift: 0    Number of times OOB to chair past shift: 5    Pain Management:  Current medication: denies pain  Patient states pain is manageable on current pain medication: denies pain    GI:    Current diet:  DIET REGULAR No [employer] Sweets  DIET NPO    Tolerating current diet:yes  Passing flatus:yes  Last Bowel Movement: 06/2/2019  Respiratory:    Incentive Spirometer at bedside: yes  Patient instructed on use: no    Patient Safety:    Falls Score: 4  Bed Alarm On? no  Sitter?  no    Lavinia Smith RN

## 2019-06-02 NOTE — CONSULTS
GI Consultation Note Marcus Montes for Cara Galindo)    NAME: Kelsie Dickey : 1932 MRN: [id number]   ATTG: [unfilled] PCP: Damien Chase MD  Date/Time:  2019 12:40 PM  Subjective:   REASON FOR CONSULT:    Yary Rivera is a 80 y.o.   female who I was asked to see for above. Pt reports she had three days of constipation and after manual disimpaction has had multiple episodes of melena. No AP. +Aleve use for last 1-2 weeks. No h/o GI bleeding. Colonoscopy in  with Dr. Cara Galindo with severe pan-diverticulosis    Past Medical History:   Diagnosis Date    Arthritis     Cancer [facility])     brain tumor, benign (meningioma)    Diabetes ([facility]     questionable?     Gastrointestinal disorder     diverticulitis    Hypothyroid 2015    Localization-related (focal) (partial) epilepsy and epileptic syndromes with complex partial seizures, without mention of intractable epilepsy 2015    Other ill-defined conditions(799.89)     loss of vision in left eye    Seizures ([address]     caused by brain tumor    Thyroid disease       Past Surgical History:   Procedure Laterality Date    COLORECTAL SCRN; HI RISK IND  2014         HX BACK SURGERY      HX ORTHOPAEDIC      right wrist    NEUROLOGICAL PROCEDURE UNLISTED      brain tumor removed     Social History     Tobacco Use    Smoking status: Never Smoker    Smokeless tobacco: Never Used   Substance Use Topics    Alcohol use: Yes     Comment: glass of wine occasionally      Family History   Problem Relation Age of Onset    Cancer Mother         breast    Alzheimer Mother     Heart Disease Father     Kidney Disease Sister         dialysis    Seizures Daughter       Allergies   Allergen Reactions    Bactrim [Sulfamethoprim] Unable to Obtain    Penicillins Itching     Patient not sure she is allergic    Sulfa (Sulfonamide Antibiotics) Nausea and Vomiting      Home Medications:  Prior to Admission Medications   Prescriptions Last Dose Informant Patient Reported? Taking? LORazepam (ATIVAN) 0.5 mg tablet   No No   Sig: Take 1 Tab by mouth every eight (8) hours as needed for up to 20 doses. Max Daily Amount: 1.5 mg. MULTI-VITAMIN PO   Yes No   Sig: Take 1 Cap by mouth daily. acetaminophen (TYLENOL) 325 mg tablet   No No   Sig: Take 2 Tabs by mouth every six (6) hours as needed for Pain or Fever. amLODIPine (NORVASC) 5 mg tablet   No No   Sig: Take 1 Tab by mouth daily. aspirin 81 mg chewable tablet   No No   Sig: Take 1 Tab by mouth two (2) times a day. biotin 5,000 mcg TbDi   Yes No   Sig: Take 1 Tab by mouth two (2) times a day. calcium carbonate (OS-ANGUS) 500 mg calcium (1,250 mg) tablet   Yes No   Sig: Take 1 Tab by mouth two (2) times a day. cephALEXin (KEFLEX) 500 mg capsule   Yes No   Sig: Take 1 Cap by mouth every eight (8) hours. cholecalciferol (VITAMIN D3) 1,000 unit tablet   Yes No   Sig: Take 1,000 Units by mouth two (2) times a day. cholecalciferol (VITAMIN D3) 1,000 unit tablet   Yes No   Sig: Take 2 Tabs by mouth two (2) times a day. cranberry extract 450 mg tab   Yes No   Sig: Take 1 Tab by mouth daily. famotidine (PEPCID) 20 mg tablet   No No   Sig: Take 1 Tab by mouth two (2) times a day. furosemide (LASIX) 20 mg tablet   Yes No   Sig: Take 1 Tab by mouth daily. take one tab daily for 5 days   levothyroxine (SYNTHROID) 88 mcg tablet   Yes No   Sig: Take 1 Tab by mouth daily. methylPREDNISolone (MEDROL DOSEPACK) 4 mg tablet   Yes No   Sig: Take 1-6 Tabs by mouth Follow dosing instructions.  instructions:  day 1: 2 tab beore breakfastm 1 after lunch and supper, 2 at bedtime  day2: 1 beore breakfast, 1 tablet after lunch and supper, and 2 at bed  day 3: 1 tab before breakfast, 1 after lunch, supper and bedtime  day 4: 1 tab before breakfast, after lunch, and bedtime  day 5: 1 tablet before breakfast and one at bedtime  day 6: 1 before breakfast   oxyCODONE (ROXICODONE) 5 mg/5 mL solution   Yes No   Sig: Take 2.5 mL by mouth every four (4) hours as needed for Pain. oxyCODONE IR (ROXICODONE) 5 mg immediate release tablet   No No   Sig: Take 1 Tab by mouth every six (6) hours as needed. Max Daily Amount: 20 mg.   phenytoin ER (DILANTIN ER) 100 mg ER capsule   No No   Sig: TAKE 2 CAPSULES TWICE DAILY   polyethylene glycol (MIRALAX) 17 gram packet   No No   Sig: Take 1 Packet by mouth daily.       Facility-Administered Medications: None     Hospital medications:  Current Facility-Administered Medications   Medication Dose Route Frequency    sodium chloride (NS) flush 5-40 mL  5-40 mL IntraVENous Q8H    sodium chloride (NS) flush 5-40 mL  5-40 mL IntraVENous PRN    acetaminophen (TYLENOL) tablet 650 mg  650 mg Oral Q4H PRN    ondansetron (ZOFRAN) injection 4 mg  4 mg IntraVENous Q4H PRN    pantoprazole (PROTONIX) tablet 40 mg  40 mg Oral ACB&D    levothyroxine (SYNTHROID) tablet 88 mcg  88 mcg Oral DAILY    phenytoin ER (DILANTIN ER) ER capsule 200 mg  200 mg Oral BID    nitroglycerin (NITROBID) 2 % ointment 1 Inch  1 Inch Topical Q6H PRN    ammonium lactate (LAC-HYDRIN) 12 % lotion   Topical BID    lisinopril (PRINIVIL, ZESTRIL) tablet 5 mg  5 mg Oral DAILY     REVIEW OF SYSTEMS:     []     Unable to obtain  ROS due to  []    mental status change  []    sedated   []    intubated   [x]    Total of 11 systems reviewed as follows:  Const:  negative fever, negative chills, negative weight loss  Eyes:   negative diplopia or visual changes, negative eye pain  ENT:   negative coryza, negative sore throat  Resp:   negative cough, hemoptysis, dyspnea  Cards:  negative for chest pain, palpitations, lower extremity edema  :  negative for frequency, dysuria and hematuria  Skin:   negative for rash and pruritus  Heme:  negative for easy bruising and gum/nose bleeding  MS:  negative for myalgias, arthralgias, back pain and muscle weakness  Neurolo:  negative for headaches, dizziness, vertigo, memory problems   Psych: negative for feelings of anxiety, depression     Pertinent Positives include :    Objective:   VITALS:    Visit Vitals  /52 (BP 1 Location: Right arm, BP Patient Position: At rest)   Pulse (!) 56   Temp 97.9 °F (36.6 °C)   Resp 16   SpO2 98%   Breastfeeding? No     Temp (24hrs), Av °F (36.7 °C), Min:97.9 °F (36.6 °C), Max:98.1 °F (36.7 °C)    PHYSICAL EXAM:   General:    Alert, cooperative, no distress, appears stated age. Head:   Normocephalic, without obvious abnormality, atraumatic. Eyes:   Conjunctivae clear, anicteric sclerae. Pupils are equal  Nose:  Nares normal. No drainage or sinus tenderness. Throat:    Lips, mucosa, and tongue normal.  No Thrush  Neck:  Supple, symmetrical,  no adenopathy, thyroid: non tender  Back:    Symmetric,  No CVA tenderness. Lungs:   CTA bilaterally. No wheezing/rhonchi/rales. Chest wall:  No tenderness or deformity. No Accessory muscle use. Heart:   Regular rate and rhythm,  no murmur, rub or gallop. Abdomen:   Soft, non-tender. Not distended. Bowel sounds normal. No masses  Extremities: Atraumatic, No cyanosis. No edema. No clubbing  Skin:     Texture, turgor normal. No rashes/lesions/jaundice  Lymph: Cervical, supraclavicular normal.  Psych:  Good insight. Not depressed. Not anxious or agitated. Neurologic: EOMs intact. No facial asymmetry. No aphasia or slurred speech. Normal  strength, A/O X 3.      LAB DATA REVIEWED:    Recent Results (from the past 48 hour(s))   CBC W/O DIFF    Collection Time: 19  8:04 PM   Result Value Ref Range    WBC 5.4 3.6 - 11.0 K/uL    RBC 3.60 (L) 3.80 - 5.20 M/uL    HGB 11.9 11.5 - 16.0 g/dL    HCT 35.6 35.0 - 47.0 %    MCV 98.9 80.0 - 99.0 FL    MCH 33.1 26.0 - 34.0 PG    MCHC 33.4 30.0 - 36.5 g/dL    RDW 12.9 11.5 - 14.5 %    PLATELET 685 166 - 402 K/uL    MPV 11.9 8.9 - 12.9 FL    NRBC 0.0 0  WBC    ABSOLUTE NRBC 0.00 0.00 - 2.61 K/uL   METABOLIC PANEL, COMPREHENSIVE    Collection Time: 19  8:04 PM Result Value Ref Range    Sodium 143 136 - 145 mmol/L    Potassium 3.7 3.5 - 5.1 mmol/L    Chloride 110 (H) 97 - 108 mmol/L    CO2 27 21 - 32 mmol/L    Anion gap 6 5 - 15 mmol/L    Glucose 99 65 - 100 mg/dL    BUN 19 6 - 20 MG/DL    Creatinine 0.62 0.55 - 1.02 MG/DL    BUN/Creatinine ratio 31 (H) 12 - 20      GFR est AA >60 >60 ml/min/1.73m2    GFR est non-AA >60 >60 ml/min/1.73m2    Calcium 8.6 8.5 - 10.1 MG/DL    Bilirubin, total 0.3 0.2 - 1.0 MG/DL    ALT (SGPT) 13 12 - 78 U/L    AST (SGOT) 17 15 - 37 U/L    Alk.  phosphatase 115 45 - 117 U/L    Protein, total 6.9 6.4 - 8.2 g/dL    Albumin 3.9 3.5 - 5.0 g/dL    Globulin 3.0 2.0 - 4.0 g/dL    A-G Ratio 1.3 1.1 - 2.2     PROTHROMBIN TIME + INR    Collection Time: 06/01/19  8:04 PM   Result Value Ref Range    INR 1.1 0.9 - 1.1      Prothrombin time 10.8 9.0 - 11.1 sec   URINALYSIS W/ REFLEX CULTURE    Collection Time: 06/01/19  9:58 PM   Result Value Ref Range    Color YELLOW/STRAW      Appearance CLEAR CLEAR      Specific gravity 1.017 1.003 - 1.030      pH (UA) 7.5 5.0 - 8.0      Protein NEGATIVE  NEG mg/dL    Glucose NEGATIVE  NEG mg/dL    Ketone NEGATIVE  NEG mg/dL    Bilirubin NEGATIVE  NEG      Blood NEGATIVE  NEG      Urobilinogen 0.2 0.2 - 1.0 EU/dL    Nitrites NEGATIVE  NEG      Leukocyte Esterase NEGATIVE  NEG      WBC 0-4 0 - 4 /hpf    RBC 0-5 0 - 5 /hpf    Epithelial cells FEW FEW /lpf    Bacteria NEGATIVE  NEG /hpf    UA:UC IF INDICATED CULTURE NOT INDICATED BY UA RESULT CNI      Hyaline cast 0-2 0 - 5 /lpf   METABOLIC PANEL, BASIC    Collection Time: 06/02/19  4:03 AM   Result Value Ref Range    Sodium 143 136 - 145 mmol/L    Potassium 4.1 3.5 - 5.1 mmol/L    Chloride 111 (H) 97 - 108 mmol/L    CO2 26 21 - 32 mmol/L    Anion gap 6 5 - 15 mmol/L    Glucose 95 65 - 100 mg/dL    BUN 16 6 - 20 MG/DL    Creatinine 0.67 0.55 - 1.02 MG/DL    BUN/Creatinine ratio 24 (H) 12 - 20      GFR est AA >60 >60 ml/min/1.73m2    GFR est non-AA >60 >60 ml/min/1.73m2 Calcium 8.7 8.5 - 10.1 MG/DL   CBC W/O DIFF    Collection Time: 06/02/19  4:03 AM   Result Value Ref Range    WBC 4.4 3.6 - 11.0 K/uL    RBC 3.61 (L) 3.80 - 5.20 M/uL    HGB 11.7 11.5 - 16.0 g/dL    HCT 35.8 35.0 - 47.0 %    MCV 99.2 (H) 80.0 - 99.0 FL    MCH 32.4 26.0 - 34.0 PG    MCHC 32.7 30.0 - 36.5 g/dL    RDW 13.0 11.5 - 14.5 %    PLATELET 637 604 - 412 K/uL    MPV 12.3 8.9 - 12.9 FL    NRBC 0.0 0  WBC    ABSOLUTE NRBC 0.00 0.00 - 0.01 K/uL     IMAGING RESULTS:   []      I have personally reviewed the actual   []    CXR  []    CT  []     US    Assessment/Plan:      Active Problems:    GI bleed (6/1/2019)    1. Melena  2. Hgb stable  3. NSAID use  4.  Colonoscopy in '14 w/ severe diverticulosis  ___________________________________________________  RECOMMENDATIONS:    - PPI BID  - NPO after midnight for EGD with Dr. Niecy Madison tomorrow    Discussed Code Status:    []    Full Code      []    DNR    ___________________________________________________  Care Plan discussed with:    [x]    Patient   [x]    Family   []    Nursing   []    Attending   ___________________________________________________  GI: Marisela Mckoy MD

## 2019-06-03 VITALS
RESPIRATION RATE: 16 BRPM | DIASTOLIC BLOOD PRESSURE: 82 MMHG | SYSTOLIC BLOOD PRESSURE: 155 MMHG | OXYGEN SATURATION: 99 % | HEART RATE: 73 BPM | TEMPERATURE: 98 F

## 2019-06-03 PROCEDURE — 94760 N-INVAS EAR/PLS OXIMETRY 1: CPT

## 2019-06-03 PROCEDURE — 74011250637 HC RX REV CODE- 250/637: Performed by: INTERNAL MEDICINE

## 2019-06-03 RX ORDER — EPINEPHRINE 0.1 MG/ML
1 INJECTION INTRACARDIAC; INTRAVENOUS
Status: CANCELLED | OUTPATIENT
Start: 2019-06-03 | End: 2019-06-04

## 2019-06-03 RX ORDER — DEXTROMETHORPHAN/PSEUDOEPHED 2.5-7.5/.8
1.2 DROPS ORAL
Status: CANCELLED | OUTPATIENT
Start: 2019-06-03

## 2019-06-03 RX ORDER — FLUMAZENIL 0.1 MG/ML
0.2 INJECTION INTRAVENOUS
Status: CANCELLED | OUTPATIENT
Start: 2019-06-03 | End: 2019-06-03

## 2019-06-03 RX ORDER — PANTOPRAZOLE SODIUM 40 MG/1
40 TABLET, DELAYED RELEASE ORAL
Qty: 60 TAB | Refills: 1 | Status: SHIPPED | OUTPATIENT
Start: 2019-06-03 | End: 2019-06-03 | Stop reason: SDUPTHER

## 2019-06-03 RX ORDER — ATROPINE SULFATE 0.1 MG/ML
0.5 INJECTION INTRAVENOUS
Status: CANCELLED | OUTPATIENT
Start: 2019-06-03 | End: 2019-06-04

## 2019-06-03 RX ORDER — SODIUM CHLORIDE 0.9 % (FLUSH) 0.9 %
5-40 SYRINGE (ML) INJECTION EVERY 8 HOURS
Status: CANCELLED | OUTPATIENT
Start: 2019-06-03

## 2019-06-03 RX ORDER — PANTOPRAZOLE SODIUM 40 MG/1
40 TABLET, DELAYED RELEASE ORAL
Qty: 60 TAB | Refills: 1 | Status: SHIPPED | OUTPATIENT
Start: 2019-06-03 | End: 2022-08-10 | Stop reason: ALTCHOICE

## 2019-06-03 RX ORDER — AMLODIPINE BESYLATE 5 MG/1
5 TABLET ORAL DAILY
Qty: 30 TAB | Refills: 1 | Status: SHIPPED | OUTPATIENT
Start: 2019-06-03 | End: 2022-08-10 | Stop reason: ALTCHOICE

## 2019-06-03 RX ORDER — SODIUM CHLORIDE 0.9 % (FLUSH) 0.9 %
5-40 SYRINGE (ML) INJECTION AS NEEDED
Status: CANCELLED | OUTPATIENT
Start: 2019-06-03

## 2019-06-03 RX ORDER — SODIUM CHLORIDE 9 MG/ML
100 INJECTION, SOLUTION INTRAVENOUS CONTINUOUS
Status: CANCELLED | OUTPATIENT
Start: 2019-06-03 | End: 2019-06-03

## 2019-06-03 RX ADMIN — LISINOPRIL 5 MG: 5 TABLET ORAL at 08:32

## 2019-06-03 RX ADMIN — LEVOTHYROXINE SODIUM 88 MCG: 88 TABLET ORAL at 08:32

## 2019-06-03 RX ADMIN — PHENYTOIN SODIUM 200 MG: 100 CAPSULE ORAL at 08:32

## 2019-06-03 RX ADMIN — Medication 10 ML: at 06:06

## 2019-06-03 RX ADMIN — PANTOPRAZOLE SODIUM 40 MG: 40 TABLET, DELAYED RELEASE ORAL at 08:32

## 2019-06-03 NOTE — DISCHARGE INSTRUCTIONS
DISCHARGE DIAGNOSIS:  Melena without anemia in setting of chronic NSAID use:   Hypertension:   Hypothyroidism:   Seizure d/o in setting of remote history of meningioma s/p resection in 2002:   Left intertrochanteric femur fracture s/p IM nail 1/2019 with Dr. Florencio Stubbs  Right closed hip fracture s/p hemiarthroplasty 8/2016 with Dr. Florencio Stubbs  -MEDICATIONS:  · It is important that you take the medication exactly as they are prescribed. · Keep your medication in the bottles provided by the pharmacist and keep a list of the medication names, dosages, and times to be taken in your wallet. · Do not take other medications without consulting your doctor. Pain Management: per above medications    What to do at [address]:  Cardiac Diet    Recommended activity: Activity as tolerated    If you have questions regarding the hospital related prescriptions or hospital related issues please call [place] [address] at . You can always direct your questions to your primary care doctor if you are unable to reach your hospital physician; your PCP works as an extension of your hospital doctor just like your hospital doctor is an extension of your PCP for your time at the hospital [facility], [facility]). If you experience any of the following symptoms then please call your primary care physician or return to the emergency room if you cannot get hold of your doctor:  Fever, chills, nausea, vomiting, diarrhea, change in mentation, falling, bleeding, shortness of breath,   Patient Education        Gastrointestinal Bleeding: Care Instructions  Your Care Instructions    The digestive or gastrointestinal tract goes from the mouth to the anus. It is often called the GI tract. Bleeding can happen anywhere in the GI tract. It may be caused by an ulcer, an infection, or cancer. It may also be caused by medicines such as aspirin or ibuprofen.   Light bleeding may not cause any symptoms at first. But if you continue to bleed for a while, you may feel very weak or tired. Sudden, heavy bleeding means you need to see a doctor right away. This kind of bleeding can be very dangerous. But it can usually be cured or controlled. The doctor may do some tests to find the cause of your bleeding. Follow-up care is a key part of your treatment and safety. Be sure to make and go to all appointments, and call your doctor if you are having problems. It's also a good idea to know your test results and keep a list of the medicines you take. How can you care for yourself at home? · Be safe with medicines. Take your medicines exactly as prescribed. Call your doctor if you think you are having a problem with your medicine. You will get more details on the specific medicines your doctor prescribes. · Do not take aspirin or other anti-inflammatory medicines, such as naproxen (Aleve) or ibuprofen (Advil, Motrin), without talking to your doctor first. Ask your doctor if it is okay to use acetaminophen (Tylenol). · Do not drink alcohol. · The bleeding may make you lose iron. So it's important to eat foods that have a lot of iron. These include red meat, shellfish, poultry, and eggs. They also include beans, raisins, whole-grain breads, and leafy green vegetables. If you want help planning meals, you can make an appointment with a dietitian. When should you call for help? Call 911 anytime you think you may need emergency care.  For example, call if:    · You have sudden, severe belly pain.     · You vomit blood or what looks like coffee grounds.     · You passed out (lost consciousness).     · Your stools are maroon or very bloody.    Call your doctor now or seek immediate medical care if:    · You are dizzy or lightheaded, or you feel like you may faint.     · Your stools are black and look like tar, or they have streaks of blood.     · You have belly pain.     · You vomit or have nausea.     · You have trouble swallowing, or it hurts when you swallow.    Watch closely for changes in your health, and be sure to contact your doctor if:    · You do not get better as expected. Where can you learn more? Go to http://oneal-delisa.info/. Enter U756 in the search box to learn more about \"Gastrointestinal Bleeding: Care Instructions. \"  Current as of: September 23, 2018  Content Version: 11.9  © 3243-8289 [employer]. Care instructions adapted under license by [employer] (which disclaims liability or warranty for this information). If you have questions about a medical condition or this instruction, always ask your healthcare professional. [place] [address] any warranty or liability for your use of this information.

## 2019-06-03 NOTE — PROGRESS NOTES

## 2019-06-03 NOTE — PROGRESS NOTES
3:31 AM Charge RNCharles, called to patient room per her request; Patient states she has a daughter at home that has \"taken ill\" and patient would like to be discharged this morning; Patient states she no longer wants to do the test (EGD) that is scheduled for today; Patient reports that she has not had any more episodes of bleeding and that if she does she will follow up with her doctor; Primary RN, [employer], made aware.      Leon Edge RN

## 2019-06-03 NOTE — PROGRESS NOTES
General Surgery End of Shift Nursing Note    Bedside shift change report given to [address] [place] [address] (oncoming nurse) by Harpreet Whiting (offgoing nurse). Report included the following information SBAR, Kardex, Intake/Output and MAR. Shift worked:   7p to [employer] of shift:   Pt has been resting throughout the night. Pt has refused EGD planned in the am on 6/3. Notified by charge nurse. Pt states she wants to go home to take care of her daughter.     Issues for physician to address:  none         Diego Zamora, RN

## 2019-06-03 NOTE — DISCHARGE SUMMARY
Hospitalist Discharge Summary     Patient ID:  Simone Rosales  [id number]  80 y.o.  9/5/1932 6/1/2019    PCP on record: Delfino Mccormack MD    Admit date: 6/1/2019  Discharge date and time: 6/3/2019    DISCHARGE DIAGNOSIS:  Melena without anemia in setting of chronic NSAID use:   Hypertension:   Hypothyroidism:   Seizure d/o in setting of remote history of meningioma s/p resection in 2002:   Left intertrochanteric femur fracture s/p IM nail 1/2019 with Dr. Ludwin Hernandez  Right closed hip fracture s/p hemiarthroplasty 8/2016 with Dr. Jannis Eisenmenger:  Cindy Pappas HPI from H&P of Sheran Meigs , MD   Fernando Mckinney is a 80 y.o.  female who presents with above. Pt says that she was doing at baseline until 4 days ago. At that time, she had a sudden urge to defecate and had a large bowel movement that had darker blood mixed in with it. She did well the next day, but yesterday evening again had a BM with dark blood mixed. She had an additional bowel movement once today with dark blood so decided to go to [facility]. They report that she did not have any imaging there but Hg was 12. She requested transfer to [facility]. Pt denies dizziness or lightheadedness. No CP or SOB. No nausea. She does admit to taking ibuprofen every 4 hours every day due to chronic R hip pain after her hip replacement. She has chronic LE edema since her hip and femur surgeries which frustrate her - she was told by Dr. Ludwin Hernandez that she should just wear CAMRON hose for these.        ______________________________________________________________________  DISCHARGE SUMMARY/HOSPITAL COURSE:  for full details see H&P, daily progress notes, labs, consult notes.      Melena without anemia in setting of chronic NSAID use:   suspect gastritis, taking ibuprofen multiple times daily for R hip pain after replacement.  Hg reported to be 12 at [facility] (they did not send complete records on transfer) with hemoccult positive stool. Son thinks last colonoscopy was with Dr. Severo Gaster in 2012.  - CTA A/P wnl   - Hb stable around 11g   - c/w  PPI BID. No further bleeding   - hold ASA.  Discussed with Pt that she needs to stop taking Advil.  - GI consulted  And pt was scheduled for inpatient EGD on 06/03 but patient refused to undergo  EGD and wanted to be discharged  For personal reasons . I emphasized the need to FU with GI and get OP EGD      Hypertension: not currently on meds, but has been in the past  - started on  lisinopril while IP , was taking amlodipine prior   - Amlodipine 5mg restarted as OP   - prn nitrobid     Hypothyroidism: con't synthroid  Seizure d/o in setting of remote history of meningioma s/p resection in 2002: seizure-free since 2002. Follows with Dr. Teresa  - con't home phenytoin     Left intertrochanteric femur fracture s/p IM nail 1/2019 with Dr. Yohannes Monson  Right closed hip fracture s/p hemiarthroplasty 8/2016 with Dr. Yohannes Monson  - low dose oxycodone prn pain  - lac hydrin ordered for chronic dry skin in lower legs     ______________________________________________________________________  Patient seen and examined by me on discharge day. Pertinent Findings:  Gen:    Not in distress  Chest: Clear lungs  CVS:   Regular rhythm. No edema  Abd:  Soft, not distended, not tender  Neuro:  Alert, oriented x3  _______________________________________________________________________  DISCHARGE MEDICATIONS:   Current Discharge Medication List      START taking these medications    Details   pantoprazole (PROTONIX) 40 mg tablet Take 1 Tab by mouth Before breakfast and dinner. Qty: 60 Tab, Refills: 1         CONTINUE these medications which have CHANGED    Details   amLODIPine (NORVASC) 5 mg tablet Take 1 Tab by mouth daily.   Qty: 30 Tab, Refills: 1         CONTINUE these medications which have NOT CHANGED    Details   phenytoin ER (DILANTIN ER) 100 mg ER capsule TAKE 2 CAPSULES TWICE DAILY  Qty: 360 Cap, Refills: 1    Associated Diagnoses: Localization-related focal epilepsy with complex partial seizures ([address] Complex partial seizure 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 seizure ([address] H/O meningioma of the brain      LORazepam (ATIVAN) 0.5 mg tablet Take 1 Tab by mouth every eight (8) hours as needed for up to 20 doses. Max Daily Amount: 1.5 mg.  Qty: 20 Tab, Refills: 0    Associated Diagnoses: Fatigue, unspecified type      oxyCODONE (ROXICODONE) 5 mg/5 mL solution Take 2.5 mL by mouth every four (4) hours as needed for Pain. biotin 5,000 mcg TbDi Take 1 Tab by mouth two (2) times a day. !! cholecalciferol (VITAMIN D3) 1,000 unit tablet Take 2 Tabs by mouth two (2) times a day. acetaminophen (TYLENOL) 325 mg tablet Take 2 Tabs by mouth every six (6) hours as needed for Pain or Fever. Qty: 40 Tab, Refills: 0      polyethylene glycol (MIRALAX) 17 gram packet Take 1 Packet by mouth daily. Qty: 30 Each, Refills: 1      oxyCODONE IR (ROXICODONE) 5 mg immediate release tablet Take 1 Tab by mouth every six (6) hours as needed. Max Daily Amount: 20 mg.  Qty: 30 Tab, Refills: 0    Associated Diagnoses: Closed fracture of left hip, initial encounter ([facility])      calcium carbonate (OS-ANGUS) 500 mg calcium (1,250 mg) tablet Take 1 Tab by mouth two (2) times a day. !! cholecalciferol (VITAMIN D3) 1,000 unit tablet Take 1,000 Units by mouth two (2) times a day. cranberry extract 450 mg tab Take 1 Tab by mouth daily. levothyroxine (SYNTHROID) 88 mcg tablet Take 1 Tab by mouth daily. MULTI-VITAMIN PO Take 1 Cap by mouth daily. !! - Potential duplicate medications found. Please discuss with provider.       STOP taking these medications       cephALEXin (KEFLEX) 500 mg capsule Comments:   Reason for Stopping:         furosemide (LASIX) 20 mg tablet Comments:   Reason for Stopping:         methylPREDNISolone (MEDROL DOSEPACK) 4 mg tablet Comments:   Reason for Stopping:         famotidine (PEPCID) 20 mg tablet Comments:   Reason for Stopping:         aspirin 81 mg chewable tablet Comments:   Reason for Stopping:                 Patient Follow Up Instructions: Activity: Activity as tolerated  Diet: Regular Diet  Wound Care: None needed    Follow-up with PCP  in 7 days.   Follow-up tests/labs   Follow-up Information     Follow up With Specialties Details Why Contact Florencia Raya MD [facility] Schedule an appointment as soon as possible for a visit in 1 week  [address] [facility]  [address]       Kimberli Larson MD Gastroenterology Schedule an appointment as soon as possible for a visit in 1 week  [address] [facility]  [address] [facility]  [phone]          ________________________________________________________________    Risk of deterioration: Low    Condition at Discharge:  Stable  __________________________________________________________________    Disposition  Home with family, no needs    ____________________________________________________________________    Code Status: DNR/DNI  ___________________________________________________________________      Total time in minutes spent coordinating this discharge (includes going over instructions, follow-up, prescriptions, and preparing report for sign off to her PCP) :  31 minutes    Signed:  Carissa Medina MD

## 2019-12-02 DIAGNOSIS — Z86.011 H/O MENINGIOMA OF THE BRAIN: ICD-10-CM

## 2019-12-02 DIAGNOSIS — G40.209 LOCALIZATION-RELATED FOCAL EPILEPSY WITH COMPLEX PARTIAL SEIZURES (HCC): ICD-10-CM

## 2019-12-02 DIAGNOSIS — G40.209 COMPLEX PARTIAL SEIZURE EVOLVING TO GENERALIZED SEIZURE (HCC): ICD-10-CM

## 2019-12-02 RX ORDER — PHENYTOIN SODIUM 100 MG/1
CAPSULE, EXTENDED RELEASE ORAL
Qty: 360 CAP | Refills: 0 | Status: SHIPPED | OUTPATIENT
Start: 2019-12-02 | End: 2020-02-28 | Stop reason: SDUPTHER

## 2020-02-28 ENCOUNTER — OFFICE VISIT (OUTPATIENT)
Dept: NEUROLOGY | Age: 85
End: 2020-02-28

## 2020-02-28 VITALS
WEIGHT: 126 LBS | DIASTOLIC BLOOD PRESSURE: 62 MMHG | OXYGEN SATURATION: 94 % | HEIGHT: 64 IN | HEART RATE: 67 BPM | SYSTOLIC BLOOD PRESSURE: 112 MMHG | BODY MASS INDEX: 21.51 KG/M2 | RESPIRATION RATE: 16 BRPM

## 2020-02-28 DIAGNOSIS — I65.23 BILATERAL CAROTID ARTERY STENOSIS: Primary | ICD-10-CM

## 2020-02-28 DIAGNOSIS — G40.209 COMPLEX PARTIAL SEIZURE EVOLVING TO GENERALIZED SEIZURE (HCC): ICD-10-CM

## 2020-02-28 DIAGNOSIS — I67.89 CEREBRAL MICROVASCULAR DISEASE: ICD-10-CM

## 2020-02-28 DIAGNOSIS — G40.209 LOCALIZATION-RELATED FOCAL EPILEPSY WITH COMPLEX PARTIAL SEIZURES (HCC): ICD-10-CM

## 2020-02-28 DIAGNOSIS — Z86.011 H/O MENINGIOMA OF THE BRAIN: ICD-10-CM

## 2020-02-28 RX ORDER — PHENYTOIN SODIUM 100 MG/1
CAPSULE, EXTENDED RELEASE ORAL
Qty: 360 CAP | Refills: 5 | Status: SHIPPED | OUTPATIENT
Start: 2020-02-28 | End: 2021-06-15

## 2020-02-28 NOTE — PATIENT INSTRUCTIONS
Benign Paroxysmal Positional Vertigo (BPPV): Care Instructions Your Care Instructions Benign paroxysmal positional vertigo, also called BPPV, is an inner ear problem. It causes a spinning or whirling sensation when you move your head. This sensation is called vertigo. The vertigo usually lasts for less than a minute. People often have vertigo spells for a few days or weeks. Then the vertigo goes away. But it may come back again. The vertigo may be mild, or it may be bad enough to cause unsteadiness, nausea, and vomiting. When you move, your inner ear sends messages to the brain. This helps you keep your balance. Vertigo can happen when debris builds up in the inner ear. The buildup can cause the inner ear to send the wrong message to the brain. Your doctor may move you in different positions to help your vertigo get better faster. This is called the Epley maneuver. Your doctor may also prescribe medicines or exercises to help with your symptoms. Follow-up care is a key part of your treatment and safety. Be sure to make and go to all appointments, and call your doctor if you are having problems. It's also a good idea to know your test results and keep a list of the medicines you take. How can you care for yourself at home? · If your doctor suggests that you do Galindo-Daroff exercises: 
? Sit on the edge of a bed or sofa. Quickly lie down on the side that causes the worst vertigo. Lie on your side with your ear down. ? Stay in this position for at least 30 seconds or until the vertigo goes away. ? Sit up. If this causes vertigo, wait for it to stop. ? Repeat the procedure on the other side. ? Repeat this 10 times. Do these exercises 2 times a day until the vertigo is gone. When should you call for help? Call 911 anytime you think you may need emergency care. For example, call if: 
  · You have symptoms of a stroke. These may include: ? Sudden numbness, tingling, weakness, or loss of movement in your face, arm, or leg, especially on only one side of your body. ? Sudden vision changes. ? Sudden trouble speaking. ? Sudden confusion or trouble understanding simple statements. ? Sudden problems with walking or balance. ? A sudden, severe headache that is different from past headaches.  
 Call your doctor now or seek immediate medical care if: 
  · You have new or worse nausea and vomiting.  
  · You have new symptoms such as hearing loss or roaring in your ears.  
 Watch closely for changes in your health, and be sure to contact your doctor if: 
  · You are not getting better as expected.  
  · Your vertigo gets worse. Where can you learn more? Go to http://oneal-delisa.info/. Enter  in the search box to learn more about \"Benign Paroxysmal Positional Vertigo (BPPV): Care Instructions. \" Current as of: October 21, 2018 Content Version: 12.2 © 3919-4435 [employer]. Care instructions adapted under license by [employer] (which disclaims liability or warranty for this information). If you have questions about a medical condition or this instruction, always ask your healthcare professional. [place] [address] any warranty or liability for your use of this information. Cawthorne Exercises for Vertigo: Care Instructions Your Care Instructions Simple exercises can help you regain your balance when you have vertigo. If you have Ménière's disease, benign paroxysmal positional vertigo (BPPV), or another inner ear problem, you may have vertigo off and on. Do these exercises first thing in the morning and before you go to bed. You might get dizzy when you first start them. If this happens, try to do them for at least 5 minutes. Do a group of exercises at a time, starting at the top of the list. It may take several weeks before you can do all the exercises without feeling dizzy. Follow-up care is a key part of your treatment and safety. Be sure to make and go to all appointments, and call your doctor if you are having problems. It's also a good idea to know your test results and keep a list of the medicines you take. How can you care for yourself at home? Exercise 1 While sitting on the side of the bed and holding your head still: 
· Look up as far as you can. · Look down as far as you can. · Look from side to side as far as you can. · Stretch your arm straight out in front of you. Focus on your index finger. Continue to focus on your finger while you bring it to your nose. Exercise 2 While sitting on the side of the bed: · Bring your head as far back as you can. · Bring your head forward to touch your chin to your chest. 
· Turn your head from side to side. · Do these exercises first with your eyes open. Then try with your eyes closed. Exercise 3 While sitting on the side of the bed: · Shrug your shoulders straight upward, then relax them. · Bend over and try to touch the ground with your fingers. Then go back to a sitting position. · Toss a small ball from one hand to the other. Throw the ball higher than your eyes so you have to look up. Exercise 4 While standing (with someone close by if you feel uncomfortable): 
· Repeat Exercise 1. 
· Repeat Exercise 2. 
· Pass a ball between your legs and above your head. · Sit down and then stand up. Repeat. Turn around in a Eek a different way each time you stand. · With someone close by to help you, try the above exercises with your eyes closed. Exercise 5 In a room that is cleared of obstacles: 
· Walk to a corner of the room, turn to your right, and walk back to the starting point. Now, repeat and turn left. · Walk up and down a slope. Now try stairs. · While holding on to someone's arm, try these exercises with your eyes closed. When should you call for help? Watch closely for changes in your health, and be sure to contact your doctor if: 
  · You do not get better as expected. Where can you learn more? Go to http://oneal-delisa.info/. Enter M384 in the search box to learn more about \"Cawthorne Exercises for Vertigo: Care Instructions. \" Current as of: October 21, 2018 Content Version: 12.2 © 8096-1436 [employer]. Care instructions adapted under license by [employer] (which disclaims liability or warranty for this information). If you have questions about a medical condition or this instruction, always ask your healthcare professional. [place] [address] any warranty or liability for your use of this information. Vertigo: Care Instructions Your Care Instructions Vertigo is the feeling that you or your surroundings are moving when there is no actual movement. It is often described as a feeling of spinning, whirling, falling, or tilting. Vertigo may make you vomit or feel nauseated. You may have trouble standing or walking and may lose your balance. Vertigo is often related to an inner ear problem, but it can have other more serious causes. If vertigo continues, you may need more tests to find its cause. Follow-up care is a key part of your treatment and safety. Be sure to make and go to all appointments, and call your doctor if you are having problems. It's also a good idea to know your test results and keep a list of the medicines you take. How can you care for yourself at home? · Do not lie flat on your back. Prop yourself up slightly. This may reduce the spinning feeling. Keep your eyes open. · Move slowly so that you do not fall. · If your doctor recommends medicine, take it exactly as directed. · Do not drive while you are having vertigo. Certain exercises, called Galindo-Daroff exercises, can help decrease vertigo. To do Galindo-Daroff exercises: · Sit on the edge of a bed or sofa and quickly lie down on the side that causes the worst vertigo. Lie on your side with your ear down. · Stay in this position for at least 30 seconds or until the vertigo goes away. · Sit up. If this causes vertigo, wait for it to stop. · Repeat the procedure on the other side. · Repeat this 10 times. Do these exercises 2 times a day until the vertigo is gone. When should you call for help? Call 911 anytime you think you may need emergency care. For example, call if: 
  · You passed out (lost consciousness).  
  · You have symptoms of a stroke. These may include: 
? Sudden numbness, tingling, weakness, or loss of movement in your face, arm, or leg, especially on only one side of your body. ? Sudden vision changes. ? Sudden trouble speaking. ? Sudden confusion or trouble understanding simple statements. ? Sudden problems with walking or balance. ? A sudden, severe headache that is different from past headaches.  
 Call your doctor now or seek immediate medical care if: 
  · Vertigo occurs with a fever, a headache, or ringing in your ears.  
  · You have new or increased nausea and vomiting.  
 Watch closely for changes in your health, and be sure to contact your doctor if: 
  · Vertigo gets worse or happens more often.  
  · Vertigo has not gotten better after 2 weeks. Where can you learn more? Go to http://oneal-delisa.info/. Enter W878 in the search box to learn more about \"Vertigo: Care Instructions. \" Current as of: October 21, 2018 Content Version: 12.2 © 8849-9451 [employer]. Care instructions adapted under license by [employer] (which disclaims liability or warranty for this information). If you have questions about a medical condition or this instruction, always ask your healthcare professional. [address] any warranty or liability for your use of this information.

## 2020-02-28 NOTE — LETTER
2/28/20 Patient: Jarrod Sun YOB: 1932 Date of Visit: 2/28/2020 Camila Buerger, MD 
[address] [place] [address] [address] [address] VIA Facsimile: [phone] Dear Camila Buerger, MD, Thank you for referring Ms. Juan Luis Chandra to [address] [place] [address] for evaluation. My notes for this consultation are attached. Consult Subjective:  
 
Jarrod Sun is a 80 y.o. Right-handed  female seen for evaluation of seizures upon referral by Dr. Yandel Story and new problem of increasing dizziness that interfered with her ability to ambulate and walk, but this did is being aggravated by head movement, but not associate with any new focal weakness or sensory loss, and no double vision, and she does have hearing loss, no new hearing loss that she is aware of. Her exam suggest a vestibular dysfunction because just head movement itself makes her dizzy. We will check a Dilantin level, the make sure she is not Dilantin toxic, but she has had no seizures and has not been seen by us in almost 3 years. She fell and broke her hip, and can barely walk with a cane. There is nothing else they can do for the hip either she is in constant pain from the right hip. Her last Dilantin level was eight 1 year ago and 5 years ago she had a CT scan of the head that was stable postop changes. She has had no new neurological symptoms in the interim, and no seizures and is doing well on her medications. Patient had a meningioma  resected from the left cerebral hemisphere in 2002 when she presented with a seizure. She had another seizure in 2005 and her medication was increased to four Dilantin a day. She has remained without seizures since. In 2006 her EEG showed mild slowing in the left temporal area, but no other lesions are recorded seizures. MRI of the brain done in 2011 then had shown no recurrent tumor, just postoperative changes.  She has had no clear side effects, and no recent blood levels. She takes her medications regularly. She's had no increase in headaches, no new focal weakness or sensory loss, no new visual changes, cognitive disability, trauma, fever, and gait issues, or memory loss. She has had no new medical problems. She takes vitamins and vitamin D. She has one daughter who has had seizures because of roseola as a child still needs medication. No other family history. She had recent metabolic screening showing normal CBC and differential CMP, vitamin D level and thyroid. Past Medical History:  
Diagnosis Date  Arthritis  Cancer [facility]) 2002  
 brain tumor, benign (meningioma)  Diabetes ([facility] [address] questionable?  Gastrointestinal disorder   
 diverticulitis  Hypothyroid 5/11/2015  Localization-related (focal) (partial) epilepsy and epileptic syndromes with complex partial seizures, without mention of intractable epilepsy 5/11/2015  Other ill-defined conditions(799.89)   
 loss of vision in left eye  Seizures ([address] 2002  
 caused by brain tumor  Thyroid disease Past Surgical History:  
Procedure Laterality Date  COLORECTAL SCRN; HI RISK IND  8/26/2014  HX BACK SURGERY  1970s  HX ORTHOPAEDIC  1990s  
 right wrist  
 NEUROLOGICAL PROCEDURE UNLISTED  2002  
 brain tumor removed Family History Problem Relation Age of Onset  Cancer Mother   
     breast  
 Alzheimer Mother  Heart Disease Father  Kidney Disease Sister   
     dialysis  Seizures Daughter Social History Tobacco Use  Smoking status: Never Smoker  Smokeless tobacco: Never Used Substance Use Topics  Alcohol use: Yes Comment: glass of wine occasionally Current Outpatient Medications:  
  phenytoin ER (DILANTIN ER) 100 mg ER capsule, TAKE 2 CAPSULES TWICE DAILY, Disp: 360 Cap, Rfl: 5 
  amLODIPine (NORVASC) 5 mg tablet, Take 1 Tab by mouth daily. , Disp: 30 Tab, Rfl: 1   pantoprazole (PROTONIX) 40 mg tablet, Take 1 Tab by mouth Before breakfast and dinner., Disp: 60 Tab, Rfl: 1 
  biotin 5,000 mcg TbDi, Take 1 Tab by mouth two (2) times a day., Disp: , Rfl:  
  cholecalciferol (VITAMIN D3) 1,000 unit tablet, Take 2 Tabs by mouth two (2) times a day., Disp: , Rfl:  
  acetaminophen (TYLENOL) 325 mg tablet, Take 2 Tabs by mouth every six (6) hours as needed for Pain or Fever., Disp: 40 Tab, Rfl: 0 
  polyethylene glycol (MIRALAX) 17 gram packet, Take 1 Packet by mouth daily. , Disp: 30 Each, Rfl: 1   calcium carbonate (OS-ANGUS) 500 mg calcium (1,250 mg) tablet, Take 1 Tab by mouth two (2) times a day., Disp: , Rfl:  
  cranberry extract 450 mg tab, Take 1 Tab by mouth daily. , Disp: , Rfl:  
  levothyroxine (SYNTHROID) 88 mcg tablet, Take 1 Tab by mouth daily. , Disp: , Rfl:  
  MULTI-VITAMIN PO, Take 1 Cap by mouth daily. , Disp: , Rfl:  
 
 
 
Allergies Allergen Reactions  Bactrim [Sulfamethoprim] Unable to Obtain  Penicillins Itching Patient not sure she is allergic  Sulfa (Sulfonamide Antibiotics) Nausea and Vomiting Review of Systems: A comprehensive review of systems was negative except for: Musculoskeletal: positive for myalgias, arthralgias and stiff joints Neurological: positive for seizures Vitals:  
 02/28/20 1516 BP: 112/62 Pulse: 67 Resp: 16 SpO2: 94% Weight: 126 lb (57.2 kg) Height: 5' 4\" (1.626 m) Objective: I 
 
 
NEUROLOGICAL EXAM: 
 
Appearance: The patient is well developed, well nourished, provides a coherent history and is in no acute distress. Mental Status: Oriented to time, place and person and the president, cognitive function is normal, speech is fluent. Mood and affect appropriate. Cranial Nerves:   Intact visual fields. Fundi are benign on the right and poorly seen on the left. Pupil on the right reacts, the left does not react well.   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 Facial sensation is normal. Corneal reflexes are not tested. Facial movement is symmetric. Hearing is abnormal bilaterally. Palate is midline with normal sternocleidomastoid and trapezius muscles are normal. Tongue is midline. Neck without meningismus or bruits Motor:  5/5 strength in upper and lower proximal and distal muscles. Normal bulk and tone. No fasciculations. Rapid alternating movement is slow in all extremities but symmetric Reflexes:   Deep tendon reflexes 1+/4 and symmetrical. 
No babinski or clonus present Sensory:   Normal to touch, pinprick and vibration. DSS is intact Gait:  Abnormal gait as she walks with a marked limp because of her right hip surgery and pain . Tremor:   No tremor noted. Cerebellar: Moderately severe abnormal cerebellar signs present on Romberg and tandem, and finger-nose-finger examination shows only minimal dysmetria. Neurovascular:  Normal heart sounds and regular rhythm, peripheral pulses decreased, and no carotid bruits. Assessment: ICD-10-CM ICD-9-CM 1. Bilateral carotid artery stenosis I65.23 433.10 PHENYTOIN, TOTAL & FREE  
  433.30 DUPLEX CAROTID BILATERAL 2. Localization-related focal epilepsy with complex partial seizures (HCC) G40.209 345.40 PHENYTOIN, TOTAL & FREE  
   phenytoin ER (DILANTIN ER) 100 mg ER capsule DUPLEX CAROTID BILATERAL 3. Complex partial seizure 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 seizure ([address] G40.209 345.40 PHENYTOIN, TOTAL & FREE  
   phenytoin ER (DILANTIN ER) 100 mg ER capsule DUPLEX CAROTID BILATERAL  
4. H/O meningioma of the brain, left brain meningioma Z86.011 V12.41 PHENYTOIN, TOTAL & FREE  
   phenytoin ER (DILANTIN ER) 100 mg ER capsule DUPLEX CAROTID BILATERAL 5. Cerebral microvascular disease I67.9 437.9 PHENYTOIN, TOTAL & FREE  
   DUPLEX CAROTID BILATERAL Plan: Patient with new problem of dizziness, we suggested vestibular therapy, but she refuses, so we will give her vestibular exercises to do, and check a carotid Doppler study making sure there is no cerebrovascular insufficiency, and check her Dilantin level to rule out toxicity. She refuses Antivert at this time. Patient needs blood levels to determine how therapeutic her medication is and to rule out Dilantin toxicity as a cause of her dizziness. She does not want to do MRI of the brain to rule out recurrent disease or other new structural brain lesions because she feels she is stable She also does not want to repeat an EEG and just wants to stay on her medication Patient will call for results of her tests, and continue her medication at its current dose in the interim The that she had a breakthrough seizure 3 years after her surgery with seemed to be a poor prognostic sign for complete withdrawal of her Dilantin and she agrees to take She is to continue her vitamins and vitamin D. And remain mentally and physically active in the interim. We discussed new medications with less side effects, but she was somewhat equivocal about these Followup in 12 months time or earlier if needed depending on the results of her tests and desire for change in therapy I spent 40 minutes reviewing all her records, her previous tests, and discussing her diagnosis, prognosis and further testing and evaluation and treatment with patient and family Signed By: Jose Guadalupe Sanderson MD   
 February 28, 2020 This note will not be viewable in [address] If you have questions, please do not hesitate to call me. I look forward to following your patient along with you. Sincerely, Jose Guadalupe Sanderson MD

## 2020-02-28 NOTE — PROGRESS NOTES
Consult    Subjective:     Merary Alvarado is a 80 y.o. Right-handed  female seen for evaluation of seizures upon referral by Dr. Gume Foster and new problem of increasing dizziness that interfered with her ability to ambulate and walk, but this did is being aggravated by head movement, but not associate with any new focal weakness or sensory loss, and no double vision, and she does have hearing loss, no new hearing loss that she is aware of. Her exam suggest a vestibular dysfunction because just head movement itself makes her dizzy. We will check a Dilantin level, the make sure she is not Dilantin toxic, but she has had no seizures and has not been seen by us in almost 3 years. She fell and broke her hip, and can barely walk with a cane. There is nothing else they can do for the hip either she is in constant pain from the right hip. Her last Dilantin level was eight 1 year ago and 5 years ago she had a CT scan of the head that was stable postop changes. She has had no new neurological symptoms in the interim, and no seizures and is doing well on her medications. Patient had a meningioma  resected from the left cerebral hemisphere in 2002 when she presented with a seizure. She had another seizure in 2005 and her medication was increased to four Dilantin a day. She has remained without seizures since. In 2006 her EEG showed mild slowing in the left temporal area, but no other lesions are recorded seizures. MRI of the brain done in 2011 then had shown no recurrent tumor, just postoperative changes. She has had no clear side effects, and no recent blood levels. She takes her medications regularly. She's had no increase in headaches, no new focal weakness or sensory loss, no new visual changes, cognitive disability, trauma, fever, and gait issues, or memory loss. She has had no new medical problems. She takes vitamins and vitamin D.   She has one daughter who has had seizures because of roseola as a child still needs medication. No other family history. She had recent metabolic screening showing normal CBC and differential CMP, vitamin D level and thyroid. Past Medical History:   Diagnosis Date    Arthritis     Cancer [facility]) 2002    brain tumor, benign (meningioma)    Diabetes ([address]     questionable? 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    Seizures ([address] 2002    caused by brain tumor    Thyroid disease       Past Surgical History:   Procedure Laterality Date    COLORECTAL SCRN; HI RISK IND  8/26/2014         HX BACK SURGERY  1970s    HX ORTHOPAEDIC  1990s    right wrist    NEUROLOGICAL PROCEDURE UNLISTED  2002    brain tumor removed     Family History   Problem Relation Age of Onset   [place] Cancer Mother         breast    Alzheimer Mother     Heart Disease Father     Kidney Disease Sister         dialysis    Seizures Daughter       Social History     Tobacco Use    Smoking status: Never Smoker    Smokeless tobacco: Never Used   Substance Use Topics    Alcohol use: Yes     Comment: glass of wine occasionally         Current Outpatient Medications:     phenytoin ER (DILANTIN ER) 100 mg ER capsule, TAKE 2 CAPSULES TWICE DAILY, Disp: 360 Cap, Rfl: 5    amLODIPine (NORVASC) 5 mg tablet, Take 1 Tab by mouth daily. , Disp: 30 Tab, Rfl: 1    pantoprazole (PROTONIX) 40 mg tablet, Take 1 Tab by mouth Before breakfast and dinner., Disp: 60 Tab, Rfl: 1    biotin 5,000 mcg TbDi, Take 1 Tab by mouth two (2) times a day., Disp: , Rfl:     cholecalciferol (VITAMIN D3) 1,000 unit tablet, Take 2 Tabs by mouth two (2) times a day., Disp: , Rfl:     acetaminophen (TYLENOL) 325 mg tablet, Take 2 Tabs by mouth every six (6) hours as needed for Pain or Fever., Disp: 40 Tab, Rfl: 0    polyethylene glycol (MIRALAX) 17 gram packet, Take 1 Packet by mouth daily. , Disp: 30 Each, Rfl: 1    calcium carbonate (OS-ANGUS) 500 mg calcium (1,250 mg) tablet, Take 1 Tab by mouth two (2) times a day., Disp: , Rfl:     cranberry extract 450 mg tab, Take 1 Tab by mouth daily. , Disp: , Rfl:     levothyroxine (SYNTHROID) 88 mcg tablet, Take 1 Tab by mouth daily. , Disp: , Rfl:     MULTI-VITAMIN PO, Take 1 Cap by mouth daily. , Disp: , Rfl:         Allergies   Allergen Reactions    Bactrim [Sulfamethoprim] Unable to Obtain    Penicillins Itching     Patient not sure she is allergic    Sulfa (Sulfonamide Antibiotics) Nausea and Vomiting        Review of Systems:  A comprehensive review of systems was negative except for: Musculoskeletal: positive for myalgias, arthralgias and stiff joints  Neurological: positive for seizures   Vitals:    02/28/20 1516   BP: 112/62   Pulse: 67   Resp: 16   SpO2: 94%   Weight: 126 lb (57.2 kg)   Height: 5' 4\" (1.626 m)     Objective:     I      NEUROLOGICAL EXAM:    Appearance: The patient is well developed, well nourished, provides a coherent history and is in no acute distress. Mental Status: Oriented to time, place and person and the president, cognitive function is normal, speech is fluent. Mood and affect appropriate. Cranial Nerves:   Intact visual fields. Fundi are benign on the right and poorly seen on the left. Pupil on the right reacts, the left does not react well. 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 Facial sensation is normal. Corneal reflexes are not tested. Facial movement is symmetric. Hearing is abnormal bilaterally. Palate is midline with normal sternocleidomastoid and trapezius muscles are normal. Tongue is midline.   Neck without meningismus or bruits   Motor:  5/5 strength in upper and lower proximal and distal muscles. Normal bulk and tone. No fasciculations. Rapid alternating movement is slow in all extremities but symmetric   Reflexes:   Deep tendon reflexes 1+/4 and symmetrical.  No babinski or clonus present   Sensory:   Normal to touch, pinprick and vibration. DSS is intact   Gait:  Abnormal gait as she walks with a marked limp because of her right hip surgery and pain . Tremor:   No tremor noted. Cerebellar: Moderately severe abnormal cerebellar signs present on Romberg and tandem, and finger-nose-finger examination shows only minimal dysmetria. Neurovascular:  Normal heart sounds and regular rhythm, peripheral pulses decreased, and no carotid bruits. Assessment:       ICD-10-CM ICD-9-CM    1. Bilateral carotid artery stenosis I65.23 433.10 PHENYTOIN, TOTAL & FREE     433.30 DUPLEX CAROTID BILATERAL   2. Localization-related focal epilepsy with complex partial seizures (HCC) G40.209 345.40 PHENYTOIN, TOTAL & FREE      phenytoin ER (DILANTIN ER) 100 mg ER capsule      DUPLEX CAROTID BILATERAL   3. Complex partial seizure 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 seizure ([address] G40.209 345.40 PHENYTOIN, TOTAL & FREE      phenytoin ER (DILANTIN ER) 100 mg ER capsule      DUPLEX CAROTID BILATERAL   4. H/O meningioma of the brain, left brain meningioma Z86.011 V12.41 PHENYTOIN, TOTAL & FREE      phenytoin ER (DILANTIN ER) 100 mg ER capsule      DUPLEX CAROTID BILATERAL   5. Cerebral microvascular disease I67.9 437.9 PHENYTOIN, TOTAL & FREE      DUPLEX CAROTID BILATERAL         Plan:     Patient with new problem of dizziness, we suggested vestibular therapy, but she refuses, so we will give her vestibular exercises to do, and check a carotid Doppler study making sure there is no cerebrovascular insufficiency, and check her Dilantin level to rule out toxicity. She refuses Antivert at this time.   Patient needs blood levels to determine how therapeutic her medication is and to rule out Dilantin toxicity as a cause of her dizziness. She does not want to do MRI of the brain to rule out recurrent disease or other new structural brain lesions because she feels she is stable  She also does not want to repeat an EEG and just wants to stay on her medication  Patient will call for results of her tests, and continue her medication at its current dose in the interim  The that she had a breakthrough seizure 3 years after her surgery with seemed to be a poor prognostic sign for complete withdrawal of her Dilantin and she agrees to take  She is to continue her vitamins and vitamin D. And remain mentally and physically active in the interim. We discussed new medications with less side effects, but she was somewhat equivocal about these  Followup in 12 months time or earlier if needed depending on the results of her tests and desire for change in therapy  I spent 40 minutes reviewing all her records, her previous tests, and discussing her diagnosis, prognosis and further testing and evaluation and treatment with patient and family    Signed By: Aurelia Giordano MD     February 28, 2020       This note will not be viewable in [address]

## 2020-03-02 LAB
PHENYTOIN FREE SERPL-MCNC: 1.2 UG/ML (ref 1–2)
PHENYTOIN SERPL-MCNC: 19.7 UG/ML (ref 10–20)

## 2020-03-05 ENCOUNTER — HOSPITAL ENCOUNTER (OUTPATIENT)
Dept: VASCULAR SURGERY | Age: 85
Discharge: HOME | End: 2020-03-05
Attending: PSYCHIATRY & NEUROLOGY
Payer: MEDICARE

## 2020-03-05 DIAGNOSIS — G40.209 COMPLEX PARTIAL SEIZURE EVOLVING TO GENERALIZED SEIZURE (HCC): ICD-10-CM

## 2020-03-05 DIAGNOSIS — Z86.011 H/O MENINGIOMA OF THE BRAIN: ICD-10-CM

## 2020-03-05 DIAGNOSIS — G40.209 LOCALIZATION-RELATED FOCAL EPILEPSY WITH COMPLEX PARTIAL SEIZURES (HCC): ICD-10-CM

## 2020-03-05 DIAGNOSIS — I65.23 BILATERAL CAROTID ARTERY STENOSIS: ICD-10-CM

## 2020-03-05 DIAGNOSIS — I67.89 CEREBRAL MICROVASCULAR DISEASE: ICD-10-CM

## 2020-03-05 LAB
LEFT CCA DIST DIAS: 8.7 CM/S
LEFT CCA DIST SYS: 46 CM/S
LEFT CCA PROX DIAS: 15.1 CM/S
LEFT CCA PROX SYS: 83.2 CM/S
LEFT ECA DIAS: 0 CM/S
LEFT ECA SYS: 59.2 CM/S
LEFT ICA DIST DIAS: 12 CM/S
LEFT ICA DIST SYS: 76.8 CM/S
LEFT ICA MID DIAS: 17.5 CM/S
LEFT ICA MID SYS: 59.2 CM/S
LEFT ICA PROX DIAS: 8.7 CM/S
LEFT ICA PROX SYS: 54.8 CM/S
LEFT ICA/CCA SYS: 1.7
LEFT SUBCLAVIAN DIAS: 0 CM/S
LEFT SUBCLAVIAN SYS: 155.6 CM/S
LEFT VERTEBRAL DIAS: 6.5 CM/S
LEFT VERTEBRAL SYS: 40.5 CM/S
RIGHT CCA DIST DIAS: 7.7 CM/S
RIGHT CCA DIST SYS: 49.4 CM/S
RIGHT CCA PROX DIAS: 9 CM/S
RIGHT CCA PROX SYS: 78.1 CM/S
RIGHT ECA DIAS: 0 CM/S
RIGHT ECA SYS: 79.4 CM/S
RIGHT ICA DIST DIAS: 11.6 CM/S
RIGHT ICA DIST SYS: 62.5 CM/S
RIGHT ICA MID DIAS: 9 CM/S
RIGHT ICA MID SYS: 50.7 CM/S
RIGHT ICA PROX DIAS: 7.7 CM/S
RIGHT ICA PROX SYS: 44.2 CM/S
RIGHT ICA/CCA SYS: 1.3
RIGHT SUBCLAVIAN DIAS: 0 CM/S
RIGHT SUBCLAVIAN SYS: 78.3 CM/S
RIGHT VERTEBRAL DIAS: 9 CM/S
RIGHT VERTEBRAL SYS: 33.8 CM/S

## 2020-03-05 PROCEDURE — 93880 EXTRACRANIAL BILAT STUDY: CPT

## 2020-10-06 ENCOUNTER — HOSPITAL ENCOUNTER (OUTPATIENT)
Age: 85
Discharge: HOME | End: 2020-10-07
Attending: INTERNAL MEDICINE | Admitting: INTERNAL MEDICINE
Payer: MEDICARE

## 2020-10-06 ENCOUNTER — APPOINTMENT (OUTPATIENT)
Dept: GENERAL RADIOLOGY | Age: 85
End: 2020-10-06
Attending: INTERNAL MEDICINE
Payer: MEDICARE

## 2020-10-06 DIAGNOSIS — I42.9 CARDIOMYOPATHY, UNSPECIFIED TYPE (HCC): ICD-10-CM

## 2020-10-06 DIAGNOSIS — G89.18 POSTOPERATIVE PAIN: Primary | ICD-10-CM

## 2020-10-06 PROBLEM — Z95.0 PACEMAKER: Status: ACTIVE | Noted: 2020-10-06

## 2020-10-06 LAB
ANION GAP SERPL CALC-SCNC: 0 MMOL/L (ref 5–15)
BUN SERPL-MCNC: 18 MG/DL (ref 6–20)
BUN/CREAT SERPL: 26 (ref 12–20)
CALCIUM SERPL-MCNC: 9.4 MG/DL (ref 8.5–10.1)
CHLORIDE SERPL-SCNC: 107 MMOL/L (ref 97–108)
CO2 SERPL-SCNC: 33 MMOL/L (ref 21–32)
CREAT SERPL-MCNC: 0.7 MG/DL (ref 0.55–1.02)
ERYTHROCYTE [DISTWIDTH] IN BLOOD BY AUTOMATED COUNT: 12.7 % (ref 11.5–14.5)
GLUCOSE SERPL-MCNC: 111 MG/DL (ref 65–100)
HCT VFR BLD AUTO: 47.1 % (ref 35–47)
HGB BLD-MCNC: 15.4 G/DL (ref 11.5–16)
MCH RBC QN AUTO: 32.8 PG (ref 26–34)
MCHC RBC AUTO-ENTMCNC: 32.7 G/DL (ref 30–36.5)
MCV RBC AUTO: 100.2 FL (ref 80–99)
NRBC # BLD: 0 K/UL (ref 0–0.01)
NRBC BLD-RTO: 0 PER 100 WBC
PLATELET # BLD AUTO: 122 K/UL (ref 150–400)
PMV BLD AUTO: 12.7 FL (ref 8.9–12.9)
POTASSIUM SERPL-SCNC: 4.3 MMOL/L (ref 3.5–5.1)
RBC # BLD AUTO: 4.7 M/UL (ref 3.8–5.2)
SODIUM SERPL-SCNC: 140 MMOL/L (ref 136–145)
WBC # BLD AUTO: 5.6 K/UL (ref 3.6–11)

## 2020-10-06 PROCEDURE — 77030031139 HC SUT VCRL2 J&J -A: Performed by: INTERNAL MEDICINE

## 2020-10-06 PROCEDURE — C1898 LEAD, PMKR, OTHER THAN TRANS: HCPCS | Performed by: INTERNAL MEDICINE

## 2020-10-06 PROCEDURE — 74011000636 HC RX REV CODE- 636: Performed by: INTERNAL MEDICINE

## 2020-10-06 PROCEDURE — 36415 COLL VENOUS BLD VENIPUNCTURE: CPT

## 2020-10-06 PROCEDURE — 33208 INSRT HEART PM ATRIAL & VENT: CPT | Performed by: INTERNAL MEDICINE

## 2020-10-06 PROCEDURE — 85027 COMPLETE CBC AUTOMATED: CPT

## 2020-10-06 PROCEDURE — 77030018729 HC ELECTRD DEFIB PAD CARD -B: Performed by: INTERNAL MEDICINE

## 2020-10-06 PROCEDURE — 80048 BASIC METABOLIC PNL TOTAL CA: CPT

## 2020-10-06 PROCEDURE — C1893 INTRO/SHEATH, FIXED,NON-PEEL: HCPCS | Performed by: INTERNAL MEDICINE

## 2020-10-06 PROCEDURE — 99152 MOD SED SAME PHYS/QHP 5/>YRS: CPT | Performed by: INTERNAL MEDICINE

## 2020-10-06 PROCEDURE — 77030037400 HC ADH TISS HI VISC EXOFIN CHMP -B: Performed by: INTERNAL MEDICINE

## 2020-10-06 PROCEDURE — 93005 ELECTROCARDIOGRAM TRACING: CPT

## 2020-10-06 PROCEDURE — 71045 X-RAY EXAM CHEST 1 VIEW: CPT

## 2020-10-06 PROCEDURE — 77030002996 HC SUT SLK J&J -A: Performed by: INTERNAL MEDICINE

## 2020-10-06 PROCEDURE — 74011250636 HC RX REV CODE- 250/636: Performed by: INTERNAL MEDICINE

## 2020-10-06 PROCEDURE — C1894 INTRO/SHEATH, NON-LASER: HCPCS | Performed by: INTERNAL MEDICINE

## 2020-10-06 PROCEDURE — 74011000272 HC RX REV CODE- 272: Performed by: INTERNAL MEDICINE

## 2020-10-06 PROCEDURE — 2709999900 HC NON-CHARGEABLE SUPPLY: Performed by: INTERNAL MEDICINE

## 2020-10-06 PROCEDURE — 74011250637 HC RX REV CODE- 250/637: Performed by: INTERNAL MEDICINE

## 2020-10-06 PROCEDURE — 99153 MOD SED SAME PHYS/QHP EA: CPT | Performed by: INTERNAL MEDICINE

## 2020-10-06 PROCEDURE — C1785 PMKR, DUAL, RATE-RESP: HCPCS | Performed by: INTERNAL MEDICINE

## 2020-10-06 PROCEDURE — 74011000250 HC RX REV CODE- 250: Performed by: INTERNAL MEDICINE

## 2020-10-06 DEVICE — LEAD PCMKR CAPSUR SNS 4574 --: Type: IMPLANTABLE DEVICE | Status: FUNCTIONAL

## 2020-10-06 DEVICE — PCMKR AZURE XT DR MRI --: Type: IMPLANTABLE DEVICE | Status: FUNCTIONAL

## 2020-10-06 DEVICE — LEAD PCMKR 58CM -- CAPSURE SENSE MRI SURESCAN: Type: IMPLANTABLE DEVICE | Status: FUNCTIONAL

## 2020-10-06 RX ORDER — SODIUM CHLORIDE 0.9 % (FLUSH) 0.9 %
5-40 SYRINGE (ML) INJECTION AS NEEDED
Status: DISCONTINUED | OUTPATIENT
Start: 2020-10-06 | End: 2020-10-07 | Stop reason: HOSPADM

## 2020-10-06 RX ORDER — SODIUM CHLORIDE 0.9 % (FLUSH) 0.9 %
5-40 SYRINGE (ML) INJECTION EVERY 8 HOURS
Status: DISCONTINUED | OUTPATIENT
Start: 2020-10-06 | End: 2020-10-07 | Stop reason: HOSPADM

## 2020-10-06 RX ORDER — MORPHINE SULFATE 2 MG/ML
4 INJECTION, SOLUTION INTRAMUSCULAR; INTRAVENOUS
Status: DISCONTINUED | OUTPATIENT
Start: 2020-10-06 | End: 2020-10-07 | Stop reason: HOSPADM

## 2020-10-06 RX ORDER — HEPARIN SODIUM 200 [USP'U]/100ML
INJECTION, SOLUTION INTRAVENOUS
Status: DISCONTINUED | OUTPATIENT
Start: 2020-10-06 | End: 2020-10-06 | Stop reason: HOSPADM

## 2020-10-06 RX ORDER — OXYCODONE AND ACETAMINOPHEN 5; 325 MG/1; MG/1
1 TABLET ORAL
Status: DISCONTINUED | OUTPATIENT
Start: 2020-10-06 | End: 2020-10-07 | Stop reason: HOSPADM

## 2020-10-06 RX ORDER — FENTANYL CITRATE 50 UG/ML
INJECTION, SOLUTION INTRAMUSCULAR; INTRAVENOUS AS NEEDED
Status: DISCONTINUED | OUTPATIENT
Start: 2020-10-06 | End: 2020-10-06 | Stop reason: HOSPADM

## 2020-10-06 RX ORDER — ACETAMINOPHEN 325 MG/1
650 TABLET ORAL
Status: DISCONTINUED | OUTPATIENT
Start: 2020-10-06 | End: 2020-10-07 | Stop reason: HOSPADM

## 2020-10-06 RX ORDER — NITROGLYCERIN 0.4 MG/1
0.4 TABLET SUBLINGUAL AS NEEDED
Status: DISCONTINUED | OUTPATIENT
Start: 2020-10-06 | End: 2020-10-07 | Stop reason: HOSPADM

## 2020-10-06 RX ORDER — LIDOCAINE HYDROCHLORIDE AND EPINEPHRINE 10; 10 MG/ML; UG/ML
INJECTION, SOLUTION INFILTRATION; PERINEURAL AS NEEDED
Status: DISCONTINUED | OUTPATIENT
Start: 2020-10-06 | End: 2020-10-06 | Stop reason: HOSPADM

## 2020-10-06 RX ORDER — OXYCODONE AND ACETAMINOPHEN 5; 325 MG/1; MG/1
1 TABLET ORAL
Qty: 9 TAB | Refills: 0 | Status: SHIPPED | OUTPATIENT
Start: 2020-10-06 | End: 2020-10-09

## 2020-10-06 RX ORDER — ONDANSETRON 2 MG/ML
4 INJECTION INTRAMUSCULAR; INTRAVENOUS
Status: DISCONTINUED | OUTPATIENT
Start: 2020-10-06 | End: 2020-10-07 | Stop reason: HOSPADM

## 2020-10-06 RX ORDER — MIDAZOLAM HYDROCHLORIDE 1 MG/ML
INJECTION, SOLUTION INTRAMUSCULAR; INTRAVENOUS AS NEEDED
Status: DISCONTINUED | OUTPATIENT
Start: 2020-10-06 | End: 2020-10-06 | Stop reason: HOSPADM

## 2020-10-06 RX ADMIN — OXYCODONE HYDROCHLORIDE AND ACETAMINOPHEN 1 TABLET: 5; 325 TABLET ORAL at 22:51

## 2020-10-06 RX ADMIN — Medication 10 ML: at 22:51

## 2020-10-06 NOTE — Clinical Note
TRANSFER - IN REPORT:     Verbal report received from: RECOVERY RN. Report consisted of patient's Situation, Background, Assessment and   Recommendations(SBAR). Opportunity for questions and clarification was provided. Assessment completed upon patient's arrival to unit and care assumed. Patient transported with a Registered Nurse.

## 2020-10-06 NOTE — Clinical Note
TRANSFER - OUT REPORT:     Verbal report given to: BORIS He. Report consisted of patient's Situation, Background, Assessment and   Recommendations(SBAR). Opportunity for questions and clarification was provided. Patient transported with a Registered Nurse. Patient transported to: [facility] .

## 2020-10-07 VITALS
OXYGEN SATURATION: 100 % | HEART RATE: 61 BPM | BODY MASS INDEX: 22.53 KG/M2 | HEIGHT: 64 IN | SYSTOLIC BLOOD PRESSURE: 143 MMHG | TEMPERATURE: 98.2 F | WEIGHT: 132 LBS | DIASTOLIC BLOOD PRESSURE: 95 MMHG | RESPIRATION RATE: 18 BRPM

## 2020-10-07 LAB
ATRIAL RATE: 60 BPM
CALCULATED P AXIS, ECG09: 62 DEGREES
CALCULATED R AXIS, ECG10: -48 DEGREES
CALCULATED T AXIS, ECG11: 79 DEGREES
DIAGNOSIS, 93000: NORMAL
GLUCOSE BLD STRIP.AUTO-MCNC: 131 MG/DL (ref 65–100)
P-R INTERVAL, ECG05: 156 MS
Q-T INTERVAL, ECG07: 448 MS
QRS DURATION, ECG06: 78 MS
QTC CALCULATION (BEZET), ECG08: 448 MS
SERVICE CMNT-IMP: ABNORMAL
VENTRICULAR RATE, ECG03: 60 BPM

## 2020-10-07 PROCEDURE — 82962 GLUCOSE BLOOD TEST: CPT

## 2020-10-07 NOTE — PROGRESS NOTES
POD#1 site check OK. S/p dual chamber Medtronic pacemaker yesterday with tined leads. She transiently had some chest discomfort last night, resolved. She feels good today, denies any site pain. Ambulatory and taking oral.      Visit Vitals  BP (!) 143/95 (BP 1 Location: Right arm, BP Patient Position: At rest)   Pulse 61   Temp 98.2 °F (36.8 °C)   Resp 18   Ht 5' 4\" (1.626 m)   Wt 59.9 kg (132 lb)   SpO2 100%   BMI 22.66 kg/m²       ND, NAD. L chest site OK. No unilateral arm edema. Awake, appropriate, neuro grossly nonfocal.    Tele:  SR with intermittent A pacing. PLAN:  Discharge to home with F/U in the office for wound and device programming check in 2-4 weeks. All questions answered. Patient is aware of signs and sx warranting urgent med F/U or calling 911.     Signed By: Yamila Betts MD     October 7, 2020

## 2020-10-07 NOTE — PROGRESS NOTES
Discharge instructions discussed with patient and son by Rony Sexton RN. All questions answered. L chest PM site c/d/i, no hematoma. Pulses palpable. Denies pain, SOB, or dizziness. VSS. IV removed. Escorted out via wheelchair, discharged home with son in a private vehicle.

## 2020-10-07 NOTE — DISCHARGE INSTRUCTIONS
DISCHARGE INSTRUCTIONS FOR PATIENTS WITH PACEMAKERS    You had a dual chamber Medtronic pacemaker implanted with Dr. Tyson Jernigan on 10/6/2020. This was due to a slow heart rate problem. 1. Remember to call for an appointment in 2-4 weeks [phone] to check healing and implant programming. 2. Medic Alert Bracelets are available from your pharmacist to wear at all times if you choose to wear one. 3. Carry your ID card for pacemaker with you at all times. This card will be given to you in the hospital or mailed to you. 4. The pacemaker will bulge slightly under your skin. The bulge will decrease in size over the next few weeks. Please notify the doctor's office if you notice any of the following around your site:   A.  A bruise that does not go away. B.  Soreness or yellow, green, or brown drainage from the site. C. Any swelling from the site. D. If you have a fever of 100 degrees or higher that lasts for a few days. INCISION CARE       1.  Leave skin glue over your site until it starts to fall off, usually in a few weeks. 2.  You may shower after 3 days as long as your incision isnt submerged or directly sprayed upon until well healed. 3.  For comfort, wear loose fitting clothing. 4.  Report any signs of infection, fever, pain, swelling, redness, oozing, or heat at site especially if these symptoms increase after the first 3 to 4 days. ACTIVITY PRECAUTIONS     1. Avoid rough contact with the implant site. 2. No driving for 14 days. 3. Avoid lifting your arm over your head, carrying anything on the affected side, or lifting over 10 pounds for 30 days. For the first 2 days only bend your arm at the elbow. 4. Any extreme activity such as golf, weight lifting or exercise biking should be restricted for 60 days. 5. Do not carry objects by holding them against your implant site. 6.  No shooting rifles or any type of gun with the affected shoulder permanently. SPECIAL PRECAUTIONS     1. You should avoid all strong magnetic fields, such as arc welding, large transformers, large motors. 2.  You may not have an MRI which uses a strong magnet to take pictures unless given the OK by a cardiologist.  3.  Treatments or surgery that requires diathermy or electrocautery should be discussed with your doctor before scheduled. 4. Avoid radio frequency transmitters, including radar. 5. Advise dentist or other medical personnel you see that you have a pacemaker. 6.  Cell phones and microwave oven use is okay. 7.  If you plan to move or take a trip to a new area, the doctor's office will give you a name of a doctor to contact for any problems. ANTIBIOTIC THERAPY    During the first 8 weeks after your pacemaker insertion, you may need antibiotics before any dental work or certain tests or operations. Let the dentist or doctor who is caring for you know that you have had an implanted device.       Signed By: Ewelina Cruz MD     October 7, 2020

## 2020-10-07 NOTE — PROGRESS NOTES
Bedside shift change report given to Wendie (oncoming nurse) by Rashid Reyse (offgoing nurse). Report included the following information SBAR, Kardex, Procedure Summary, MAR, Recent Results and Cardiac Rhythm Atrial paced.

## 2020-10-07 NOTE — PROGRESS NOTES
Problem: Falls - Risk of  Goal: *Absence of Falls  Description: Document Sheng Donald Fall Risk and appropriate interventions in the flowsheet.   Outcome: Progressing Towards Goal  Note: Fall Risk Interventions:  Mobility Interventions: Communicate number of staff needed for ambulation/transfer, Utilize walker, cane, or other assistive device    Mentation Interventions: Door open when patient unattended         Elimination Interventions: Call light in reach, Patient to call for help with toileting needs    History of Falls Interventions: Door open when patient unattended, Room close to nurse's station         Problem: Pacer/ICD: Post-Procedure  Goal: Activity/Safety  Outcome: Progressing Towards Goal  Goal: Diagnostic Test/Procedures  Outcome: Progressing Towards Goal  Goal: Nutrition/Diet  Outcome: Progressing Towards Goal  Goal: Discharge Planning  Outcome: Progressing Towards Goal  Goal: Respiratory  Outcome: Progressing Towards Goal  Goal: Treatments/Interventions/Procedures  Outcome: Progressing Towards Goal  Goal: Psychosocial  Outcome: Progressing Towards Goal  Goal: *Hemodynamically stable  Outcome: Progressing Towards Goal  Goal: *Optimal pain control at patient's stated goal  Outcome: Progressing Towards Goal  Goal: *Absence of signs and symptoms of infection or wound complication  Outcome: Progressing Towards Goal

## 2020-10-07 NOTE — PROGRESS NOTES
Problem: Falls - Risk of  Goal: *Absence of Falls  Description: Document Evelin Nieves Fall Risk and appropriate interventions in the flowsheet.   Outcome: Resolved/Met  Note: Fall Risk Interventions:  Mobility Interventions: Bed/chair exit alarm, Communicate number of staff needed for ambulation/transfer, Patient to call before getting OOB    Mentation Interventions: Door open when patient unattended    Medication Interventions: Teach patient to arise slowly, Patient to call before getting OOB    Elimination Interventions: Bed/chair exit alarm, Call light in reach    History of Falls Interventions: Bed/chair exit alarm, Investigate reason for fall, Room close to nurse's station         Problem: Patient Education: Go to Patient Education Activity  Goal: Patient/Family Education  Outcome: Resolved/Met     Problem: Patient Education: Go to Patient Education Activity  Goal: Patient/Family Education  Outcome: Resolved/Met     Problem: Pacer/ICD: Pre-Procedure  Goal: Off Pathway (Use only if patient is Off Pathway)  Outcome: Resolved/Met  Goal: Activity/Safety  Outcome: Resolved/Met  Goal: Consults, if ordered  Outcome: Resolved/Met  Goal: Diagnostic Test/Procedures  Outcome: Resolved/Met  Goal: Nutrition/Diet  Outcome: Resolved/Met  Goal: Discharge Planning  Outcome: Resolved/Met  Goal: Medications  Outcome: Resolved/Met  Goal: Respiratory  Outcome: Resolved/Met  Goal: Treatments/Interventions/Procedures  Outcome: Resolved/Met  Goal: Psychosocial  Outcome: Resolved/Met  Goal: *Verbalize description of procedure  Outcome: Resolved/Met  Goal: *Consent signed  Outcome: Resolved/Met     Problem: Pacer/ICD: Post-Procedure  Goal: Off Pathway (Use only if patient is Off Pathway)  Outcome: Resolved/Met  Goal: Activity/Safety  Outcome: Resolved/Met  Goal: Consults, if ordered  Outcome: Resolved/Met  Goal: Diagnostic Test/Procedures  Outcome: Resolved/Met  Goal: Nutrition/Diet  Outcome: Resolved/Met  Goal: Discharge Planning  Outcome: Resolved/Met  Goal: Medications  Outcome: Resolved/Met  Goal: Respiratory  Outcome: Resolved/Met  Goal: Treatments/Interventions/Procedures  Outcome: Resolved/Met  Goal: Psychosocial  Outcome: Resolved/Met  Goal: *Hemodynamically stable  Outcome: Resolved/Met  Goal: *Optimal pain control at patient's stated goal  Outcome: Resolved/Met  Goal: *Absence of signs and symptoms of infection or wound complication  Outcome: Resolved/Met     Problem: Pacer/ICD: Day 1  Goal: Off Pathway (Use only if patient is Off Pathway)  Outcome: Resolved/Met  Goal: Activity/Safety  Outcome: Resolved/Met  Goal: Diagnostic Test/Procedures  Outcome: Resolved/Met  Goal: Nutrition/Diet  Outcome: Resolved/Met  Goal: Discharge Planning  Outcome: Resolved/Met  Goal: Medications  Outcome: Resolved/Met  Goal: Respiratory  Outcome: Resolved/Met  Goal: Treatments/Interventions/Procedures  Outcome: Resolved/Met  Goal: Psychosocial  Outcome: Resolved/Met     Problem: Pacer/ICD: Discharge Outcomes  Goal: *Hemodynamically stable  Outcome: Resolved/Met  Goal: *Stable cardiac rhythm  Outcome: Resolved/Met  Goal: *Lungs clear or at baseline  Outcome: Resolved/Met  Goal: *Demonstrates ability to perform prescribed activity without shortness of breath or discomfort  Outcome: Resolved/Met  Goal: *Verbalizes home exercise program, activity guidelines, cardiac precautions  Outcome: Resolved/Met  Goal: *Verbalizes understanding and describes prescribed diet  Outcome: Resolved/Met  Goal: *Verbalizes understanding and describes medication purposes and frequencies  Outcome: Resolved/Met  Goal: *Identifies cardiac risk factors  Outcome: Resolved/Met  Goal: *No signs and symptoms of infection or wound complications  Outcome: Resolved/Met  Goal: *Anxiety reduced or absent  Outcome: Resolved/Met  Goal: *Understands and describes signs and symptoms to report to providers(Stroke Metric)  Outcome: Resolved/Met  Goal: *Describes follow-up/return visits to physicians  Outcome: Resolved/Met  Goal: *Describes available resources and support systems  Outcome: Resolved/Met  Goal: *Influenza immunization  Outcome: Resolved/Met  Goal: *Pneumococcal immunization  Outcome: Resolved/Met  Goal: *Optimal pain control at patient's stated goal  Outcome: Resolved/Met

## 2020-10-07 NOTE — PROGRESS NOTES
2155 -- Dr Olegario Beaulieu (on-call) notified of new onset chest pain, 8-9/10, described as \"dull and aching. \" EKG results reviewed. MD ordered 4 mg IV morphine q6 prn, GI cocktail now and sublingual nitroglycerin. MD stated to keep patient overnight.

## 2021-03-23 ENCOUNTER — HOSPITAL ENCOUNTER (EMERGENCY)
Age: 86
Discharge: HOME | End: 2021-03-23
Attending: STUDENT IN AN ORGANIZED HEALTH CARE EDUCATION/TRAINING PROGRAM
Payer: MEDICARE

## 2021-03-23 ENCOUNTER — APPOINTMENT (OUTPATIENT)
Dept: GENERAL RADIOLOGY | Age: 86
End: 2021-03-23
Attending: PHYSICIAN ASSISTANT
Payer: MEDICARE

## 2021-03-23 VITALS
DIASTOLIC BLOOD PRESSURE: 98 MMHG | TEMPERATURE: 98.1 F | SYSTOLIC BLOOD PRESSURE: 191 MMHG | HEART RATE: 76 BPM | HEIGHT: 65 IN | BODY MASS INDEX: 21.71 KG/M2 | WEIGHT: 130.29 LBS | RESPIRATION RATE: 17 BRPM | OXYGEN SATURATION: 100 %

## 2021-03-23 DIAGNOSIS — S40.012A CONTUSION OF LEFT SHOULDER, INITIAL ENCOUNTER: Primary | ICD-10-CM

## 2021-03-23 PROCEDURE — 73030 X-RAY EXAM OF SHOULDER: CPT

## 2021-03-23 PROCEDURE — 72050 X-RAY EXAM NECK SPINE 4/5VWS: CPT

## 2021-03-23 PROCEDURE — 99283 EMERGENCY DEPT VISIT LOW MDM: CPT

## 2021-03-23 PROCEDURE — 74011250637 HC RX REV CODE- 250/637: Performed by: PHYSICIAN ASSISTANT

## 2021-03-23 RX ORDER — OXYCODONE HYDROCHLORIDE 5 MG/1
2.5 TABLET ORAL
Status: DISCONTINUED | OUTPATIENT
Start: 2021-03-23 | End: 2021-03-23

## 2021-03-23 RX ORDER — OXYCODONE HYDROCHLORIDE 5 MG/1
5 TABLET ORAL
Status: COMPLETED | OUTPATIENT
Start: 2021-03-23 | End: 2021-03-23

## 2021-03-23 RX ORDER — METHOCARBAMOL 750 MG/1
750 TABLET, FILM COATED ORAL 3 TIMES DAILY
Qty: 15 TAB | Refills: 0 | Status: SHIPPED | OUTPATIENT
Start: 2021-03-23 | End: 2022-08-10 | Stop reason: ALTCHOICE

## 2021-03-23 RX ORDER — LIDOCAINE 50 MG/G
PATCH TOPICAL
Qty: 1 EACH | Refills: 0 | Status: SHIPPED | OUTPATIENT
Start: 2021-03-23 | End: 2022-08-10 | Stop reason: ALTCHOICE

## 2021-03-23 RX ADMIN — OXYCODONE 5 MG: 5 TABLET ORAL at 14:26

## 2021-03-23 NOTE — ED PROVIDER NOTES
EMERGENCY DEPARTMENT HISTORY AND PHYSICAL EXAM      Date: 3/23/2021  Patient Name: Daphnie Delvalle    History of Presenting Illness     Chief Complaint   Patient presents with    Arm Injury     She tripped and fell this morning and landed on her left shoulder. Severe pain in her shoulder and neck. History Provided By: Patient    HPI: Daphnie Delvalle, 80 y.o. female with PMHx listed below, presents to the ED with cc of left shoulder injury this morning. The patient reports that she tripped and fell, landing directly on her left shoulder. The pain radiates from her left shoulder to the bottom of her left lateral neck. Pain is worse with movement and is severe. She tried Tylenol without relief of pain. She denies hitting her head, loss of consciousness, arm numbness, posterior neck pain, dysphagia. There are no other complaints, changes, or physical findings at this time. PCP: Juanita Wall MD    No current facility-administered medications on file prior to encounter. Current Outpatient Medications on File Prior to Encounter   Medication Sig Dispense Refill    vit C/E/Zn/coppr/lutein/zeaxan (PRESERVISION AREDS-2 PO) Take 1 Tab by mouth daily.  phenytoin ER (DILANTIN ER) 100 mg ER capsule TAKE 2 CAPSULES TWICE DAILY 360 Cap 5    amLODIPine (NORVASC) 5 mg tablet Take 1 Tab by mouth daily. 30 Tab 1    pantoprazole (PROTONIX) 40 mg tablet Take 1 Tab by mouth Before breakfast and dinner. 60 Tab 1    biotin 5,000 mcg TbDi Take 2 Tabs by mouth two (2) times a day.  cholecalciferol (VITAMIN D3) 1,000 unit tablet Take 2 Tabs by mouth two (2) times a day.  acetaminophen (TYLENOL) 325 mg tablet Take 2 Tabs by mouth every six (6) hours as needed for Pain or Fever. 40 Tab 0    polyethylene glycol (MIRALAX) 17 gram packet Take 1 Packet by mouth daily. 30 Each 1    calcium carbonate (OS-ANGUS) 500 mg calcium (1,250 mg) tablet Take 1 Tab by mouth two (2) times a day.       cranberry extract 450 mg tab Take 1 Tab by mouth daily.  levothyroxine (SYNTHROID) 88 mcg tablet Take 1 Tab by mouth daily.  MULTI-VITAMIN PO Take 1 Cap by mouth daily. Past History     Past Medical History:  Past Medical History:   Diagnosis Date    Arthritis     Cancer ([address] 2002    brain tumor, benign (meningioma)    Diabetes ([address]     questionable? 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    Seizures ([address] 2002    caused by brain tumor    Thyroid disease        Past Surgical History:  Past Surgical History:   Procedure Laterality Date    COLORECTAL SCRN; HI RISK IND  8/26/2014         HX BACK SURGERY  1970s    HX ORTHOPAEDIC  1990s    right wrist    NEUROLOGICAL PROCEDURE UNLISTED  2002    brain tumor removed    VT INS NEW/RPLCMT PRM PM W/TRANSV ELTRD ATRIAL&VENT N/A 10/6/2020    INSERT PPM DUAL performed by Angel Stephenson MD at [facility] CARDIAC CATH LAB       Family History:  Family History   Problem Relation Age of Onset    Cancer Mother         breast    Alzheimer Mother     Heart Disease Father     Kidney Disease Sister         dialysis    Seizures Daughter        Social History:  Social History     Tobacco Use    Smoking status: Never Smoker    Smokeless tobacco: Never Used   Substance Use Topics    Alcohol use: Yes     Comment: glass of wine occasionally    Drug use: No       Allergies: Allergies   Allergen Reactions    Bactrim [Sulfamethoprim] Unable to Obtain    Penicillins Itching     Patient not sure she is allergic    Sulfa (Sulfonamide Antibiotics) Nausea and Vomiting         Review of Systems   Review of Systems   Constitutional: Negative for chills and fever. HENT: Negative for ear pain and sore throat.     Eyes: Negative for redness and visual disturbance. Respiratory: Negative for cough and shortness of breath. Cardiovascular: Negative for chest pain and palpitations. Gastrointestinal: Negative for abdominal pain, nausea and vomiting. Genitourinary: Negative for dysuria and hematuria. Musculoskeletal: Positive for neck pain. Negative for back pain and gait problem. +left shoulder pain   Skin: Negative for rash and wound. Neurological: Negative for dizziness and headaches. Psychiatric/Behavioral: Negative for behavioral problems and confusion. All other systems reviewed and are negative. Physical Exam   Physical Exam  Constitutional:       Appearance: She is not toxic-appearing. Comments: Elderly female in no acute distress. HENT:      Head: Normocephalic and atraumatic. Mouth/Throat:      Mouth: Mucous membranes are moist.   Eyes:      Extraocular Movements: Extraocular movements intact. Pupils: Pupils are equal, round, and reactive to light. Neck:      Musculoskeletal: Normal range of motion and neck supple. Cardiovascular:      Rate and Rhythm: Normal rate and regular rhythm. Pulmonary:      Effort: Pulmonary effort is normal. No respiratory distress. Musculoskeletal:      Comments: Tenderness to palpation of the left proximal humerus and shoulder diffusely. Tenderness along the left upper trapezius to the left lateral lower cervical muscles. No posterior cervical spine tenderness. No neck swelling or contusion. Range of motion in shoulder limited secondary to pain. 2+ radial pulse. Distal sensation and motor function intact. Skin:     General: Skin is warm and dry. Neurological:      General: No focal deficit present. Mental Status: She is alert and oriented to person, place, and time.    Psychiatric:         Behavior: Behavior normal.           Diagnostic Study Results     Labs -   No results found for this or any previous visit (from the past 12 hour(s)). Radiologic Studies -   XR SPINE CERV 4 OR 5 V   Final Result   No acute fracture or subluxation. XR SHOULDER LT AP/LAT MIN 2 V   Final Result   No acute fracture or dislocation. CT Results  (Last 48 hours)    None        CXR Results  (Last 48 hours)    None            Medical Decision Making   I am the first provider for this patient. I reviewed the vital signs, available nursing notes, past medical history, past surgical history, family history and social history. Vital Signs-Reviewed the patient's vital signs. Patient Vitals for the past 12 hrs:   Temp Pulse Resp BP SpO2   03/23/21 1429 -- 76 17 (!) 191/98 100 %   03/23/21 1246 98.1 °F (36.7 °C) 80 18 (!) 199/94 100 %         Records Reviewed: Nursing Notes, Old Medical Records, Previous Radiology Studies and Previous Laboratory Studies      Provider Notes (Medical Decision Making):   DDx: fracture, sprain, contusion, muscle strain, dislocation, rotator cuff tear    Patient presents with left shoulder injury. She has tenderness from the left shoulder to the left lower lateral lower neck. She has no posterior cervical spine tenderness and has full range of motion in the neck. She has no neck swelling or contusion. X-ray showed no acute fracture. A sling was provided for comfort. She was given orthopedic referral for further evaluation and management. Discussed return precautions. ED Course:   Initial assessment performed. The patients presenting problems have been discussed, and they are in agreement with the care plan formulated and outlined with them. I have encouraged them to ask questions as they arise throughout their visit. Disposition:  2:37 PM  The patient has been re-evaluated and is ready for discharge. Reviewed available results with patient. Counseled patient on diagnosis and care plan. Patient has expressed understanding, and all questions have been answered.  Patient agrees with plan and agrees to follow up as recommended, or to return to the ED if their symptoms worsen. Discharge instructions have been provided and explained to the patient, along with reasons to return to the ED. PLAN:  1. Discharge Medication List as of 3/23/2021  2:37 PM      START taking these medications    Details   lidocaine (LIDODERM) 5 % Apply patch to the affected area for 12 hours a day and remove for 12 hours a day., Normal, Disp-1 Each, R-0      methocarbamoL (ROBAXIN) 750 mg tablet Take 1 Tab by mouth three (3) times daily. , Normal, Disp-15 Tab, R-0         CONTINUE these medications which have NOT CHANGED    Details   vit C/E/Zn/coppr/lutein/zeaxan (PRESERVISION AREDS-2 PO) Take 1 Tab by mouth daily. , Historical Med      phenytoin ER (DILANTIN ER) 100 mg ER capsule TAKE 2 CAPSULES TWICE DAILY, Normal, Disp-360 Cap, R-5      amLODIPine (NORVASC) 5 mg tablet Take 1 Tab by mouth daily. , Print, Disp-30 Tab, R-1      pantoprazole (PROTONIX) 40 mg tablet Take 1 Tab by mouth Before breakfast and dinner., Print, Disp-60 Tab, R-1      biotin 5,000 mcg TbDi Take 2 Tabs by mouth two (2) times a day., Historical Med      cholecalciferol (VITAMIN D3) 1,000 unit tablet Take 2 Tabs by mouth two (2) times a day., Historical Med      acetaminophen (TYLENOL) 325 mg tablet Take 2 Tabs by mouth every six (6) hours as needed for Pain or Fever., Print, Disp-40 Tab, R-0      polyethylene glycol (MIRALAX) 17 gram packet Take 1 Packet by mouth daily. , Print, Disp-30 Each, R-1      calcium carbonate (OS-ANGUS) 500 mg calcium (1,250 mg) tablet Take 1 Tab by mouth two (2) times a day., Historical Med      cranberry extract 450 mg tab Take 1 Tab by mouth daily. , Historical Med      levothyroxine (SYNTHROID) 88 mcg tablet Take 1 Tab by mouth daily. , Historical Med      MULTI-VITAMIN PO Take 1 Cap by mouth daily. , Historical Med           2.    Follow-up Information     Follow up With Specialties Details Why Rick Becker MD Orthopedic Surgery Call today to schedule a follow up appointment [address] [facility]  [address] [facility],[address]  [address] [facility]  [phone]      [facility] EMERGENCY DEPT Emergency Medicine Go to  If symptoms worsen [address] [place] [address]  [phone]        Return to ED if worse     Diagnosis     Clinical Impression:   1. Contusion of left shoulder, initial encounter            Clay Jain.  JONG Rivera

## 2021-03-23 NOTE — ED NOTES
Sling applied and patient given teach back instructions on use. Shameka MINER reviewed discharge instructions with the patient. The patient verbalized understanding. All questions and concerns were addressed. The patient was taken out of the department in a wheelchair and is discharged ambulatory in the care of family members with instructions and prescriptions in hand. Pt is alert and oriented x 4. Respirations are clear and unlabored.

## 2021-06-15 DIAGNOSIS — G40.209 LOCALIZATION-RELATED FOCAL EPILEPSY WITH COMPLEX PARTIAL SEIZURES (HCC): ICD-10-CM

## 2021-06-15 DIAGNOSIS — Z86.011 H/O MENINGIOMA OF THE BRAIN: ICD-10-CM

## 2021-06-15 DIAGNOSIS — G40.209 COMPLEX PARTIAL SEIZURE EVOLVING TO GENERALIZED SEIZURE (HCC): ICD-10-CM

## 2021-06-15 RX ORDER — PHENYTOIN SODIUM 100 MG/1
CAPSULE, EXTENDED RELEASE ORAL
Qty: 360 CAPSULE | Refills: 5 | Status: SHIPPED | OUTPATIENT
Start: 2021-06-15 | End: 2022-09-17

## 2021-06-18 ENCOUNTER — DOCUMENTATION ONLY (OUTPATIENT)
Dept: NEUROLOGY | Age: 86
End: 2021-06-18

## 2021-06-18 NOTE — PROGRESS NOTES
Received a fax for a handicap placard. Patient has an appointment 9/2021 and will wait until them to fill this out.  Last seen 2/28/2020

## 2021-07-14 ENCOUNTER — APPOINTMENT (OUTPATIENT)
Dept: ULTRASOUND IMAGING | Age: 86
End: 2021-07-14
Attending: EMERGENCY MEDICINE
Payer: MEDICARE

## 2021-07-14 ENCOUNTER — HOSPITAL ENCOUNTER (EMERGENCY)
Age: 86
Discharge: HOME | End: 2021-07-14
Attending: EMERGENCY MEDICINE
Payer: MEDICARE

## 2021-07-14 VITALS
HEART RATE: 96 BPM | TEMPERATURE: 98.2 F | HEIGHT: 65 IN | WEIGHT: 129.85 LBS | SYSTOLIC BLOOD PRESSURE: 142 MMHG | OXYGEN SATURATION: 99 % | RESPIRATION RATE: 18 BRPM | BODY MASS INDEX: 21.63 KG/M2 | DIASTOLIC BLOOD PRESSURE: 56 MMHG

## 2021-07-14 DIAGNOSIS — I82.4Z2 DVT, LOWER EXTREMITY, DISTAL, ACUTE, LEFT (HCC): Primary | ICD-10-CM

## 2021-07-14 LAB
ALBUMIN SERPL-MCNC: 3.9 G/DL (ref 3.5–5)
ALBUMIN/GLOB SERPL: 1 {RATIO} (ref 1.1–2.2)
ALP SERPL-CCNC: 110 U/L (ref 45–117)
ALT SERPL-CCNC: 19 U/L (ref 12–78)
ANION GAP SERPL CALC-SCNC: 5 MMOL/L (ref 5–15)
APPEARANCE UR: CLEAR
AST SERPL-CCNC: 13 U/L (ref 15–37)
BACTERIA URNS QL MICRO: NEGATIVE /HPF
BASOPHILS # BLD: 0 K/UL (ref 0–0.1)
BASOPHILS NFR BLD: 1 % (ref 0–1)
BILIRUB SERPL-MCNC: 0.3 MG/DL (ref 0.2–1)
BILIRUB UR QL: NEGATIVE
BUN SERPL-MCNC: 12 MG/DL (ref 6–20)
BUN/CREAT SERPL: 16 (ref 12–20)
CALCIUM SERPL-MCNC: 9.1 MG/DL (ref 8.5–10.1)
CHLORIDE SERPL-SCNC: 106 MMOL/L (ref 97–108)
CO2 SERPL-SCNC: 30 MMOL/L (ref 21–32)
COLOR UR: ABNORMAL
CREAT SERPL-MCNC: 0.74 MG/DL (ref 0.55–1.02)
DIFFERENTIAL METHOD BLD: NORMAL
EOSINOPHIL # BLD: 0 K/UL (ref 0–0.4)
EOSINOPHIL NFR BLD: 0 % (ref 0–7)
EPITH CASTS URNS QL MICRO: ABNORMAL /LPF
ERYTHROCYTE [DISTWIDTH] IN BLOOD BY AUTOMATED COUNT: 12.5 % (ref 11.5–14.5)
GLOBULIN SER CALC-MCNC: 3.8 G/DL (ref 2–4)
GLUCOSE SERPL-MCNC: 125 MG/DL (ref 65–100)
GLUCOSE UR STRIP.AUTO-MCNC: NEGATIVE MG/DL
HCT VFR BLD AUTO: 39.4 % (ref 35–47)
HGB BLD-MCNC: 12.8 G/DL (ref 11.5–16)
HGB UR QL STRIP: NEGATIVE
IMM GRANULOCYTES # BLD AUTO: 0 K/UL (ref 0–0.04)
IMM GRANULOCYTES NFR BLD AUTO: 0 % (ref 0–0.5)
KETONES UR QL STRIP.AUTO: NEGATIVE MG/DL
LEUKOCYTE ESTERASE UR QL STRIP.AUTO: ABNORMAL
LYMPHOCYTES # BLD: 1.2 K/UL (ref 0.8–3.5)
LYMPHOCYTES NFR BLD: 23 % (ref 12–49)
MCH RBC QN AUTO: 32 PG (ref 26–34)
MCHC RBC AUTO-ENTMCNC: 32.5 G/DL (ref 30–36.5)
MCV RBC AUTO: 98.5 FL (ref 80–99)
MONOCYTES # BLD: 0.6 K/UL (ref 0–1)
MONOCYTES NFR BLD: 11 % (ref 5–13)
NEUTS SEG # BLD: 3.4 K/UL (ref 1.8–8)
NEUTS SEG NFR BLD: 65 % (ref 32–75)
NITRITE UR QL STRIP.AUTO: NEGATIVE
NRBC # BLD: 0 K/UL (ref 0–0.01)
NRBC BLD-RTO: 0 PER 100 WBC
PH UR STRIP: 7 [PH] (ref 5–8)
PLATELET # BLD AUTO: 160 K/UL (ref 150–400)
PMV BLD AUTO: 12.2 FL (ref 8.9–12.9)
POTASSIUM SERPL-SCNC: 3.7 MMOL/L (ref 3.5–5.1)
PROT SERPL-MCNC: 7.7 G/DL (ref 6.4–8.2)
PROT UR STRIP-MCNC: NEGATIVE MG/DL
RBC # BLD AUTO: 4 M/UL (ref 3.8–5.2)
RBC #/AREA URNS HPF: ABNORMAL /HPF (ref 0–5)
SODIUM SERPL-SCNC: 141 MMOL/L (ref 136–145)
SP GR UR REFRACTOMETRY: 1.01 (ref 1–1.03)
UA: UC IF INDICATED,UAUC: ABNORMAL
UROBILINOGEN UR QL STRIP.AUTO: 0.2 EU/DL (ref 0.2–1)
WBC # BLD AUTO: 5.3 K/UL (ref 3.6–11)
WBC URNS QL MICRO: ABNORMAL /HPF (ref 0–4)

## 2021-07-14 PROCEDURE — 93971 EXTREMITY STUDY: CPT

## 2021-07-14 PROCEDURE — 74011250636 HC RX REV CODE- 250/636: Performed by: EMERGENCY MEDICINE

## 2021-07-14 PROCEDURE — 36415 COLL VENOUS BLD VENIPUNCTURE: CPT

## 2021-07-14 PROCEDURE — 81001 URINALYSIS AUTO W/SCOPE: CPT

## 2021-07-14 PROCEDURE — 74011250637 HC RX REV CODE- 250/637: Performed by: EMERGENCY MEDICINE

## 2021-07-14 PROCEDURE — 85025 COMPLETE CBC W/AUTO DIFF WBC: CPT

## 2021-07-14 PROCEDURE — 74011000258 HC RX REV CODE- 258: Performed by: EMERGENCY MEDICINE

## 2021-07-14 PROCEDURE — 96365 THER/PROPH/DIAG IV INF INIT: CPT

## 2021-07-14 PROCEDURE — 80053 COMPREHEN METABOLIC PANEL: CPT

## 2021-07-14 PROCEDURE — 99284 EMERGENCY DEPT VISIT MOD MDM: CPT

## 2021-07-14 RX ORDER — CEPHALEXIN 500 MG/1
500 CAPSULE ORAL 3 TIMES DAILY
Qty: 21 CAPSULE | Refills: 0 | Status: SHIPPED | OUTPATIENT
Start: 2021-07-14 | End: 2021-07-21

## 2021-07-14 RX ORDER — RIVAROXABAN 15 MG-20MG
KIT ORAL
Qty: 1 DOSE PACK | Refills: 0 | Status: SHIPPED | OUTPATIENT
Start: 2021-07-14 | End: 2021-08-18 | Stop reason: SINTOL

## 2021-07-14 RX ADMIN — RIVAROXABAN 15 MG: 15 TABLET, FILM COATED ORAL at 22:38

## 2021-07-14 RX ADMIN — SODIUM CHLORIDE 3 G: 900 INJECTION INTRAVENOUS at 20:59

## 2021-07-15 NOTE — ED NOTES
Assumed care of pt from triage. Pt is A&O x 4. Pt reports CC of left leg swelling & pain starting last night  Pt denies SOB, Chest pain, N/V/D. Upon arrival to room pt left leg is red and warm to the touch.  Pt Is place on the monitor x 2, call penaloza within reach      at bedside evaluating pt     2114  at bedside reevaluating pt

## 2021-07-15 NOTE — ED NOTES
Pt discharged by MD Pt provided with discharge instructions Rx and instructions on follow up care. Pt out of ED in wheelchair  accompanied by family.

## 2021-07-15 NOTE — ED PROVIDER NOTES
EMERGENCY DEPARTMENT HISTORY AND PHYSICAL EXAM      Date: 7/14/2021  Patient Name: Mily Gaines  Patient Age and Sex: 80 y.o. female    History of Presenting Illness     Chief Complaint   Patient presents with    Leg Pain     Leg swelling in her left ankle and leg that started last night. The swelling went down when she slept but then she worked in the yard and it became swollen again. She doesn't really coplain of pain but it is tingling. History Provided By: Patient and Patient's     Ability to gather history was limited by:     HPI: Mily Gaines, 80 y.o. female with history of epilepsy, hypothyroidism, complains of sudden onset swelling and redness involving her left calf and ankle and foot. Symptoms started acutely yesterday, 24 to 36 hours ago. Mild burning pain, worsened by touching the affected area. She has never had similar symptoms before. Symptoms are isolated to the left leg. She is not anticoagulated. Location:    Quality:      Severity:    Duration:   Timing:      Context:    Modifying factors:   Associated symptoms:     Past History      The patient's medical, surgical, and social history on file were reviewed by me today.  The family history was reviewed by me today and was non-contributory, unless otherwise specified below:    Past Medical History:  Past Medical History:   Diagnosis Date    Arthritis     Cancer ([address] 2002    brain tumor, benign (meningioma)    Diabetes ([address]     questionable?    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    Seizures ([address] 2002    caused by brain tumor    Thyroid disease        Past Surgical History:  Past Surgical History:   Procedure Laterality Date    COLORECTAL SCRN; HI RISK IND  8/26/2014         HX BACK SURGERY  1970s    HX ORTHOPAEDIC  1990s    right wrist    NEUROLOGICAL PROCEDURE UNLISTED  2002    brain tumor removed    AR INS NEW/RPLCMT PRM PM W/TRANSV ELTRD ATRIAL&VENT N/A 10/6/2020    INSERT PPM DUAL performed by James Mack MD at [facility] CARDIAC CATH LAB       Family History:  Family History   Problem Relation Age of Onset    Cancer Mother         breast    Alzheimer Mother     Heart Disease Father     Kidney Disease Sister         dialysis    Seizures Daughter        Social History:  Social History     Tobacco Use    Smoking status: Never Smoker    Smokeless tobacco: Never Used   Substance Use Topics    Alcohol use: Yes     Comment: glass of wine occasionally    Drug use: No       Current Medications:  No current facility-administered medications on file prior to encounter. Current Outpatient Medications on File Prior to Encounter   Medication Sig Dispense Refill    phenytoin ER (DILANTIN ER) 100 mg ER capsule TAKE 2 CAPSULES TWICE DAILY 360 Capsule 5    lidocaine (LIDODERM) 5 % Apply patch to the affected area for 12 hours a day and remove for 12 hours a day. 1 Each 0    methocarbamoL (ROBAXIN) 750 mg tablet Take 1 Tab by mouth three (3) times daily. 15 Tab 0    vit C/E/Zn/coppr/lutein/zeaxan (PRESERVISION AREDS-2 PO) Take 1 Tab by mouth daily.  amLODIPine (NORVASC) 5 mg tablet Take 1 Tab by mouth daily. 30 Tab 1    pantoprazole (PROTONIX) 40 mg tablet Take 1 Tab by mouth Before breakfast and dinner. 60 Tab 1    biotin 5,000 mcg TbDi Take 2 Tabs by mouth two (2) times a day.  cholecalciferol (VITAMIN D3) 1,000 unit tablet Take 2 Tabs by mouth two (2) times a day.  acetaminophen (TYLENOL) 325 mg tablet Take 2 Tabs by mouth every six (6) hours as needed for Pain or Fever. 40 Tab 0    polyethylene glycol (MIRALAX) 17 gram packet Take 1 Packet by mouth daily.  30 Each 1    calcium carbonate (OS-ANGUS) 500 mg calcium (1,250 mg) tablet Take 1 Tab by mouth two (2) times a day.  cranberry extract 450 mg tab Take 1 Tab by mouth daily.  levothyroxine (SYNTHROID) 88 mcg tablet Take 1 Tab by mouth daily.  MULTI-VITAMIN PO Take 1 Cap by mouth daily. Allergies: Allergies   Allergen Reactions    Bactrim [Sulfamethoprim] Unable to Obtain    Penicillins Itching     Patient not sure she is allergic    Sulfa (Sulfonamide Antibiotics) Nausea and Vomiting     Review of Systems    A complete ROS was reviewed by me today and was negative, unless otherwise specified below:    Review of Systems   Constitutional: Negative for fatigue and fever. Respiratory: Negative for shortness of breath and wheezing. Cardiovascular: Positive for leg swelling. Negative for chest pain. Gastrointestinal: Negative for abdominal pain. Skin: Positive for color change. All other systems reviewed and are negative. Physical Exam   Vital Signs  Patient Vitals for the past 8 hrs:   Temp Pulse Resp BP SpO2   07/14/21 2045    (!) 142/56    07/14/21 2044     99 %   07/14/21 1818 98.2 °F (36.8 °C) 96 18 131/87 98 %          Physical Exam  Vitals and nursing note reviewed. Constitutional:       General: She is not in acute distress. Appearance: Normal appearance. She is well-developed. She is not ill-appearing. HENT:      Head: Normocephalic and atraumatic. Mouth/Throat:      Mouth: Mucous membranes are moist.   Eyes:      General:         Right eye: No discharge. Left eye: No discharge. Conjunctiva/sclera: Conjunctivae normal.   Cardiovascular:      Rate and Rhythm: Normal rate and regular rhythm. Heart sounds: Normal heart sounds. No murmur heard. Pulmonary:      Effort: Pulmonary effort is normal. No respiratory distress. Breath sounds: Normal breath sounds. No wheezing. Abdominal:      General: There is no distension. Palpations: Abdomen is soft. Tenderness:  There is no abdominal tenderness. Musculoskeletal:         General: No deformity. Normal range of motion. Cervical back: Normal range of motion and neck supple. Left lower leg: Tenderness present. Edema ( 3+ edema left lower leg and ankle.) present. Legs:       Comments: Left lower extremity below the knee is tender, edematous, erythematous. Mild petechiae. No apparent wounds or breaks in the skin. Skin:     General: Skin is warm and dry. Findings: No rash. Neurological:      General: No focal deficit present. Mental Status: She is alert and oriented to person, place, and time. Psychiatric:         Speech: Speech normal.         Behavior: Behavior normal.         Cognition and Memory: Cognition normal.             Diagnostic Study Results   Labs  Recent Results (from the past 24 hour(s))   CBC WITH AUTOMATED DIFF    Collection Time: 07/14/21  7:08 PM   Result Value Ref Range    WBC 5.3 3.6 - 11.0 K/uL    RBC 4.00 3.80 - 5.20 M/uL    HGB 12.8 11.5 - 16.0 g/dL    HCT 39.4 35.0 - 47.0 %    MCV 98.5 80.0 - 99.0 FL    MCH 32.0 26.0 - 34.0 PG    MCHC 32.5 30.0 - 36.5 g/dL    RDW 12.5 11.5 - 14.5 %    PLATELET 229 177 - 560 K/uL    MPV 12.2 8.9 - 12.9 FL    NRBC 0.0 0  WBC    ABSOLUTE NRBC 0.00 0.00 - 0.01 K/uL    NEUTROPHILS 65 32 - 75 %    LYMPHOCYTES 23 12 - 49 %    MONOCYTES 11 5 - 13 %    EOSINOPHILS 0 0 - 7 %    BASOPHILS 1 0 - 1 %    IMMATURE GRANULOCYTES 0 0.0 - 0.5 %    ABS. NEUTROPHILS 3.4 1.8 - 8.0 K/UL    ABS. LYMPHOCYTES 1.2 0.8 - 3.5 K/UL    ABS. MONOCYTES 0.6 0.0 - 1.0 K/UL    ABS. EOSINOPHILS 0.0 0.0 - 0.4 K/UL    ABS. BASOPHILS 0.0 0.0 - 0.1 K/UL    ABS. IMM.  GRANS. 0.0 0.00 - 0.04 K/UL    DF AUTOMATED     METABOLIC PANEL, COMPREHENSIVE    Collection Time: 07/14/21  7:08 PM   Result Value Ref Range    Sodium 141 136 - 145 mmol/L    Potassium 3.7 3.5 - 5.1 mmol/L    Chloride 106 97 - 108 mmol/L    CO2 30 21 - 32 mmol/L    Anion gap 5 5 - 15 mmol/L    Glucose 125 (H) 65 - 100 mg/dL BUN 12 6 - 20 MG/DL    Creatinine 0.74 0.55 - 1.02 MG/DL    BUN/Creatinine ratio 16 12 - 20      GFR est AA >60 >60 ml/min/1.73m2    GFR est non-AA >60 >60 ml/min/1.73m2    Calcium 9.1 8.5 - 10.1 MG/DL    Bilirubin, total 0.3 0.2 - 1.0 MG/DL    ALT (SGPT) 19 12 - 78 U/L    AST (SGOT) 13 (L) 15 - 37 U/L    Alk. phosphatase 110 45 - 117 U/L    Protein, total 7.7 6.4 - 8.2 g/dL    Albumin 3.9 3.5 - 5.0 g/dL    Globulin 3.8 2.0 - 4.0 g/dL    A-G Ratio 1.0 (L) 1.1 - 2.2     URINALYSIS W/ REFLEX CULTURE    Collection Time: 07/14/21  9:04 PM    Specimen: Urine   Result Value Ref Range    Color YELLOW/STRAW      Appearance CLEAR CLEAR      Specific gravity 1.008 1.003 - 1.030      pH (UA) 7.0 5.0 - 8.0      Protein Negative NEG mg/dL    Glucose Negative NEG mg/dL    Ketone Negative NEG mg/dL    Bilirubin Negative NEG      Blood Negative NEG      Urobilinogen 0.2 0.2 - 1.0 EU/dL    Nitrites Negative NEG      Leukocyte Esterase SMALL (A) NEG      WBC 0-4 0 - 4 /hpf    RBC 0-5 0 - 5 /hpf    Epithelial cells FEW FEW /lpf    Bacteria Negative NEG /hpf    UA:UC IF INDICATED CULTURE NOT INDICATED BY UA RESULT CNI         Radiologic Studies  DUPLEX LOWER EXT VENOUS LEFT   Final Result   :     1. Occlusive thrombus in the popliteal vein. 2. Edema. CT Results  (Last 48 hours)    None        CXR Results  (Last 48 hours)    None          Billable Procedures   Procedures    Cardiac monitoring was not ordered for this patient. Medical Decision Making     I reviewed the patient's most recent Emergency Dept notes and diagnostic tests in formulating my MDM on today's visit. Provider Notes (Medical Decision Making):   80-year-old female presenting with acute onset left lower extremity swelling and redness and tenderness starting yesterday. On examination, see photograph. The left lower leg is warm to touch compared to the right, mildly tender. 3+ edema. Mild petechiae. She does not have a fever or leukocytosis. Duplex ultrasound is consistent with DVT, with acute thrombus seen in the popliteal vein. I initiated patient on Xarelto, and prescribed her a Xarelto starter pack and instructed her to follow-up closely with her primary care physician. Also out of an abundance of caution I administered Unasyn to cover for cellulitis and prescribed 1 week of Keflex. Patient appears stable for outpatient management at this time. Her laboratories are reassuring. Ashley Horner MD  8:25 PM  7/14/2021     Consults:    Social History     Tobacco Use    Smoking status: Never Smoker    Smokeless tobacco: Never Used   Substance Use Topics    Alcohol use: Yes     Comment: glass of wine occasionally    Drug use: No       Medications Administered during ED course:  Medications   ampicillin-sulbactam (UNASYN) 3 g in 0.9% sodium chloride (MBP/ADV) 100 mL MBP (0 g IntraVENous IV Completed 7/14/21 2129)   rivaroxaban (XARELTO) tablet 15 mg (15 mg Oral Given 7/14/21 2238)          Prescriptions from today's ED visit:  Current Discharge Medication List      START taking these medications    Details   rivaroxaban (Xarelto DVT-PE Treat 30d Start) 15 mg (42)- 20 mg (9) DsPk Take one 15 mg tablet twice a day with food for the first 21 days. Then, take one 20 mg tablet once a day with food for 9 days. Qty: 1 Dose Pack, Refills: 0  Start date: 7/14/2021      cephALEXin (Keflex) 500 mg capsule Take 1 Capsule by mouth three (3) times daily for 7 days. Qty: 21 Capsule, Refills: 0  Start date: 7/14/2021, End date: 7/21/2021            Diagnosis and Disposition     Disposition:  Discharged    Clinical Impression:   1. DVT, lower extremity, distal, acute, left ([address]        Attestation:  I personally performed the services described in this documentation on this date 7/14/2021 for patient Khloe Allan. Ashley Horner MD        I was the first provider for this patient on this visit.   To the best of my ability I reviewed relevant prior medical records, electrocardiograms, laboratories, and radiologic studies. The patient's presenting problems were discussed, and the patient was in agreement with the care plan formulated and outlined with them. Marie Ni MD    Please note that this dictation was completed with Dragon voice recognition software. Quite often unanticipated grammatical, syntax, homophones, and other interpretive errors are inadvertently transcribed by the computer software. Please disregard these errors and excuse any errors that have escaped final proofreading.

## 2021-07-15 NOTE — DISCHARGE INSTRUCTIONS
Your ultrasound today shows that you have a blood clot (DVT) in your left leg. We have started you on Xarelto, which is a blood thinner. You should take Xarelto 15 mg twice a day for 3 weeks, then transition to 20 mg once a day after that. Follow-up with your primary care physician to discuss blood thinners. In addition, take Keflex antibiotic 3 times a day for the next week to treat any possible infection that may also be present in your left leg. It was a pleasure taking care of you in our Emergency Department today. We know that when you come to [facility], you are entrusting us with your health, comfort, and safety. Our physicians and nurses honor that trust, and truly appreciate the opportunity to care for you and your loved ones. We also value your feedback. If you receive a survey about your Emergency Department experience today, please fill it out. We care about our patients' feedback, and we listen to what you have to say. Thank you! Detail Level: Simple Additional Notes: Sample of Mimyx given to use daily.\\nHold off on other skin txs and makeup until rash has cleared.

## 2021-08-18 ENCOUNTER — OFFICE VISIT (OUTPATIENT)
Dept: ONCOLOGY | Age: 86
End: 2021-08-18
Payer: MEDICARE

## 2021-08-18 ENCOUNTER — TELEPHONE (OUTPATIENT)
Dept: ONCOLOGY | Age: 86
End: 2021-08-18

## 2021-08-18 VITALS
OXYGEN SATURATION: 95 % | WEIGHT: 125.2 LBS | HEIGHT: 65 IN | TEMPERATURE: 98.4 F | HEART RATE: 65 BPM | SYSTOLIC BLOOD PRESSURE: 151 MMHG | BODY MASS INDEX: 20.86 KG/M2 | DIASTOLIC BLOOD PRESSURE: 69 MMHG

## 2021-08-18 DIAGNOSIS — I82.439 ACUTE DEEP VEIN THROMBOSIS (DVT) OF POPLITEAL VEIN, UNSPECIFIED LATERALITY (HCC): Primary | ICD-10-CM

## 2021-08-18 PROCEDURE — 99205 OFFICE O/P NEW HI 60 MIN: CPT | Performed by: STUDENT IN AN ORGANIZED HEALTH CARE EDUCATION/TRAINING PROGRAM

## 2021-08-18 PROCEDURE — G8420 CALC BMI NORM PARAMETERS: HCPCS | Performed by: STUDENT IN AN ORGANIZED HEALTH CARE EDUCATION/TRAINING PROGRAM

## 2021-08-18 PROCEDURE — G8536 NO DOC ELDER MAL SCRN: HCPCS | Performed by: STUDENT IN AN ORGANIZED HEALTH CARE EDUCATION/TRAINING PROGRAM

## 2021-08-18 PROCEDURE — G8432 DEP SCR NOT DOC, RNG: HCPCS | Performed by: STUDENT IN AN ORGANIZED HEALTH CARE EDUCATION/TRAINING PROGRAM

## 2021-08-18 PROCEDURE — 1101F PT FALLS ASSESS-DOCD LE1/YR: CPT | Performed by: STUDENT IN AN ORGANIZED HEALTH CARE EDUCATION/TRAINING PROGRAM

## 2021-08-18 PROCEDURE — G8427 DOCREV CUR MEDS BY ELIG CLIN: HCPCS | Performed by: STUDENT IN AN ORGANIZED HEALTH CARE EDUCATION/TRAINING PROGRAM

## 2021-08-18 PROCEDURE — 1090F PRES/ABSN URINE INCON ASSESS: CPT | Performed by: STUDENT IN AN ORGANIZED HEALTH CARE EDUCATION/TRAINING PROGRAM

## 2021-08-18 PROCEDURE — G0463 HOSPITAL OUTPT CLINIC VISIT: HCPCS | Performed by: STUDENT IN AN ORGANIZED HEALTH CARE EDUCATION/TRAINING PROGRAM

## 2021-08-18 RX ORDER — WARFARIN SODIUM 5 MG/1
5 TABLET ORAL DAILY
Qty: 30 TABLET | Refills: 3 | Status: SHIPPED | OUTPATIENT
Start: 2021-08-18 | End: 2021-09-17

## 2021-08-18 NOTE — PROGRESS NOTES
Paula Levine is a 80 y.o. female here for new patient appt for DVT. (unprovoked)  Pt was in ED 7/14/21 for leg pain. DVT found. She is on Xarelto. 1. Have you been to the ER, urgent care clinic since your last visit? Hospitalized since your last visit? New Pt    2. Have you seen or consulted any other health care providers outside of the [address] [place] [address] since your last visit? Include any pap smears or colon screening. New Pt    Pt here with son. Pt has blood clot in 2002 after brain surgery. Was not PE. She was on Coumadin for a few months after that.

## 2021-08-18 NOTE — LETTER
8/26/2021    Patient: Mily Gaines   YOB: 1932   Date of Visit: 8/18/2021     Siva Caldwell MD  [address] [place] [address]  [address] [phone]  Via Fax: [phone]    Dear Siva Caldwell MD,      Thank you for referring Ms. Chinmay Beach to [address] [place] [address] for evaluation. My notes for this consultation are attached. If you have questions, please do not hesitate to call me. I look forward to following your patient along with you.       Sincerely,    Cain Roman MD

## 2021-08-19 ENCOUNTER — TELEPHONE (OUTPATIENT)
Dept: ONCOLOGY | Age: 86
End: 2021-08-19

## 2021-08-19 NOTE — TELEPHONE ENCOUNTER
HIPAA verified by two patient identifiers. Return call placed to pt. Pt told nurse she was informed to take the Coumadin twice daily 12 hours apart but when she picked up her medication it had different instructions on it. Nurse informed pt that the twice daily 12 hours apart directions was for the Eliquis but she is not taking Eliquis she is taking Coumadin. Nurse clarified to pt she is taking Coumadin 5mg daily not Eliquis. Pt instructed by nurse per Dr. Rocio Pineda she is to  Take only 1 tablet of the Coumadin 5mg daily. Pt verbalized understanding.

## 2021-08-19 NOTE — TELEPHONE ENCOUNTER
Patient prescribed medication yesterday by Dr Marisol Mir, but patient needs clarification on how to take it.  Please return call

## 2021-08-19 NOTE — TELEPHONE ENCOUNTER
Call placed to pt PCP Dr. Yani Soto office to ask if Dr. Yani Soto can monitor pt for PT/INR therapy because Dr. Reggie Liu was switching pt to Coumadin d/t c/o cost of Xarelto. Nurse left a voicemail message for Sylvia Benito; Dr. Yani Soto nurse to return her call. Nurse received a call back from PAM at Dr. Yani Soto office saying she will send a message to the PCP to ask about monitoring pt's PT/INR and return call to nurse. Return call received from PAM asking the following questions:     Dosage? Coumadin 5 mg daily  How long will pt be on therapy? lifelong  Reason for switching to Coumadin? D/t c/o cost of Xarelto which pt was previously taking   LB also informed pt needs to be monitored weekly for PT/INR until correct therapeutic level is reached and that we will check the pt's PT/INR next week on her next office visit and fax her the PT/INR results. LB verbalized understanding and said Dr. Yani Soto will monitor pt for PT/INR therapy and provided nurse the fax number to fas PT/ INR results for next week to Dr. Yani Soto.

## 2021-08-24 NOTE — PROGRESS NOTES
Beverly Carrasquillo is a 80 y.o. female here for follow up for DVT. She is on Xarelto. 1. Have you been to the ER, urgent care clinic since your last visit? Hospitalized since your last visit? no    2. Have you seen or consulted any other health care providers outside of the [address] [place] [address] since your last visit? Include any pap smears or colon screening. No    Please fax lab results to [phone] Dr Rosemary Meade. Just had blood work done about 20 minutes ago in [facility]. Pt states she is doing well. No complaints.

## 2021-08-25 ENCOUNTER — OFFICE VISIT (OUTPATIENT)
Dept: ONCOLOGY | Age: 86
End: 2021-08-25
Payer: MEDICARE

## 2021-08-25 VITALS
BODY MASS INDEX: 20.66 KG/M2 | TEMPERATURE: 97.9 F | SYSTOLIC BLOOD PRESSURE: 151 MMHG | OXYGEN SATURATION: 95 % | DIASTOLIC BLOOD PRESSURE: 79 MMHG | HEART RATE: 82 BPM | WEIGHT: 124 LBS | HEIGHT: 65 IN

## 2021-08-25 DIAGNOSIS — I82.439 ACUTE DEEP VEIN THROMBOSIS (DVT) OF POPLITEAL VEIN, UNSPECIFIED LATERALITY (HCC): Primary | ICD-10-CM

## 2021-08-25 PROCEDURE — 1090F PRES/ABSN URINE INCON ASSESS: CPT | Performed by: STUDENT IN AN ORGANIZED HEALTH CARE EDUCATION/TRAINING PROGRAM

## 2021-08-25 PROCEDURE — G8536 NO DOC ELDER MAL SCRN: HCPCS | Performed by: STUDENT IN AN ORGANIZED HEALTH CARE EDUCATION/TRAINING PROGRAM

## 2021-08-25 PROCEDURE — 99214 OFFICE O/P EST MOD 30 MIN: CPT | Performed by: STUDENT IN AN ORGANIZED HEALTH CARE EDUCATION/TRAINING PROGRAM

## 2021-08-25 PROCEDURE — G8432 DEP SCR NOT DOC, RNG: HCPCS | Performed by: STUDENT IN AN ORGANIZED HEALTH CARE EDUCATION/TRAINING PROGRAM

## 2021-08-25 PROCEDURE — G8420 CALC BMI NORM PARAMETERS: HCPCS | Performed by: STUDENT IN AN ORGANIZED HEALTH CARE EDUCATION/TRAINING PROGRAM

## 2021-08-25 PROCEDURE — G8427 DOCREV CUR MEDS BY ELIG CLIN: HCPCS | Performed by: STUDENT IN AN ORGANIZED HEALTH CARE EDUCATION/TRAINING PROGRAM

## 2021-08-25 PROCEDURE — G0463 HOSPITAL OUTPT CLINIC VISIT: HCPCS | Performed by: STUDENT IN AN ORGANIZED HEALTH CARE EDUCATION/TRAINING PROGRAM

## 2021-08-25 PROCEDURE — 1101F PT FALLS ASSESS-DOCD LE1/YR: CPT | Performed by: STUDENT IN AN ORGANIZED HEALTH CARE EDUCATION/TRAINING PROGRAM

## 2021-08-26 ENCOUNTER — TELEPHONE (OUTPATIENT)
Dept: ONCOLOGY | Age: 86
End: 2021-08-26

## 2021-08-26 NOTE — TELEPHONE ENCOUNTER
Called patient regarding PT/INR results and spoke with her son, Tavo Kumar. HIPAA verified by two patient identifiers. Informed him that her INR was 5.7 and instructed him to have her hold coumadin today and tomorrow. She takes it in the morning and he believes she has already taken it today. They are scheduled to see her PCP, Dr. Curt Marshall, tomorrow at 3:00pm. Dr. Sukhjinder Velazquez will take over management of her coumadin. Copy of labs faxed to Dr. Sukhjinder Velazquez. Son expressed understanding and will call our office with any questions or concerns.

## 2021-08-26 NOTE — PROGRESS NOTES
[facility] [place] at [address]  W: [phone] F: [phone]      Reason for Visit:   Ruddy Mccain is a 80 y.o. female who is seen in consultation at the request of Dr. Charmaine Dumont for evaluation of recurrent DVT. Hematology / Oncology Treatment History:     Hematological/Oncological Diagnosis: Recurrent DVTs    Date of Diagnosis: July 14, 2021    Treatment course: Xarelto      History of Present Illness:     80-year-old female with a relevant medical history significant for DVT after brain surgery in 2002, history of PE in 2018 after gallstone surgery, and recent history of DVT diagnosed in the left lower extremity in July of this year. Patient's other medical history is notable for CKD, asthma, hypertension, GERD, seizure disorder for which she takes phenytoin. The patient reports that she had some swelling in her left lower extremity and was found to have a left popliteal vein thrombosis for which she was given Xarelto. She has had clinical improvement with the Xarelto but cost of the medication has been a barrier to continuing this medication. Furthermore, the patient takes phenytoin for seizure disorder since 2002. Phenytoin has a contraindication for use with Xarelto as it can cause decreased efficacy of Xarelto. Patient denies any bleeding, bruising, dark stools. No other complications reported from use of Xarelto. Patient otherwise in good spirits and reports no other symptoms or pain. Family history reviewed, noncontributory  Social history also reviewed, noncontributory      Review of Systems: A complete review of systems was obtained, negative except as described above. Past Medical History:   Diagnosis Date    Arthritis     Cancer [facility]) 2002    brain tumor, benign (meningioma)    Diabetes ([facility] [address]     questionable?    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    Seizures ([address] 2002    caused by brain tumor    Thromboembolus [facility])     Thyroid disease       Past Surgical History:   Procedure Laterality Date    COLORECTAL SCRN; HI RISK IND  8/26/2014         HX BACK SURGERY  1970s    HX ORTHOPAEDIC  1990s    right wrist    NEUROLOGICAL PROCEDURE UNLISTED  2002    brain tumor removed    NM INS NEW/RPLCMT PRM PM W/TRANSV ELTRD ATRIAL&VENT N/A 10/6/2020    INSERT PPM DUAL performed by Jessie Butler MD at [facility] CARDIAC CATH LAB      Social History     Tobacco Use    Smoking status: Never Smoker    Smokeless tobacco: Never Used   Substance Use Topics    Alcohol use: Yes     Comment: glass of wine occasionally      Family History   Problem Relation Age of Onset    Cancer Mother         breast    Alzheimer Mother     Heart Disease Father     Kidney Disease Sister         dialysis    Seizures Daughter      Current Outpatient Medications   Medication Sig    warfarin (COUMADIN) 5 mg tablet Take 1 Tablet by mouth daily for 30 days.  phenytoin ER (DILANTIN ER) 100 mg ER capsule TAKE 2 CAPSULES TWICE DAILY    vit C/E/Zn/coppr/lutein/zeaxan (PRESERVISION AREDS-2 PO) Take 1 Tab by mouth daily.  biotin 5,000 mcg TbDi Take 2 Tabs by mouth two (2) times a day.  cholecalciferol (VITAMIN D3) 1,000 unit tablet Take 2 Tabs by mouth two (2) times a day.  acetaminophen (TYLENOL) 325 mg tablet Take 2 Tabs by mouth every six (6) hours as needed for Pain or Fever.  polyethylene glycol (MIRALAX) 17 gram packet Take 1 Packet by mouth daily.  levothyroxine (SYNTHROID) 88 mcg tablet Take 1 Tab by mouth daily.  MULTI-VITAMIN PO Take 1 Cap by mouth daily.     lidocaine (LIDODERM) 5 % Apply patch to the affected area for 12 hours a day and remove for 12 hours a day. (Patient not taking: Reported on 8/18/2021)    methocarbamoL (ROBAXIN) 750 mg tablet Take 1 Tab by mouth three (3) times daily. (Patient not taking: Reported on 8/18/2021)    amLODIPine (NORVASC) 5 mg tablet Take 1 Tab by mouth daily. (Patient not taking: Reported on 8/18/2021)    pantoprazole (PROTONIX) 40 mg tablet Take 1 Tab by mouth Before breakfast and dinner. (Patient not taking: Reported on 8/18/2021)    calcium carbonate (OS-ANGUS) 500 mg calcium (1,250 mg) tablet Take 1 Tab by mouth two (2) times a day. (Patient not taking: Reported on 8/18/2021)    cranberry extract 450 mg tab Take 1 Tab by mouth daily. (Patient not taking: Reported on 8/18/2021)     No current facility-administered medications for this visit. Allergies   Allergen Reactions    Bactrim [Sulfamethoprim] Unable to Obtain    Penicillins Itching     Patient not sure she is allergic    Sulfa (Sulfonamide Antibiotics) Nausea and Vomiting            Physical Exam:     Visit Vitals  BP (!) 151/69 (BP 1 Location: Left upper arm, BP Patient Position: Sitting)   Pulse 65   Temp 98.4 °F (36.9 °C) (Temporal)   Ht 5' 4.5\" (1.638 m)   Wt 125 lb 3.2 oz (56.8 kg)   SpO2 95%   BMI 21.16 kg/m²     ECOG PS: 1  General: No distress  Eyes: PERRLA, anicteric sclerae  HENT: Atraumatic with normal appearance of ears and nose; OP clear  Neck: Supple; no visualized JVD  Lymphatic: No cervical, or supraclavicular lymphadenopathy. Respiratory: CTAB, normal respiratory effort  CV: Normal rate, regular rhythm, no murmurs, no peripheral edema  GI: Soft, nontender, nondistended, no palpable masses, no hepatomegaly, no splenomegaly  MS: Ambulates with cane. Digits without clubbing or cyanosis. Skin: No rashes, ecchymoses, or petechiae. Normal temperature, turgor, and texture.   Neuro/Psych: Alert, oriented, appropriate affect, normal judgment/insight      Results:     Lab Results   Component Value Date/Time WBC 5.4 08/25/2021 02:10 PM    HGB 14.0 08/25/2021 02:10 PM    HCT 42.7 08/25/2021 02:10 PM    PLATELET 814 83/28/8827 02:10 PM    MCV 99.1 (H) 08/25/2021 02:10 PM    ABS. NEUTROPHILS 3.7 08/25/2021 02:10 PM    Hemoglobin (POC) 12.9 10/14/2015 01:26 PM    Hematocrit (POC) 38 10/14/2015 01:26 PM     Lab Results   Component Value Date/Time    Sodium 141 08/25/2021 02:10 PM    Potassium 4.2 08/25/2021 02:10 PM    Chloride 107 08/25/2021 02:10 PM    CO2 31 08/25/2021 02:10 PM    Glucose 120 (H) 08/25/2021 02:10 PM    BUN 15 08/25/2021 02:10 PM    Creatinine 0.70 08/25/2021 02:10 PM    GFR est AA >60 08/25/2021 02:10 PM    GFR est non-AA >60 08/25/2021 02:10 PM    Calcium 9.4 08/25/2021 02:10 PM    Sodium (POC) 141 10/14/2015 01:26 PM    Potassium (POC) 3.8 10/14/2015 01:26 PM    Chloride (POC) 102 10/14/2015 01:26 PM    Glucose (POC) 131 (H) 10/07/2020 07:47 AM    BUN (POC) 12 10/14/2015 01:26 PM    Creatinine (POC) 0.7 10/14/2015 01:26 PM    Calcium, ionized (POC) 1.19 10/14/2015 01:26 PM     Lab Results   Component Value Date/Time    Bilirubin, total 0.3 08/25/2021 02:10 PM    ALT (SGPT) 19 08/25/2021 02:10 PM    Alk. phosphatase 104 08/25/2021 02:10 PM    Protein, total 7.9 08/25/2021 02:10 PM    Albumin 4.4 08/25/2021 02:10 PM    Globulin 3.5 08/25/2021 02:10 PM         Assessment and Recommendations:     # Recurrent DVTs. Recent acute DVT in the left lower extremity    -The patient was previously treated with Xarelto although she has difficulty with continuous medication because of cost.  Additionally, Xarelto has a contraindication with her oral antiseizure medication. As long as the patient requires the seizure medication she cannot be continued on one of the new oral anticoagulants.    -We will start the patient on Coumadin 5 mg daily with the intention of checking INR next week. Her anticoagulation threshold goal is between an INR of 2-3.   Will ask her primary care provider to follow PT/INR in the future. - I did discuss the risks and benefits of anticoagulation in the setting of recurrent DVTs. For now, the patient verbalizes that she wishes to continue with anticoagulation to reduce the risk of development of DVTs or PEs in the future. For now, the patient is agreeable to anticoagulation with Coumadin. We discussed dietary instructions and overall cautions to be aware of when taking Coumadin. The patient's son was also present during this conversation.    - Plan for follow-up next week to review INR and adjust Coumadin as needed.            Signed By: Rick Paredes MD      Attending Medical Oncologist   [facility]

## 2021-08-31 NOTE — PROGRESS NOTES
[facility] [place] at [address] [facility], [address], [address] [facility]  W: [phone] F: [phone]      Reason for Visit:   Ishmael Montelongo is a 80 y.o. female who is seen in consultation at the request of Dr. Micha Mena for evaluation of recurrent DVT. Hematology / Oncology Treatment History:     Hematological/Oncological Diagnosis: Recurrent DVTs    Date of Diagnosis: July 14, 2021    Treatment course: Xarelto      History of Present Illness:     40-year-old female with a relevant medical history significant for DVT after brain surgery in 2002, history of PE in 2018 after gallstone surgery, and recent history of DVT diagnosed in the left lower extremity in July of this year. Patient's other medical history is notable for CKD, asthma, hypertension, GERD, seizure disorder for which she takes phenytoin. The patient reports that she had some swelling in her left lower extremity and was found to have a left popliteal vein thrombosis for which she was given Xarelto. She has had clinical improvement with the Xarelto but cost of the medication has been a barrier to continuing this medication. Furthermore, the patient takes phenytoin for seizure disorder since 2002. Phenytoin has a contraindication for use with Xarelto as it can cause decreased efficacy of Xarelto. Patient denies any bleeding, bruising, dark stools. No other complications reported from use of Xarelto. Patient otherwise in good spirits and reports no other symptoms or pain. Family history reviewed, noncontributory  Social history also reviewed, noncontributory      Review of Systems: A complete review of systems was obtained, negative except as described above. Interval History:     Follow-up INR after being on Coumadin for 1 week shows supratherapeutic INR of 5.7. Patient advised to hold Coumadin over the weekend with plan for follow-up with her PCP on Friday.   No bleeding or any other side effects from Coumadin reported. Past Medical History:   Diagnosis Date    Arthritis     Cancer [facility]) 2002    brain tumor, benign (meningioma)    Diabetes ([address]     questionable? 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    Seizures ([address] 2002    caused by brain tumor    Thromboembolus [facility])     Thyroid disease       Past Surgical History:   Procedure Laterality Date    COLORECTAL SCRN; HI RISK IND  8/26/2014         HX BACK SURGERY  1970s    HX ORTHOPAEDIC  1990s    right wrist    NEUROLOGICAL PROCEDURE UNLISTED  2002    brain tumor removed    CA INS NEW/RPLCMT PRM PM W/TRANSV ELTRD ATRIAL&VENT N/A 10/6/2020    INSERT PPM DUAL performed by Paramjit Salvador MD at [facility] CARDIAC CATH LAB      Social History     Tobacco Use    Smoking status: Never Smoker    Smokeless tobacco: Never Used   Substance Use Topics    Alcohol use: Yes     Comment: glass of wine occasionally      Family History   Problem Relation Age of Onset    Cancer Mother         breast    Alzheimer Mother     Heart Disease Father     Kidney Disease Sister         dialysis    Seizures Daughter      Current Outpatient Medications   Medication Sig    warfarin (COUMADIN) 5 mg tablet Take 1 Tablet by mouth daily for 30 days.  phenytoin ER (DILANTIN ER) 100 mg ER capsule TAKE 2 CAPSULES TWICE DAILY    vit C/E/Zn/coppr/lutein/zeaxan (PRESERVISION AREDS-2 PO) Take 1 Tab by mouth daily.  biotin 5,000 mcg TbDi Take 2 Tabs by mouth two (2) times a day.  cholecalciferol (VITAMIN D3) 1,000 unit tablet Take 2 Tabs by mouth two (2) times a day.     acetaminophen (TYLENOL) 325 mg tablet Take 2 Tabs by mouth every six (6) hours as needed for Pain or Fever.    polyethylene glycol (MIRALAX) 17 gram packet Take 1 Packet by mouth daily.  levothyroxine (SYNTHROID) 88 mcg tablet Take 1 Tab by mouth daily.  MULTI-VITAMIN PO Take 1 Cap by mouth daily.  lidocaine (LIDODERM) 5 % Apply patch to the affected area for 12 hours a day and remove for 12 hours a day. (Patient not taking: Reported on 8/18/2021)    methocarbamoL (ROBAXIN) 750 mg tablet Take 1 Tab by mouth three (3) times daily. (Patient not taking: Reported on 8/18/2021)    amLODIPine (NORVASC) 5 mg tablet Take 1 Tab by mouth daily. (Patient not taking: Reported on 8/18/2021)    pantoprazole (PROTONIX) 40 mg tablet Take 1 Tab by mouth Before breakfast and dinner. (Patient not taking: Reported on 8/18/2021)    calcium carbonate (OS-ANGUS) 500 mg calcium (1,250 mg) tablet Take 1 Tab by mouth two (2) times a day. (Patient not taking: Reported on 8/18/2021)    cranberry extract 450 mg tab Take 1 Tab by mouth daily. (Patient not taking: Reported on 8/18/2021)     No current facility-administered medications for this visit. Allergies   Allergen Reactions    Bactrim [Sulfamethoprim] Unable to Obtain    Penicillins Itching     Patient not sure she is allergic    Sulfa (Sulfonamide Antibiotics) Nausea and Vomiting            Physical Exam:     Visit Vitals  BP (!) 151/79 (BP 1 Location: Left upper arm, BP Patient Position: Sitting)   Pulse 82   Temp 97.9 °F (36.6 °C) (Temporal)   Ht 5' 4.5\" (1.638 m)   Wt 124 lb (56.2 kg)   SpO2 95%   BMI 20.96 kg/m²     ECOG PS: 1  General: No distress  Eyes: PERRLA, anicteric sclerae  HENT: Atraumatic with normal appearance of ears and nose; OP clear  Neck: Supple; no visualized JVD  Lymphatic: No cervical, or supraclavicular lymphadenopathy.     Respiratory: CTAB, normal respiratory effort  CV: Normal rate, regular rhythm, no murmurs, no peripheral edema  GI: Soft, nontender, nondistended, no palpable masses, no hepatomegaly, no splenomegaly  MS: Ambulates with cane.  Digits without clubbing or cyanosis. Skin: No rashes, ecchymoses, or petechiae. Normal temperature, turgor, and texture. Neuro/Psych: Alert, oriented, appropriate affect, normal judgment/insight      Results:     Lab Results   Component Value Date/Time    WBC 5.4 08/25/2021 02:10 PM    HGB 14.0 08/25/2021 02:10 PM    HCT 42.7 08/25/2021 02:10 PM    PLATELET 383 82/84/8792 02:10 PM    MCV 99.1 (H) 08/25/2021 02:10 PM    ABS. NEUTROPHILS 3.7 08/25/2021 02:10 PM    Hemoglobin (POC) 12.9 10/14/2015 01:26 PM    Hematocrit (POC) 38 10/14/2015 01:26 PM     Lab Results   Component Value Date/Time    Sodium 141 08/25/2021 02:10 PM    Potassium 4.2 08/25/2021 02:10 PM    Chloride 107 08/25/2021 02:10 PM    CO2 31 08/25/2021 02:10 PM    Glucose 120 (H) 08/25/2021 02:10 PM    BUN 15 08/25/2021 02:10 PM    Creatinine 0.70 08/25/2021 02:10 PM    GFR est AA >60 08/25/2021 02:10 PM    GFR est non-AA >60 08/25/2021 02:10 PM    Calcium 9.4 08/25/2021 02:10 PM    Sodium (POC) 141 10/14/2015 01:26 PM    Potassium (POC) 3.8 10/14/2015 01:26 PM    Chloride (POC) 102 10/14/2015 01:26 PM    Glucose (POC) 131 (H) 10/07/2020 07:47 AM    BUN (POC) 12 10/14/2015 01:26 PM    Creatinine (POC) 0.7 10/14/2015 01:26 PM    Calcium, ionized (POC) 1.19 10/14/2015 01:26 PM     Lab Results   Component Value Date/Time    Bilirubin, total 0.3 08/25/2021 02:10 PM    ALT (SGPT) 19 08/25/2021 02:10 PM    Alk. phosphatase 104 08/25/2021 02:10 PM    Protein, total 7.9 08/25/2021 02:10 PM    Albumin 4.4 08/25/2021 02:10 PM    Globulin 3.5 08/25/2021 02:10 PM         Assessment and Recommendations:     # Recurrent DVTs. Recent acute DVT in the left lower extremity    -The patient was previously treated with Xarelto although she has difficulty with continuous medication because of cost.  Additionally, Xarelto has a contraindication with her oral antiseizure medication.   As long as the patient requires the seizure medication she cannot be continued on one of the new oral anticoagulants.    -Patient was started on Coumadin 5 mg daily, recent INR was elevated she was asked to hold her the next 2 days. Her anticoagulation INR goal is between an INR of 2-3. Will ask her primary care provider to follow PT/INR in the future. Follow-up and Dispositions    · Return in about 4 weeks (around 9/22/2021).            Signed By: Tolu Hernandez MD      Attending Medical Oncologist   [facility]

## 2021-09-12 ENCOUNTER — APPOINTMENT (OUTPATIENT)
Dept: ULTRASOUND IMAGING | Age: 86
End: 2021-09-12
Attending: EMERGENCY MEDICINE
Payer: MEDICARE

## 2021-09-12 ENCOUNTER — HOSPITAL ENCOUNTER (EMERGENCY)
Age: 86
Discharge: HOME | End: 2021-09-12
Attending: EMERGENCY MEDICINE
Payer: MEDICARE

## 2021-09-12 VITALS
RESPIRATION RATE: 13 BRPM | DIASTOLIC BLOOD PRESSURE: 78 MMHG | BODY MASS INDEX: 20.66 KG/M2 | OXYGEN SATURATION: 99 % | HEIGHT: 65 IN | HEART RATE: 61 BPM | TEMPERATURE: 98.6 F | SYSTOLIC BLOOD PRESSURE: 134 MMHG | WEIGHT: 124 LBS

## 2021-09-12 DIAGNOSIS — R20.2 NUMBNESS AND TINGLING: Primary | ICD-10-CM

## 2021-09-12 DIAGNOSIS — R79.1 SUBTHERAPEUTIC INTERNATIONAL NORMALIZED RATIO (INR): ICD-10-CM

## 2021-09-12 DIAGNOSIS — R20.0 NUMBNESS AND TINGLING: Primary | ICD-10-CM

## 2021-09-12 LAB
ALBUMIN SERPL-MCNC: 3.9 G/DL (ref 3.5–5)
ALBUMIN/GLOB SERPL: 1 {RATIO} (ref 1.1–2.2)
ALP SERPL-CCNC: 101 U/L (ref 45–117)
ALT SERPL-CCNC: 18 U/L (ref 12–78)
ANION GAP SERPL CALC-SCNC: 5 MMOL/L (ref 5–15)
AST SERPL-CCNC: 13 U/L (ref 15–37)
BASOPHILS # BLD: 0 K/UL (ref 0–0.1)
BASOPHILS NFR BLD: 0 % (ref 0–1)
BILIRUB SERPL-MCNC: 0.4 MG/DL (ref 0.2–1)
BUN SERPL-MCNC: 13 MG/DL (ref 6–20)
BUN/CREAT SERPL: 21 (ref 12–20)
CALCIUM SERPL-MCNC: 9.2 MG/DL (ref 8.5–10.1)
CHLORIDE SERPL-SCNC: 104 MMOL/L (ref 97–108)
CO2 SERPL-SCNC: 31 MMOL/L (ref 21–32)
CREAT SERPL-MCNC: 0.61 MG/DL (ref 0.55–1.02)
DIFFERENTIAL METHOD BLD: NORMAL
EOSINOPHIL # BLD: 0 K/UL (ref 0–0.4)
EOSINOPHIL NFR BLD: 0 % (ref 0–7)
ERYTHROCYTE [DISTWIDTH] IN BLOOD BY AUTOMATED COUNT: 12.6 % (ref 11.5–14.5)
GLOBULIN SER CALC-MCNC: 4 G/DL (ref 2–4)
GLUCOSE SERPL-MCNC: 100 MG/DL (ref 65–100)
HCT VFR BLD AUTO: 42.2 % (ref 35–47)
HGB BLD-MCNC: 13.8 G/DL (ref 11.5–16)
IMM GRANULOCYTES # BLD AUTO: 0 K/UL (ref 0–0.04)
IMM GRANULOCYTES NFR BLD AUTO: 0 % (ref 0–0.5)
INR PPP: 1.8 (ref 0.9–1.1)
LYMPHOCYTES # BLD: 1.1 K/UL (ref 0.8–3.5)
LYMPHOCYTES NFR BLD: 25 % (ref 12–49)
MCH RBC QN AUTO: 32.2 PG (ref 26–34)
MCHC RBC AUTO-ENTMCNC: 32.7 G/DL (ref 30–36.5)
MCV RBC AUTO: 98.4 FL (ref 80–99)
MONOCYTES # BLD: 0.5 K/UL (ref 0–1)
MONOCYTES NFR BLD: 12 % (ref 5–13)
NEUTS SEG # BLD: 2.8 K/UL (ref 1.8–8)
NEUTS SEG NFR BLD: 63 % (ref 32–75)
NRBC # BLD: 0 K/UL (ref 0–0.01)
NRBC BLD-RTO: 0 PER 100 WBC
PLATELET # BLD AUTO: 164 K/UL (ref 150–400)
PMV BLD AUTO: 11.6 FL (ref 8.9–12.9)
POTASSIUM SERPL-SCNC: 3.6 MMOL/L (ref 3.5–5.1)
PROT SERPL-MCNC: 7.9 G/DL (ref 6.4–8.2)
PROTHROMBIN TIME: 18 SEC (ref 9–11.1)
RBC # BLD AUTO: 4.29 M/UL (ref 3.8–5.2)
SODIUM SERPL-SCNC: 140 MMOL/L (ref 136–145)
WBC # BLD AUTO: 4.5 K/UL (ref 3.6–11)

## 2021-09-12 PROCEDURE — 36415 COLL VENOUS BLD VENIPUNCTURE: CPT

## 2021-09-12 PROCEDURE — 99283 EMERGENCY DEPT VISIT LOW MDM: CPT

## 2021-09-12 PROCEDURE — 85610 PROTHROMBIN TIME: CPT

## 2021-09-12 PROCEDURE — 85025 COMPLETE CBC W/AUTO DIFF WBC: CPT

## 2021-09-12 PROCEDURE — 80053 COMPREHEN METABOLIC PANEL: CPT

## 2021-09-12 PROCEDURE — 93971 EXTREMITY STUDY: CPT

## 2021-09-12 NOTE — ED NOTES
Pt arrives from home on the advise of her PCP. Pt reports she is currently under treatment for blood clot in her left leg. Pt says her right leg recently became numb/tingly similar to her left leg and has a concern for blood clot. Pt denies falls.      Pt says she takes warfarin

## 2021-09-12 NOTE — ED NOTES
BORIS Hopper reviewed discharge instructions with the patient. The patient verbalized understanding. All questions and concerns were addressed. The patient declined a wheelchair and is discharged ambulatory in the care of family members with instructions and prescriptions in hand. Pt is alert and oriented x 4. Respirations are clear and unlabored.

## 2021-09-12 NOTE — DISCHARGE INSTRUCTIONS
It was a pleasure taking care of you in our Emergency Department today. We know that when you come to [facility] you are entrusting us with your health, comfort, and safety. Our physicians and nurses honor that trust, and truly appreciate the opportunity to care for you and your loved ones. We also value your feedback. If you receive a survey about your Emergency Department experience today, please fill it out. We care about our patients' feedback, and we listen to what you have to say. Thank you! Dr. Alina Hess MD.      _________________________________________________________________________  I have included a copy of all your lab results and/or radiologic studies so you can have them easily available at your follow-up visit. We hope you feel better and please do not hesitate to contact the ED if you have any questions at all! Vitals:    09/12/21 0828 09/12/21 0847   BP: (!) 175/84    Pulse: 92    Temp: 98.6 °F (37 °C)    Resp: 14    Height: 5' 4.5\" (1.638 m)    Weight: 56.2 kg (124 lb)    SpO2: 100% 100%       Recent Results (from the past 12 hour(s))   CBC WITH AUTOMATED DIFF    Collection Time: 09/12/21  8:35 AM   Result Value Ref Range    WBC 4.5 3.6 - 11.0 K/uL    RBC 4.29 3.80 - 5.20 M/uL    HGB 13.8 11.5 - 16.0 g/dL    HCT 42.2 35.0 - 47.0 %    MCV 98.4 80.0 - 99.0 FL    MCH 32.2 26.0 - 34.0 PG    MCHC 32.7 30.0 - 36.5 g/dL    RDW 12.6 11.5 - 14.5 %    PLATELET 608 510 - 557 K/uL    MPV 11.6 8.9 - 12.9 FL    NRBC 0.0 0  WBC    ABSOLUTE NRBC 0.00 0.00 - 0.01 K/uL    NEUTROPHILS 63 32 - 75 %    LYMPHOCYTES 25 12 - 49 %    MONOCYTES 12 5 - 13 %    EOSINOPHILS 0 0 - 7 %    BASOPHILS 0 0 - 1 %    IMMATURE GRANULOCYTES 0 0.0 - 0.5 %    ABS. NEUTROPHILS 2.8 1.8 - 8.0 K/UL    ABS. LYMPHOCYTES 1.1 0.8 - 3.5 K/UL    ABS. MONOCYTES 0.5 0.0 - 1.0 K/UL    ABS. EOSINOPHILS 0.0 0.0 - 0.4 K/UL    ABS. BASOPHILS 0.0 0.0 - 0.1 K/UL    ABS. IMM.  GRANS. 0.0 0.00 - 0.04 K/UL    DF AUTOMATED     METABOLIC PANEL, COMPREHENSIVE    Collection Time: 09/12/21  8:35 AM   Result Value Ref Range    Sodium 140 136 - 145 mmol/L    Potassium 3.6 3.5 - 5.1 mmol/L    Chloride 104 97 - 108 mmol/L    CO2 31 21 - 32 mmol/L    Anion gap 5 5 - 15 mmol/L    Glucose 100 65 - 100 mg/dL    BUN 13 6 - 20 MG/DL    Creatinine 0.61 0.55 - 1.02 MG/DL    BUN/Creatinine ratio 21 (H) 12 - 20      GFR est AA >60 >60 ml/min/1.73m2    GFR est non-AA >60 >60 ml/min/1.73m2    Calcium 9.2 8.5 - 10.1 MG/DL    Bilirubin, total 0.4 0.2 - 1.0 MG/DL    ALT (SGPT) 18 12 - 78 U/L    AST (SGOT) 13 (L) 15 - 37 U/L    Alk.  phosphatase 101 45 - 117 U/L    Protein, total 7.9 6.4 - 8.2 g/dL    Albumin 3.9 3.5 - 5.0 g/dL    Globulin 4.0 2.0 - 4.0 g/dL    A-G Ratio 1.0 (L) 1.1 - 2.2     PROTHROMBIN TIME + INR    Collection Time: 09/12/21  8:35 AM   Result Value Ref Range    INR 1.8 (H) 0.9 - 1.1      Prothrombin time 18.0 (H) 9.0 - 11.1 sec       DUPLEX LOWER EXT VENOUS RIGHT   Final Result        CT Results  (Last 48 hours)      None

## 2021-09-12 NOTE — ED PROVIDER NOTES
EMERGENCY DEPARTMENT HISTORY AND PHYSICAL EXAM      Please note that this dictation was completed with the assistance of \"Dragon\", the computer voice recognition software. Quite often unanticipated grammatical, syntax, homophones, and other interpretive errors are inadvertently transcribed by the computer software. Please disregard these errors and any errors that have escaped final proofreading. Thank you. Patient Name: Paula Levine  : 1932  MRN: [id number]  History of Presenting Illness     Chief Complaint   Patient presents with    Numbness     she is being treated for a blood clot in the left leg; this morning her right leg became numb; her doctor by phone referred her to the ED      History Provided By: Patient    HPI: Paula Levine, 80 y.o. female with past medical history as documented below presents to the ED with c/o of right-sided leg numbness and pain. Patient reports that she was recently diagnosed with a left popliteal vein thrombosis. Patient was seen by hematology, oncology recently. 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 Patient also notes a history of PE in 2018 after gallstone surgery. Of note, patient does have a history of seizure disorder for which she takes phenytoin. Patient initially was placed on Xarelto but due to cost of medication that was a barrier for compliance. Patient also states that due to her seizure medication, phenytoin, it is contraindication for use with Xarelto as it can decrease efficacy of Xarelto. Due to this, patient was initiated on Coumadin. Patient initially had blood work which showed a supratherapeutic INR 5.7. Patient reports compliance of her Coumadin therapy. She denies any headaches, blurry vision, slurred speech, focal numbness or weakness. Denies any bloody tarry stools.   She called her PCP regarding her discomfort and complaints of right leg numbness and was told to come to the ER for evaluation to rule out DVT in her right leg. Patient's usual hematologist/oncologist is Dr. Kwame Wing. Pt denies any other exacerbating or ameliorating factors. Additionally, pt specifically denies any recent fever, chills, headache, nausea, vomiting, abdominal pain, CP, SOB, lightheadedness, dizziness, weakness, lower extremity swelling, heart palpitations, urinary sxs, diarrhea, constipation, melena, hematochezia, cough, or congestion. There are no other complaints, changes or physical findings pertinent to the HPI at this time. PCP: Zonia Alvarado MD    Past History   Past Medical History:  Past Medical History:   Diagnosis Date    Arthritis     Cancer [facility]) 2002    brain tumor, benign (meningioma)    Diabetes ([facility]     questionable?    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    Seizures ([facility] 2002    caused by brain tumor    Thromboembolus [facility])     Thyroid disease        Past Surgical History:  Past Surgical History:   Procedure Laterality Date    COLORECTAL SCRN; HI RISK IND  8/26/2014         HX BACK SURGERY  1970s    HX ORTHOPAEDIC  1990s    right wrist    NEUROLOGICAL PROCEDURE UNLISTED  2002    brain tumor removed    WA INS NEW/RPLCMT PRM PM W/TRANSV ELTRD ATRIAL&VENT N/A 10/6/2020    INSERT PPM DUAL performed by Yamila Cornejo MD at \[facility]\"" CARDIAC CATH LAB       Family History:  Family history reviewed and non-contributory  Family History   Problem Relation Age of Onset    Cancer Mother         breast    Alzheimer Mother     Heart Disease Father     Kidney Disease Sister         dialysis    Seizures Daughter          Social History:  Social History     Tobacco Use    Smoking status: Never Smoker    Smokeless tobacco: Never Used   Substance Use Topics    Alcohol use: Yes     Comment: glass of wine occasionally    Drug use: No       Allergies: Allergies   Allergen Reactions    Bactrim [Sulfamethoprim] Unable to Obtain    Penicillins Itching     Patient not sure she is allergic    Sulfa (Sulfonamide Antibiotics) Nausea and Vomiting       Current Medications:  No current facility-administered medications on file prior to encounter. Current Outpatient Medications on File Prior to Encounter   Medication Sig Dispense Refill    warfarin (COUMADIN) 5 mg tablet Take 1 Tablet by mouth daily for 30 days. 30 Tablet 3    phenytoin ER (DILANTIN ER) 100 mg ER capsule TAKE 2 CAPSULES TWICE DAILY 360 Capsule 5    lidocaine (LIDODERM) 5 % Apply patch to the affected area for 12 hours a day and remove for 12 hours a day. (Patient not taking: Reported on 8/18/2021) 1 Each 0    methocarbamoL (ROBAXIN) 750 mg tablet Take 1 Tab by mouth three (3) times daily. (Patient not taking: Reported on 8/18/2021) 15 Tab 0    vit C/E/Zn/coppr/lutein/zeaxan (PRESERVISION AREDS-2 PO) Take 1 Tab by mouth daily.  amLODIPine (NORVASC) 5 mg tablet Take 1 Tab by mouth daily. (Patient not taking: Reported on 8/18/2021) 30 Tab 1    pantoprazole (PROTONIX) 40 mg tablet Take 1 Tab by mouth Before breakfast and dinner. (Patient not taking: Reported on 8/18/2021) 60 Tab 1    biotin 5,000 mcg TbDi Take 2 Tabs by mouth two (2) times a day.  cholecalciferol (VITAMIN D3) 1,000 unit tablet Take 2 Tabs by mouth two (2) times a day.  acetaminophen (TYLENOL) 325 mg tablet Take 2 Tabs by mouth every six (6) hours as needed for Pain or Fever. 40 Tab 0    polyethylene glycol (MIRALAX) 17 gram packet Take 1 Packet by mouth daily. 30 Each 1    calcium carbonate (OS-ANGUS) 500 mg calcium (1,250 mg) tablet Take 1 Tab by mouth two (2) times a day. (Patient not taking: Reported on 8/18/2021)      cranberry extract 450 mg tab Take 1 Tab by mouth daily.  (Patient not taking: Reported on 8/18/2021)      levothyroxine (SYNTHROID) 88 mcg tablet Take 1 Tab by mouth daily.  MULTI-VITAMIN PO Take 1 Cap by mouth daily. Review of Systems   A complete ROS was reviewed by me today and all other systems negative, unless otherwise specified below:  Review of Systems   Constitutional: Negative. Negative for chills and fever. HENT: Negative. Negative for congestion and sore throat. Eyes: Negative. Respiratory: Negative. Negative for cough, chest tightness, shortness of breath and wheezing. Cardiovascular: Negative. Negative for chest pain, palpitations and leg swelling. Gastrointestinal: Negative. Negative for abdominal distention, abdominal pain, blood in stool, constipation, diarrhea, nausea and vomiting. Endocrine: Negative. Genitourinary: Negative. Negative for dysuria, flank pain, frequency, hematuria and urgency. Musculoskeletal: Negative. Negative for arthralgias, back pain and myalgias. Skin: Negative. Negative for color change and rash. Neurological: Positive for numbness. Negative for dizziness, syncope, speech difficulty, weakness, light-headedness and headaches. Hematological: Negative. Psychiatric/Behavioral: Negative. Negative for confusion and self-injury. The patient is not nervous/anxious. All other systems reviewed and are negative. Physical Exam   Physical Exam  Vitals and nursing note reviewed. Constitutional:       General: She is not in acute distress. Appearance: She is well-developed. She is not diaphoretic. HENT:      Head: Normocephalic and atraumatic. Mouth/Throat:      Pharynx: No oropharyngeal exudate. Eyes:      Conjunctiva/sclera: Conjunctivae normal.   Cardiovascular:      Rate and Rhythm: Normal rate and regular rhythm. Heart sounds: Normal heart sounds. Pulmonary:      Effort: Pulmonary effort is normal. No respiratory distress. Breath sounds: Normal breath sounds.  No wheezing or rales. Chest:      Chest wall: No tenderness. Abdominal:      General: Bowel sounds are normal. There is no distension. Palpations: Abdomen is soft. There is no mass. Tenderness: There is no abdominal tenderness. There is no guarding or rebound. Musculoskeletal:         General: Normal range of motion. Cervical back: Normal range of motion. Comments: No palpable cords, neurovascular intact distally. No overlying erythema, no edema noted. Skin:     General: Skin is warm. Neurological:      Mental Status: She is alert and oriented to person, place, and time. Cranial Nerves: No cranial nerve deficit. Motor: No abnormal muscle tone. Diagnostic Study Results     Labs -   I have personally reviewed and interpreted all available laboratory results. Recent Results (from the past 24 hour(s))   CBC WITH AUTOMATED DIFF    Collection Time: 09/12/21  8:35 AM   Result Value Ref Range    WBC 4.5 3.6 - 11.0 K/uL    RBC 4.29 3.80 - 5.20 M/uL    HGB 13.8 11.5 - 16.0 g/dL    HCT 42.2 35.0 - 47.0 %    MCV 98.4 80.0 - 99.0 FL    MCH 32.2 26.0 - 34.0 PG    MCHC 32.7 30.0 - 36.5 g/dL    RDW 12.6 11.5 - 14.5 %    PLATELET 383 768 - 744 K/uL    MPV 11.6 8.9 - 12.9 FL    NRBC 0.0 0  WBC    ABSOLUTE NRBC 0.00 0.00 - 0.01 K/uL    NEUTROPHILS 63 32 - 75 %    LYMPHOCYTES 25 12 - 49 %    MONOCYTES 12 5 - 13 %    EOSINOPHILS 0 0 - 7 %    BASOPHILS 0 0 - 1 %    IMMATURE GRANULOCYTES 0 0.0 - 0.5 %    ABS. NEUTROPHILS 2.8 1.8 - 8.0 K/UL    ABS. LYMPHOCYTES 1.1 0.8 - 3.5 K/UL    ABS. MONOCYTES 0.5 0.0 - 1.0 K/UL    ABS. EOSINOPHILS 0.0 0.0 - 0.4 K/UL    ABS. BASOPHILS 0.0 0.0 - 0.1 K/UL    ABS. IMM.  GRANS. 0.0 0.00 - 0.04 K/UL    DF AUTOMATED     METABOLIC PANEL, COMPREHENSIVE    Collection Time: 09/12/21  8:35 AM   Result Value Ref Range    Sodium 140 136 - 145 mmol/L    Potassium 3.6 3.5 - 5.1 mmol/L    Chloride 104 97 - 108 mmol/L    CO2 31 21 - 32 mmol/L    Anion gap 5 5 - 15 mmol/L    Glucose 100 65 - 100 mg/dL    BUN 13 6 - 20 MG/DL    Creatinine 0.61 0.55 - 1.02 MG/DL    BUN/Creatinine ratio 21 (H) 12 - 20      GFR est AA >60 >60 ml/min/1.73m2    GFR est non-AA >60 >60 ml/min/1.73m2    Calcium 9.2 8.5 - 10.1 MG/DL    Bilirubin, total 0.4 0.2 - 1.0 MG/DL    ALT (SGPT) 18 12 - 78 U/L    AST (SGOT) 13 (L) 15 - 37 U/L    Alk. phosphatase 101 45 - 117 U/L    Protein, total 7.9 6.4 - 8.2 g/dL    Albumin 3.9 3.5 - 5.0 g/dL    Globulin 4.0 2.0 - 4.0 g/dL    A-G Ratio 1.0 (L) 1.1 - 2.2     PROTHROMBIN TIME + INR    Collection Time: 09/12/21  8:35 AM   Result Value Ref Range    INR 1.8 (H) 0.9 - 1.1      Prothrombin time 18.0 (H) 9.0 - 11.1 sec       Radiologic Studies -   I have personally reviewed and interpreted all available imaging studies and agree with radiology interpretation and report. DUPLEX LOWER EXT VENOUS RIGHT   Final Result        CT Results  (Last 48 hours)    None        CXR Results  (Last 48 hours)    None          Medical Decision Making   I reviewed the vital signs, available nursing notes, past medical history, past surgical history, family history and social history. Vital Signs-Reviewed the patient's vital signs. Patient Vitals for the past 24 hrs:   Temp Pulse Resp BP SpO2   09/12/21 1030  61 13 134/78 99 %   09/12/21 0847     100 %   09/12/21 0828 98.6 °F (37 °C) 92 14 (!) 175/84 100 %       Pulse Oximetry Analysis - 100% on RA    Cardiac Monitor:   Rate: 92 bpm  The cardiac monitor revealed the following rhythm as interpreted by me: Normal Sinus Rhythm       Records Reviewed: Nursing Notes, Old Medical Records, Previous electrocardiograms, Previous Radiology Studies and Previous Laboratory Studies    Provider Notes (Medical Decision Making):   Patient presenting with right leg DVT and numbness and tingling. Stable vitals and nontoxic appearing.   DDx: infection, anemia, electrolyte anomoly (hypo or hyperkalemia, hypomagnesemia), hypothyroid, dehydration, depression, CA, ACS. Will obtain EKG, UA, labwork for any urgent/emergent pathology. Will reassess and monitor while in ED. ED Course:   I am the first physician for this patient's ED visit today. Initial assessment performed. I discussed presenting problems, concerns and my formulated plan for today's visit with the patient and any available family members at bedside. I encouraged them to ask questions as they arise throughout the visit. Social History     Tobacco Use    Smoking status: Never Smoker    Smokeless tobacco: Never Used   Substance Use Topics    Alcohol use: Yes     Comment: glass of wine occasionally    Drug use: No       I reviewed our electronic medical record system for any past medical records that were available that may contribute to the patient's current condition, the nursing notes and vital signs from today's visit. ED Orders Placed :  Orders Placed This Encounter    CBC WITH AUTOMATED DIFF    METABOLIC PANEL, COMPREHENSIVE    PROTHROMBIN TIME + INR       ED Medications Administered:  Medications - No data to display     Progress Note:  Patient has been reassessed and reports feeling better and symptoms have improved significantly after ED treatment. Najma Ko's final labs and imaging have been reviewed with her and available family and/or caregiver. They have been counseled regarding her diagnosis. She verbally conveys understanding and agreement of the signs, symptoms, diagnosis, treatment and prognosis and additionally agrees to follow up as recommended with Dr. Jevon Villarreal MD and/or specialist in 24 - 48 hours. She also agrees with the care-plan we created together and conveys that all of her questions have been answered.   I have also put together a packet of discharge instructions for her that include: 1) educational information regarding their diagnosis, 2) how to care for their diagnosis at home, as well a 3) list of reasons why they would want to return to the ED prior to their follow-up appointment should the patient's condition change or symptoms worsen. I have answered all questions to the patient's satisfaction. Strict return precautions given. She both understood and agreed with plan as discussed. Vital signs stable for discharge. Disposition:   DISCHARGE  The pt is ready for discharge. The pt's signs, symptoms, diagnosis, and discharge instructions have been discussed and pt has conveyed their understanding. The pt is to follow up as recommended or return to ER should their symptoms worsen. Plan has been discussed and pt is in agreement. Plan:  1. Return precautions as discussed with patient and available family and/or caregiver. 2.   Discharge Medication List as of 9/12/2021 10:34 AM      No current facility-administered medications for this encounter. Current Outpatient Medications:     phenytoin ER (DILANTIN ER) 100 mg ER capsule, TAKE 2 CAPSULES TWICE DAILY, Disp: 360 Capsule, Rfl: 5    lidocaine (LIDODERM) 5 %, Apply patch to the affected area for 12 hours a day and remove for 12 hours a day. (Patient not taking: Reported on 8/18/2021), Disp: 1 Each, Rfl: 0    methocarbamoL (ROBAXIN) 750 mg tablet, Take 1 Tab by mouth three (3) times daily. (Patient not taking: Reported on 8/18/2021), Disp: 15 Tab, Rfl: 0    vit C/E/Zn/coppr/lutein/zeaxan (PRESERVISION AREDS-2 PO), Take 1 Tab by mouth daily. , Disp: , Rfl:     amLODIPine (NORVASC) 5 mg tablet, Take 1 Tab by mouth daily. (Patient not taking: Reported on 8/18/2021), Disp: 30 Tab, Rfl: 1    pantoprazole (PROTONIX) 40 mg tablet, Take 1 Tab by mouth Before breakfast and dinner.  (Patient not taking: Reported on 8/18/2021), Disp: 60 Tab, Rfl: 1    biotin 5,000 mcg TbDi, Take 2 Tabs by mouth two (2) times a day., Disp: , Rfl:     cholecalciferol (VITAMIN D3) 1,000 unit tablet, Take 2 Tabs by mouth two (2) times a day., Disp: , Rfl:     acetaminophen (TYLENOL) 325 mg tablet, Take 2 Tabs by mouth every six (6) hours as needed for Pain or Fever., Disp: 40 Tab, Rfl: 0    polyethylene glycol (MIRALAX) 17 gram packet, Take 1 Packet by mouth daily. , Disp: 30 Each, Rfl: 1    calcium carbonate (OS-ANGUS) 500 mg calcium (1,250 mg) tablet, Take 1 Tab by mouth two (2) times a day. (Patient not taking: Reported on 8/18/2021), Disp: , Rfl:     cranberry extract 450 mg tab, Take 1 Tab by mouth daily. (Patient not taking: Reported on 8/18/2021), Disp: , Rfl:     levothyroxine (SYNTHROID) 88 mcg tablet, Take 1 Tab by mouth daily. , Disp: , Rfl:     MULTI-VITAMIN PO, Take 1 Cap by mouth daily. , Disp: , Rfl:     3. Follow-up Information     Follow up With Specialties Details Why Contact Info    [facility] EM[facility] Emergency Medicine  As needed, If symptoms worsen [address] [place] [address]  [phone]          Instructed to return to ED if worse  Diagnosis   Clinical Impression:  1. Numbness and tingling    2. Subtherapeutic international normalized ratio (INR)        Attestation:  Medhat Leigh MD, am the attending of record for this patient. I personally performed the services described in this documentation on this date, 9/12/2021 for patient, Sonia Camarillo. I have reviewed the chart and verified that the record is accurate and complete.

## 2021-09-24 ENCOUNTER — HOSPITAL ENCOUNTER (EMERGENCY)
Age: 86
Discharge: HOME | End: 2021-09-24
Attending: EMERGENCY MEDICINE
Payer: MEDICARE

## 2021-09-24 VITALS
DIASTOLIC BLOOD PRESSURE: 52 MMHG | WEIGHT: 123.9 LBS | BODY MASS INDEX: 20.64 KG/M2 | TEMPERATURE: 98 F | HEIGHT: 65 IN | HEART RATE: 70 BPM | RESPIRATION RATE: 20 BRPM | OXYGEN SATURATION: 100 % | SYSTOLIC BLOOD PRESSURE: 134 MMHG

## 2021-09-24 DIAGNOSIS — R07.89 ATYPICAL CHEST PAIN: Primary | ICD-10-CM

## 2021-09-24 LAB
ALBUMIN SERPL-MCNC: 4 G/DL (ref 3.5–5)
ALBUMIN/GLOB SERPL: 1 {RATIO} (ref 1.1–2.2)
ALP SERPL-CCNC: 94 U/L (ref 45–117)
ALT SERPL-CCNC: 18 U/L (ref 12–78)
ANION GAP SERPL CALC-SCNC: 6 MMOL/L (ref 5–15)
APPEARANCE UR: CLEAR
AST SERPL-CCNC: 15 U/L (ref 15–37)
ATRIAL RATE: 65 BPM
ATRIAL RATE: 91 BPM
BASOPHILS # BLD: 0 K/UL (ref 0–0.1)
BASOPHILS NFR BLD: 1 % (ref 0–1)
BILIRUB SERPL-MCNC: 0.4 MG/DL (ref 0.2–1)
BILIRUB UR QL: NEGATIVE
BUN SERPL-MCNC: 12 MG/DL (ref 6–20)
BUN/CREAT SERPL: 18 (ref 12–20)
CALCIUM SERPL-MCNC: 9.5 MG/DL (ref 8.5–10.1)
CALCULATED P AXIS, ECG09: 48 DEGREES
CALCULATED P AXIS, ECG09: 82 DEGREES
CALCULATED R AXIS, ECG10: -26 DEGREES
CALCULATED R AXIS, ECG10: -53 DEGREES
CALCULATED T AXIS, ECG11: 64 DEGREES
CALCULATED T AXIS, ECG11: 84 DEGREES
CHLORIDE SERPL-SCNC: 104 MMOL/L (ref 97–108)
CO2 SERPL-SCNC: 30 MMOL/L (ref 21–32)
COLOR UR: ABNORMAL
CREAT SERPL-MCNC: 0.65 MG/DL (ref 0.55–1.02)
DIAGNOSIS, 93000: NORMAL
DIAGNOSIS, 93000: NORMAL
DIFFERENTIAL METHOD BLD: ABNORMAL
EOSINOPHIL # BLD: 0 K/UL (ref 0–0.4)
EOSINOPHIL NFR BLD: 0 % (ref 0–7)
ERYTHROCYTE [DISTWIDTH] IN BLOOD BY AUTOMATED COUNT: 12.5 % (ref 11.5–14.5)
GLOBULIN SER CALC-MCNC: 4.1 G/DL (ref 2–4)
GLUCOSE SERPL-MCNC: 107 MG/DL (ref 65–100)
GLUCOSE UR STRIP.AUTO-MCNC: NEGATIVE MG/DL
HCT VFR BLD AUTO: 42.6 % (ref 35–47)
HGB BLD-MCNC: 13.9 G/DL (ref 11.5–16)
HGB UR QL STRIP: NEGATIVE
IMM GRANULOCYTES # BLD AUTO: 0 K/UL (ref 0–0.04)
IMM GRANULOCYTES NFR BLD AUTO: 0 % (ref 0–0.5)
KETONES UR QL STRIP.AUTO: ABNORMAL MG/DL
LEUKOCYTE ESTERASE UR QL STRIP.AUTO: NEGATIVE
LYMPHOCYTES # BLD: 0.7 K/UL (ref 0.8–3.5)
LYMPHOCYTES NFR BLD: 17 % (ref 12–49)
MCH RBC QN AUTO: 32 PG (ref 26–34)
MCHC RBC AUTO-ENTMCNC: 32.6 G/DL (ref 30–36.5)
MCV RBC AUTO: 97.9 FL (ref 80–99)
MONOCYTES # BLD: 0.4 K/UL (ref 0–1)
MONOCYTES NFR BLD: 9 % (ref 5–13)
NEUTS SEG # BLD: 3.2 K/UL (ref 1.8–8)
NEUTS SEG NFR BLD: 73 % (ref 32–75)
NITRITE UR QL STRIP.AUTO: NEGATIVE
NRBC # BLD: 0 K/UL (ref 0–0.01)
NRBC BLD-RTO: 0 PER 100 WBC
P-R INTERVAL, ECG05: 172 MS
P-R INTERVAL, ECG05: 188 MS
PH UR STRIP: 7 [PH] (ref 5–8)
PLATELET # BLD AUTO: 165 K/UL (ref 150–400)
PMV BLD AUTO: 11.5 FL (ref 8.9–12.9)
POTASSIUM SERPL-SCNC: 3.8 MMOL/L (ref 3.5–5.1)
PROT SERPL-MCNC: 8.1 G/DL (ref 6.4–8.2)
PROT UR STRIP-MCNC: NEGATIVE MG/DL
Q-T INTERVAL, ECG07: 372 MS
Q-T INTERVAL, ECG07: 422 MS
QRS DURATION, ECG06: 76 MS
QRS DURATION, ECG06: 80 MS
QTC CALCULATION (BEZET), ECG08: 438 MS
QTC CALCULATION (BEZET), ECG08: 457 MS
RBC # BLD AUTO: 4.35 M/UL (ref 3.8–5.2)
RBC MORPH BLD: ABNORMAL
SODIUM SERPL-SCNC: 140 MMOL/L (ref 136–145)
SP GR UR REFRACTOMETRY: 1.01 (ref 1–1.03)
TROPONIN I SERPL-MCNC: 0.11 NG/ML
TROPONIN I SERPL-MCNC: 0.13 NG/ML
UROBILINOGEN UR QL STRIP.AUTO: 0.2 EU/DL (ref 0.2–1)
VENTRICULAR RATE, ECG03: 65 BPM
VENTRICULAR RATE, ECG03: 91 BPM
WBC # BLD AUTO: 4.3 K/UL (ref 3.6–11)

## 2021-09-24 PROCEDURE — 93005 ELECTROCARDIOGRAM TRACING: CPT

## 2021-09-24 PROCEDURE — 81003 URINALYSIS AUTO W/O SCOPE: CPT

## 2021-09-24 PROCEDURE — 80053 COMPREHEN METABOLIC PANEL: CPT

## 2021-09-24 PROCEDURE — 85025 COMPLETE CBC W/AUTO DIFF WBC: CPT

## 2021-09-24 PROCEDURE — 99285 EMERGENCY DEPT VISIT HI MDM: CPT

## 2021-09-24 PROCEDURE — 36415 COLL VENOUS BLD VENIPUNCTURE: CPT

## 2021-09-24 PROCEDURE — 84484 ASSAY OF TROPONIN QUANT: CPT

## 2021-09-24 NOTE — ED NOTES
Replaced pt's yahaira. Pt states she does not want to stay. Repeat EKG and troponin delegated to tech.

## 2021-09-24 NOTE — CONSULTS
CARDIOLOGY CONSULT    Patient ID:  Patient: Gonzalo Garrido  MRN: [id number]  Age: 80 y.o.  : 1932    Date of  Admission: 2021 10:25 AM   PCP:  Addison Green MD   Usual cardiologist:  Steph Mesa MD    Assessment: 1. R chest pain lasting 2 minutes, atypical character, no recurrence. She hasn't had this before she says. Associated dizziness but no imbalance, fall, near-syncope or syncope. 2. Faintly positive troponin with flat trend. 3. Hypertension. 4. Medtronic dual chamber pacemaker implanted for SSS in . Normal function on check today. No arrhythmias at the time of her discomfort recorded. 5. Left lower extremity DVT (popliteal) diagnosed 2021, started on warfarin several weeks ago after changing from Xarelto. 6. Meningioma and seizure disorder. 7. Hypothyroidism. 8. Full code. Plan:     1. I don't think any further cardiac testing is needed. Comfortable with discharge from the ER to the outpatient setting. 2. No changes to her medications. I do think that she'll have to follow-up with her PCP regarding her BP. She (and son) will call if SBP>160 mm Hg on home cuff. She has been prescribed amlodipine in the past, but hasn't taken it--I don't know the details. 3. No pacemaker program changes. 4. She will make a ROUTINE follow-up with her cardiologist, Dr. Rishi Martell. All questions answered for her and her son. [x]       High complexity decision making was performed in this patient    Gonzalo Garrido is a 80 y.o. female with a history of sudden onset of R-sided \"burning\" chest discomfort that she had not had before. This started after she had gotten up at night to urinate. She felt dizzy with this but not like she would fall. This lasted up to 2 minutes before quickly resolving. She went to sleep. After telling her son, her PCP was called. Her PCP told her to come to the ER for evaluation.   She denies syncope, dizziness, chest pain, SOB, orthopnea, PND, or worsening edema here. No TIA or stroke-like symptoms. She has been on anticoagulation for a L lower extremity popliteal DVT. Past Medical History:   Diagnosis Date    Arthritis     Cancer [facility]) 2002    brain tumor, benign (meningioma)    Diabetes ([facility] [address]     questionable? 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    Seizures ([facility] [address] 2002    caused by brain tumor    Thromboembolus [facility])     Thyroid disease         Past Surgical History:   Procedure Laterality Date    COLORECTAL SCRN; HI RISK IND  8/26/2014         HX BACK SURGERY  1970s    HX ORTHOPAEDIC  1990s    right wrist    NEUROLOGICAL PROCEDURE UNLISTED  2002    brain tumor removed    MS INS NEW/RPLCMT PRM PM W/TRANSV ELTRD ATRIAL&VENT N/A 10/6/2020    INSERT PPM DUAL performed by Bib Stafford MD at [facility] CARDIAC CATH LAB       Social History     Tobacco Use    Smoking status: Never Smoker    Smokeless tobacco: Never Used   Substance Use Topics    Alcohol use: Yes     Comment: glass of wine occasionally        Family History   Problem Relation Age of Onset    Cancer Mother         breast    Alzheimer Mother     Heart Disease Father     Kidney Disease Sister         dialysis    Seizures Daughter         Allergies   Allergen Reactions    Bactrim [Sulfamethoprim] Unable to Obtain    Penicillins Itching     Patient not sure she is allergic    Sulfa (Sulfonamide Antibiotics) Nausea and Vomiting          No current facility-administered medications for this encounter.      Current Outpatient Medications   Medication Sig    phenytoin ER (DILANTIN ER) 100 mg ER capsule TAKE 2 CAPSULES TWICE DAILY    lidocaine (LIDODERM) 5 % Apply patch to the affected area for 12 hours a day and remove for 12 hours a day. (Patient not taking: Reported on 8/18/2021)    methocarbamoL (ROBAXIN) 750 mg tablet Take 1 Tab by mouth three (3) times daily. (Patient not taking: Reported on 8/18/2021)    vit C/E/Zn/coppr/lutein/zeaxan (PRESERVISION AREDS-2 PO) Take 1 Tab by mouth daily.  amLODIPine (NORVASC) 5 mg tablet Take 1 Tab by mouth daily. (Patient not taking: Reported on 8/18/2021)    pantoprazole (PROTONIX) 40 mg tablet Take 1 Tab by mouth Before breakfast and dinner. (Patient not taking: Reported on 8/18/2021)    biotin 5,000 mcg TbDi Take 2 Tabs by mouth two (2) times a day.  cholecalciferol (VITAMIN D3) 1,000 unit tablet Take 2 Tabs by mouth two (2) times a day.  acetaminophen (TYLENOL) 325 mg tablet Take 2 Tabs by mouth every six (6) hours as needed for Pain or Fever.  polyethylene glycol (MIRALAX) 17 gram packet Take 1 Packet by mouth daily.  calcium carbonate (OS-ANGUS) 500 mg calcium (1,250 mg) tablet Take 1 Tab by mouth two (2) times a day. (Patient not taking: Reported on 8/18/2021)    cranberry extract 450 mg tab Take 1 Tab by mouth daily. (Patient not taking: Reported on 8/18/2021)    levothyroxine (SYNTHROID) 88 mcg tablet Take 1 Tab by mouth daily.  MULTI-VITAMIN PO Take 1 Cap by mouth daily. Review of Symptoms:  See HPI as well.   General: negative for fever, chills, sweats, weakness, weight loss   Eyes: negative for blurred vision, eye pain, loss of vision, diplopia   Ear Nose and Throat: negative for rhinorrhea, pharyngitis, otalgia, tinnitus, speech or swallowing difficulties   Respiratory: negative for SOB, cough, sputum production, wheezing, DORSEY, pleuritic pain   Cardiology: negative for palpitations, orthopnea, PND, edema, syncope   Gastrointestinal: negative for abdominal pain, N/V, dysphagia, change in bowel habits, bleeding   Genitourinary: negative for frequency, urgency, dysuria, hematuria, incontinence   Muskuloskeletal : negative for arthralgia, myalgia   Hematology: negative for easy bruising, bleeding, lymphadenopathy   Dermatological: negative for rash, ulceration, mole change, new lesion   Endocrine: negative for hot flashes or polydipsia   Neurological: negative for headache, dizziness, confusion, focal weakness, paresthesia, memory loss, gait disturbance   Psychological: negative for anxiety, depression, agitation       Objective:      Physical Exam:  Temp (24hrs), Av °F (36.7 °C), Min:98 °F (36.7 °C), Max:98 °F (36.7 °C)    Patient Vitals for the past 8 hrs:   Pulse   21 1315 79   21 1300 67   21 1245 66   21 1230 80   21 1215 82   21 1200 65   21 1145 60   21 1130 67   21 1115 61   21 1100 70   21 1003 69    Patient Vitals for the past 8 hrs:   Resp   21 1315 21   21 1300 20   21 1245 20   21 1230 19   21 1215 21   21 1200 17   21 1145 18   21 1130 18   21 1115 19   21 1100 21   21 1003 14    Patient Vitals for the past 8 hrs:   BP   21 1315 (!) 134/52   21 1300 (!) 165/57   21 1245 (!) 174/69   21 1230 (!) 181/103   21 1215 (!) 170/69   21 1200 (!) 158/58   21 1145 (!) 158/68   21 1130 (!) 166/63   21 1115 (!) 168/80   21 1100 (!) 188/85   21 1017 (!) 181/79   21 1016 (!) 184/87   21 1015 (!) 164/74   21 1003 (!) 167/74      No intake or output data in the 24 hours ending 21 1449    Nondiaphoretic, not in acute distress. No scleral icterus, mucous membranes moist, conjuctivae pink, no xanthelasma. Unlabored, clear to auscultation bilaterally, symmetric air movement. Regular rate and rhythm, no murmur, pericardial rub, knock, or gallop. No JVD or peripheral edema. Palpable radial pulses bilaterally. Abdomen, soft, nontender, nondistended. Extremities without cyanosis or clubbing.   Muscle tone and bulk normal.  Skin warm and dry. No rashes or ulcers. Neuro grossly nonfocal.  No tremor. Awake and appropriate. CARDIOGRAPHICS and STUDIES, I reviewed:    Telemetry:  A pacing. ECG:  A pacing. LAFB. Labs:  Recent Labs     09/24/21  1316 09/24/21  1024   TROIQ 0.13* 0.11*     No results found for: CHOL, CHOLX, CHLST, CHOLV, HDL, HDLP, LDL, LDLC, DLDLP, TGLX, TRIGL, TRIGP, CHHD, CHHDX  No results for input(s): INR, PTP, APTT, INREXT in the last 72 hours. Recent Labs     09/24/21  1024      K 3.8      CO2 30   BUN 12   CREA 0.65   *   CA 9.5   ALB 4.0   WBC 4.3   HGB 13.9   HCT 42.6        Recent Labs     09/24/21  1024   AP 94   TP 8.1   ALB 4.0   GLOB 4.1*     No components found for: GLPOC  No results for input(s): PH, PCO2, PO2 in the last 72 hours.         Sylwia Hurd MD  9/24/2021

## 2021-09-24 NOTE — DISCHARGE INSTRUCTIONS
He was seen in the emergency department after onset of a fiery sensation in your chest and upper abdomen beginning this morning. Evaluation here including an exam, history taking, cardiac monitoring, 2 EKGs, blood work and a chest x-ray as well as cardiology consultation are reassuring and it is safe to send you home. Please resume your normal activity and take your medications as directed including your blood thinning medication. Thank you for letting us take care of you.

## 2021-09-24 NOTE — ED PROVIDER NOTES
EMERGENCY DEPARTMENT HISTORY AND PHYSICAL EXAM      Date: 9/24/2021  Patient Name: Beverly Carrasquillo    History of Presenting Illness     Chief Complaint   Patient presents with    Dizziness     feeling of being on fire when got up in middle of night; now feels dizzy; denies pain       History Provided By: Patient patient's son    HPI: Beverly Carrasquillo, 80 y.o. female presents to the ED with complaints of feeling like she was on fire in her chest abdomen all the way up to the top of her head at approximately 3 AM.  This fiery feeling has resolved but was concerned because she never experienced anything like that before. This is prompting today's emergency department visit. She complains of some mild dizziness but has not passed out, denies voice change, facial droop or new onset weakness in her face arms or legs. She is accompanied by her son who is her primary caretaker. She denies any chest pain, shortness of breath, abdominal pain and says has been eating and drinking without difficulty, no diarrhea no urinary symptoms. She does not have any known history, per the patient of acid reflux. She does have a history of an ill-defined gastrointestinal disorder, diabetes, thyroid disease, hypothyroidism. There are no other complaints, changes, or physical findings at this time. PCP: Malika Poole MD    No current facility-administered medications on file prior to encounter. Current Outpatient Medications on File Prior to Encounter   Medication Sig Dispense Refill    phenytoin ER (DILANTIN ER) 100 mg ER capsule TAKE 2 CAPSULES TWICE DAILY 360 Capsule 5    lidocaine (LIDODERM) 5 % Apply patch to the affected area for 12 hours a day and remove for 12 hours a day. (Patient not taking: Reported on 8/18/2021) 1 Each 0    methocarbamoL (ROBAXIN) 750 mg tablet Take 1 Tab by mouth three (3) times daily.  (Patient not taking: Reported on 8/18/2021) 15 Tab 0    vit C/E/Zn/coppr/lutein/zeaxan (PRESERVISION AREDS-2 PO) Take 1 Tab by mouth daily.  amLODIPine (NORVASC) 5 mg tablet Take 1 Tab by mouth daily. (Patient not taking: Reported on 8/18/2021) 30 Tab 1    pantoprazole (PROTONIX) 40 mg tablet Take 1 Tab by mouth Before breakfast and dinner. (Patient not taking: Reported on 8/18/2021) 60 Tab 1    biotin 5,000 mcg TbDi Take 2 Tabs by mouth two (2) times a day.  cholecalciferol (VITAMIN D3) 1,000 unit tablet Take 2 Tabs by mouth two (2) times a day.  acetaminophen (TYLENOL) 325 mg tablet Take 2 Tabs by mouth every six (6) hours as needed for Pain or Fever. 40 Tab 0    polyethylene glycol (MIRALAX) 17 gram packet Take 1 Packet by mouth daily. 30 Each 1    calcium carbonate (OS-ANGUS) 500 mg calcium (1,250 mg) tablet Take 1 Tab by mouth two (2) times a day. (Patient not taking: Reported on 8/18/2021)      cranberry extract 450 mg tab Take 1 Tab by mouth daily. (Patient not taking: Reported on 8/18/2021)      levothyroxine (SYNTHROID) 88 mcg tablet Take 1 Tab by mouth daily.  MULTI-VITAMIN PO Take 1 Cap by mouth daily. Past History     Past Medical History:  Past Medical History:   Diagnosis Date    Arthritis     Cancer ([address] 2002    brain tumor, benign (meningioma)    Diabetes ([address]     questionable?    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    Seizures ([address] 2002    caused by brain tumor    Thromboembolus [facility])     Thyroid disease        Past Surgical History:  Past Surgical History:   Procedure Laterality Date    COLORECTAL SCRN; HI RISK IND  8/26/2014         HX BACK SURGERY  1970s    HX ORTHOPAEDIC  1990s    right wrist    NEUROLOGICAL PROCEDURE UNLISTED  2002    brain tumor removed    AK INS NEW/RPLCMT PRM PM W/TRANSV ELTRD ATRIAL&VENT N/A 10/6/2020    INSERT PPM DUAL performed by Jessie Butler MD at [facility] CARDIAC CATH LAB       Family History:  Family History   Problem Relation Age of Onset    Cancer Mother         breast    Alzheimer Mother     Heart Disease Father     Kidney Disease Sister         dialysis    Seizures Daughter        Social History:  Social History     Tobacco Use    Smoking status: Never Smoker    Smokeless tobacco: Never Used   Substance Use Topics    Alcohol use: Yes     Comment: glass of wine occasionally    Drug use: No       Allergies: Allergies   Allergen Reactions    Bactrim [Sulfamethoprim] Unable to Obtain    Penicillins Itching     Patient not sure she is allergic    Sulfa (Sulfonamide Antibiotics) Nausea and Vomiting         Review of Systems   Review of Systems   Constitutional: Negative for activity change and appetite change. HENT: Negative. Respiratory: Negative for shortness of breath. Cardiovascular: Positive for chest pain. Negative for palpitations and leg swelling. Gastrointestinal: Negative. Genitourinary: Negative. Musculoskeletal: Negative for back pain, neck pain and neck stiffness. Hematological: Does not bruise/bleed easily. All other systems reviewed and are negative. Physical Exam   Physical Exam   Vital signs and nursing notes reviewed    CONSTITUTIONAL: Alert, in mild distress; well-developed; well-nourished. Thin build. HEAD:  Normocephalic, atraumatic  EYES: PERRL; EOM's intact. ENTM: Nose: no rhinorrhea; Throat: no erythema or exudate, mucous membranes moist  Neck:  Supple. trachea is midline. No JVD, no carotid bruits bilaterally. RESP: Chest clear, equal breath sounds. - W/R/R  CV: S1 and S2 WNL; No murmurs, gallops or rubs. 2+ radial and DP pulses bilaterally. GI: non-distended, normal bowel sounds, abdomen soft and non-tender. No masses or organomegaly. : No costo-vertebral angle tenderness.   BACK:  Non-tender, normal appearance  UPPER EXT:  Normal inspection. no joint or soft tissue swelling  LOWER EXT: No edema, no calf tenderness. NEURO: Alert and oriented x3, 5/5 strength and light touch sensation intact in bilateral upper and lower extremities. SKIN: No rashes; Warm and dry, no urticaria or other rash. PSYCH: Normal mood, normal affect    Diagnostic Study Results     Labs -     Recent Results (from the past 12 hour(s))   EKG, 12 LEAD, INITIAL    Collection Time: 09/24/21 10:13 AM   Result Value Ref Range    Ventricular Rate 91 BPM    Atrial Rate 91 BPM    P-R Interval 188 ms    QRS Duration 76 ms    Q-T Interval 372 ms    QTC Calculation (Bezet) 457 ms    Calculated P Axis 82 degrees    Calculated R Axis -26 degrees    Calculated T Axis 84 degrees    Diagnosis       Atrial-paced rhythm  When compared with ECG of 06-OCT-2020 21:38,  Vent. rate has increased BY  31 BPM  Left anterior fascicular block is no longer present  Criteria for Inferior infarct are no longer present     CBC WITH AUTOMATED DIFF    Collection Time: 09/24/21 10:24 AM   Result Value Ref Range    WBC 4.3 3.6 - 11.0 K/uL    RBC 4.35 3.80 - 5.20 M/uL    HGB 13.9 11.5 - 16.0 g/dL    HCT 42.6 35.0 - 47.0 %    MCV 97.9 80.0 - 99.0 FL    MCH 32.0 26.0 - 34.0 PG    MCHC 32.6 30.0 - 36.5 g/dL    RDW 12.5 11.5 - 14.5 %    PLATELET 364 123 - 763 K/uL    MPV 11.5 8.9 - 12.9 FL    NRBC 0.0 0  WBC    ABSOLUTE NRBC 0.00 0.00 - 0.01 K/uL    NEUTROPHILS 73 32 - 75 %    LYMPHOCYTES 17 12 - 49 %    MONOCYTES 9 5 - 13 %    EOSINOPHILS 0 0 - 7 %    BASOPHILS 1 0 - 1 %    IMMATURE GRANULOCYTES 0 0.0 - 0.5 %    ABS. NEUTROPHILS 3.2 1.8 - 8.0 K/UL    ABS. LYMPHOCYTES 0.7 (L) 0.8 - 3.5 K/UL    ABS. MONOCYTES 0.4 0.0 - 1.0 K/UL    ABS. EOSINOPHILS 0.0 0.0 - 0.4 K/UL    ABS. BASOPHILS 0.0 0.0 - 0.1 K/UL    ABS. IMM.  GRANS. 0.0 0.00 - 0.04 K/UL    DF SMEAR SCANNED      RBC COMMENTS NORMOCYTIC, NORMOCHROMIC     METABOLIC PANEL, COMPREHENSIVE    Collection Time: 09/24/21 10:24 AM   Result Value Ref Range    Sodium 140 136 - 145 mmol/L    Potassium 3.8 3.5 - 5.1 mmol/L    Chloride 104 97 - 108 mmol/L    CO2 30 21 - 32 mmol/L    Anion gap 6 5 - 15 mmol/L    Glucose 107 (H) 65 - 100 mg/dL    BUN 12 6 - 20 MG/DL    Creatinine 0.65 0.55 - 1.02 MG/DL    BUN/Creatinine ratio 18 12 - 20      GFR est AA >60 >60 ml/min/1.73m2    GFR est non-AA >60 >60 ml/min/1.73m2    Calcium 9.5 8.5 - 10.1 MG/DL    Bilirubin, total 0.4 0.2 - 1.0 MG/DL    ALT (SGPT) 18 12 - 78 U/L    AST (SGOT) 15 15 - 37 U/L    Alk.  phosphatase 94 45 - 117 U/L    Protein, total 8.1 6.4 - 8.2 g/dL    Albumin 4.0 3.5 - 5.0 g/dL    Globulin 4.1 (H) 2.0 - 4.0 g/dL    A-G Ratio 1.0 (L) 1.1 - 2.2     TROPONIN I    Collection Time: 09/24/21 10:24 AM   Result Value Ref Range    Troponin-I, Qt. 0.11 (H) <0.05 ng/mL   URINALYSIS W/ RFLX MICROSCOPIC    Collection Time: 09/24/21  1:16 PM   Result Value Ref Range    Color YELLOW/STRAW      Appearance CLEAR CLEAR      Specific gravity 1.010 1.003 - 1.030      pH (UA) 7.0 5.0 - 8.0      Protein Negative NEG mg/dL    Glucose Negative NEG mg/dL    Ketone TRACE (A) NEG mg/dL    Bilirubin Negative NEG      Blood Negative NEG      Urobilinogen 0.2 0.2 - 1.0 EU/dL    Nitrites Negative NEG      Leukocyte Esterase Negative NEG     TROPONIN I    Collection Time: 09/24/21  1:16 PM   Result Value Ref Range    Troponin-I, Qt. 0.13 (H) <0.05 ng/mL   EKG, 12 LEAD, SUBSEQUENT    Collection Time: 09/24/21  2:02 PM   Result Value Ref Range    Ventricular Rate 61 BPM    Atrial Rate 61 BPM    P-R Interval 178 ms    QRS Duration 78 ms    Q-T Interval 414 ms    QTC Calculation (Bezet) 416 ms    Calculated P Axis 85 degrees    Calculated R Axis -51 degrees    Calculated T Axis 70 degrees    Diagnosis       Atrial-paced rhythm  Left anterior fascicular block  Inferior infarct , age undetermined  Anterior infarct , age undetermined  When compared with ECG of 24-SEP-2021 10:13,  MANUAL COMPARISON REQUIRED, DATA IS UNCONFIRMED         Radiologic Studies -   No orders to display     CT Results  (Last 48 hours)    None        CXR Results  (Last 48 hours)    None          Medical Decision Making   I am the first provider for this patient. I reviewed the vital signs, available nursing notes, past medical history, past surgical history, family history and social history. Vital Signs-Reviewed the patient's vital signs. Patient Vitals for the past 12 hrs:   Temp Pulse Resp BP SpO2   09/24/21 1315  79 21 (!) 134/52 100 %   09/24/21 1300  67 20 (!) 165/57 99 %   09/24/21 1245  66 20 (!) 174/69 100 %   09/24/21 1230  80 19 (!) 181/103 99 %   09/24/21 1215  82 21 (!) 170/69 98 %   09/24/21 1200  65 17 (!) 158/58 98 %   09/24/21 1145  60 18 (!) 158/68 98 %   09/24/21 1130  67 18 (!) 166/63 99 %   09/24/21 1115  61 19 (!) 168/80 97 %   09/24/21 1100  70 21 (!) 188/85 96 %   09/24/21 1017    (!) 181/79    09/24/21 1016    (!) 184/87    09/24/21 1015    (!) 164/74    09/24/21 1003 98 °F (36.7 °C) 69 14 (!) 167/74 98 %       EKG interpretation: (Preliminary)  EKG performed at 10:13 AM shows an atrial paced rhythm at a rate of 91 with a leftward axis, normal intervals without visible acute ischemic changes. Subsequent EKG performed at 1403 hrs. shows an atrial paced rhythm at a rate of 65 with a left anterior fascicular block pattern, left axis deviation without visible acute ischemic change. Pacemaker query of Medtronic pacemaker shows no concerning changes. Records Reviewed: Nursing Notes and Old Medical Records    Provider Notes (Medical Decision Making):   59-year-old female with fiery-like chest discomfort, elevated troponin, slightly up on second EKG with stable EKG pattern without visible acute ischemic changes. Seen by cardiology this is a patient can go home, resume normal medications without adjustment. ED Course:   Initial assessment performed.  The patients presenting problems have been discussed, and they are in agreement with the care plan formulated and outlined with them. I have encouraged them to ask questions as they arise throughout their visit. ED Course as of Sep 24 1454   Fri Sep 24, 2021   1427 Spoke to Dr. Rachael Forrester who will come see the patient given troponin is slightly elevated at 0.13, up from 0.11. The patient has not had any chest pain while here. [TL]      ED Course User Index  [TL] Loc Mccullough MD     2:50pm: Patient seen by Dr. Rachael Forrester, is cleared to be discharged home. Disposition:  Discharge    Discharge Note:  2:54 PM  The pt is ready for discharge. The pt's signs, symptoms, diagnosis, and discharge instructions have been discussed and pt has conveyed their understanding. The pt is to follow up as recommended or return to ER should their symptoms worsen. Plan has been discussed and pt is in agreement. DISCHARGE PLAN:  1. Current Discharge Medication List        2. Follow-up Information     Follow up With Specialties Details Why Contact Info    Ev Jamison MD Family Medicine In 1 week For reevaluation after today's visit. [address] [facility]  [phone]      [facility] EMERGENCY DEPT Emergency Medicine  If symptoms worsen including new chest pain, new difficulty breathing, if you pass out or have sudden onset weakness in her face arms or legs. [address] [place] [address]  [phone]        3. Return to ED if worse     Diagnosis     Clinical Impression:   1. Atypical chest pain        Attestations:    Nas Huggins MD    Please note that this dictation was completed with [employer], the computer voice recognition software. Quite often unanticipated grammatical, syntax, homophones, and other interpretive errors are inadvertently transcribed by the computer software. Please disregard these errors. Please excuse any errors that have escaped final proofreading. Thank you.

## 2021-12-06 ENCOUNTER — TELEPHONE (OUTPATIENT)
Dept: NEUROLOGY | Age: 86
End: 2021-12-06

## 2021-12-06 NOTE — TELEPHONE ENCOUNTER
Pt's son would like to know if there is anything that can be given to slow down the progression of alzheimer's.  Please call [phone]

## 2021-12-06 NOTE — TELEPHONE ENCOUNTER
First of all this is a [facility] patient I have not yet seen him    They have an appointment coming up on 12/ 8 we can discuss treatment options at that time but yes there are a couple of medicines that we can use to try to slow the progression down

## 2021-12-07 NOTE — TELEPHONE ENCOUNTER
Confirmed speaking with Son Arlet Garcia. Advised that medication would be discussed at patients visit tomorrow. Son states thank you for getting back to him, he had reached out as he did not want to mention the medication in front of patient. States that he is the only one helping patient as sister has a lot of issues as well and not able to help.

## 2021-12-08 ENCOUNTER — TELEPHONE (OUTPATIENT)
Dept: NEUROLOGY | Age: 86
End: 2021-12-08

## 2021-12-08 NOTE — TELEPHONE ENCOUNTER
Luanne Arias left voicemail    Needs to r/s today's visit as mom is taking care sick daughter. Wants to talk to [facility] nurse before r/s appt because he needs to discuss her alzheimer's medication.  Please call [phone]

## 2022-03-18 PROBLEM — S72.22XA CLOSED LEFT SUBTROCHANTERIC FEMUR FRACTURE, INITIAL ENCOUNTER (HCC): Status: ACTIVE | Noted: 2019-01-15

## 2022-03-18 PROBLEM — S72.92XA FEMUR FRACTURE, LEFT (HCC): Status: ACTIVE | Noted: 2019-01-13

## 2022-03-19 PROBLEM — G40.209 COMPLEX PARTIAL SEIZURE EVOLVING TO GENERALIZED SEIZURE (HCC): Status: ACTIVE | Noted: 2017-04-21

## 2022-03-19 PROBLEM — I65.23 BILATERAL CAROTID ARTERY STENOSIS: Status: ACTIVE | Noted: 2020-02-28

## 2022-03-19 PROBLEM — G40.209 LOCALIZATION-RELATED FOCAL EPILEPSY WITH COMPLEX PARTIAL SEIZURES (HCC): Status: ACTIVE | Noted: 2020-02-28

## 2022-03-19 PROBLEM — K92.2 GI BLEED: Status: ACTIVE | Noted: 2019-06-01

## 2022-03-19 PROBLEM — I67.89 CEREBRAL MICROVASCULAR DISEASE: Status: ACTIVE | Noted: 2020-02-28

## 2022-03-19 PROBLEM — K92.2 GI BLEED DUE TO NSAIDS: Status: ACTIVE | Noted: 2019-06-02

## 2022-03-19 PROBLEM — Z95.0 PACEMAKER: Status: ACTIVE | Noted: 2020-10-06

## 2022-03-19 PROBLEM — T39.395A GI BLEED DUE TO NSAIDS: Status: ACTIVE | Noted: 2019-06-02

## 2022-03-20 PROBLEM — E11.9 DIABETES (HCC): Status: ACTIVE | Noted: 2019-01-13

## 2022-04-17 ENCOUNTER — HOSPITAL ENCOUNTER (EMERGENCY)
Age: 87
Discharge: HOME | End: 2022-04-17
Attending: EMERGENCY MEDICINE
Payer: MEDICARE

## 2022-04-17 ENCOUNTER — APPOINTMENT (OUTPATIENT)
Dept: ULTRASOUND IMAGING | Age: 87
End: 2022-04-17
Attending: EMERGENCY MEDICINE
Payer: MEDICARE

## 2022-04-17 VITALS
RESPIRATION RATE: 19 BRPM | DIASTOLIC BLOOD PRESSURE: 74 MMHG | WEIGHT: 123.68 LBS | HEART RATE: 72 BPM | HEIGHT: 65 IN | SYSTOLIC BLOOD PRESSURE: 153 MMHG | BODY MASS INDEX: 20.61 KG/M2 | OXYGEN SATURATION: 100 % | TEMPERATURE: 98.2 F

## 2022-04-17 DIAGNOSIS — R60.9 PERIPHERAL EDEMA: ICD-10-CM

## 2022-04-17 DIAGNOSIS — R79.1 SUBTHERAPEUTIC INTERNATIONAL NORMALIZED RATIO (INR): ICD-10-CM

## 2022-04-17 DIAGNOSIS — M79.89 SWELLING OF RIGHT LOWER EXTREMITY: Primary | ICD-10-CM

## 2022-04-17 LAB
ALBUMIN SERPL-MCNC: 3.5 G/DL (ref 3.5–5)
ALBUMIN/GLOB SERPL: 1.1 {RATIO} (ref 1.1–2.2)
ALP SERPL-CCNC: 89 U/L (ref 45–117)
ALT SERPL-CCNC: 19 U/L (ref 12–78)
ANION GAP SERPL CALC-SCNC: 3 MMOL/L (ref 5–15)
AST SERPL-CCNC: 12 U/L (ref 15–37)
BASOPHILS # BLD: 0 K/UL (ref 0–0.1)
BASOPHILS NFR BLD: 1 % (ref 0–1)
BILIRUB SERPL-MCNC: 0.3 MG/DL (ref 0.2–1)
BNP SERPL-MCNC: 276 PG/ML
BUN SERPL-MCNC: 16 MG/DL (ref 6–20)
BUN/CREAT SERPL: 23 (ref 12–20)
CALCIUM SERPL-MCNC: 9.1 MG/DL (ref 8.5–10.1)
CHLORIDE SERPL-SCNC: 107 MMOL/L (ref 97–108)
CO2 SERPL-SCNC: 31 MMOL/L (ref 21–32)
CREAT SERPL-MCNC: 0.71 MG/DL (ref 0.55–1.02)
DIFFERENTIAL METHOD BLD: ABNORMAL
EOSINOPHIL # BLD: 0 K/UL (ref 0–0.4)
EOSINOPHIL NFR BLD: 0 % (ref 0–7)
ERYTHROCYTE [DISTWIDTH] IN BLOOD BY AUTOMATED COUNT: 13 % (ref 11.5–14.5)
GLOBULIN SER CALC-MCNC: 3.3 G/DL (ref 2–4)
GLUCOSE SERPL-MCNC: 83 MG/DL (ref 65–100)
HCT VFR BLD AUTO: 37.9 % (ref 35–47)
HGB BLD-MCNC: 12.5 G/DL (ref 11.5–16)
IMM GRANULOCYTES # BLD AUTO: 0 K/UL (ref 0–0.04)
IMM GRANULOCYTES NFR BLD AUTO: 0 % (ref 0–0.5)
INR PPP: 1.4 (ref 0.9–1.1)
LYMPHOCYTES # BLD: 0.9 K/UL (ref 0.8–3.5)
LYMPHOCYTES NFR BLD: 21 % (ref 12–49)
MCH RBC QN AUTO: 33.1 PG (ref 26–34)
MCHC RBC AUTO-ENTMCNC: 33 G/DL (ref 30–36.5)
MCV RBC AUTO: 100.3 FL (ref 80–99)
MONOCYTES # BLD: 0.5 K/UL (ref 0–1)
MONOCYTES NFR BLD: 12 % (ref 5–13)
NEUTS SEG # BLD: 2.9 K/UL (ref 1.8–8)
NEUTS SEG NFR BLD: 66 % (ref 32–75)
NRBC # BLD: 0 K/UL (ref 0–0.01)
NRBC BLD-RTO: 0 PER 100 WBC
PLATELET # BLD AUTO: 172 K/UL (ref 150–400)
PMV BLD AUTO: 11.2 FL (ref 8.9–12.9)
POTASSIUM SERPL-SCNC: 3.9 MMOL/L (ref 3.5–5.1)
PROT SERPL-MCNC: 6.8 G/DL (ref 6.4–8.2)
PROTHROMBIN TIME: 14.4 SEC (ref 9–11.1)
RBC # BLD AUTO: 3.78 M/UL (ref 3.8–5.2)
SODIUM SERPL-SCNC: 141 MMOL/L (ref 136–145)
WBC # BLD AUTO: 4.4 K/UL (ref 3.6–11)

## 2022-04-17 PROCEDURE — 85025 COMPLETE CBC W/AUTO DIFF WBC: CPT

## 2022-04-17 PROCEDURE — 36415 COLL VENOUS BLD VENIPUNCTURE: CPT

## 2022-04-17 PROCEDURE — 83880 ASSAY OF NATRIURETIC PEPTIDE: CPT

## 2022-04-17 PROCEDURE — 93971 EXTREMITY STUDY: CPT

## 2022-04-17 PROCEDURE — 80053 COMPREHEN METABOLIC PANEL: CPT

## 2022-04-17 PROCEDURE — 74011250637 HC RX REV CODE- 250/637: Performed by: EMERGENCY MEDICINE

## 2022-04-17 PROCEDURE — 99284 EMERGENCY DEPT VISIT MOD MDM: CPT

## 2022-04-17 PROCEDURE — 85610 PROTHROMBIN TIME: CPT

## 2022-04-17 RX ORDER — ACETAMINOPHEN 500 MG
1000 TABLET ORAL ONCE
Status: COMPLETED | OUTPATIENT
Start: 2022-04-17 | End: 2022-04-17

## 2022-04-17 RX ADMIN — ACETAMINOPHEN 1000 MG: 500 TABLET ORAL at 10:56

## 2022-04-17 NOTE — DISCHARGE INSTRUCTIONS
You should prop your legs up while seated and wear compression stockings during the day.  You INR was too low, please call your doctor in the morning to adjust medication and make an appointment to recheck your INR

## 2022-04-17 NOTE — ED PROVIDER NOTES
EMERGENCY DEPARTMENT HISTORY AND PHYSICAL EXAM      Date: 4/17/2022  Patient Name: Coreen Anderson    History of Presenting Illness     Chief Complaint   Patient presents with    Peripheral Edema     pt via wheelchair into triage with cc of lower leg edema, worse in the right lower leg over the past 2 weeks. pt not taking a diuretic currently. History Provided By: Patient    HPI: Coreen Anderson, 80 y.o. 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 Patient has had ongoing issues with pain and swelling primarily in her right lower extremity for at least 2 weeks. Was seen by her PCP on 2 separate occasions but patient states that \"nothing was done. \"  Her son therefore brought her to the emergency department today as she is continuing to complain of pain. There has been no acute change. No falls or trauma. PCP: Jeni Orosco MD    There are no other complaints, changes, or physical findings at this time. Current Outpatient Medications   Medication Sig Dispense Refill    phenytoin ER (DILANTIN ER) 100 mg ER capsule TAKE 2 CAPSULES TWICE DAILY 360 Capsule 5    lidocaine (LIDODERM) 5 % Apply patch to the affected area for 12 hours a day and remove for 12 hours a day. (Patient not taking: Reported on 8/18/2021) 1 Each 0    methocarbamoL (ROBAXIN) 750 mg tablet Take 1 Tab by mouth three (3) times daily. (Patient not taking: Reported on 8/18/2021) 15 Tab 0    vit C/E/Zn/coppr/lutein/zeaxan (PRESERVISION AREDS-2 PO) Take 1 Tab by mouth daily.  amLODIPine (NORVASC) 5 mg tablet Take 1 Tab by mouth daily. (Patient not taking: Reported on 8/18/2021) 30 Tab 1    pantoprazole (PROTONIX) 40 mg tablet Take 1 Tab by mouth Before breakfast and dinner.  (Patient not taking: Reported on 8/18/2021) 60 Tab 1    biotin 5,000 mcg TbDi Take 2 Tabs by mouth two (2) times a day.  cholecalciferol (VITAMIN D3) 1,000 unit tablet Take 2 Tabs by mouth two (2) times a day.  acetaminophen (TYLENOL) 325 mg tablet Take 2 Tabs by mouth every six (6) hours as needed for Pain or Fever. 40 Tab 0    polyethylene glycol (MIRALAX) 17 gram packet Take 1 Packet by mouth daily. 30 Each 1    calcium carbonate (OS-ANGUS) 500 mg calcium (1,250 mg) tablet Take 1 Tab by mouth two (2) times a day. (Patient not taking: Reported on 8/18/2021)      cranberry extract 450 mg tab Take 1 Tab by mouth daily. (Patient not taking: Reported on 8/18/2021)      levothyroxine (SYNTHROID) 88 mcg tablet Take 1 Tab by mouth daily.  MULTI-VITAMIN PO Take 1 Cap by mouth daily. Past History     Past Medical History:  Past Medical History:   Diagnosis Date    Arthritis     Cancer ([address] 2002    brain tumor, benign (meningioma)    Diabetes ([address]     questionable?    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    Seizures ([address] 2002    caused by brain tumor    [facility]embolus [facility])     Thyroid disease      Past Surgical History:  Past Surgical History:   Procedure Laterality Date    COLORECTAL SCRN; HI RISK IND  8/26/2014         HX BACK SURGERY  1970s    HX ORTHOPAEDIC  1990s    right wrist    NEUROLOGICAL PROCEDURE UNLISTED  2002    brain tumor removed    AR INS NEW/RPLCMT PRM PM W/TRANSV ELTRD ATRIAL&VENT N/A 10/6/2020    INSERT PPM DUAL performed by Joel Hassan MD at [facility] CARDIAC CATH LAB     Family History:  Family History   Problem Relation Age of Onset    Cancer Mother         breast    Alzheimer's Disease Mother     Heart Disease Father     Kidney Disease Sister         dialysis    Seizures Daughter      Social History:  Social History     Tobacco Use    Smoking status: Never Smoker    Smokeless tobacco: Never Used   Substance Use Topics    Alcohol use: Yes     Comment: glass of wine occasionally    Drug use: No     Allergies: Allergies   Allergen Reactions    Bactrim [Sulfamethoprim] Unable to Obtain    Penicillins Itching     Patient not sure she is allergic    Sulfa (Sulfonamide Antibiotics) Nausea and Vomiting     Review of Systems   Review of Systems   Constitutional: Negative for chills and fever. HENT: Negative for congestion, rhinorrhea and sore throat. Respiratory: Negative for cough and shortness of breath. Cardiovascular: Positive for leg swelling. Negative for chest pain. Gastrointestinal: Negative for abdominal pain, nausea and vomiting. Genitourinary: Negative for dysuria and urgency. Skin: Negative for rash. Neurological: Negative for dizziness, light-headedness and headaches. All other systems reviewed and are negative. Physical Exam   Physical Exam  Vitals and nursing note reviewed. Constitutional:       General: She is not in acute distress. Appearance: She is well-developed. HENT:      Head: Normocephalic and atraumatic. Eyes:      Conjunctiva/sclera: Conjunctivae normal.      Pupils: Pupils are equal, round, and reactive to light. Cardiovascular:      Rate and Rhythm: Normal rate and regular rhythm. Pulmonary:      Effort: Pulmonary effort is normal. No respiratory distress. Breath sounds: Normal breath sounds. No stridor. Abdominal:      General: There is no distension. Palpations: Abdomen is soft. Tenderness: There is no abdominal tenderness. Musculoskeletal:         General: Normal range of motion. Cervical back: Normal range of motion. Right lower le+ Edema present.       Comments: 2+ pitting edema in the right calf, mild erythema over the medial ankle that is blanching, 2+ DP pulse, no significant warmth or tenderness to touch   Skin:     General: Skin is warm and dry. Neurological:      Mental Status: She is alert and oriented to person, place, and time. Diagnostic Study Results   Labs -     Recent Results (from the past 12 hour(s))   CBC WITH AUTOMATED DIFF    Collection Time: 04/17/22  9:11 AM   Result Value Ref Range    WBC 4.4 3.6 - 11.0 K/uL    RBC 3.78 (L) 3.80 - 5.20 M/uL    HGB 12.5 11.5 - 16.0 g/dL    HCT 37.9 35.0 - 47.0 %    .3 (H) 80.0 - 99.0 FL    MCH 33.1 26.0 - 34.0 PG    MCHC 33.0 30.0 - 36.5 g/dL    RDW 13.0 11.5 - 14.5 %    PLATELET 545 134 - 699 K/uL    MPV 11.2 8.9 - 12.9 FL    NRBC 0.0 0  WBC    ABSOLUTE NRBC 0.00 0.00 - 0.01 K/uL    NEUTROPHILS 66 32 - 75 %    LYMPHOCYTES 21 12 - 49 %    MONOCYTES 12 5 - 13 %    EOSINOPHILS 0 0 - 7 %    BASOPHILS 1 0 - 1 %    IMMATURE GRANULOCYTES 0 0.0 - 0.5 %    ABS. NEUTROPHILS 2.9 1.8 - 8.0 K/UL    ABS. LYMPHOCYTES 0.9 0.8 - 3.5 K/UL    ABS. MONOCYTES 0.5 0.0 - 1.0 K/UL    ABS. EOSINOPHILS 0.0 0.0 - 0.4 K/UL    ABS. BASOPHILS 0.0 0.0 - 0.1 K/UL    ABS. IMM. GRANS. 0.0 0.00 - 0.04 K/UL    DF AUTOMATED     METABOLIC PANEL, COMPREHENSIVE    Collection Time: 04/17/22  9:11 AM   Result Value Ref Range    Sodium 141 136 - 145 mmol/L    Potassium 3.9 3.5 - 5.1 mmol/L    Chloride 107 97 - 108 mmol/L    CO2 31 21 - 32 mmol/L    Anion gap 3 (L) 5 - 15 mmol/L    Glucose 83 65 - 100 mg/dL    BUN 16 6 - 20 MG/DL    Creatinine 0.71 0.55 - 1.02 MG/DL    BUN/Creatinine ratio 23 (H) 12 - 20      GFR est AA >60 >60 ml/min/1.73m2    GFR est non-AA >60 >60 ml/min/1.73m2    Calcium 9.1 8.5 - 10.1 MG/DL    Bilirubin, total 0.3 0.2 - 1.0 MG/DL    ALT (SGPT) 19 12 - 78 U/L    AST (SGOT) 12 (L) 15 - 37 U/L    Alk.  phosphatase 89 45 - 117 U/L    Protein, total 6.8 6.4 - 8.2 g/dL    Albumin 3.5 3.5 - 5.0 g/dL    Globulin 3.3 2.0 - 4.0 g/dL    A-G Ratio 1.1 1.1 - 2.2     NT-PRO BNP    Collection Time: 04/17/22  9:11 AM   Result Value Ref Range    NT pro- <450 PG/ML   PROTHROMBIN TIME + INR    Collection Time: 04/17/22  9:11 AM   Result Value Ref Range    INR 1.4 (H) 0.9 - 1.1      Prothrombin time 14.4 (H) 9.0 - 11.1 sec       Radiologic Studies -   DUPLEX LOWER EXT VENOUS RIGHT   Final Result        No results found. Medical Decision Making   I am the first provider for this patient. I reviewed the vital signs, available nursing notes, past medical history, past surgical history, family history and social history. Vital Signs-Reviewed the patient's vital signs. Patient Vitals for the past 12 hrs:   Temp Pulse Resp BP SpO2   04/17/22 0913  72 19 (!) 153/74    04/17/22 0858  65 22 (!) 151/76    04/17/22 0843 98.2 °F (36.8 °C) 90 16 (!) 163/72 100 %       Pulse Oximetry Analysis - 100% on ra    Cardiac Monitor:   Rate: 72 bpm  Rhythm: Normal Sinus Rhythm        Records Reviewed: Nursing Notes and Old Medical Records    Provider Notes (Medical Decision Making):   Patient presents with a chief complaint of right lower extremity swelling and pain. She does have pitting edema in her right lower extremity that is significantly greater than left. She does have some erythema that has not changed for last 2 weeks. Complaint has been evaluated by her PCP x2. Exam not consistent with cellulitis, consistent with venous stasis changes. Will check duplex to rule out DVT though less likely as the patient is anticoagulated. Also check INR level and basic labs. ED Course:   Initial assessment performed. The patients presenting problems have been discussed, and they are in agreement with the care plan formulated and outlined with them. I have encouraged them to ask questions as they arise throughout their visit. Duplex negative for acute DVT. No leukocytosis. INR is slightly subtherapeutic. Patient son states he will call tomorrow regarding dosing.   Stable for discharge       Procedures:  Procedures    Critical Care:  none    Disposition:  Discharge Note:  The patient has been re-evaluated and is ready for discharge. Reviewed available results with patient. Counseled patient on diagnosis and care plan. Patient has expressed understanding, and all questions have been answered. Patient agrees with plan and agrees to follow up as recommended, or to return to the ED if their symptoms worsen. Discharge instructions have been provided and explained to the patient, along with reasons to return to the ED. PLAN:  1. Discharge Medication List as of 4/17/2022 10:48 AM        2. Follow-up Information     Follow up With Specialties Details Why Contact Info    Christian Paris MD [facility] Schedule an appointment as soon as possible for a visit   [address] [place] [address] [phone]      [facility] EMERGENCY DEPT Emergency Medicine  As needed, If symptoms worsen [address] [place] [address]  [address] [facility]  [phone]        Return to ED if worse     Diagnosis     Clinical Impression:   1. Swelling of right lower extremity    2. Subtherapeutic international normalized ratio (INR)    3. Peripheral edema            Please note that this dictation was completed with [employer], the computer voice recognition software. Quite often unanticipated grammatical, syntax, homophones, and other interpretive errors are inadvertently transcribed by the computer software. Please disregard these errors.   Please excuse any errors that have escaped final proofreading

## 2022-04-17 NOTE — ED TRIAGE NOTES
pts son gave RN a letter in triage from pts PCP, letter stated that PCP felt that pt no longer has the capacity to make own healthcare decisions due to progressed dementia. Pt lives at home currently with her 2 children.

## 2022-06-21 ENCOUNTER — TELEPHONE (OUTPATIENT)
Dept: NEUROLOGY | Age: 87
End: 2022-06-21

## 2022-06-21 NOTE — TELEPHONE ENCOUNTER
Pt's son is in the process of trying to put mother and sister into an assisted living home. He cannot take care of them anymore. Would like to speak to a nurse about the process. Romeo Alanis wants to make Alex Duke aware of the situation since she will be seeing mom for the first time in Aug. Similar message was sent to Mo, on sister's account.

## 2022-06-21 NOTE — TELEPHONE ENCOUNTER
Next appt 8/10/22 with SHINE Ty the son is concerned with patient mental status and has burst of angry outburst recommend he cont PCP for further evaluation can check with county for assistance  until able to be seen by neurology will send packet for [employer].

## 2022-08-10 ENCOUNTER — OFFICE VISIT (OUTPATIENT)
Dept: NEUROLOGY | Age: 87
End: 2022-08-10
Payer: MEDICARE

## 2022-08-10 VITALS
WEIGHT: 120 LBS | HEIGHT: 64 IN | BODY MASS INDEX: 20.49 KG/M2 | SYSTOLIC BLOOD PRESSURE: 120 MMHG | HEART RATE: 56 BPM | DIASTOLIC BLOOD PRESSURE: 80 MMHG | RESPIRATION RATE: 16 BRPM | TEMPERATURE: 98.4 F | OXYGEN SATURATION: 100 %

## 2022-08-10 DIAGNOSIS — R41.82 ALTERED MENTAL STATUS, UNSPECIFIED ALTERED MENTAL STATUS TYPE: Primary | ICD-10-CM

## 2022-08-10 DIAGNOSIS — R45.1 AGITATION: ICD-10-CM

## 2022-08-10 DIAGNOSIS — R41.3 MEMORY LOSS: ICD-10-CM

## 2022-08-10 DIAGNOSIS — G40.209 COMPLEX PARTIAL SEIZURE EVOLVING TO GENERALIZED SEIZURE (HCC): ICD-10-CM

## 2022-08-10 PROCEDURE — 99214 OFFICE O/P EST MOD 30 MIN: CPT | Performed by: NURSE PRACTITIONER

## 2022-08-10 PROCEDURE — 1123F ACP DISCUSS/DSCN MKR DOCD: CPT | Performed by: NURSE PRACTITIONER

## 2022-08-10 RX ORDER — ARIPIPRAZOLE 2 MG/1
2 TABLET ORAL
COMMUNITY
Start: 2022-07-26

## 2022-08-10 RX ORDER — WARFARIN 3 MG/1
3 TABLET ORAL DAILY
COMMUNITY

## 2022-08-10 RX ORDER — FUROSEMIDE 20 MG/1
20 TABLET ORAL DAILY
COMMUNITY
Start: 2022-05-05

## 2022-08-10 NOTE — PROGRESS NOTES
[facility]  [facility] [address] [place] [address]  Maddie Dias  Tel: [phone]  Fax: [phone]      Date:  08/10/22     Name:  Anitra Burns  :  1932  MRN:  [id number]     PCP:  Bridger Peña MD    Chief Complaint   Patient presents with    Follow-up    Memory Loss     Cognitive decline frequent burst of anger       HISTORY OF PRESENT ILLNESS:  Patient presents today for \"I am not even sure why I am here. \"  She denies any issues. Patient's son was with her, who presented with a letter from Dr. Sadie Yañez. She was not competent to make her own decisions. Patient's son and daughter lives with her. Watches a lot of tv, reads a lot, she claims she lives a good life, but she keeps saying she could die tomorrow. Patient notes she is ready to go. Dizzy and every now and then. Seizures: none. No driving, gave it up voluntarily. No pain. Recap from last visit  with Dr Jarrett Li: Patient with new problem of dizziness, we suggested vestibular therapy, but she refuses, so we will give her vestibular exercises to do, and check a carotid Doppler study making sure there is no cerebrovascular insufficiency, and check her Dilantin level to rule out toxicity. She refuses Antivert at this time. Patient needs blood levels to determine how therapeutic her medication is and to rule out Dilantin toxicity as a cause of her dizziness. She does not want to do MRI of the brain to rule out recurrent disease or other new structural brain lesions because she feels she is stable  She also does not want to repeat an EEG and just wants to stay on her medication  Patient will call for results of her tests, and continue her medication at its current dose in the interim  The that she had a breakthrough seizure 3 years after her surgery with seemed to be a poor prognostic sign for complete withdrawal of her Dilantin and she agrees to take  She is to continue her vitamins and vitamin D.  And remain mentally and physically active in the interim. We discussed new medications with less side effects, but she was somewhat equivocal about these  Followup in 12 months time or earlier if needed depending on the results of her tests and desire for change in therapy    REVIEW OF SYSTEMS:     Review of Systems   Eyes: Negative. Neurological:  Positive for dizziness. Negative for seizures. Psychiatric/Behavioral:  Positive for depression and memory loss. Negative for hallucinations. The patient is not nervous/anxious and does not have insomnia. All other systems reviewed and are negative. Current Outpatient Medications   Medication Sig    ARIPiprazole (ABILIFY) 2 mg tablet Take 2 mg by mouth nightly as needed. warfarin (COUMADIN) 3 mg tablet Take 3 mg by mouth in the morning. As directed currently every day omits saturday    phenytoin ER (DILANTIN ER) 100 mg ER capsule TAKE 2 CAPSULES TWICE DAILY    vit C/E/Zn/coppr/lutein/zeaxan (PRESERVISION AREDS-2 PO) Take 1 Tab by mouth daily. biotin 5,000 mcg TbDi Take 2 Tabs by mouth two (2) times a day. cholecalciferol (VITAMIN D3) 1,000 unit tablet Take 2 Tabs by mouth two (2) times a day. acetaminophen (TYLENOL) 325 mg tablet Take 2 Tabs by mouth every six (6) hours as needed for Pain or Fever. calcium carbonate (OS-ANGUS) 500 mg calcium (1,250 mg) tablet Take 1 Tablet by mouth two (2) times a day. levothyroxine (SYNTHROID) 88 mcg tablet Take 1 Tab by mouth daily. furosemide (LASIX) 20 mg tablet Take 20 mg by mouth in the morning. No current facility-administered medications for this visit. Allergies   Allergen Reactions    Bactrim [Sulfamethoprim] Unable to Obtain    Penicillins Itching     Patient not sure she is allergic    Sulfa (Sulfonamide Antibiotics) Nausea and Vomiting     Past Medical History:   Diagnosis Date    Arthritis     Cancer ([facility] 2002    brain tumor, benign (meningioma)    Diabetes ([facility]     questionable? Gastrointestinal disorder     diverticulitis    Hypothyroid 5/11/2015    Localization-related (focal) (partial) epilepsy and epileptic syndromes with complex partial seizures, without mention of intractable epilepsy 5/11/2015    Other ill-defined conditions(799.89)     loss of vision in left eye    Seizures ([facility] 2002    caused by brain tumor    Thromboembolus [facility])     Thyroid disease      Past Surgical History:   Procedure Laterality Date    COLORECTAL SCRN; HI RISK IND  8/26/2014         HX BACK SURGERY  1970s    HX ORTHOPAEDIC  1990s    right wrist    NEUROLOGICAL PROCEDURE UNLISTED  2002    brain tumor removed    [place] INS NEW/RPLCMT PRM PM W/TRANSV ELTRD ATRIAL&VENT N/A 10/6/2020    INSERT PPM DUAL performed by Angel Stephenson MD at [facility] CARDIAC CATH LAB     Social History     Socioeconomic History    Marital status:      Spouse name: Not on file    Number of children: Not on file    Years of education: Not on file    Highest education level: Not on file   Occupational History    Not on file   Tobacco Use    Smoking status: Never    Smokeless tobacco: Never   Vaping Use    Vaping Use: Never used   Substance and Sexual Activity    Alcohol use: Yes     Comment: glass of wine occasionally    Drug use: No    Sexual activity: Not on file   Other Topics Concern    Not on file   Social History Narrative    Not on file     Social Determinants of Health     Financial Resource Strain: Not on file   Food Insecurity: Not on file   Transportation Needs: Not on file   Physical Activity: Not on file   Stress: Not on file   Social Connections: Not on file   Intimate Partner Violence: Not on file   Housing Stability: Not on file     Family History   Problem Relation Age of Onset    Cancer Mother         breast    Alzheimer's Disease Mother     Heart Disease Father     Kidney Disease Sister         dialysis    Seizures Daughter      MRI Results (most recent):  Results from Hospital Encounter encounter on 09/09/11    MRI BRAIN W AND W/O CONTRAST    Narrative  **Final Report**      ICD Codes / Adm. 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 Technical factors : diffusion imaging mass at the L T1-weighted pre- and  post-12 cc IV Magnevist, axial T1-weighted pre- enteric and postcontrast T2  and FLAIR gradient-echo coronal T1-weighted contrast and T2-weighted. No  prior . Diffusion imaging is negative. There is mild atrophy and white matter  disease. Major intracranial vessels are patent. There is no extra-axial  fluid collection hemorrhage or shift. There is chronic frontal atrophy. This  is nonspecific.it is likely  related to prior surgery. There is no  associated residual enhancement. No masses or mass effect. IMPRESSION: Postsurgical changes. Atrophy and white matter disease but no  acute findings. Signing/Reading Doctor: David Mcgee ([id number])  Transcribed:  on 09/09/2011  Approved: David Mcgee ([id number])  09/09/2011        Distribution:  Attending Doctor: Jesica Rivera    PHYSICAL EXAMINATION:    Visit Vitals  /80 (BP 1 Location: Left upper arm, BP Patient Position: Sitting, BP Cuff Size: Adult)   Pulse (!) 56   Temp 98.4 °F (36.9 °C) (Temporal)   Resp 16   Ht 5' 4\" (1.626 m)   Wt 120 lb (54.4 kg)   SpO2 100%   BMI 20.60 kg/m²     General:  Well defined, nourished, slightly unkempt in appearance, patient was irritable and argumentative throughout majority of the visit. Musculoskeletal:  Extremities revealed no edema and had full range of motion of joints. Psych: Agitated mood.     NEUROLOGICAL EXAMINATION:     Mental Status:   Alert and oriented to person (herself) but could not recall her son's name, cannot recall particular dates, but knew the year patient could not perform 3 word recall, remaining portion of exam limited due to argumentative aspects, Attention span and concentration are limited. Clear speech. Cranial Nerves:   PERRLA. Visual fields were full  EOM: no evidence of nystagmus  Facial sensation:  normal and symmetric  Facial motor: normal and symmetric, no facial droop noted. Hearing intact  SCM strength intact  Tongue: midline without fasciculations    Motor Examination: Normal tone. 4+ /5 muscle strength in bilateral upper extremities. No cogwheel rigidity. No muscle wasting, no twitching or fasciculation noted. Coordination:   Finger to nose and rapid arm movement testing was normal.  No resting or intention tremor. Negative pronator drift. Gait and Station: Not assessed. Reflexes:  DTRs 2+ in bilateral biceps, brachioradialis, patella . ASSESSMENT AND PLAN      ICD-10-CM ICD-9-CM    1. Altered mental status, unspecified altered mental status type  R41.82 780.97 URINALYSIS W/ REFLEX CULTURE      CBC WITH AUTOMATED DIFF      METABOLIC PANEL, COMPREHENSIVE      CT HEAD W WO CONT      2. 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 Memory loss  R41.3 780.93       4. Agitation  R45.1 307.9         1. Altered mental status, unspecified altered mental status type: I have contacted her primary care Dr. Andrew Vaughan office for further conversation about patient's presentation today. I would like to pursue a brain MRI to rule out any acute abnormalities however patient was defiantly stating she would not get this done, therefore we will proceed with a head CT, intervene based on results. Labs have been ordered as well to rule out infectious process. Otherwise again we need to consult with her primary care's office to further assess these changes. -     URINALYSIS W/ REFLEX CULTURE; Future  -     CBC WITH AUTOMATED DIFF; Future  -     METABOLIC PANEL, COMPREHENSIVE; Future  -     CT HEAD W WO CONT; Future  2.  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 Patient is not driving. 3. Memory loss: Uncertain as to whether patient is on any medications for memory loss it looks as though her primary care has her on Abilify, for some of her behavior, consider modifying this or adding supplemental medication. 4. Agitation: Again have reached out to primary care's office to further assess this patient's condition, defer adding any new prescriptions at this time. We will try to rule out any acute abnormalities, infections via CT as well as labs intervene based on results. Patient and/or family verbalized understand of all instructions and all questions/concerns were addressed. Safety/side effects of medications discussed. Patient remains a complex patient secondary to polypharmacy, significant comorbid conditions, and use of high-risk medications which complicate the decision making process related to patient's neurologic diagnosis. No set follow-up at this time, will consult with primary care modify plan of care based on results of test and discussion with primary care.       Antonio Lopez, FNP-BC

## 2022-08-10 NOTE — LETTER
8/10/2022    Patient: Chika Shannon   YOB: 1932   Date of Visit: 8/10/2022     Edgardo Moss MD  [address] [place] [address]  Via Fax: [phone]    Dear Edgardo Moss MD,      Thank you for referring Ms. Ana Alvarado to [address] [place] [address] for evaluation. My notes for this consultation are attached. If you have questions, please do not hesitate to call me. I look forward to following your patient along with you.       Sincerely,    Jemal Velez NP

## 2022-09-17 DIAGNOSIS — G40.209 COMPLEX PARTIAL SEIZURE EVOLVING TO GENERALIZED SEIZURE (HCC): ICD-10-CM

## 2022-09-17 DIAGNOSIS — G40.209 LOCALIZATION-RELATED FOCAL EPILEPSY WITH COMPLEX PARTIAL SEIZURES (HCC): ICD-10-CM

## 2022-09-17 DIAGNOSIS — Z86.011 H/O MENINGIOMA OF THE BRAIN: ICD-10-CM

## 2022-09-17 RX ORDER — PHENYTOIN SODIUM 100 MG/1
CAPSULE, EXTENDED RELEASE ORAL
Qty: 360 CAPSULE | Refills: 5 | Status: SHIPPED | OUTPATIENT
Start: 2022-09-17

## 2022-10-31 ENCOUNTER — TRANSCRIBE ORDER (OUTPATIENT)
Dept: SCHEDULING | Age: 87
End: 2022-10-31

## 2022-10-31 DIAGNOSIS — Z12.31 VISIT FOR SCREENING MAMMOGRAM: Primary | ICD-10-CM

## 2022-12-08 ENCOUNTER — HOSPITAL ENCOUNTER (OUTPATIENT)
Dept: MAMMOGRAPHY | Age: 87
Discharge: HOME | End: 2022-12-08
Attending: FAMILY MEDICINE
Payer: MEDICARE

## 2022-12-08 DIAGNOSIS — Z12.31 VISIT FOR SCREENING MAMMOGRAM: ICD-10-CM

## 2022-12-08 PROCEDURE — 77067 SCR MAMMO BI INCL CAD: CPT

## 2022-12-08 PROCEDURE — 77063 BREAST TOMOSYNTHESIS BI: CPT

## 2023-03-13 ENCOUNTER — HOSPITAL ENCOUNTER (INPATIENT)
Age: 88
LOS: 5 days | Discharge: NURSING, REHAB OR LONG-TERM CARE | End: 2023-03-18
Attending: STUDENT IN AN ORGANIZED HEALTH CARE EDUCATION/TRAINING PROGRAM | Admitting: INTERNAL MEDICINE
Payer: MEDICARE

## 2023-03-13 ENCOUNTER — APPOINTMENT (OUTPATIENT)
Dept: CT IMAGING | Age: 88
End: 2023-03-13
Attending: STUDENT IN AN ORGANIZED HEALTH CARE EDUCATION/TRAINING PROGRAM
Payer: MEDICARE

## 2023-03-13 DIAGNOSIS — R29.898 RIGHT ARM WEAKNESS: ICD-10-CM

## 2023-03-13 DIAGNOSIS — I65.23 BILATERAL CAROTID ARTERY STENOSIS: ICD-10-CM

## 2023-03-13 DIAGNOSIS — R29.898 RIGHT LEG WEAKNESS: Primary | ICD-10-CM

## 2023-03-13 DIAGNOSIS — R29.898 BILATERAL LEG WEAKNESS: ICD-10-CM

## 2023-03-13 DIAGNOSIS — G40.209 COMPLEX PARTIAL SEIZURE EVOLVING TO GENERALIZED SEIZURE (HCC): ICD-10-CM

## 2023-03-13 DIAGNOSIS — I67.89 CEREBRAL MICROVASCULAR DISEASE: ICD-10-CM

## 2023-03-13 PROBLEM — R77.8 ELEVATED TROPONIN: Status: ACTIVE | Noted: 2023-03-13

## 2023-03-13 PROBLEM — R79.89 ELEVATED TROPONIN: Status: ACTIVE | Noted: 2023-03-13

## 2023-03-13 LAB
ALBUMIN SERPL-MCNC: 3.7 G/DL (ref 3.5–5)
ALBUMIN/GLOB SERPL: 1 (ref 1.1–2.2)
ALP SERPL-CCNC: 71 U/L (ref 45–117)
ALT SERPL-CCNC: 27 U/L (ref 12–78)
ANION GAP SERPL CALC-SCNC: 6 MMOL/L (ref 5–15)
APPEARANCE UR: ABNORMAL
AST SERPL-CCNC: 30 U/L (ref 15–37)
BACTERIA URNS QL MICRO: NEGATIVE /HPF
BASOPHILS # BLD: 0 K/UL (ref 0–0.1)
BASOPHILS NFR BLD: 0 % (ref 0–1)
BILIRUB SERPL-MCNC: 0.8 MG/DL (ref 0.2–1)
BILIRUB UR QL: NEGATIVE
BUN SERPL-MCNC: 24 MG/DL (ref 6–20)
BUN/CREAT SERPL: 24 (ref 12–20)
CALCIUM SERPL-MCNC: 9.8 MG/DL (ref 8.5–10.1)
CHLORIDE SERPL-SCNC: 110 MMOL/L (ref 97–108)
CO2 SERPL-SCNC: 27 MMOL/L (ref 21–32)
COLOR UR: ABNORMAL
COMMENT, HOLDF: NORMAL
CREAT SERPL-MCNC: 1.01 MG/DL (ref 0.55–1.02)
DIFFERENTIAL METHOD BLD: NORMAL
EOSINOPHIL # BLD: 0 K/UL (ref 0–0.4)
EOSINOPHIL NFR BLD: 0 % (ref 0–7)
EPITH CASTS URNS QL MICRO: ABNORMAL /LPF
ERYTHROCYTE [DISTWIDTH] IN BLOOD BY AUTOMATED COUNT: 13 % (ref 11.5–14.5)
GLOBULIN SER CALC-MCNC: 3.6 G/DL (ref 2–4)
GLUCOSE SERPL-MCNC: 107 MG/DL (ref 65–100)
GLUCOSE UR STRIP.AUTO-MCNC: NEGATIVE MG/DL
HCT VFR BLD AUTO: 39.9 % (ref 35–47)
HGB BLD-MCNC: 13.2 G/DL (ref 11.5–16)
HGB UR QL STRIP: NEGATIVE
IMM GRANULOCYTES # BLD AUTO: 0 K/UL (ref 0–0.04)
IMM GRANULOCYTES NFR BLD AUTO: 0 % (ref 0–0.5)
INR PPP: 1.1 (ref 0.9–1.1)
KETONES UR QL STRIP.AUTO: ABNORMAL MG/DL
LEUKOCYTE ESTERASE UR QL STRIP.AUTO: ABNORMAL
LYMPHOCYTES # BLD: 1 K/UL (ref 0.8–3.5)
LYMPHOCYTES NFR BLD: 17 % (ref 12–49)
MAGNESIUM SERPL-MCNC: 2.3 MG/DL (ref 1.6–2.4)
MCH RBC QN AUTO: 32.4 PG (ref 26–34)
MCHC RBC AUTO-ENTMCNC: 33.1 G/DL (ref 30–36.5)
MCV RBC AUTO: 97.8 FL (ref 80–99)
MONOCYTES # BLD: 0.6 K/UL (ref 0–1)
MONOCYTES NFR BLD: 10 % (ref 5–13)
NEUTS SEG # BLD: 4.1 K/UL (ref 1.8–8)
NEUTS SEG NFR BLD: 73 % (ref 32–75)
NITRITE UR QL STRIP.AUTO: NEGATIVE
NRBC # BLD: 0 K/UL (ref 0–0.01)
NRBC BLD-RTO: 0 PER 100 WBC
PH UR STRIP: 5 (ref 5–8)
PLATELET # BLD AUTO: 193 K/UL (ref 150–400)
PMV BLD AUTO: 12 FL (ref 8.9–12.9)
POTASSIUM SERPL-SCNC: 3.8 MMOL/L (ref 3.5–5.1)
PROT SERPL-MCNC: 7.3 G/DL (ref 6.4–8.2)
PROT UR STRIP-MCNC: 30 MG/DL
PROTHROMBIN TIME: 11.6 SEC (ref 9–11.1)
RBC # BLD AUTO: 4.08 M/UL (ref 3.8–5.2)
RBC #/AREA URNS HPF: ABNORMAL /HPF (ref 0–5)
SAMPLES BEING HELD,HOLD: NORMAL
SODIUM SERPL-SCNC: 143 MMOL/L (ref 136–145)
SP GR UR REFRACTOMETRY: 1.02
TROPONIN I SERPL HS-MCNC: 388 NG/L (ref 0–51)
UROBILINOGEN UR QL STRIP.AUTO: 1 EU/DL (ref 0.2–1)
WBC # BLD AUTO: 5.7 K/UL (ref 3.6–11)
WBC URNS QL MICRO: ABNORMAL /HPF (ref 0–4)

## 2023-03-13 PROCEDURE — 83735 ASSAY OF MAGNESIUM: CPT

## 2023-03-13 PROCEDURE — 65270000046 HC RM TELEMETRY

## 2023-03-13 PROCEDURE — 84484 ASSAY OF TROPONIN QUANT: CPT

## 2023-03-13 PROCEDURE — 70450 CT HEAD/BRAIN W/O DYE: CPT

## 2023-03-13 PROCEDURE — 93005 ELECTROCARDIOGRAM TRACING: CPT

## 2023-03-13 PROCEDURE — 85025 COMPLETE CBC W/AUTO DIFF WBC: CPT

## 2023-03-13 PROCEDURE — 74011250637 HC RX REV CODE- 250/637: Performed by: INTERNAL MEDICINE

## 2023-03-13 PROCEDURE — 99285 EMERGENCY DEPT VISIT HI MDM: CPT

## 2023-03-13 PROCEDURE — 81001 URINALYSIS AUTO W/SCOPE: CPT

## 2023-03-13 PROCEDURE — 36415 COLL VENOUS BLD VENIPUNCTURE: CPT

## 2023-03-13 PROCEDURE — 80053 COMPREHEN METABOLIC PANEL: CPT

## 2023-03-13 PROCEDURE — 85610 PROTHROMBIN TIME: CPT

## 2023-03-13 RX ORDER — TRAMADOL HYDROCHLORIDE 50 MG/1
50 TABLET ORAL
Status: DISCONTINUED | OUTPATIENT
Start: 2023-03-13 | End: 2023-03-18 | Stop reason: HOSPADM

## 2023-03-13 RX ORDER — HYDROXYZINE HYDROCHLORIDE 10 MG/1
25 TABLET, FILM COATED ORAL
Status: DISCONTINUED | OUTPATIENT
Start: 2023-03-13 | End: 2023-03-18 | Stop reason: HOSPADM

## 2023-03-13 RX ORDER — HYDRALAZINE HYDROCHLORIDE 20 MG/ML
10 INJECTION INTRAMUSCULAR; INTRAVENOUS
Status: DISCONTINUED | OUTPATIENT
Start: 2023-03-13 | End: 2023-03-18 | Stop reason: HOSPADM

## 2023-03-13 RX ORDER — PHENYTOIN SODIUM 100 MG/1
200 CAPSULE, EXTENDED RELEASE ORAL 2 TIMES DAILY
Status: DISCONTINUED | OUTPATIENT
Start: 2023-03-13 | End: 2023-03-18 | Stop reason: HOSPADM

## 2023-03-13 RX ORDER — LEVOTHYROXINE SODIUM 88 UG/1
88 TABLET ORAL DAILY
Status: DISCONTINUED | OUTPATIENT
Start: 2023-03-14 | End: 2023-03-14

## 2023-03-13 RX ORDER — ACETAMINOPHEN 650 MG/1
650 SUPPOSITORY RECTAL
Status: DISCONTINUED | OUTPATIENT
Start: 2023-03-13 | End: 2023-03-18 | Stop reason: HOSPADM

## 2023-03-13 RX ORDER — GUAIFENESIN 100 MG/5ML
81 LIQUID (ML) ORAL DAILY
Status: DISCONTINUED | OUTPATIENT
Start: 2023-03-14 | End: 2023-03-18 | Stop reason: HOSPADM

## 2023-03-13 RX ORDER — POLYETHYLENE GLYCOL 3350 17 G/17G
17 POWDER, FOR SOLUTION ORAL DAILY PRN
Status: DISCONTINUED | OUTPATIENT
Start: 2023-03-13 | End: 2023-03-18 | Stop reason: HOSPADM

## 2023-03-13 RX ORDER — ACETAMINOPHEN 325 MG/1
650 TABLET ORAL
Status: DISCONTINUED | OUTPATIENT
Start: 2023-03-13 | End: 2023-03-18 | Stop reason: HOSPADM

## 2023-03-13 RX ORDER — FUROSEMIDE 20 MG/1
20 TABLET ORAL DAILY
Status: DISCONTINUED | OUTPATIENT
Start: 2023-03-14 | End: 2023-03-18 | Stop reason: HOSPADM

## 2023-03-13 RX ORDER — LANOLIN ALCOHOL/MO/W.PET/CERES
1.5 CREAM (GRAM) TOPICAL
Status: DISCONTINUED | OUTPATIENT
Start: 2023-03-13 | End: 2023-03-18 | Stop reason: HOSPADM

## 2023-03-13 RX ORDER — LABETALOL HYDROCHLORIDE 5 MG/ML
5 INJECTION, SOLUTION INTRAVENOUS
Status: DISCONTINUED | OUTPATIENT
Start: 2023-03-13 | End: 2023-03-18 | Stop reason: HOSPADM

## 2023-03-13 RX ADMIN — PHENYTOIN SODIUM 200 MG: 100 CAPSULE ORAL at 21:56

## 2023-03-13 RX ADMIN — WARFARIN SODIUM 3 MG: 2 TABLET ORAL at 21:56

## 2023-03-13 NOTE — PROGRESS NOTES
Warfarin Dosing - Pharmacy Consult Note    Consulting Provider: Dr. Katie Meng  Indication: History of VTE/PE  Warfarin Dose prior to admission: 2 mg daily      Concurrent anticoagulants/antiplatelets: aspirin    Significant Drug Interactions:  phenytoin  Recent Labs     03/13/23  1450 03/13/23  1407   INR 1.1  --    HGB  --  13.2   PLT  --  193     [unfilled]    Date      INR       Dose  3/13       1.1         3 mg    Assessment/Plan  Goal INR: 2 - 3  INR 1.1, will give 3 mg dose of warfarin. Please confirm home regimen on day shift. Note preexisting DDI with phenytoin    Active problem list reviewed. INR orders are placed. Chart reviewed for pertinent labs, drug/diet interactions, and past doses. Documentation of patient's clinical condition was reviewed. Pharmacy Dosing:  Pharmacy will continue to follow.

## 2023-03-13 NOTE — ED NOTES
[facility] NOTE    IP UNIT CALLED NOTE IS READY: Yes Spoke to [address] [place] [address] Road    IF there are questions Call transferring nurse (your name) Monique Young RN at phone # [phone]    SITUATION/BACKGROUND:    Patient is being transferred to [address] Tele, Room# 135    Patient's Chief Complaint on arrival to ED was extremity weakness and is admitted for right leg weakness. CODE STATUS: Full Code    VITAL SIGNS (MUST BE WITHIN 1 HOUR OF TRANSFER TO IP UNIT:    TEMP: 98.5  PULSE: 76  RESP: 17  BP: 131/54  PAIN SCORE: 0  MEWS:       ISOLATION/PRECAUTIONS: No   ISOLATION TYPE: n/a    Called outstanding consults: Yes     Are there sign and held orders that need to be released? No   Are all STAT orders completed: Yes    STAT labs collected: Yes  REPEAT LACTIC ACID DUE? No  TIME DUE: n/a    Are there any titrating drips? No   If so what? n/a    The following personal items will be sent with the patient during transfer to the floor: All valuables:   ITEM:    ITEM:    ITEM:           ASSESSMENT:    CIWA Assessment: No  Last Score: n/a    NEURO:   NIH SCORE:   Total: 1 (03/13/23 1330)    [place] SCREENING:          NEURO ASSESSMENT:        Is patient impulsive? No   Is patient oriented? No    Do they follow commands? Yes  Is the patient ambulatory? Yes Device need 1 assist    FALL RISK? Yes   Is the [place] 1 Assessment completed? Yes  INTERVENTIONS: family at bedside, call bell within reach    INTEGUMENTARY:   IS THE PATIENT UNDRESSED? Yes     WOUNDS PRESENT? No  On admit to ED No   ARE THE WOUNDS DOCUMENTED? No     RESPIRATORY:   Is patient on Oxygen? No    OXYGEN: Oxygen Therapy  O2 Device: None (Room air) (03/13/23 1326)    CARDIAC:   Is cardiac monitoring ordered? Yes Last Rhythm: NSR  Patient to transfer with tele box on? Yes  Is patient using a LIFE VEST? No     LINE ACCESS:       /GI:   CONTINENT BOWEL/BLADDER? Yes  URINARY OUTPUT: voiding   Written Order for [place] [facility]?  No   CHRONIC OR ACUTE? n/a   If CHRONIC, is it 1days old, was it changed prior to specimen collection? No   WAS UA WITH REFLEX SENT TO LAB? Yes IF NO,  COLLECT AND SEND PRIOR TO TRANSPORT TO INPATIENT AREA    RESTRAINTS IN USE: No      IS DOCUMENTATION COMPLETE: No   Is there a current Order?  No  When does it ? n/a    Additional details as Needed:

## 2023-03-13 NOTE — ED PROVIDER NOTES
[facility] EMERGENCY DEPT  EMERGENCY DEPARTMENT ENCOUNTER       Pt Name: Adam Madrid  MRN: [id number]  [place] 9/5/1932  Date of evaluation: 3/13/2023  Provider: Carmel Rutledge MD   PCP: Claudio Fitzgerald MD  Note Started: 2:01 PM 3/13/23     CHIEF COMPLAINT       Chief Complaint   Patient presents with    Extremity Weakness        HISTORY OF PRESENT ILLNESS: 1 or more elements    History From: Patient  HPI Limitations : None     Adam Madrid is a 80 y.o. female with Pmhx listed below     Patient is a 80-year-old female with history of arthritis, seizures, PE on warfarin presenting with concern of generalized weakness and gait instability which began yesterday. Patient states that when she woke up yesterday she said she felt a funny feeling in her head and weakness of her lower extremities, states that by about evening time she could no longer move her legs bilaterally but feels that right leg is worse than left, states that she was unable to walk all night and this morning called her family who reported that she was on the floor. She denies any falls but states that she just could not get out of bed. Patient denies any recent changes such as fevers, chills, URI symptoms, nausea vomiting diarrhea or other acute symptoms today. Patient states she has no history of stroke, is on warfarin, no other complaints today. Nursing Notes were all reviewed and agreed with or any disagreements were addressed in the HPI. REVIEW OF SYSTEMS      Positives and Pertinent negatives as per HPI. PAST HISTORY     Past Medical History:  Past Medical History:   Diagnosis Date    Arthritis     Cancer ([facility] [address] 2002    brain tumor, benign (meningioma)    Diabetes ([facility] [address]     questionable?     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 ARIPIPRAZOLE (ABILIFY) 2 MG TABLET    Take 2 mg by mouth nightly as needed. BIOTIN 5,000 MCG TBDI    Take 2 Tabs by mouth two (2) times a day. CALCIUM CARBONATE (OS-ANGUS) 500 MG CALCIUM (1,250 MG) TABLET    Take 1 Tablet by mouth two (2) times a day. CHOLECALCIFEROL (VITAMIN D3) 1,000 UNIT TABLET    Take 2 Tabs by mouth two (2) times a day. FUROSEMIDE (LASIX) 20 MG TABLET    Take 20 mg by mouth in the morning. LEVOTHYROXINE (SYNTHROID) 88 MCG TABLET    Take 1 Tab by mouth daily. PHENYTOIN ER (DILANTIN ER) 100 MG ER CAPSULE    TAKE 2 CAPSULES TWICE DAILY    VIT C/E/ZN/COPPR/LUTEIN/ZEAXAN (PRESERVISION AREDS-2 PO)    Take 1 Tab by mouth daily. WARFARIN (COUMADIN) 3 MG TABLET    Take 3 mg by mouth in the morning.  As directed currently every day omits saturday       Past Surgical History:  Past Surgical History:   Procedure Laterality Date    COLORECTAL SCRN; HI RISK IND  8/26/2014         HX BACK SURGERY  1970s    HX ORTHOPAEDIC  1990s    right wrist    NEUROLOGICAL PROCEDURE UNLISTED  2002    brain tumor removed    CT INS NEW/RPLCMT PRM PM W/TRANSV ELTRD ATRIAL&VENT N/A 10/6/2020    INSERT PPM DUAL performed by Lj Albarran MD at [facility] CARDIAC CATH LAB       Family History:  Family History   Problem Relation Age of Onset    Cancer Mother         breast    Alzheimer's Disease Mother     Heart Disease Father     Kidney Disease Sister         dialysis    Seizures Daughter        Social History:  Social History     Tobacco Use    Smoking status: Never    Smokeless tobacco: Never   Vaping Use    Vaping Use: Never used   Substance Use Topics    Alcohol use: Yes     Comment: glass of wine occasionally Drug use: No       Allergies: Allergies   Allergen Reactions    Bactrim [Sulfamethoprim] Unable to Obtain    Penicillins Itching     Patient not sure she is allergic    Sulfa (Sulfonamide Antibiotics) Nausea and Vomiting       PHYSICAL EXAM      ED Triage Vitals [03/13/23 1326]   ED Encounter Vitals Group      /73      Pulse (Heart Rate) 60      Resp Rate 15      Temp 98.5 °F (36.9 °C)      Temp src       O2 Sat (%) 100 %      Weight 120 lb      Height 5' 4\"        Physical Exam  Vitals reviewed. Constitutional:       General: She is not in acute distress. Appearance: Normal appearance. She is not ill-appearing, toxic-appearing or diaphoretic. HENT:      Head: Normocephalic. Mouth/Throat:      Mouth: Mucous membranes are moist.   Eyes:      Conjunctiva/sclera: Conjunctivae normal.   Cardiovascular:      Rate and Rhythm: Normal rate. Pulmonary:      Effort: Pulmonary effort is normal. No respiratory distress. Abdominal:      General: Abdomen is flat. Tenderness: There is no abdominal tenderness. There is no guarding or rebound. Musculoskeletal:         General: No deformity. Cervical back: Neck supple. Right lower leg: Edema present. Left lower leg: Edema present. Skin:     General: Skin is warm and dry. Neurological:      General: No focal deficit present. Mental Status: She is alert and oriented to person, place, and time. Cranial Nerves: No cranial nerve deficit. Sensory: No sensory deficit. Motor: Weakness (RLE) present.       Comments: Patient with significant bilateral lower extremity weakness, cannot lift against gravity on either leg but strength appears slightly worse on right compared to left in the distal muscles   Psychiatric:         Mood and Affect: Mood normal.        EMERGENCY DEPARTMENT COURSE and DIFFERENTIAL DIAGNOSIS/MDM   CC/HPI Summary, DDx, ED Course, and Reassessment:     MDM  Number of Diagnoses or Management Options  Right leg weakness  Diagnosis management comments: Patient is a 5year-old female presenting with concern of lower extremity weakness, appears to have right greater than left, patient is concerned that she may have had a stroke, patient is on warfarin, denies any falls but states that she cannot get out of bed today and was crawling on the floor. Patient states that she started feeling funny feeling in her head and weakness yesterday, given that it has been over 24 hours do not feel that this patient requires a stroke alert but I am concerned of possible CVA consideration, due to this we will plan on CT head as well as CTA head and neck, will obtain metabolic and infectious work-up including CBC, CMP, urine.   Consideration of anemia versus electrolyte instability, versus UTI as cause of symptoms, will plan on imaging, lab work and reevaluation, currently patient is having trouble lifting both legs against gravity, due to age and gait instability patient will likely need admission for this pending work up              Vitals:    03/13/23 1326 03/13/23 1407 03/13/23 1415 03/13/23 1445   BP: 134/73 (!) 136/59 128/61 (!) 131/54   Pulse: 60 66 69 76   Resp: 15 16 18 17   Temp: 98.5 °F (36.9 °C)      SpO2: 100%      Weight: 54.4 kg (120 lb)      Height: 5' 4\" (1.626 m)           Patient was given the following medications:  Medications   iopamidoL (ISOVUE-370) 370 mg iodine /mL (76 %) injection 100 mL (has no administration in time range)       CONSULTS:  None    Social Determinants affecting Dx or Tx: None    Records Reviewed (source and summary of external notes): Prior medical records  DIAGNOSTIC RESULTS   LABS:     Recent Results (from the past 12 hour(s))   CBC WITH AUTOMATED DIFF    Collection Time: 03/13/23  2:07 PM   Result Value Ref Range    WBC 5.7 3.6 - 11.0 K/uL    RBC 4.08 3.80 - 5.20 M/uL    HGB 13.2 11.5 - 16.0 g/dL    HCT 39.9 35.0 - 47.0 %    MCV 97.8 80.0 - 99.0 FL    MCH 32.4 26.0 - 34.0 PG    MCHC 33.1 30.0 - 36.5 g/dL    RDW 13.0 11.5 - 14.5 %    PLATELET 769 801 - 249 K/uL    MPV 12.0 8.9 - 12.9 FL    NRBC 0.0 0  WBC    ABSOLUTE NRBC 0.00 0.00 - 0.01 K/uL    NEUTROPHILS 73 32 - 75 %    LYMPHOCYTES 17 12 - 49 %    MONOCYTES 10 5 - 13 %    EOSINOPHILS 0 0 - 7 %    BASOPHILS 0 0 - 1 %    IMMATURE GRANULOCYTES 0 0.0 - 0.5 %    ABS. NEUTROPHILS 4.1 1.8 - 8.0 K/UL    ABS. LYMPHOCYTES 1.0 0.8 - 3.5 K/UL    ABS. MONOCYTES 0.6 0.0 - 1.0 K/UL    ABS. EOSINOPHILS 0.0 0.0 - 0.4 K/UL    ABS. BASOPHILS 0.0 0.0 - 0.1 K/UL    ABS. IMM. GRANS. 0.0 0.00 - 0.04 K/UL    DF AUTOMATED     METABOLIC PANEL, COMPREHENSIVE    Collection Time: 03/13/23  2:07 PM   Result Value Ref Range    Sodium 143 136 - 145 mmol/L    Potassium 3.8 3.5 - 5.1 mmol/L    Chloride 110 (H) 97 - 108 mmol/L    CO2 27 21 - 32 mmol/L    Anion gap 6 5 - 15 mmol/L    Glucose 107 (H) 65 - 100 mg/dL    BUN 24 (H) 6 - 20 MG/DL    Creatinine 1.01 0.55 - 1.02 MG/DL    BUN/Creatinine ratio 24 (H) 12 - 20      eGFR 53 (L) >60 ml/min/1.73m2    Calcium 9.8 8.5 - 10.1 MG/DL    Bilirubin, total 0.8 0.2 - 1.0 MG/DL    ALT (SGPT) 27 12 - 78 U/L    AST (SGOT) 30 15 - 37 U/L    Alk. phosphatase 71 45 - 117 U/L    Protein, total 7.3 6.4 - 8.2 g/dL    Albumin 3.7 3.5 - 5.0 g/dL    Globulin 3.6 2.0 - 4.0 g/dL    A-G Ratio 1.0 (L) 1.1 - 2.2     MAGNESIUM    Collection Time: 03/13/23  2:07 PM   Result Value Ref Range    Magnesium 2.3 1.6 - 2.4 mg/dL   TROPONIN-HIGH SENSITIVITY    Collection Time: 03/13/23  2:07 PM   Result Value Ref Range    Troponin-High Sensitivity 388 (HH) 0 - 51 ng/L   SAMPLES BEING HELD    Collection Time: 03/13/23  2:07 PM   Result Value Ref Range    SAMPLES BEING HELD SST     COMMENT        Add-on orders for these samples will be processed based on acceptable specimen integrity and analyte stability, which may vary by analyte.    PROTHROMBIN TIME + INR    Collection Time: 03/13/23  2:50 PM   Result Value Ref Range    INR 1.1 0.9 - 1.1      Prothrombin time 11.6 (H) 9.0 - 11.1 sec        EKG interpreted by me: Normal sinus rhythm, regular rate 66, significant motion artifact secondary to patient tremor but no obvious ST changes     RADIOLOGY:  Non-plain film images such as CT, Ultrasound and MRI are read by the radiologist.   Plain radiographic images are often visualized and preliminarily interpreted by the ED, if an interpretation was completed the findings will be listed below:        Interpretation per the Radiologist below, if available at the time of this note:     CT HEAD WO CONT    Result Date: 3/13/2023  CLINICAL HISTORY: weakness RLE INDICATION: weakness RLE COMPARISON: 2015. CT dose reduction was achieved through use of a standardized protocol tailored for this examination and automatic exposure control for dose modulation. TECHNIQUE: Serial axial images with a collimation of 5 mm were obtained from the skull base through the vertex  FINDINGS: There is sulcal and ventricular prominence. Confluent periventricular and scattered foci of hypodensity in the cerebral white matter. There is no evidence of an acute infarction, hemorrhage, or mass-effect. There is no evidence of midline shift or hydrocephalus. Posterior fossa structures are unremarkable. No extra-axial collections are seen. Mastoid air cells are well pneumatized and clear. 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 Postsurgical change on the left. No acute intracranial process. Imaging findings consistent with severe chronic microvascular ischemic change. There is a severe degree of cerebral atrophy. PROCEDURES   Unless otherwise noted below, none  Procedures     CRITICAL CARE TIME       FINAL IMPRESSION     1.  Right leg weakness          DISPOSITION/PLAN   Admitted         PATIENT REFERRED TO:  Follow-up Information    None           DISCHARGE MEDICATIONS:  Current Discharge Medication List            DISCONTINUED MEDICATIONS:  Current Discharge Medication List          I am the Primary Clinician of Record. Signed By: Mic Paredes MD     March 13, 2023      (Please note that parts of this dictation were completed with voice recognition software. Quite often unanticipated grammatical, syntax, homophones, and other interpretive errors are inadvertently transcribed by the computer software. Please disregards these errors.  Please excuse any errors that have escaped final proofreading.)

## 2023-03-13 NOTE — H&P
Hospitalist Admission Note    NAME: Pacheco Trevino   :  1932   MRN:  [id number]     Date/Time:  3/13/2023 5:59 PM    Patient PCP: Brian Freeman MD  ______________________________________________________________________  Given the patient's current clinical presentation, I have a high level of concern for decompensation if discharged from the emergency department. Complex decision making was performed, which includes reviewing the patient's available past medical records, laboratory results, and x-ray films. My assessment of this patient's clinical condition and my plan of care is as follows. Assessment / Plan:  Bilateral lower extremity weakness right greater than left rule out CVA versus other causes  -On exam, definitely weakness on the right side is worse than left but not able to lift both legs against gravity  -We will check MRI of the brain, 2D echocardiogram and ultrasound carotids.  -Check MRI of the lumbar spine as well. -If no clear cause found, consider lumbar puncture  -Consult PT OT.  -Start aspirin 81 mg daily patient is already on Coumadin at home but INR is subtherapeutic. Troponin elevation likely demand ischemia versus type II non-STEMI  -Currently troponin is 388. We will check 1 more troponin.  -Patient is not reporting any chest pain or shortness of breath. EKG shows normal sinus rhythm with no ST-T wave changes  -Follow-up on results of echocardiogram  -Consult cardiology in AM.  Consider stress test    Seizure disorder  Hypothyroidism  History of recurrent DVTs  History of meningioma  -Continue phenytoin, levothyroxine  -Consult pharmacy for warfarin. INR is 1.1. Check INR in a.m.       Code Status: Full code  Surrogate Decision Maker: Child Campbell Ruiz    DVT Prophylaxis: Warfarin  GI Prophylaxis: not indicated    Baseline: From home, independent of ADLs      Subjective:   CHIEF COMPLAINT: Lower extremity weakness bilaterally    HISTORY OF PRESENT ILLNESS:     Sirena Perez is a 80 y.o.   female who presents with past medical history of seizure disorder, hypothyroidism, recurrent DVT is coming the hospital chief complaints of bilateral lower extremity weakness. Patient reports being usual travails until about yesterday when she woke up feeling funny in her head and started having bilateral lower extremity weakness but more on the right side. By evening, she was not able to walk and was dragging her feet. She did not report any associated weakness in her hands, facial deviation or slurred speech. Does not report any headache. Does not report any chest pain or shortness of breath. Does not report any recent cough or phlegm. No fever or chills. On arrival to ED, noted to have stable vitals. On labs CBC is normal.  BMP shows a troponin of 388. Creatinine is 1.01. LFTs are normal.  CT head shows chronic microvascular changes with the severe degree of cerebral atrophy. We were asked to admit for work up and evaluation of the above problems. Past Medical History:   Diagnosis Date    Arthritis     Cancer ([address] 2002    brain tumor, benign (meningioma)    Diabetes ([address]     questionable?     Gastrointestinal disorder     diverticulitis    Hypothyroid 5/11/2015    Localization-related (focal) (partial) epilepsy and epileptic syndromes with complex partial seizures, without mention of intractable epilepsy 5/11/2015    Other ill-defined conditions(799.89)     loss of vision in left eye    Seizures ([address] 2002    caused by brain tumor    Thromboembolus [facility])     Thyroid disease         Past Surgical History:   Procedure Laterality Date    COLORECTAL SCRN; HI RISK IND  8/26/2014         HX BACK SURGERY  1970s    HX ORTHOPAEDIC  1990s    right wrist    NEUROLOGICAL PROCEDURE UNLISTED  2002    brain tumor removed    CT INS NEW/RPLCMT PRM PM W/TRANSV ELTRD ATRIAL&VENT N/A 10/6/2020    INSERT PPM DUAL performed by Erica Maravilla MD at [address] CATH LAB Social History     Tobacco Use    Smoking status: Never    Smokeless tobacco: Never   Substance Use Topics    Alcohol use: Yes     Comment: glass of wine occasionally        Family History   Problem Relation Age of Onset    Cancer Mother         breast    Alzheimer's Disease Mother     Heart Disease Father     Kidney Disease Sister         dialysis    Seizures Daughter      Allergies   Allergen Reactions    Bactrim [Sulfamethoprim] Unable to Obtain    Penicillins Itching     Patient not sure she is allergic    Sulfa (Sulfonamide Antibiotics) Nausea and Vomiting        Prior to Admission medications    Medication Sig Start Date End Date Taking? Authorizing Provider   phenytoin ER (DILANTIN ER) 100 mg ER capsule TAKE 2 CAPSULES TWICE DAILY 9/17/22   Wang Lo MD   ARIPiprazole (ABILIFY) 2 mg tablet Take 2 mg by mouth nightly as needed. 7/26/22   Provider, Historical   furosemide (LASIX) 20 mg tablet Take 20 mg by mouth in the morning. 5/5/22   Provider, Historical   warfarin (COUMADIN) 3 mg tablet Take 3 mg by mouth in the morning. As directed currently every day omits saturday    Provider, Historical   vit C/E/Zn/coppr/lutein/zeaxan (PRESERVISION AREDS-2 PO) Take 1 Tab by mouth daily. Provider, Historical   biotin 5,000 mcg TbDi Take 2 Tabs by mouth two (2) times a day. Brian Freeman MD   cholecalciferol (VITAMIN D3) 1,000 unit tablet Take 2 Tabs by mouth two (2) times a day. Brian Freeman MD   acetaminophen (TYLENOL) 325 mg tablet Take 2 Tabs by mouth every six (6) hours as needed for Pain or Fever. 1/19/19   Keila Farley MD   calcium carbonate (OS-ANGUS) 500 mg calcium (1,250 mg) tablet Take 1 Tablet by mouth two (2) times a day. Eloy Hernandez MD   levothyroxine (SYNTHROID) 88 mcg tablet Take 1 Tab by mouth daily. 7/28/15   Eloy Hernandez MD       REVIEW OF SYSTEMS:     I am not able to complete the review of systems because:    The patient is intubated and sedated    The patient has altered mental status due to his acute medical problems    The patient has baseline aphasia from prior stroke(s)    The patient has baseline dementia and is not reliable historian    The patient is in acute medical distress and unable to provide information           Total of 12 systems reviewed as follows:       POSITIVE= underlined text  Negative = text not underlined  General:  fever, chills, sweats, generalized weakness, weight loss/gain,      loss of appetite   Eyes:    blurred vision, eye pain, loss of vision, double vision  ENT:    rhinorrhea, pharyngitis   Respiratory:   cough, sputum production, SOB, DORSEY, wheezing, pleuritic pain   Cardiology:   chest pain, palpitations, orthopnea, PND, edema, syncope   Gastrointestinal:  abdominal pain , N/V, diarrhea, dysphagia, constipation, bleeding   Genitourinary:  frequency, urgency, dysuria, hematuria, incontinence   Muskuloskeletal :  arthralgia, myalgia, back pain  Hematology:  easy bruising, nose or gum bleeding, lymphadenopathy   Dermatological: rash, ulceration, pruritis, color change / jaundice  Endocrine:   hot flashes or polydipsia   Neurological:  headache, dizziness, confusion, focal weakness, paresthesia,     Speech difficulties, memory loss, gait difficulty  Psychological: Feelings of anxiety, depression, agitation    Objective:   VITALS:    Visit Vitals  BP (!) 131/54   Pulse 76   Temp 98.5 °F (36.9 °C)   Resp 17   Ht 5' 4\" (1.626 m)   Wt 54.4 kg (120 lb)   SpO2 100%   BMI 20.60 kg/m²       PHYSICAL EXAM:    General:    Alert, cooperative, no distress, appears stated age. HEENT: Atraumatic, anicteric sclerae, pink conjunctivae     No oral ulcers, mucosa moist, throat clear, dentition fair  Neck:  Supple, symmetrical,  thyroid: non tender  Lungs:   Clear to auscultation bilaterally. No Wheezing or Rhonchi. No rales. Chest wall:  No tenderness  No Accessory muscle use.   Heart:   Regular  rhythm,  No  murmur   No edema  Abdomen:   Soft, non-tender. Not distended. Bowel sounds normal  Extremities: No cyanosis. No clubbing,      Skin turgor normal, Capillary refill normal, Radial dial pulse 2+  Skin:     Not pale. Not Jaundiced  No rashes   Psych:  Good insight. Not depressed. Not anxious or agitated. Neurologic: Alert, awake, not able to lift both legs against gravity but right leg weakness is more prominent, strength in both arms is normal, sensation to light touch intact    _______________________________________________________________________  Care Plan discussed with:    Comments   Patient y    Family      RN y    Care Manager                    Consultant:      _______________________________________________________________________  Expected  Disposition:   Home with Family y   HH/PT/OT/RN    SNF/LTC    NIRANJAN    ________________________________________________________________________  TOTAL TIME:  [facility] Provided     Minutes non procedure based      Comments    y Reviewed previous records   >50% of visit spent in counseling and coordination of care y Discussion with patient and/or family and questions answered       ________________________________________________________________________  Signed: Vania Burgess MD    Procedures: see electronic medical records for all procedures/Xrays and details which were not copied into this note but were reviewed prior to creation of Plan.     LAB DATA REVIEWED:    Recent Results (from the past 24 hour(s))   CBC WITH AUTOMATED DIFF    Collection Time: 03/13/23  2:07 PM   Result Value Ref Range    WBC 5.7 3.6 - 11.0 K/uL    RBC 4.08 3.80 - 5.20 M/uL    HGB 13.2 11.5 - 16.0 g/dL    HCT 39.9 35.0 - 47.0 %    MCV 97.8 80.0 - 99.0 FL    MCH 32.4 26.0 - 34.0 PG    MCHC 33.1 30.0 - 36.5 g/dL    RDW 13.0 11.5 - 14.5 %    PLATELET 828 883 - 568 K/uL    MPV 12.0 8.9 - 12.9 FL    NRBC 0.0 0  WBC    ABSOLUTE NRBC 0.00 0.00 - 0.01 K/uL    NEUTROPHILS 73 32 - 75 %    LYMPHOCYTES 17 12 - 49 %    MONOCYTES 10 5 - 13 %    EOSINOPHILS 0 0 - 7 %    BASOPHILS 0 0 - 1 %    IMMATURE GRANULOCYTES 0 0.0 - 0.5 %    ABS. NEUTROPHILS 4.1 1.8 - 8.0 K/UL    ABS. LYMPHOCYTES 1.0 0.8 - 3.5 K/UL    ABS. MONOCYTES 0.6 0.0 - 1.0 K/UL    ABS. EOSINOPHILS 0.0 0.0 - 0.4 K/UL    ABS. BASOPHILS 0.0 0.0 - 0.1 K/UL    ABS. IMM. GRANS. 0.0 0.00 - 0.04 K/UL    DF AUTOMATED     METABOLIC PANEL, COMPREHENSIVE    Collection Time: 03/13/23  2:07 PM   Result Value Ref Range    Sodium 143 136 - 145 mmol/L    Potassium 3.8 3.5 - 5.1 mmol/L    Chloride 110 (H) 97 - 108 mmol/L    CO2 27 21 - 32 mmol/L    Anion gap 6 5 - 15 mmol/L    Glucose 107 (H) 65 - 100 mg/dL    BUN 24 (H) 6 - 20 MG/DL    Creatinine 1.01 0.55 - 1.02 MG/DL    BUN/Creatinine ratio 24 (H) 12 - 20      eGFR 53 (L) >60 ml/min/1.73m2    Calcium 9.8 8.5 - 10.1 MG/DL    Bilirubin, total 0.8 0.2 - 1.0 MG/DL    ALT (SGPT) 27 12 - 78 U/L    AST (SGOT) 30 15 - 37 U/L    Alk. phosphatase 71 45 - 117 U/L    Protein, total 7.3 6.4 - 8.2 g/dL    Albumin 3.7 3.5 - 5.0 g/dL    Globulin 3.6 2.0 - 4.0 g/dL    A-G Ratio 1.0 (L) 1.1 - 2.2     MAGNESIUM    Collection Time: 03/13/23  2:07 PM   Result Value Ref Range    Magnesium 2.3 1.6 - 2.4 mg/dL   TROPONIN-HIGH SENSITIVITY    Collection Time: 03/13/23  2:07 PM   Result Value Ref Range    Troponin-High Sensitivity 388 (HH) 0 - 51 ng/L   SAMPLES BEING HELD    Collection Time: 03/13/23  2:07 PM   Result Value Ref Range    SAMPLES BEING HELD SST     COMMENT        Add-on orders for these samples will be processed based on acceptable specimen integrity and analyte stability, which may vary by analyte.    PROTHROMBIN TIME + INR    Collection Time: 03/13/23  2:50 PM   Result Value Ref Range    INR 1.1 0.9 - 1.1      Prothrombin time 11.6 (H) 9.0 - 11.1 sec

## 2023-03-13 NOTE — ED TRIAGE NOTES
Pt arrives to ed via ems w reports of generalized weakness and numbness in L leg onset of unknown time. Hx dementia. NIH 0 per ems. Reports equal sensation bilat.  strengths equal pta. Found crawling in floor by son. Glu 130.

## 2023-03-14 ENCOUNTER — APPOINTMENT (OUTPATIENT)
Dept: VASCULAR SURGERY | Age: 88
End: 2023-03-14
Attending: INTERNAL MEDICINE
Payer: MEDICARE

## 2023-03-14 ENCOUNTER — APPOINTMENT (OUTPATIENT)
Dept: NON INVASIVE DIAGNOSTICS | Age: 88
End: 2023-03-14
Attending: INTERNAL MEDICINE
Payer: MEDICARE

## 2023-03-14 LAB
ANION GAP SERPL CALC-SCNC: 3 MMOL/L (ref 5–15)
BUN SERPL-MCNC: 22 MG/DL (ref 6–20)
BUN/CREAT SERPL: 26 (ref 12–20)
CALCIUM SERPL-MCNC: 9.2 MG/DL (ref 8.5–10.1)
CHLORIDE SERPL-SCNC: 108 MMOL/L (ref 97–108)
CO2 SERPL-SCNC: 28 MMOL/L (ref 21–32)
CREAT SERPL-MCNC: 0.84 MG/DL (ref 0.55–1.02)
ECHO AO ROOT DIAM: 3.2 CM
ECHO AO ROOT INDEX: 2.04 CM/M2
ECHO AV AREA PEAK VELOCITY: 2 CM2
ECHO AV AREA/BSA PEAK VELOCITY: 1.3 CM2/M2
ECHO AV PEAK GRADIENT: 7 MMHG
ECHO AV PEAK VELOCITY: 1.4 M/S
ECHO AV VELOCITY RATIO: 0.71
ECHO LV EDV A2C: 34 ML
ECHO LV EDV A4C: 37 ML
ECHO LV EDV BP: 36 ML (ref 56–104)
ECHO LV EDV INDEX A4C: 24 ML/M2
ECHO LV EDV INDEX BP: 23 ML/M2
ECHO LV EDV NDEX A2C: 22 ML/M2
ECHO LV EJECTION FRACTION A2C: 56 %
ECHO LV EJECTION FRACTION A4C: 61 %
ECHO LV EJECTION FRACTION BIPLANE: 58 % (ref 55–100)
ECHO LV ESV A2C: 15 ML
ECHO LV ESV A4C: 15 ML
ECHO LV ESV BP: 15 ML (ref 19–49)
ECHO LV ESV INDEX A2C: 10 ML/M2
ECHO LV ESV INDEX A4C: 10 ML/M2
ECHO LV ESV INDEX BP: 10 ML/M2
ECHO LV FRACTIONAL SHORTENING: 24 % (ref 28–44)
ECHO LV INTERNAL DIMENSION DIASTOLE INDEX: 2.68 CM/M2
ECHO LV INTERNAL DIMENSION DIASTOLIC: 4.2 CM (ref 3.9–5.3)
ECHO LV INTERNAL DIMENSION SYSTOLIC INDEX: 2.04 CM/M2
ECHO LV INTERNAL DIMENSION SYSTOLIC: 3.2 CM
ECHO LV IVSD: 1.1 CM (ref 0.6–0.9)
ECHO LV MASS 2D: 137.2 G (ref 67–162)
ECHO LV MASS INDEX 2D: 87.4 G/M2 (ref 43–95)
ECHO LV POSTERIOR WALL DIASTOLIC: 0.9 CM (ref 0.6–0.9)
ECHO LV RELATIVE WALL THICKNESS RATIO: 0.43
ECHO LVOT AREA: 2.8 CM2
ECHO LVOT DIAM: 1.9 CM
ECHO LVOT PEAK GRADIENT: 4 MMHG
ECHO LVOT PEAK VELOCITY: 1 M/S
ECHO MV A VELOCITY: 0.88 M/S
ECHO MV E DECELERATION TIME (DT): 285.4 MS
ECHO MV E VELOCITY: 0.66 M/S
ECHO MV E/A RATIO: 0.75
ECHO MV MAX VELOCITY: 0.9 M/S
ECHO MV MEAN GRADIENT: 1 MMHG
ECHO MV MEAN VELOCITY: 0.5 M/S
ECHO MV PEAK GRADIENT: 3 MMHG
ECHO MV VTI: 28.1 CM
ECHO TV REGURGITANT MAX VELOCITY: 2.46 M/S
ECHO TV REGURGITANT PEAK GRADIENT: 24 MMHG
ERYTHROCYTE [DISTWIDTH] IN BLOOD BY AUTOMATED COUNT: 12.8 % (ref 11.5–14.5)
EST. AVERAGE GLUCOSE BLD GHB EST-MCNC: 105 MG/DL
GLUCOSE SERPL-MCNC: 99 MG/DL (ref 65–100)
HBA1C MFR BLD: 5.3 % (ref 4–5.6)
HCT VFR BLD AUTO: 37.3 % (ref 35–47)
HGB BLD-MCNC: 12.3 G/DL (ref 11.5–16)
INR PPP: 1.1 (ref 0.9–1.1)
LEFT CCA DIST DIAS: 4.3 CM/S
LEFT CCA DIST SYS: 43.5 CM/S
LEFT CCA PROX DIAS: 12 CM/S
LEFT CCA PROX SYS: 71.3 CM/S
LEFT ECA DIAS: 0 CM/S
LEFT ECA SYS: 77.5 CM/S
LEFT ICA DIST DIAS: 15 CM/S
LEFT ICA DIST SYS: 90.1 CM/S
LEFT ICA MID DIAS: 11.3 CM/S
LEFT ICA MID SYS: 51.4 CM/S
LEFT ICA PROX DIAS: 9.5 CM/S
LEFT ICA PROX SYS: 61 CM/S
LEFT ICA/CCA SYS: 2.07 NO UNITS
LEFT VERTEBRAL DIAS: 7.15 CM/S
LEFT VERTEBRAL SYS: 32.5 CM/S
MCH RBC QN AUTO: 32.5 PG (ref 26–34)
MCHC RBC AUTO-ENTMCNC: 33 G/DL (ref 30–36.5)
MCV RBC AUTO: 98.7 FL (ref 80–99)
NRBC # BLD: 0 K/UL (ref 0–0.01)
NRBC BLD-RTO: 0 PER 100 WBC
PHENYTOIN SERPL-MCNC: 4.7 UG/ML (ref 10–20)
PLATELET # BLD AUTO: 170 K/UL (ref 150–400)
PMV BLD AUTO: 11.9 FL (ref 8.9–12.9)
POTASSIUM SERPL-SCNC: 3.6 MMOL/L (ref 3.5–5.1)
PROTHROMBIN TIME: 11.1 SEC (ref 9–11.1)
RBC # BLD AUTO: 3.78 M/UL (ref 3.8–5.2)
RIGHT CCA DIST DIAS: 6.8 CM/S
RIGHT CCA DIST SYS: 47.5 CM/S
RIGHT CCA PROX DIAS: 11.2 CM/S
RIGHT CCA PROX SYS: 51.9 CM/S
RIGHT ECA DIAS: 0 CM/S
RIGHT ECA SYS: 76.6 CM/S
RIGHT ICA DIST DIAS: 16.7 CM/S
RIGHT ICA DIST SYS: 68.5 CM/S
RIGHT ICA MID DIAS: 8 CM/S
RIGHT ICA MID SYS: 35.1 CM/S
RIGHT ICA PROX DIAS: 7.2 CM/S
RIGHT ICA PROX SYS: 41.2 CM/S
RIGHT ICA/CCA SYS: 1.4 NO UNITS
RIGHT VERTEBRAL DIAS: 7.91 CM/S
RIGHT VERTEBRAL SYS: 38.7 CM/S
SODIUM SERPL-SCNC: 139 MMOL/L (ref 136–145)
TROPONIN I SERPL HS-MCNC: 427 NG/L (ref 0–51)
TSH SERPL DL<=0.05 MIU/L-ACNC: 3.9 UIU/ML (ref 0.36–3.74)
VAS LEFT SUBCLAVIAN PROX EDV: 0 CM/S
VAS LEFT SUBCLAVIAN PROX PSV: 159.5 CM/S
VAS RIGHT SUBCLAVIAN PROX EDV: 0 CM/S
VAS RIGHT SUBCLAVIAN PROX PSV: 114.5 CM/S
WBC # BLD AUTO: 5.9 K/UL (ref 3.6–11)

## 2023-03-14 PROCEDURE — 83036 HEMOGLOBIN GLYCOSYLATED A1C: CPT

## 2023-03-14 PROCEDURE — 80061 LIPID PANEL: CPT

## 2023-03-14 PROCEDURE — 80185 ASSAY OF PHENYTOIN TOTAL: CPT

## 2023-03-14 PROCEDURE — 93880 EXTRACRANIAL BILAT STUDY: CPT

## 2023-03-14 PROCEDURE — 36415 COLL VENOUS BLD VENIPUNCTURE: CPT

## 2023-03-14 PROCEDURE — 74011250637 HC RX REV CODE- 250/637: Performed by: INTERNAL MEDICINE

## 2023-03-14 PROCEDURE — 80048 BASIC METABOLIC PNL TOTAL CA: CPT

## 2023-03-14 PROCEDURE — 74011250637 HC RX REV CODE- 250/637: Performed by: STUDENT IN AN ORGANIZED HEALTH CARE EDUCATION/TRAINING PROGRAM

## 2023-03-14 PROCEDURE — 85610 PROTHROMBIN TIME: CPT

## 2023-03-14 PROCEDURE — 85027 COMPLETE CBC AUTOMATED: CPT

## 2023-03-14 PROCEDURE — 65270000046 HC RM TELEMETRY

## 2023-03-14 PROCEDURE — 99223 1ST HOSP IP/OBS HIGH 75: CPT | Performed by: PSYCHIATRY & NEUROLOGY

## 2023-03-14 PROCEDURE — 93880 EXTRACRANIAL BILAT STUDY: CPT | Performed by: PSYCHIATRY & NEUROLOGY

## 2023-03-14 PROCEDURE — 84443 ASSAY THYROID STIM HORMONE: CPT

## 2023-03-14 PROCEDURE — 84484 ASSAY OF TROPONIN QUANT: CPT

## 2023-03-14 PROCEDURE — 93308 TTE F-UP OR LMTD: CPT

## 2023-03-14 RX ORDER — LORAZEPAM 2 MG/ML
1 INJECTION INTRAMUSCULAR ONCE
Status: ACTIVE | OUTPATIENT
Start: 2023-03-14 | End: 2023-03-14

## 2023-03-14 RX ORDER — LEVOTHYROXINE SODIUM 88 UG/1
88 TABLET ORAL
Status: DISCONTINUED | OUTPATIENT
Start: 2023-03-14 | End: 2023-03-18 | Stop reason: HOSPADM

## 2023-03-14 RX ORDER — WARFARIN 2 MG/1
4 TABLET ORAL ONCE
Status: COMPLETED | OUTPATIENT
Start: 2023-03-14 | End: 2023-03-14

## 2023-03-14 RX ORDER — BALSAM PERU/CASTOR OIL
OINTMENT (GRAM) TOPICAL 2 TIMES DAILY
Status: DISCONTINUED | OUTPATIENT
Start: 2023-03-14 | End: 2023-03-18 | Stop reason: HOSPADM

## 2023-03-14 RX ADMIN — MELATONIN 1.5 MG: at 20:03

## 2023-03-14 RX ADMIN — PHENYTOIN SODIUM 200 MG: 100 CAPSULE ORAL at 09:27

## 2023-03-14 RX ADMIN — FUROSEMIDE 20 MG: 20 TABLET ORAL at 09:27

## 2023-03-14 RX ADMIN — HYDROXYZINE HYDROCHLORIDE 25 MG: 10 TABLET, FILM COATED ORAL at 20:03

## 2023-03-14 RX ADMIN — CASTOR OIL AND BALSAM, PERU: 788; 87 OINTMENT TOPICAL at 20:06

## 2023-03-14 RX ADMIN — ASPIRIN 81 MG: 81 TABLET, CHEWABLE ORAL at 09:27

## 2023-03-14 RX ADMIN — PHENYTOIN SODIUM 200 MG: 100 CAPSULE ORAL at 17:19

## 2023-03-14 RX ADMIN — CASTOR OIL AND BALSAM, PERU: 788; 87 OINTMENT TOPICAL at 09:29

## 2023-03-14 RX ADMIN — WARFARIN SODIUM 4 MG: 2 TABLET ORAL at 17:19

## 2023-03-14 RX ADMIN — LEVOTHYROXINE SODIUM 88 MCG: 0.09 TABLET ORAL at 05:59

## 2023-03-14 NOTE — PROGRESS NOTES
Physical Therapy Note    Chart reviewed. Troponin elevated at 427 and up-trending from previous value. Not medically appropriate for skilled therapy at this time. Will defer.     Michael Duff, PT, DPT

## 2023-03-14 NOTE — PROGRESS NOTES
Warfarin Dosing - Pharmacy Consult Note    Consulting Provider: Dr. Ronita Goodell  Indication: History of VTE/PE  Warfarin Dose prior to admission: 2 mg daily      Concurrent anticoagulants/antiplatelets: aspirin    Significant Drug Interactions:  phenytoin  Recent Labs     03/13/23  1450 03/13/23  1407   INR 1.1  --    HGB  --  13.2   PLT  --  193     [unfilled]    Date      INR       Dose  3/13       1.1         3 mg  3/14       1.1         4 mg    Assessment/Plan  Goal INR: 2 - 3  INR 1.1, will give 3 mg dose of warfarin. Please confirm home regimen on day shift. Note preexisting DDI with phenytoin    Active problem list reviewed. INR orders are placed. Chart reviewed for pertinent labs, drug/diet interactions, and past doses. Documentation of patient's clinical condition was reviewed. Pharmacy Dosing:  Pharmacy will continue to follow.

## 2023-03-14 NOTE — PROGRESS NOTES
452 PM   I met with patient-  she defers dc planning to her son. I will reach out and make contact with him 3-15.     Janette Segura RN  453 pm

## 2023-03-14 NOTE — PROGRESS NOTES
Hospitalist Progress Note    NAME: Eddie Meraz   :  1932   MRN:  [id number]       Assessment / Plan:    Bilateral lower extremity weakness right greater than left rule out CVA versus other causes  -presented with weakness on the right side is worse than left but not able to lift both legs against gravity  -MRI of the brain and ultrasound carotids ordered on admission, pending.  -Echo with normal EF and no PFO reported  -MRI cervical  spine pending, was ordered on admission  -If no clear cause found, consider lumbar puncture  -PT OT eval when able  -On admission started on aspirin 81 mg daily patient is already on Coumadin at home but INR is subtherapeutic. Warfarin need monitoring by INR    Troponin elevation likely demand ischemia versus type II non-STEMI  -Troponin around 380 on admission  - Patient is chest pain-free  - No acute ischemic changes on EKG  -Echo with normal EF no PFO reported  -Cardiology input is appreciated, recommendation for conservative management    Seizure disorder  Hypothyroidism  History of recurrent DVTs  History of meningioma  -Continue phenytoin, levothyroxine  -Consult pharmacy for warfarin. INR 1.1        Code Status: Full code  Surrogate Decision Maker: Sandrine Cadet     DVT Prophylaxis: Warfarin  GI Prophylaxis: not indicated    18.5 - 24.9 Normal weight / Body mass index is 20.42 kg/m². Estimated discharge date: March 15  Barriers: Echo, brain MRI, carotid ultrasound, PT OT evaluation     Subjective:     Patient was seen and examined. No acute events overnight. Discussed with RN overnight events. All patient's questions were answered.     \"Feeling very well\"      Review of Systems:  Symptom Y/N Comments  Symptom Y/N Comments   Fever/Chills n   Chest Pain n    Poor Appetite    Edema     Cough n   Abdominal Pain n    Sputum    Joint Pain     SOB/DORSEY n   Pruritis/Rash     Nausea/vomit n   Tolerating PT/OT     Diarrhea    Tolerating Diet y    Constipation    Other Could NOT obtain due to:          Objective:     VITALS:   Last 24hrs VS reviewed since prior progress note. Most recent are:  Patient Vitals for the past 24 hrs:   Temp Pulse Resp BP SpO2   03/14/23 1145 97.7 °F (36.5 °C) 60 18 (!) 114/47 99 %   03/14/23 1033 -- -- -- 101/60 --   03/14/23 0810 97.6 °F (36.4 °C) 72 16 101/60 98 %   03/14/23 0318 97.7 °F (36.5 °C) 69 16 (!) 119/59 100 %   03/13/23 2312 97.7 °F (36.5 °C) (!) 54 16 125/61 100 %   03/13/23 2017 -- (!) 104 -- -- 100 %   03/13/23 2013 97.9 °F (36.6 °C) 70 16 (!) 91/59 --   03/13/23 1855 97.5 °F (36.4 °C) 85 16 (!) 111/57 100 %   03/13/23 1445 -- 76 17 (!) 131/54 --   03/13/23 1415 -- 69 18 128/61 --   03/13/23 1407 -- 66 16 (!) 136/59 --   03/13/23 1326 98.5 °F (36.9 °C) 60 15 134/73 100 %     No intake or output data in the 24 hours ending 03/14/23 1154     I had a face to face encounter and independently examined this patient on 3/14/2023, as outlined below:  PHYSICAL EXAM:  General: WD, WN. Alert, cooperative, no acute distress    EENT:  EOMI. Anicteric sclerae. MMM  Resp:  CTA bilaterally, no wheezing or rales. No accessory muscle use  CV:  Regular  rhythm,  No edema  GI:  Soft, Non distended, Non tender. +Bowel sounds  Neurologic:  EOMs intact. No facial asymmetry. No aphasia or slurred speech. strength 5/5 all over except in the right lower extremity 3/5, Sensation grossly intact. Alert and oriented X 4.     Psych:   Good insight. Not anxious nor agitated  Skin:  No rashes.   No jaundice    Reviewed most current lab test results and cultures  YES  Reviewed most current radiology test results   YES  Review and summation of old records today    NO  Reviewed patient's current orders and MAR    YES  PMH/SH reviewed - no change compared to H&P  ________________________________________________________________________  Care Plan discussed with:    Comments   Patient X    Family      RN X    Care Manager X    Consultant                       X Multidiciplinary team rounds were held today with , nursing, pharmacist and clinical coordinator. Patient's plan of care was discussed; medications were reviewed and discharge planning was addressed. ________________________________________________________________________        Comments   >50% of visit spent in counseling and coordination of care X    ________________________________________________________________________  Hector Reyes MD     Procedures: see electronic medical records for all procedures/Xrays and details which were not copied into this note but were reviewed prior to creation of Plan. LABS:  I reviewed today's most current labs and imaging studies.   Pertinent labs include:  Recent Labs     03/14/23  0255 03/13/23  1407   WBC 5.9 5.7   HGB 12.3 13.2   HCT 37.3 39.9    193     Recent Labs     03/14/23  0255 03/13/23  1450 03/13/23  1407     --  143   K 3.6  --  3.8     --  110*   CO2 28  --  27   GLU 99  --  107*   BUN 22*  --  24*   CREA 0.84  --  1.01   CA 9.2  --  9.8   MG  --   --  2.3   ALB  --   --  3.7   TBILI  --   --  0.8   ALT  --   --  27   INR 1.1 1.1  --        Signed: Hector Reyes MD

## 2023-03-14 NOTE — PROGRESS NOTES
Occupational Therapy    Chart reviewed. Troponin elevated at 427 and up-trending from previous value. Not medically appropriate for skilled therapy at this time.  Will defer and continue to follow for OT eval.  Adán Calhoun OTR/L

## 2023-03-14 NOTE — PROGRESS NOTES
Speech Pathology Note    Chart reviewed and spoke with RN. Patient currently JAIDA at time of SLP attempted visit. Will defer and follow up as patient is available. Thank you. Addendum 12:00 and 14:00  Second attempt: RN and PCT at bedside placing IV. Patient not available for SLP evaluation. Will defer and will follow up as patient is available. Third attempt: Patient sleeping with difficulty arousing to verbal and tactile cues. Will continue to defer at this time.     Krystina Garcia M.S., CCC-SLP

## 2023-03-14 NOTE — ROUTINE PROCESS
End of Shift Note    Bedside shift change report given to [place] (oncoming nurse) by Devin Camarena RN (offgoing nurse). Report included the following information SBAR, Kardex, Intake/Output, and MAR    Shift worked:  7p-7a   Shift summary and any significant changes:     New admit       Concerns for physician to address: Troponin now 427   Zone phone for oncoming shift:   [id number]     Patient Information  Kevin Israel  80 y.o.  3/13/2023  1:16 PM by Mian Barkley MD. Kevin Israel was admitted from Home    Problem List  Patient Active Problem List    Diagnosis Date Noted    Closed left subtrochanteric femur fracture, initial encounter ([address] 01/15/2019    Elevated troponin 03/13/2023    Bilateral leg weakness 03/13/2023    Pacemaker 10/06/2020    Bilateral carotid artery stenosis 02/28/2020    Localization-related focal epilepsy with complex partial seizures ([address] 02/28/2020    Cerebral microvascular disease 02/28/2020    GI bleed due to NSAIDs 06/02/2019    GI bleed 06/01/2019    Diabetes ([address] 01/13/2019    Femur fracture, left ([address] 01/13/2019    Complex partial seizure 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 seizure ([address] 04/21/2017    Closed right hip fracture ([address] 08/17/2016    Lumbar post-laminectomy syndrome 05/11/2015    Hypothyroid 05/11/2015    H/O meningioma of the brain, left brain meningioma 05/11/2015     Past Medical History:   Diagnosis Date    Arthritis     Cancer ([address] 2002    brain tumor, benign (meningioma)    Diabetes ([address]     questionable?     Gastrointestinal disorder     diverticulitis    Hypothyroid 5/11/2015    Localization-related (focal) (partial) epilepsy and epileptic syndromes with complex partial seizures, without mention of intractable epilepsy 5/11/2015    Other ill-defined conditions(799.89)     loss of vision in left eye    Seizures ([address] 2002    caused by brain tumor    Thromboembolus [facility])     Thyroid disease        Core Measures:  CVA: Yes Yes      Activity:  Activity Level: Bed Rest      Cardiac:   Cardiac Monitoring: Yes      Cardiac Rhythm: Sinus Rhythm    Access:   Current line(s): PIV       Genitourinary:   Urinary status: voiding and external catheter      Respiratory:   O2 Device: None (Room air)         GI:     Current diet:  ADULT DIET Regular         Pain Management:   Patient states pain is manageable on current regimen: N/A    Skin:  Jan Score: 15  Interventions: float heels, increase time out of bed, PT/OT consult, limit briefs, internal/external urinary devices, and nutritional support     Patient Safety:  Fall Score:    Interventions: bed/chair alarm and pt to call before getting OOB     @Rollbelt  @dexterity to release roll belt  Yes/No ( must document dexterity  here by stating Yes or No here, otherwise this is a restraint and must follow restraint documentation policy.)    DVT prophylaxis:  DVT prophylaxis Med- Yes  DVT prophylaxis SCD or CAMRON- No     Wounds: (If Applicable)  Wounds- Yes  Location sacrum and bilat lower legs and feet    Active Consults:  IP CONSULT TO NEUROLOGY  IP CONSULT TO CARDIOLOGY    Length of Stay:  Expected LOS: - - -  Actual LOS: 1  Discharge Plan: Yes when ready      Jordan Car RN

## 2023-03-14 NOTE — PROGRESS NOTES
End of Shift Note    Bedside shift change report given to Vivi (oncoming nurse) by Mo Aldrich RN (offgoing nurse). Report included the following information SBAR, Kardex, and Cardiac Rhythm      Shift worked:  7a-7p   Shift summary and any significant changes:     MRI screening form completed and faxed to mri. Concerns for physician to address:  N/a   Zone phone for oncoming shift:   [phone]     Patient Information  Ha Conley  80 y.o.  3/13/2023  1:16 PM by Mana Gray MD. Ha Conley was admitted from Home    Problem List  Patient Active Problem List    Diagnosis Date Noted    Closed left subtrochanteric femur fracture, initial encounter ([address] 01/15/2019    Elevated troponin 03/13/2023    Bilateral leg weakness 03/13/2023    Pacemaker 10/06/2020    Bilateral carotid artery stenosis 02/28/2020    Localization-related focal epilepsy with complex partial seizures ([address] 02/28/2020    Cerebral microvascular disease 02/28/2020    GI bleed due to NSAIDs 06/02/2019    GI bleed 06/01/2019    Diabetes ([address] 01/13/2019    Femur fracture, left ([address] 01/13/2019    Complex partial seizure 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 seizure ([address] 04/21/2017    Closed right hip fracture ([address] 08/17/2016    Lumbar post-laminectomy syndrome 05/11/2015    Hypothyroid 05/11/2015    H/O meningioma of the brain, left brain meningioma 05/11/2015     Past Medical History:   Diagnosis Date    Arthritis     Cancer ([address] 2002    brain tumor, benign (meningioma)    Diabetes ([address]     questionable?     Gastrointestinal disorder     diverticulitis    Hypothyroid 5/11/2015    Localization-related (focal) (partial) epilepsy and epileptic syndromes with complex partial seizures, without mention of intractable epilepsy 5/11/2015    Other ill-defined conditions(799.89)     loss of vision in left eye    Seizures ([address] 2002    caused by brain tumor    Thromboembolus [facility])     Thyroid disease        Core Measures:  CVA: Yes Yes  CHF:No No  PNA:No No    Activity:  Activity Level: Bed Rest  Number times ambulated in hallways past shift: 0  Number of times OOB to chair past shift: 0    Cardiac:   Cardiac Monitoring: Yes      Cardiac Rhythm: Atrial Paced    Access:   Current line(s): PIV   Central Line? No   PICC LINE? No     Genitourinary:   Urinary status: voiding and external catheter  Urinary Catheter? No     Respiratory:   O2 Device: None (Room air)  Chronic home O2 use?: NO  Incentive spirometer at bedside: NO       GI:     Current diet:  ADULT DIET Regular  Passing flatus: YES  Tolerating current diet: YES       Pain Management:   Patient states pain is manageable on current regimen: YES    Skin:  Jan Score: 15  Interventions: float heels, increase time out of bed, PT/OT consult, and limit briefs    Patient Safety:  Fall Score: Total Score: 5  Interventions: bed/chair alarm, gripper socks, and pt to call before getting OOB  High Fall Risk: Yes  @Rollbelt  @dexterity to release roll belt  Yes/No ( must document dexterity  here by stating Yes or No here, otherwise this is a restraint and must follow restraint documentation policy.)    DVT prophylaxis:  DVT prophylaxis Med- Yes  DVT prophylaxis SCD or CAMRON- No     Wounds: (If Applicable)  Wounds- Yes  Location buttocks    Active Consults:  IP CONSULT TO NEUROLOGY  IP CONSULT TO CARDIOLOGY    Length of Stay:  Expected LOS: 2d 12h  Actual LOS: 1  Discharge Plan:  Yes       Martine Harry RN

## 2023-03-14 NOTE — CONSULTS
Neurology Note    Patient ID:  Bobby Pearl  [id number]  89 y.o.  9/5/1932      Date of Consultation:  March 14, 2023    Referring Physician: Dr. Sonia Gilbert    Reason for Consultation:  weakness      Assessment and Plan:    The patient is a pleasant 72-year-old female with acute onset of right upper and lower extremity weakness. There is also mild weakness noted in the left lower extremity. Acute right-sided hemiparesis:  Differential includes acute stroke, recrudescence of neurological symptoms due to metabolic abnormalities. Risk factors for stroke do include hypertension and recurrent DVTs with subtherapeutic INR. She should be on 81 mg aspirin daily  Pharmacy has been consulted with to help with Coumadin dosing. Questionable noncompliance  Echocardiogram has been ordered  Lipid panel and hemoglobin A1c are pending  Brain MRI ordered  I will order a MRA to look at vascular circulation     I provided stroke education today in regards to risk factors for stroke and lifestyle modifications to help minimize the risk of future stroke. This included medication compliance, regular follow up with primary care physician,  and healthy lifestyle habits (nutrition/exercise)     Lower extremity weakness:  Differential includes  lumbar spine disease, peripheral neuropathy, associated with possible stroke  Start with mri of brain. 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 Last level was in 2020. There was a concern at last office visit of non-compliance. Will check level today  200 mg twice a day of phenytoin  Patient has been restarted on home dosing. I would not obtain a EEG at this time    Hypothyroidism: On levothyroxine.       Neurology will continue to follow closely in this complicated patient with ongoing neurological symptoms necessitating additional testing. Subjective: I am weak       History of Present Illness:   Bobby Pearl is a 80 y.o. female with a past medical history of seizure disorder, hypothyroidism, recurrent DVTs who presented to the hospital with onset of bilateral lower extremities with the right side being weaker. This continue to progress throughout the day and she was not able to walk and was dragging her feet. Initially, it was reported that there was no weakness in her upper extremities or slurred speech. There was no headache, chest pain or shortness of breath. This morning, the patient states she does feel weakness still in her legs. The right side is notably worse. She also does feel that there is weakness in her right arm. She is uncertain as to when the right arm weakness began but she does feel that it started within the past 2 days. She has no difficulty with her speech or swallowing. History was obtained from reviewing the medical records, speaking with nursing and the patient. Pertinent labs upon admission include a white blood cell count of 5.9, hemoglobin 12.3. Platelets of 090. Urinalysis was suggestive of a possible urinary tract infection. Sodium of 139. Creatinine 0.84. AST of 30 and ALT of 27. Troponin was elevated at 427. TSH 3.9. I did independently review the head CT from March 13, 2023. There is no acute abnormalities there was severe chronic microvascular ischemic changes with diffuse severe atrophy    The patient was last seen in the neurology clinic in August 2022 by Lubna Moreno NP. She had been seen by multiple other neurology providers. She does have a reported history of seizures and last seizure was greater than 3 years ago. She was continued on dilantin for a history of seizures. It is unclear if she has been compliant with her medication.   There was a concern about progressive memory loss that was discussed at her most recent neurology visit. Her examination did reveal 4 out of 5 strength in her upper extremities. Her gait was not examined. It appears a brain MRI was discussed at that visit but she refused to get it done. It was noted there was questionable compliance with her Dilantin. It appears her seizure disorder was associated with a prior meningioma resection in 2002. A prior EEG in 2006 revealed mild left temporal slowing and due to this she was kept on medication. Past Medical History:   Diagnosis Date    Arthritis     Cancer ([address] 2002    brain tumor, benign (meningioma)    Diabetes ([address]     questionable?     Gastrointestinal disorder     diverticulitis    Hypothyroid 5/11/2015    Localization-related (focal) (partial) epilepsy and epileptic syndromes with complex partial seizures, without mention of intractable epilepsy 5/11/2015    Other ill-defined conditions(799.89)     loss of vision in left eye    Seizures ([facility] 2002    caused by brain tumor    Thromboembolus [facility])     Thyroid disease         Past Surgical History:   Procedure Laterality Date    COLORECTAL SCRN; HI RISK IND  8/26/2014         HX BACK SURGERY  1970s    HX ORTHOPAEDIC  1990s    right wrist    NEUROLOGICAL PROCEDURE UNLISTED  2002    brain tumor removed    HI INS NEW/RPLCMT PRM PM W/TRANSV ELTRD ATRIAL&VENT N/A 10/6/2020    INSERT PPM DUAL performed by Justyna Connell MD at [facility] CARDIAC CATH LAB        Family History   Problem Relation Age of Onset    Cancer Mother         breast    Alzheimer's Disease Mother     Heart Disease Father     Kidney Disease Sister         dialysis    Seizures Daughter         Social History     Tobacco Use    Smoking status: Never    Smokeless tobacco: Never   Substance Use Topics    Alcohol use: Yes     Comment: glass of wine occasionally        Allergies   Allergen Reactions    Bactrim [Sulfamethoprim] Unable to Obtain    Penicillins Itching     Patient not sure she is allergic    Sulfa (Sulfonamide Antibiotics) Nausea and Vomiting        Prior to Admission medications    Medication Sig Start Date End Date Taking? Authorizing Provider   phenytoin ER (DILANTIN ER) 100 mg ER capsule TAKE 2 CAPSULES TWICE DAILY 9/17/22   Finn Herrera MD   ARIPiprazole (ABILIFY) 2 mg tablet Take 2 mg by mouth nightly as needed. 7/26/22   Provider, Historical   furosemide (LASIX) 20 mg tablet Take 20 mg by mouth in the morning. 5/5/22   Provider, Historical   warfarin (COUMADIN) 3 mg tablet Take 3 mg by mouth in the morning. As directed currently every day omits saturday    Provider, Historical   vit C/E/Zn/coppr/lutein/zeaxan (PRESERVISION AREDS-2 PO) Take 1 Tab by mouth daily. Provider, Historical   biotin 5,000 mcg TbDi Take 2 Tabs by mouth two (2) times a day. Emily Batres MD   cholecalciferol (VITAMIN D3) 1,000 unit tablet Take 2 Tabs by mouth two (2) times a day. Emily Batres MD   acetaminophen (TYLENOL) 325 mg tablet Take 2 Tabs by mouth every six (6) hours as needed for Pain or Fever. 1/19/19   Shani Farley MD   calcium carbonate (OS-ANGUS) 500 mg calcium (1,250 mg) tablet Take 1 Tablet by mouth two (2) times a day. Eloy Hernandez MD   levothyroxine (SYNTHROID) 88 mcg tablet Take 1 Tab by mouth daily.  7/28/15   Eloy Hernandez MD     Current Facility-Administered Medications   Medication Dose Route Frequency    levothyroxine (SYNTHROID) tablet 88 mcg  88 mcg Oral 6am    balsam peru-castor oiL (VENELEX) ointment   Topical BID    furosemide (LASIX) tablet 20 mg  20 mg Oral DAILY    phenytoin ER (DILANTIN ER) ER capsule 200 mg  200 mg Oral BID    acetaminophen (TYLENOL) tablet 650 mg  650 mg Oral Q4H PRN    Or    acetaminophen (TYLENOL) solution 650 mg  650 mg Per NG tube Q4H PRN    Or    acetaminophen (TYLENOL) suppository 650 mg  650 mg Rectal Q4H PRN    aspirin chewable tablet 81 mg  81 mg Oral DAILY    labetaloL (NORMODYNE;TRANDATE) injection 5 mg  5 mg IntraVENous Q10MIN PRN    polyethylene glycol (MIRALAX) packet 17 g  17 g Oral DAILY PRN    WARFARIN INFORMATION NOTE (COUMADIN)   Other QPM    traMADoL (ULTRAM) tablet 50 mg  50 mg Oral Q6H PRN    hydrALAZINE (APRESOLINE) 20 mg/mL injection 10 mg  10 mg IntraVENous QID PRN    hydrOXYzine HCL (ATARAX) tablet 25 mg  25 mg Oral TID PRN    melatonin tablet 1.5 mg  1.5 mg Oral QHS PRN       Review of Systems:    General, constitutional: negative  Eyes, vision: negative  Ears, nose, throat: negative  Cardiovascular, heart: negative  Respiratory: negative  Gastrointestinal: negative  Genitourinary: negative  Musculoskeletal: negative  Skin and integumentary: negative  Psychiatric: negative  Endocrine: negative  Neurological: negative, except for HPI  Hematologic/lymphatic: negative  Allergy/immunology: negative    Objective:     Visit Vitals  BP (!) 119/59 (BP 1 Location: Right upper arm, BP Patient Position: Supine)   Pulse 69   Temp 97.7 °F (36.5 °C)   Resp 16   Ht 5' 4\" (1.626 m)   Wt 120 lb (54.4 kg)   SpO2 100%   BMI 20.60 kg/m²       Physical Exam:      General:  appears well nourished in no acute distress  Neck: no carotid bruits  Lungs: clear to auscultation  Heart:  no murmurs, regular rate  Lower extremity: peripheral pulses palpable and no edema  Skin: intact    Neurological exam:  The patient is awake and alert. She did know the correct year. She got the month incorrect. She could perform simple calculations. She did know the name of the president. She could name objects correctly. She has decreased insight into her medical condition and medical history. She has decreased attention and concentration. There was no dysarthria or aphasia.     Cranial nerves:   II-XII were tested    Perrrla  Fundoscopic examination attempted but difficult to visualize fundus  Visual fields were full  Eomi, no evidence of nystagmus  Facial sensation:  normal and symmetric  Facial motor: normal and symmetric  Hearing intact  SCM strength intact  Tongue: midline without fasciculations    Motor: Tone normal  Mild right-sided pronator drift  No evidence of fasciculations    Strength testing:   deltoid triceps biceps Wrist ext. Wrist flex. intrinsics Hip flex. Hip ext. Knee ext. Knee flex Dorsi flex Plantar flex   Right 4 4 4 4 5 4 4 4 4 4 4 4   Left 5 5 5 5 5 5 4+ 4+ 4+ 4+ 4+ 4+     There is a bilateral lower extremity weakness however asymmetry does exist.    Sensory:  Upper extremity: intact to pp, light touch, and vibration > 10 seconds  Lower extremity: Pinprick was decreased to mid shin bilaterally. There was an asymmetry with reduced sensation on the right. Reflexes:    Right Left  Biceps  2 2  Triceps 2 2  Brachiorad. 2 2  Patella  2 2  Achilles - -    Plantar response: extensor on the right      Cerebellar testing:  no tremor apparent, finger/nose and fantasma were intact. Slower on the right    Gait: not assessed due to concerns over safety    Labs:     Lab Results   Component Value Date/Time    Sodium 139 03/14/2023 02:55 AM    Potassium 3.6 03/14/2023 02:55 AM    Chloride 108 03/14/2023 02:55 AM    Glucose 99 03/14/2023 02:55 AM    BUN 22 (H) 03/14/2023 02:55 AM    Creatinine 0.84 03/14/2023 02:55 AM    Calcium 9.2 03/14/2023 02:55 AM    WBC 5.9 03/14/2023 02:55 AM    HCT 37.3 03/14/2023 02:55 AM    HGB 12.3 03/14/2023 02:55 AM    PLATELET 821 24/57/8842 02:55 AM       Imaging:    Results from Hospital Encounter encounter on 09/09/11    MRI BRAIN W AND W/O CONTRAST    Narrative  **Final Report**      ICD Codes / Adm. 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     Technical factors : diffusion imaging mass at the L T1-weighted pre- and  post-12 cc IV Magnevist, axial T1-weighted pre- enteric and postcontrast T2  and FLAIR gradient-echo coronal T1-weighted contrast and T2-weighted. No  prior . Diffusion imaging is negative. There is mild atrophy and white matter  disease. Major intracranial vessels are patent. There is no extra-axial  fluid collection hemorrhage or shift. There is chronic frontal atrophy. This  is nonspecific.it is likely  related to prior surgery. There is no  associated residual enhancement. No masses or mass effect. IMPRESSION: Postsurgical changes. Atrophy and white matter disease but no  acute findings. Signing/Reading Doctor: Amina Molina ([id number])  Transcribed:  on 09/09/2011  Approved: Amina Molina ([id number])  09/09/2011        Distribution:  Attending Doctor: Delaney Love      Results from Hospital Encounter encounter on 03/13/23    CT HEAD WO CONT    Narrative  CLINICAL HISTORY: weakness RLE  INDICATION: weakness RLE  COMPARISON: 2015. CT dose reduction was achieved through use of a standardized protocol tailored  for this examination and automatic exposure control for dose modulation. TECHNIQUE: Serial axial images with a collimation of 5 mm were obtained from the  skull base through the vertex  FINDINGS:  There is sulcal and ventricular prominence. Confluent periventricular and  scattered foci of hypodensity in the cerebral white matter. There is no evidence  of an acute infarction, hemorrhage, or mass-effect. There is no evidence of  midline shift or hydrocephalus. Posterior fossa structures are unremarkable. No  extra-axial collections are seen. Mastoid air cells are well pneumatized and clear. 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 Postsurgical change on  the left. Impression  No acute intracranial process. Imaging findings consistent with severe chronic microvascular ischemic change. There is a severe degree of cerebral atrophy. Complex decision making was necessary due to the patient's current medical condition and other medical co-morbidities.          Active Problems:    Elevated troponin (3/13/2023)      Bilateral leg weakness (3/13/2023)                   Signed By:  Sammy Lee DO FAAN    March 14, 2023

## 2023-03-14 NOTE — CONSULTS
Cardiology Consult Note    CC: CVA Sx    Milo Shields MD  Reason for consult:  elevated troponin  Requesting MD:  Dr. Geovanni Del Valle Date: 3/13/2023   Today's Date: 3/14/2023   Cardiologist:  Dr Charletta Prader (last OV w/ me was 6/2021). Cardiac Assessment/Plan:   1) HTN, Resolved in 2018; Off Rx. 2) DORSEY, variable since 2017: Neg MPI 7/2017; Mild-mod TR/mild MR; PAp 48 6/2017.  3) SSS: Bradycardia, since 2017: 3 sec pauses during sleep on holter 2017: ASx, observation. *Nocturnal dyspnea 9/2020: Sx pauses at night: Sx resolved w/ PPM ([employer]tr[employer]) 10/2020; PET and echo prev rec but Sx resolved. 4) Atypical CP 9/2021 in ER; trop 110s; Med Rx. 5) Seizure d/o (after benign brain tumor resection; meningioma),  6) Osteoporosis. 7) Hypothyroidism,   8) Poor balance, prior fall. Uses cane. 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   12) RLE >>LLE weakness. Admitted w/ CVA Sx; neg head CT w/o contrast; MRI pending. Current cardiac meds: asa; lasix 20. Coumadin. Rec: echo pending; Med Rx rec rather than invasive testing. D/W Santosh Cervantes (son) who agrees with medical Rx only (no invasive cardiac testing). Consider NOAC instead of coumadin. Hospital Problem List:  Active Problems:    Elevated troponin (3/13/2023)      Bilateral leg weakness (3/13/2023)       ____________________________________________________________________  Cynthia Rios is a 80 y.o. female presents with Bilateral leg weakness [R29.898]  Elevated troponin [R77.8]. As noted in H&P: \"80 y.o.   female who presents with past medical history of seizure disorder, hypothyroidism, recurrent DVT is coming the hospital chief complaints of bilateral lower extremity weakness. Patient reports being usual travails until about yesterday when she woke up feeling funny in her head and started having bilateral lower extremity weakness but more on the right side.   By evening, she was not able to walk and was dragging her feet. She did not report any associated weakness in her hands, facial deviation or slurred speech. Does not report any headache. Does not report any chest pain or shortness of breath. Does not report any recent cough or phlegm. No fever or chills. On arrival to ED, noted to have stable vitals. On labs CBC is normal.  BMP shows a troponin of 388. Creatinine is 1.01. LFTs are normal.  CT head shows chronic microvascular changes with the severe degree of cerebral atrophy. \"    ______________________________________________________________________    The patient is not a reliable historian; she does not know why she is in the hospital; believes it is 2020. Think she is visiting [place]. no current chest pain or dyspnea. No apparent PND, orthopnea, palpitations, pre-syncope, syncope, peripheral edema. No current complaints. ECG 3/13/23: sinus/PACs; LAFB. Tele: sinus; PACs; occ Atrial pacing. CXR: none  Head CT: no acute changes; chronic microvasc dz/  Notable labs: Hg 12.3; plt 170; K 3.6; Cr 0.8; trop 388/427; INR 1.1.   ________________________________________________________  Notable prior cardiac history:  @ NP OV 5/10/22:  Ms Oma Mckeon has a h/o:  1) HTN, Resolved in 2018; Off Rx. 2) DORSEY, variable since 2017: Neg MPI 7/2017; Mild-mod TR/mild MR; PAp 48 6/2017.  3) SSS: Bradycardia, since 2017: 3 sec pauses during sleep on holter 2017: ASx, observation. *Nocturnal dyspnea 9/2020: Sx pauses at night: Sx resolved w/ PPM ([employer]) 10/2020; PET and echo prev rec but Sx resolved. 4) DM (diet controlled),   5) Seizure d/o (after benign brain tumor resection),  6) Osteoporosis. 7) Hypothyroidism,   8) Poor balance, prior fall. Uses cane. 9) Dementia 2022  10) Neg RLE for VI 6/2022.  11) Atypical CP 9/2021 in ER; trop 110s; Med Rx. She has been very active until 5/2017. No added salt nor nicotine. Very rare ethanol. 6/2021:  No chest pain or DORSEY.   Nocturnal dyspnea has resolved. Equivocal decreased fatigue but remains quite active. Continues to use a cane. NP OV 5/2022: Ms. Kiya Arredondo presents with her son. She has had some right lower leg swelling. She went to the [facility] ED on 4/17/22 for the same complaint. She had negative duplex. Her previous DVT was in the left leg (popliteal). She was treated with Coumadin because of this. She was started on Lasix 20 last week but does not weigh herself. She elevates her legs but does not like compression stockings. She complains of dizziness from time to time. She does not think its significant. She  denies chest pain suggestive of ischemia, shortness of breath, orthopnea, PND,  palpitations, lightheadedness,  near syncope, and syncope. No TIA or stroke like symptoms. Her son says she has dementia. IMPRESSION AND PLAN  01. Sinus node dysfunction (I49.5): The patient has a permanent pacemaker. Previously symptomatic. Interrogated [employer] PPM today. AP 70.7%,  <0.1%, 0 episodes since last transmission. normal device function, 12.5 years battery life   02. Edema (R60.9):  RLE mildly edematous. Negative venous duplex at [facility] ED 4/17. On Lasix 20 daily  Compression stockings and elevation Venous Insufficiency study to be done. Unilateral right. Continue Lasix   03. Shortness of breath (R06.02):  Neg MPI and unremarkable echo 7/2017. Resolved; hold on PET/echo for now. We discussed the signs and symptoms of unstable angina, myocardial infarction and malignant arrhythmia. The patient knows to seek immediate medical attention should they occur. 04. Essential (primary) hypertension (I10): On no Rx. Adequately controlled at home diary   05. Atrial flutter (I48.92):  PPM interrogation above   06. Seizure disorder (G40.909): Managed by: Other Physician. 07. Acquired hypothyroidism (E03.9):  Managed by: Other Physician. 08. Body mass index [BMI] 22.0-22.9, adult (F76.51):  weight down   09.  Dementia (F03.90): Son brought note with dementia diagnosis and she can no longer manage her affairs. Scanned in chart. 10. DVT of left leg (I82.402):  in 2019, treated with Coumadin     ORDERS:  1 Venous Insufficiency study to be done. 5/10/22 MEDICATION LIST  Medication Sig Desc Non-VCS   biotin 10,000 mcg capsule  Y   Dilantin Extended 100 mg capsule take 1 capsule by oral route 2 times every day Y   Lasix 20 mg tablet take 1 tablet by oral route  every day N   multivitamin tablet take 1 tablet by oral route  every day with food Y   Synthroid 88 mcg tablet take 1 tablet by oral route  every day Y   Tylenol 325 mg tablet take 2 tablet by oral route  every 4 hours as needed as needed Y   Vitamin D3 1,000 unit tablet take 1 by Oral route  every day Y       ________________________________________________  CARDIAC HISTORY  CAD:  1 DORSEY. [Non-Ischemic Stress, Nl echo.] - 6/2017     ARRHYTHMIA:  1 SSS, ASx, during sleep: observation. - 7/2017     RISK FACTORS:  1 Hypertension       CARDIOVASCULAR PROCEDURES  Procedure Date Results   Noah MPI 07/07/2017 EF 0.77 (77%), No Ischemia   Devices 10/06/2020 Dual Chamber PPM (Medtronic)   Echo 06/30/2017 EF 0.65 (75%), No Diastolic Dysfunction, Mild-Moderate TR, Mild MR, Est RVSP 48 mmHg, Normal RV. EKG 06/18/2021 Sinus Rhythm, PRWP, otherwise unremarkable. Holter 09/22/2020 Sinus Rhythm, HR 34 (sleep w/ 4.8 sec pauses x2)-122, ave 53; PAflutter (ASx) w/ VR 160s; D/W pt; EP OV rec. Holter 07/06/2017 Sinus Rhythm, Sinus Maury, No Malignant Arrhythmias, No Sx; HR 42-98, ave 56. Appropriate HR variability. Brief ASx sinus node arrest during sleep (HR to 40s; 3 sec pause with a junctional escape beat).        ACTIVE ALLERGIES:  Ingredient Reaction Comment   TRIMETHOPRIM     SULFA (SULFONAMIDE ANTIBIOTICS)     SULFAMETHOXAZOLE     CIPROFLOXACIN         ______________________________________________________________________  Patient Active Problem List    Diagnosis Date Noted    Closed left subtrochanteric femur fracture, initial encounter ([facility] [address] 01/15/2019    Elevated troponin 03/13/2023    Bilateral leg weakness 03/13/2023    Pacemaker 10/06/2020    Bilateral carotid artery stenosis 02/28/2020    Localization-related focal epilepsy with complex partial seizures ([address] 02/28/2020    Cerebral microvascular disease 02/28/2020    GI bleed due to NSAIDs 06/02/2019    GI bleed 06/01/2019    Diabetes ([address] 01/13/2019    Femur fracture, left ([address] 01/13/2019    Complex partial seizure 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 seizure ([address] 04/21/2017    Closed right hip fracture ([address] 08/17/2016    Lumbar post-laminectomy syndrome 05/11/2015    Hypothyroid 05/11/2015    H/O meningioma of the brain, left brain meningioma 05/11/2015        Past Medical History:   Diagnosis Date    Arthritis     Cancer ([address] 2002    brain tumor, benign (meningioma)    Diabetes ([address]     questionable?     Gastrointestinal disorder     diverticulitis    Hypothyroid 5/11/2015    Localization-related (focal) (partial) epilepsy and epileptic syndromes with complex partial seizures, without mention of intractable epilepsy 5/11/2015    Other ill-defined conditions(799.89)     loss of vision in left eye    Seizures ([address] 2002    caused by brain tumor    Thromboembolus [facility])     Thyroid disease       Past Surgical History:   Procedure Laterality Date    COLORECTAL SCRN; HI RISK IND  8/26/2014         HX BACK SURGERY  1970s    HX ORTHOPAEDIC  1990s    right wrist    NEUROLOGICAL PROCEDURE UNLISTED  2002    brain tumor removed    MN INS NEW/RPLCMT PRM PM W/TRANSV ELTRD ATRIAL&VENT N/A 10/6/2020    INSERT PPM DUAL performed by Carolina Wilson MD at [facility] C[facility]ATH [facility]     Allergies   Allergen Reactions    Bactrim [Sulfamethoprim] Unable to Obtain    Penicillins Itching     Patient not sure she is allergic    Sulfa (Sulfonamide Antibiotics) Nausea and Vomiting      Family History   Problem Relation Age of Onset    Cancer Mother         breast Alzheimer's Disease Mother     Heart Disease Father     Kidney Disease Sister         dialysis    Seizures Daughter       Social History     Socioeconomic History    Marital status:      Spouse name: Not on file    Number of children: Not on file    Years of education: Not on file    Highest education level: Not on file   Occupational History    Not on file   Tobacco Use    Smoking status: Never    Smokeless tobacco: Never   Vaping Use    Vaping Use: Never used   Substance and Sexual Activity    Alcohol use: Yes     Comment: glass of wine occasionally    Drug use: No    Sexual activity: Not on file   Other Topics Concern    Not on file   Social History Narrative    Not on file     Social Determinants of Health     Financial Resource Strain: Not on file   Food Insecurity: Not on file   Transportation Needs: Not on file   Physical Activity: Not on file   Stress: Not on file   Social Connections: Not on file   Intimate Partner Violence: Not on file   Housing Stability: Not on file     Current Facility-Administered Medications   Medication Dose Route Frequency    levothyroxine (SYNTHROID) tablet 88 mcg  88 mcg Oral 6am    balsam peru-castor oiL (VENELEX) ointment   Topical BID    furosemide (LASIX) tablet 20 mg  20 mg Oral DAILY    phenytoin ER (DILANTIN ER) ER capsule 200 mg  200 mg Oral BID    acetaminophen (TYLENOL) tablet 650 mg  650 mg Oral Q4H PRN    Or    acetaminophen (TYLENOL) solution 650 mg  650 mg Per NG tube Q4H PRN    Or    acetaminophen (TYLENOL) suppository 650 mg  650 mg Rectal Q4H PRN    aspirin chewable tablet 81 mg  81 mg Oral DAILY    labetaloL (NORMODYNE;TRANDATE) injection 5 mg  5 mg IntraVENous Q10MIN PRN    polyethylene glycol (MIRALAX) packet 17 g  17 g Oral DAILY PRN    WARFARIN INFORMATION NOTE (COUMADIN)   Other QPM    traMADoL (ULTRAM) tablet 50 mg  50 mg Oral Q6H PRN    hydrALAZINE (APRESOLINE) 20 mg/mL injection 10 mg  10 mg IntraVENous QID PRN    hydrOXYzine HCL (ATARAX) tablet 25 mg  25 mg Oral TID PRN    melatonin tablet 1.5 mg  1.5 mg Oral QHS PRN        No reported FHx of early CAD or SCD    Prior to Admission Medications:  Prior to Admission medications    Medication Sig Start Date End Date Taking? Authorizing Provider   phenytoin ER (DILANTIN ER) 100 mg ER capsule TAKE 2 CAPSULES TWICE DAILY 9/17/22  Yes Abimbola Calhoun MD   ARIPiprazole (ABILIFY) 2 mg tablet Take 2 mg by mouth nightly as needed. 7/26/22  Yes Provider, Historical   furosemide (LASIX) 20 mg tablet Take 20 mg by mouth in the morning. 5/5/22  Yes Provider, Historical   warfarin (COUMADIN) 3 mg tablet Take 3 mg by mouth in the morning. As directed currently every day omits saturday   Yes Provider, Historical   biotin 5,000 mcg TbDi Take 2 Tabs by mouth two (2) times a day. Yes Twin Vann MD   cholecalciferol (VITAMIN D3) 1,000 unit tablet Take 2 Tabs by mouth two (2) times a day. Yes Twin Vann MD   calcium carbonate (OS-ANGUS) 500 mg calcium (1,250 mg) tablet Take 1 Tablet by mouth two (2) times a day. Yes Mary, MD Eloy   levothyroxine (SYNTHROID) 88 mcg tablet Take 1 Tab by mouth daily. 7/28/15  Yes Other, MD Eloy   vit C/E/Zn/coppr/lutein/zeaxan (PRESERVISION AREDS-2 PO) Take 1 Tab by mouth daily. Patient not taking: Reported on 3/14/2023    Provider, Historical   acetaminophen (TYLENOL) 325 mg tablet Take 2 Tabs by mouth every six (6) hours as needed for Pain or Fever.  1/19/19   Kya Ayon MD        Review of Symptoms: As noted in H&P:  \"Total of 12 systems reviewed as follows:                                        POSITIVE= underlined text  Negative = text not underlined  General:                     fever, chills, sweats, generalized weakness, weight loss/gain,                                       loss of appetite   Eyes:                           blurred vision, eye pain, loss of vision, double vision  ENT:                            rhinorrhea, pharyngitis   Respiratory: cough, sputum production, SOB, DORSEY, wheezing, pleuritic pain   Cardiology:                chest pain, palpitations, orthopnea, PND, edema, syncope   Gastrointestinal:       abdominal pain , N/V, diarrhea, dysphagia, constipation, bleeding   Genitourinary:           frequency, urgency, dysuria, hematuria, incontinence   Muskuloskeletal :      arthralgia, myalgia, back pain  Hematology:              easy bruising, nose or gum bleeding, lymphadenopathy   Dermatological:         rash, ulceration, pruritis, color change / jaundice  Endocrine:                 hot flashes or polydipsia   Neurological:             headache, dizziness, confusion, focal weakness, paresthesia,                                      Speech difficulties, memory loss, gait difficulty  Psychological:          Feelings of anxiety, depression, agitation\". 24 hr VS reviewed, overall VSSAF  Temp (24hrs), Av.8 °F (36.6 °C), Min:97.5 °F (36.4 °C), Max:98.5 °F (36.9 °C)    Patient Vitals for the past 8 hrs:   Pulse   23 0810 72   23 0318 69     Patient Vitals for the past 8 hrs:   Resp   23 0810 16   23 0318 16     Patient Vitals for the past 8 hrs:   BP   23 0810 101/60   23 0318 (!) 119/59     No intake or output data in the 24 hours ending 23 0933      Physical Exam (complete single organ system exam)    Cons: The patient is no distress. Appears stated age; elderly. HEENT: Normal conjunctivae and palate. No xanthelasma. Neck: Flat JVP without appreciable HJR. Resp: Normal respiratory effort with clear lungs bilaterally. CV: Regular rate and rhythm. PMI not palpated. Normal S1,S2  No gallop or rubs appreciated. Soft JOSE murmur appreciated. Intact carotid upstroke bilaterally without appreciated bruits. Abdominal aorta not palpated; no abdominal bruit noted. Decreased pedal pulses. No peripheral edema. GI: No abd mass noted, soft; no organomegaly noted. Bowel sounds present.    Muscular: No significant kyphosis. LE weakness. Ext: No cyanosis, clubbing, or stigmata of peripheral embolization. Derm: No ulcers or stasis dermatitis of lower extremities. Neuro: Alert and oriented x 1;  RLE>>LLE weakness; Pleasant mood and affect. Labs:   Recent Results (from the past 24 hour(s))   EKG, 12 LEAD, INITIAL    Collection Time: 03/13/23  1:58 PM   Result Value Ref Range    Ventricular Rate 66 BPM    Atrial Rate 57 BPM    QRS Duration 76 ms    Q-T Interval 422 ms    QTC Calculation (Bezet) 442 ms    Calculated R Axis -61 degrees    Calculated T Axis 71 degrees    Diagnosis       Baseline artifact; Sinus with premature supraventricular complexes  Left anterior fascicular block  Cannot rule out Inferior infarct (cited on or before 13-MAR-2023)  When compared with ECG of 24-SEP-2021 14:03,  Junctional rhythm has replaced Electronic atrial pacemaker     CBC WITH AUTOMATED DIFF    Collection Time: 03/13/23  2:07 PM   Result Value Ref Range    WBC 5.7 3.6 - 11.0 K/uL    RBC 4.08 3.80 - 5.20 M/uL    HGB 13.2 11.5 - 16.0 g/dL    HCT 39.9 35.0 - 47.0 %    MCV 97.8 80.0 - 99.0 FL    MCH 32.4 26.0 - 34.0 PG    MCHC 33.1 30.0 - 36.5 g/dL    RDW 13.0 11.5 - 14.5 %    PLATELET 776 545 - 299 K/uL    MPV 12.0 8.9 - 12.9 FL    NRBC 0.0 0  WBC    ABSOLUTE NRBC 0.00 0.00 - 0.01 K/uL    NEUTROPHILS 73 32 - 75 %    LYMPHOCYTES 17 12 - 49 %    MONOCYTES 10 5 - 13 %    EOSINOPHILS 0 0 - 7 %    BASOPHILS 0 0 - 1 %    IMMATURE GRANULOCYTES 0 0.0 - 0.5 %    ABS. NEUTROPHILS 4.1 1.8 - 8.0 K/UL    ABS. LYMPHOCYTES 1.0 0.8 - 3.5 K/UL    ABS. MONOCYTES 0.6 0.0 - 1.0 K/UL    ABS. EOSINOPHILS 0.0 0.0 - 0.4 K/UL    ABS. BASOPHILS 0.0 0.0 - 0.1 K/UL    ABS. IMM.  GRANS. 0.0 0.00 - 0.04 K/UL    DF AUTOMATED     METABOLIC PANEL, COMPREHENSIVE    Collection Time: 03/13/23  2:07 PM   Result Value Ref Range    Sodium 143 136 - 145 mmol/L    Potassium 3.8 3.5 - 5.1 mmol/L    Chloride 110 (H) 97 - 108 mmol/L    CO2 27 21 - 32 mmol/L Anion gap 6 5 - 15 mmol/L    Glucose 107 (H) 65 - 100 mg/dL    BUN 24 (H) 6 - 20 MG/DL    Creatinine 1.01 0.55 - 1.02 MG/DL    BUN/Creatinine ratio 24 (H) 12 - 20      eGFR 53 (L) >60 ml/min/1.73m2    Calcium 9.8 8.5 - 10.1 MG/DL    Bilirubin, total 0.8 0.2 - 1.0 MG/DL    ALT (SGPT) 27 12 - 78 U/L    AST (SGOT) 30 15 - 37 U/L    Alk. phosphatase 71 45 - 117 U/L    Protein, total 7.3 6.4 - 8.2 g/dL    Albumin 3.7 3.5 - 5.0 g/dL    Globulin 3.6 2.0 - 4.0 g/dL    A-G Ratio 1.0 (L) 1.1 - 2.2     MAGNESIUM    Collection Time: 03/13/23  2:07 PM   Result Value Ref Range    Magnesium 2.3 1.6 - 2.4 mg/dL   TROPONIN-HIGH SENSITIVITY    Collection Time: 03/13/23  2:07 PM   Result Value Ref Range    Troponin-High Sensitivity 388 (HH) 0 - 51 ng/L   SAMPLES BEING HELD    Collection Time: 03/13/23  2:07 PM   Result Value Ref Range    SAMPLES BEING HELD SST     COMMENT        Add-on orders for these samples will be processed based on acceptable specimen integrity and analyte stability, which may vary by analyte.    PROTHROMBIN TIME + INR    Collection Time: 03/13/23  2:50 PM   Result Value Ref Range    INR 1.1 0.9 - 1.1      Prothrombin time 11.6 (H) 9.0 - 11.1 sec   URINALYSIS W/ RFLX MICROSCOPIC    Collection Time: 03/13/23  5:39 PM   Result Value Ref Range    Color YELLOW/STRAW      Appearance CLOUDY (A) CLEAR      Specific gravity 1.023      pH (UA) 5.0 5.0 - 8.0      Protein 30 (A) NEG mg/dL    Glucose Negative NEG mg/dL    Ketone TRACE (A) NEG mg/dL    Bilirubin Negative NEG      Blood Negative NEG      Urobilinogen 1.0 0.2 - 1.0 EU/dL    Nitrites Negative NEG      Leukocyte Esterase MODERATE (A) NEG      WBC 5-10 0 - 4 /hpf    RBC 0-5 0 - 5 /hpf    Epithelial cells FEW FEW /lpf    Bacteria Negative NEG /hpf   CBC W/O DIFF    Collection Time: 03/14/23  2:55 AM   Result Value Ref Range    WBC 5.9 3.6 - 11.0 K/uL    RBC 3.78 (L) 3.80 - 5.20 M/uL    HGB 12.3 11.5 - 16.0 g/dL    HCT 37.3 35.0 - 47.0 %    MCV 98.7 80.0 - 99.0 FL MCH 32.5 26.0 - 34.0 PG    MCHC 33.0 30.0 - 36.5 g/dL    RDW 12.8 11.5 - 14.5 %    PLATELET 732 611 - 107 K/uL    MPV 11.9 8.9 - 12.9 FL    NRBC 0.0 0  WBC    ABSOLUTE NRBC 0.00 0.00 - 0.01 K/uL   TSH 3RD GENERATION    Collection Time: 03/14/23  2:55 AM   Result Value Ref Range    TSH 3.90 (H) 0.36 - 0.62 uIU/mL   METABOLIC PANEL, BASIC    Collection Time: 03/14/23  2:55 AM   Result Value Ref Range    Sodium 139 136 - 145 mmol/L    Potassium 3.6 3.5 - 5.1 mmol/L    Chloride 108 97 - 108 mmol/L    CO2 28 21 - 32 mmol/L    Anion gap 3 (L) 5 - 15 mmol/L    Glucose 99 65 - 100 mg/dL    BUN 22 (H) 6 - 20 MG/DL    Creatinine 0.84 0.55 - 1.02 MG/DL    BUN/Creatinine ratio 26 (H) 12 - 20      eGFR >60 >60 ml/min/1.73m2    Calcium 9.2 8.5 - 10.1 MG/DL   TROPONIN-HIGH SENSITIVITY    Collection Time: 03/14/23  2:55 AM   Result Value Ref Range    Troponin-High Sensitivity 427 (HH) 0 - 51 ng/L   PROTHROMBIN TIME + INR    Collection Time: 03/14/23  2:55 AM   Result Value Ref Range    INR 1.1 0.9 - 1.1      Prothrombin time 11.1 9.0 - 11.1 sec     No results for input(s): CPK, CKMB, CKNDX, TROIQ in the last 72 hours.     Recent Labs     03/14/23  0255 03/13/23  1407    143   K 3.6 3.8    110*   CO2 28 27   BUN 22* 24*   CREA 0.84 1.01   GLU 99 107*   CA 9.2 9.8   ALB  --  3.7   WBC 5.9 5.7   HGB 12.3 13.2   HCT 37.3 39.9    193       Bryan Peng MD

## 2023-03-14 NOTE — PROGRESS NOTES
Problem: Falls - Risk of  Goal: *Absence of Falls  Description: Document Aliya Hill Fall Risk and appropriate interventions in the flowsheet. Outcome: Progressing Towards Goal  Note: Fall Risk Interventions:  Mobility Interventions: Bed/chair exit alarm    Mentation Interventions: Bed/chair exit alarm    Medication Interventions: Bed/chair exit alarm    Elimination Interventions: Bed/chair exit alarm, Call light in reach    History of Falls Interventions: Bed/chair exit alarm, Room close to nurse's station         Problem: Patient Education: Go to Patient Education Activity  Goal: Patient/Family Education  Outcome: Progressing Towards Goal     Problem: Pressure Injury - Risk of  Goal: *Prevention of pressure injury  Description: Document Jan Scale and appropriate interventions in the flowsheet.   Outcome: Progressing Towards Goal  Note: Pressure Injury Interventions:       Moisture Interventions: Check for incontinence Q2 hours and as needed, Internal/External urinary devices, Limit adult briefs, Minimize layers, Moisture barrier, Apply protective barrier, creams and emollients    Activity Interventions: PT/OT evaluation    Mobility Interventions: HOB 30 degrees or less, PT/OT evaluation    Nutrition Interventions: Document food/fluid/supplement intake    Friction and Shear Interventions: HOB 30 degrees or less, Foam dressings/transparent film/skin sealants, Apply protective barrier, creams and emollients, Minimize layers                Problem: Patient Education: Go to Patient Education Activity  Goal: Patient/Family Education  Outcome: Progressing Towards Goal     Problem: Patient Education: Go to Patient Education Activity  Goal: Patient/Family Education  Outcome: Progressing Towards Goal     Problem: TIA/CVA Stroke: 0-24 hours  Goal: Off Pathway (Use only if patient is Off Pathway)  Outcome: Progressing Towards Goal  Goal: Activity/Safety  Outcome: Progressing Towards Goal  Goal: Consults, if ordered  Outcome: Progressing Towards Goal  Goal: Diagnostic Test/Procedures  Outcome: Progressing Towards Goal  Goal: Nutrition/Diet  Outcome: Progressing Towards Goal  Goal: Discharge Planning  Outcome: Progressing Towards Goal  Goal: Medications  Outcome: Progressing Towards Goal  Goal: Respiratory  Outcome: Progressing Towards Goal  Goal: Treatments/Interventions/Procedures  Outcome: Progressing Towards Goal  Goal: Minimize risk of bleeding post-thrombolytic infusion  Outcome: Progressing Towards Goal  Goal: Monitor for complications post-thrombolytic infusion  Outcome: Progressing Towards Goal  Goal: Psychosocial  Outcome: Progressing Towards Goal  Goal: *Hemodynamically stable  Outcome: Progressing Towards Goal  Goal: *Neurologically stable  Description: Absence of additional neurological deficits    Outcome: Progressing Towards Goal  Goal: *Verbalizes anxiety and depression are reduced or absent  Outcome: Progressing Towards Goal  Goal: *Absence of Signs of Aspiration on Current Diet  Outcome: Progressing Towards Goal  Goal: *Absence of deep venous thrombosis signs and symptoms(Stroke Metric)  Outcome: Progressing Towards Goal  Goal: *Ability to perform ADLs and demonstrates progressive mobility and function  Outcome: Progressing Towards Goal  Goal: *Stroke education started(Stroke Metric)  Outcome: Progressing Towards Goal  Goal: *Dysphagia screen performed(Stroke Metric)  Outcome: Progressing Towards Goal  Goal: *Rehab consulted(Stroke Metric)  Outcome: Progressing Towards Goal     Problem: TIA/CVA Stroke: Day 2 Until Discharge  Goal: Off Pathway (Use only if patient is Off Pathway)  Outcome: Progressing Towards Goal  Goal: Activity/Safety  Outcome: Progressing Towards Goal  Goal: Diagnostic Test/Procedures  Outcome: Progressing Towards Goal  Goal: Nutrition/Diet  Outcome: Progressing Towards Goal  Goal: Discharge Planning  Outcome: Progressing Towards Goal  Goal: Medications  Outcome: Progressing Towards Goal  Goal: Respiratory  Outcome: Progressing Towards Goal  Goal: Treatments/Interventions/Procedures  Outcome: Progressing Towards Goal  Goal: Psychosocial  Outcome: Progressing Towards Goal  Goal: *Verbalizes anxiety and depression are reduced or absent  Outcome: Progressing Towards Goal  Goal: *Absence of aspiration  Outcome: Progressing Towards Goal  Goal: *Absence of deep venous thrombosis signs and symptoms(Stroke Metric)  Outcome: Progressing Towards Goal  Goal: *Optimal pain control at patient's stated goal  Outcome: Progressing Towards Goal  Goal: *Tolerating diet  Outcome: Progressing Towards Goal  Goal: *Ability to perform ADLs and demonstrates progressive mobility and function  Outcome: Progressing Towards Goal  Goal: *Stroke education continued(Stroke Metric)  Outcome: Progressing Towards Goal     Problem: Ischemic Stroke: Discharge Outcomes  Goal: *Verbalizes anxiety and depression are reduced or absent  Outcome: Progressing Towards Goal  Goal: *Verbalize understanding of risk factor modification(Stroke Metric)  Outcome: Progressing Towards Goal  Goal: *Hemodynamically stable  Outcome: Progressing Towards Goal  Goal: *Absence of aspiration pneumonia  Outcome: Progressing Towards Goal  Goal: *Aware of needed dietary changes  Outcome: Progressing Towards Goal  Goal: *Verbalize understanding of prescribed medications including anti-coagulants, anti-lipid, and/or anti-platelets(Stroke Metric)  Outcome: Progressing Towards Goal  Goal: *Tolerating diet  Outcome: Progressing Towards Goal  Goal: *Aware of follow-up diagnostics related to anticoagulants  Outcome: Progressing Towards Goal  Goal: *Ability to perform ADLs and demonstrates progressive mobility and function  Outcome: Progressing Towards Goal  Goal: *Absence of DVT(Stroke Metric)  Outcome: Progressing Towards Goal  Goal: *Absence of aspiration  Outcome: Progressing Towards Goal  Goal: *Optimal pain control at patient's stated goal  Outcome: Progressing Towards Goal  Goal: *Home safety concerns addressed  Outcome: Progressing Towards Goal  Goal: *Describes available resources and support systems  Outcome: Progressing Towards Goal  Goal: *Verbalizes understanding of activation of EMS(911) for stroke symptoms(Stroke Metric)  Outcome: Progressing Towards Goal  Goal: *Understands and describes signs and symptoms to report to providers(Stroke Metric)  Outcome: Progressing Towards Goal  Goal: *Neurolgocially stable (absence of additional neurological deficits)  Outcome: Progressing Towards Goal  Goal: *Verbalizes importance of follow-up with primary care physician(Stroke Metric)  Outcome: Progressing Towards Goal  Goal: *Smoking cessation discussed,if applicable(Stroke Metric)  Outcome: Progressing Towards Goal  Goal: *Depression screening completed(Stroke Metric)  Outcome: Progressing Towards Goal

## 2023-03-15 LAB
CHOLEST SERPL-MCNC: 226 MG/DL
HDLC SERPL-MCNC: 108 MG/DL
HDLC SERPL: 2.1 (ref 0–5)
INR PPP: 1.1 (ref 0.9–1.1)
LDLC SERPL CALC-MCNC: 96 MG/DL (ref 0–100)
PROTHROMBIN TIME: 11 SEC (ref 9–11.1)
TRIGL SERPL-MCNC: 110 MG/DL (ref ?–150)
VLDLC SERPL CALC-MCNC: 22 MG/DL

## 2023-03-15 PROCEDURE — 74011250637 HC RX REV CODE- 250/637: Performed by: INTERNAL MEDICINE

## 2023-03-15 PROCEDURE — 65270000046 HC RM TELEMETRY

## 2023-03-15 PROCEDURE — 97530 THERAPEUTIC ACTIVITIES: CPT

## 2023-03-15 PROCEDURE — 74011250636 HC RX REV CODE- 250/636: Performed by: INTERNAL MEDICINE

## 2023-03-15 PROCEDURE — 92610 EVALUATE SWALLOWING FUNCTION: CPT

## 2023-03-15 PROCEDURE — 97165 OT EVAL LOW COMPLEX 30 MIN: CPT

## 2023-03-15 PROCEDURE — 85610 PROTHROMBIN TIME: CPT

## 2023-03-15 PROCEDURE — 99232 SBSQ HOSP IP/OBS MODERATE 35: CPT | Performed by: PSYCHIATRY & NEUROLOGY

## 2023-03-15 PROCEDURE — 74011250637 HC RX REV CODE- 250/637: Performed by: STUDENT IN AN ORGANIZED HEALTH CARE EDUCATION/TRAINING PROGRAM

## 2023-03-15 PROCEDURE — 97161 PT EVAL LOW COMPLEX 20 MIN: CPT

## 2023-03-15 PROCEDURE — 36415 COLL VENOUS BLD VENIPUNCTURE: CPT

## 2023-03-15 PROCEDURE — 97535 SELF CARE MNGMENT TRAINING: CPT

## 2023-03-15 RX ORDER — LORAZEPAM 2 MG/ML
0.5 INJECTION INTRAMUSCULAR ONCE
Status: ACTIVE | OUTPATIENT
Start: 2023-03-15 | End: 2023-03-15

## 2023-03-15 RX ORDER — HALOPERIDOL 5 MG/ML
1 INJECTION INTRAMUSCULAR
Status: DISCONTINUED | OUTPATIENT
Start: 2023-03-15 | End: 2023-03-18 | Stop reason: HOSPADM

## 2023-03-15 RX ORDER — WARFARIN SODIUM 5 MG/1
5 TABLET ORAL ONCE
Status: COMPLETED | OUTPATIENT
Start: 2023-03-15 | End: 2023-03-15

## 2023-03-15 RX ADMIN — HYDROXYZINE HYDROCHLORIDE 25 MG: 10 TABLET, FILM COATED ORAL at 21:02

## 2023-03-15 RX ADMIN — MELATONIN 1.5 MG: at 21:02

## 2023-03-15 RX ADMIN — PHENYTOIN SODIUM 200 MG: 100 CAPSULE ORAL at 18:13

## 2023-03-15 RX ADMIN — FUROSEMIDE 20 MG: 20 TABLET ORAL at 11:40

## 2023-03-15 RX ADMIN — LEVOTHYROXINE SODIUM 88 MCG: 0.09 TABLET ORAL at 05:39

## 2023-03-15 RX ADMIN — ASPIRIN 81 MG: 81 TABLET, CHEWABLE ORAL at 11:40

## 2023-03-15 RX ADMIN — CASTOR OIL AND BALSAM, PERU: 788; 87 OINTMENT TOPICAL at 09:04

## 2023-03-15 RX ADMIN — PHENYTOIN SODIUM 200 MG: 100 CAPSULE ORAL at 11:40

## 2023-03-15 RX ADMIN — HALOPERIDOL LACTATE 1 MG: 5 INJECTION, SOLUTION INTRAMUSCULAR at 09:04

## 2023-03-15 RX ADMIN — WARFARIN SODIUM 5 MG: 5 TABLET ORAL at 18:12

## 2023-03-15 RX ADMIN — CASTOR OIL AND BALSAM, PERU: 788; 87 OINTMENT TOPICAL at 21:02

## 2023-03-15 NOTE — PROGRESS NOTES
Cardiology Progress Note  3/15/2023     Admit Date: 3/13/2023  Admit Diagnosis: Bilateral leg weakness [R29.898]  Elevated troponin [R77.8]  CC: none currently  Cardiologist:  Dr Salazar Izaguirre (last OV w/ me was 6/2021). Cardiac Assessment/Plan:    1) HTN, Resolved in 2018; Off Rx. 2) DORSEY, variable since 2017: Neg MPI 7/2017; Mild-mod TR/mild MR; PAp 48 6/2017.  3) SSS: Bradycardia, since 2017: 3 sec pauses during sleep on holter 2017: ASx, observation. *Nocturnal dyspnea 9/2020: Sx pauses at night: Sx resolved w/ PPM (Medtronic) 10/2020; PET and echo prev rec but Sx resolved. 4) Atypical CP 9/2021 in ER; trop 110s; Med Rx. 5) Seizure d/o (after benign brain tumor resection; meningioma),  6) Osteoporosis. 7) Hypothyroidism,   8) Poor balance, prior fall. Uses cane. 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   12) RLE >>LLE weakness. Admitted w/ CVA Sx; neg head CT w/o contrast; MRI pending. Current cardiac meds: asa; lasix 20. Coumadin. Rec: echo pending; Med Rx rec rather than invasive testing. D/W Sandoval Muro (son) who agrees with medical Rx only (no invasive cardiac testing). Consider NOAC instead of coumadin. Echo 3/14/23:    Left Ventricle: Normal left ventricular systolic function with a visually estimated EF of 60 - 65%. Left ventricle size is normal. Mildly increased wall thickness. Normal wall motion. Right Ventricle: Right ventricle size is normal. Normal systolic function. No agitated-saline contrast images. 3/15: Alert; frustrated w/ soft restraints; No CP/resting dyspnea  BPs 100-120s; HR 60s; sinus; 95% RA  INR 1.1    Current cardiac meds: asa; lasix 20. Coumadin. No new cardiac recs; BP too low for BBlocker/ACEi/ARB. Stable cardiac status.   Dispo per primary team. Coumadin dosing per primary team; consider NOAC.  ____________________________________________________________________  Nallely Hernandez is a 80 y.o. female presents with Bilateral leg weakness [R29.898]  Elevated troponin [R77.8]. As noted in H&P: \"80 y.o.   female who presents with past medical history of seizure disorder, hypothyroidism, recurrent DVT is coming the hospital chief complaints of bilateral lower extremity weakness. Patient reports being usual travails until about yesterday when she woke up feeling funny in her head and started having bilateral lower extremity weakness but more on the right side. By evening, she was not able to walk and was dragging her feet. She did not report any associated weakness in her hands, facial deviation or slurred speech. Does not report any headache. Does not report any chest pain or shortness of breath. Does not report any recent cough or phlegm. No fever or chills. On arrival to ED, noted to have stable vitals. On labs CBC is normal.  BMP shows a troponin of 388. Creatinine is 1.01. LFTs are normal.  CT head shows chronic microvascular changes with the severe degree of cerebral atrophy. \"     ______________________________________________________________________     The patient is not a reliable historian; she does not know why she is in the hospital; believes it is 2020. Think she is visiting [place]. no current chest pain or dyspnea. No apparent PND, orthopnea, palpitations, pre-syncope, syncope, peripheral edema. No current complaints. ECG 3/13/23: sinus/PACs; LAFB. Tele: sinus; PACs; occ Atrial pacing. CXR: none  Head CT: no acute changes; chronic microvasc dz/  Notable labs: Hg 12.3; plt 170; K 3.6; Cr 0.8; trop 388/427; INR 1.1.   ________________________________________________________  Notable prior cardiac history:  @ NP OV 5/10/22:  Ms Brad Vera has a h/o:  1) HTN, Resolved in 2018; Off Rx.   2) DORSEY, variable since 2017: Neg MPI 7/2017; Mild-mod TR/mild MR; PAp 48 6/2017.  3) SSS: Bradycardia, since 2017: 3 sec pauses during sleep on holter 2017: ASx, observation. *Nocturnal dyspnea 9/2020: Sx pauses at night: Sx resolved w/ PPM ([employer]tronic) 10/2020; PET and echo prev rec but Sx resolved. 4) DM (diet controlled),   5) Seizure d/o (after benign brain tumor resection),  6) Osteoporosis. 7) Hypothyroidism,   8) Poor balance, prior fall. Uses cane. 9) Dementia 2022  10) Neg RLE for VI 6/2022.  11) Atypical CP 9/2021 in ER; trop 110s; Med Rx. She has been very active until 5/2017. No added salt nor nicotine. Very rare ethanol. 6/2021:  No chest pain or DORSEY. Nocturnal dyspnea has resolved. Equivocal decreased fatigue but remains quite active. Continues to use a cane. NP OV 5/2022: Ms. Miley Lorenzo presents with her son. She has had some right lower leg swelling. She went to the [facility] on 4/17/22 for the same complaint. She had negative duplex. Her previous DVT was in the left leg (popliteal). She was treated with Coumadin because of this. She was started on Lasix 20 last week but does not weigh herself. She elevates her legs but does not like compression stockings. She complains of dizziness from time to time. She does not think its significant. She  denies chest pain suggestive of ischemia, shortness of breath, orthopnea, PND,  palpitations, lightheadedness,  near syncope, and syncope. No TIA or stroke like symptoms. Her son says she has dementia. IMPRESSION AND PLAN  01. Sinus node dysfunction (I49.5): The patient has a permanent pacemaker. Previously symptomatic. Interrogated Medtronic PPM today. AP 70.7%,  <0.1%, 0 episodes since last transmission. normal device function, 12.5 years battery life   02. Edema (R60.9):  RLE mildly edematous. Negative venous duplex at [facility] 4/17. On Lasix 20 daily  Compression stockings and elevation Venous Insufficiency study to be done. Unilateral right. Continue Lasix   03.  Shortness of breath (R06.02):  Neg MPI and unremarkable echo 7/2017. Resolved; hold on PET/echo for now. We discussed the signs and symptoms of unstable angina, myocardial infarction and malignant arrhythmia. The patient knows to seek immediate medical attention should they occur. 04. Essential (primary) hypertension (I10): On no Rx. Adequately controlled at home diary   05. Atrial flutter (I48.92):  PPM interrogation above   06. Seizure disorder (G40.909): Managed by: Other Physician. 07. Acquired hypothyroidism (E03.9):  Managed by: Other Physician. 08. Body mass index [BMI] 22.0-22.9, adult (C03.98):  weight down   09. Dementia (F03.90): Son brought note with dementia diagnosis and she can no longer manage her affairs. Scanned in chart. 10. DVT of left leg (I82.402):  in 2019, treated with Coumadin      ORDERS:  1 Venous Insufficiency study to be done. 5/10/22 MEDICATION LIST  Medication Sig Desc Non-VCS   biotin 10,000 mcg capsule   Y   Dilantin Extended 100 mg capsule take 1 capsule by oral route 2 times every day Y   Lasix 20 mg tablet take 1 tablet by oral route  every day N   multivitamin tablet take 1 tablet by oral route  every day with food Y   Synthroid 88 mcg tablet take 1 tablet by oral route  every day Y   Tylenol 325 mg tablet take 2 tablet by oral route  every 4 hours as needed as needed Y   Vitamin D3 1,000 unit tablet take 1 by Oral route  every day Y         ________________________________________________  CARDIAC HISTORY  CAD:  1 DORSEY. [Non-Ischemic Stress, Nl echo.] - 6/2017      ARRHYTHMIA:  1 SSS, ASx, during sleep: observation. - 7/2017      RISK FACTORS:  1 Hypertension         CARDIOVASCULAR PROCEDURES  Procedure Date Results   Noah MPI 07/07/2017 EF 0.77 (77%), No Ischemia   Devices 10/06/2020 Dual Chamber PPM (Medtronic)   Echo 06/30/2017 EF 0.65 (73%), No Diastolic Dysfunction, Mild-Moderate TR, Mild MR, Est RVSP 48 mmHg, Normal RV. EKG 06/18/2021 Sinus Rhythm, PRWP, otherwise unremarkable. Holter 2020 Sinus Rhythm, HR 34 (sleep w/ 4.8 sec pauses x2)-122, ave 53; PAflutter (ASx) w/ VR 160s; D/W pt; EP OV rec. Holter 2017 Sinus Rhythm, Sinus Maury, No Malignant Arrhythmias, No Sx; HR 42-98, ave 56. Appropriate HR variability. Brief ASx sinus node arrest during sleep (HR to 40s; 3 sec pause with a junctional escape beat). For other plans, see orders. Hospital problem list     Active Hospital Problems    Diagnosis Date Noted    Elevated troponin 2023    Bilateral leg weakness 2023        Subjective: Brianda Horne reports       Chest pain X none  consistent with:  Non-cardiac CP         Atypical CP     None now  On going  Anginal CP     Dyspnea X none  at rest  with exertion         improved  unchanged  worse              PND X none  overnight       Orthopnea X none  improved  unchanged  worse   Presyncope X none  improved  unchanged  worse     Ambulated in hallway without symptoms   Yes   Ambulated in room without symptoms  Yes   Objective:     Physical Exam:  Overall VSSAF;  Visit Vitals  BP (!) 122/55   Pulse 61   Temp 98.1 °F (36.7 °C)   Resp 20   Ht 5' 4.02\" (1.626 m)   Wt 54 kg (119 lb)   SpO2 95%   BMI 20.42 kg/m²     Temp (24hrs), Av.2 °F (36.8 °C), Min:97.7 °F (36.5 °C), Max:98.5 °F (36.9 °C)    Patient Vitals for the past 8 hrs:   Pulse   03/15/23 024 61     Patient Vitals for the past 8 hrs:   Resp   03/15/23 0244 20     Patient Vitals for the past 8 hrs:   BP   03/15/23 0244 (!) 122/55     No intake or output data in the 24 hours ending 03/15/23 0821  General Appearance: Well developed, elderly, no acute distress. Ears/Nose/Mouth/Throat:   Normal MM; anicteric. JVP: WNL   Resp:   Lungs clear to auscultation ant. Nl resp effort. Cardiovascular:  RRR, S1, S2 normal, no new murmur. No gallop or rub. Abdomen:   Soft, non-tender, bowel sounds are present. Extremities: No edema bilaterally. Skin:  Neuro: Warm and dry.   A/dementia       cath site intact w/o hematoma or new bruit; distal pulse unchanged  Yes   Data Review:     Telemetry independently reviewed x sinus  chronic afib  parox afib  NSVT     ECG independently reviewed  NSR  afib  no significant changes  NSST-Tw chgs     x no new ECG provided for review   Lab results reviewed as noted below. Current medications reviewed as noted below. No results for input(s): PH, PCO2, PO2 in the last 72 hours. No results for input(s): CPK, CKMB, CKNDX, TROIQ in the last 72 hours.   Recent Labs     03/14/23  0255 03/13/23  1407    143   K 3.6 3.8    110*   CO2 28 27   BUN 22* 24*   CREA 0.84 1.01   GLU 99 107*   CA 9.2 9.8   ALB  --  3.7   WBC 5.9 5.7   HGB 12.3 13.2   HCT 37.3 39.9    193     No results found for: CHOL, CHOLX, CHLST, CHOLV, HDL, HDLP, LDL, LDLC, DLDLP, TGLX, TRIGL, TRIGP, CHHD, CHHDX  Recent Labs     03/13/23  1407   AP 71   TP 7.3   ALB 3.7   GLOB 3.6     Recent Labs     03/15/23  0241 03/14/23  0255 03/13/23  1450   INR 1.1 1.1 1.1   PTP 11.0 11.1 11.6*      No components found for: GLPOC    Current Facility-Administered Medications   Medication Dose Route Frequency    levothyroxine (SYNTHROID) tablet 88 mcg  88 mcg Oral 6am    balsam peru-castor oiL (VENELEX) ointment   Topical BID    furosemide (LASIX) tablet 20 mg  20 mg Oral DAILY    phenytoin ER (DILANTIN ER) ER capsule 200 mg  200 mg Oral BID    acetaminophen (TYLENOL) tablet 650 mg  650 mg Oral Q4H PRN    Or    acetaminophen (TYLENOL) solution 650 mg  650 mg Per NG tube Q4H PRN    Or    acetaminophen (TYLENOL) suppository 650 mg  650 mg Rectal Q4H PRN    aspirin chewable tablet 81 mg  81 mg Oral DAILY    labetaloL (NORMODYNE;TRANDATE) injection 5 mg  5 mg IntraVENous Q10MIN PRN    polyethylene glycol (MIRALAX) packet 17 g  17 g Oral DAILY PRN    WARFARIN INFORMATION NOTE (COUMADIN)   Other QPM    traMADoL (ULTRAM) tablet 50 mg  50 mg Oral Q6H PRN    hydrALAZINE (APRESOLINE) 20 mg/mL injection 10 mg  10 mg IntraVENous QID PRN    hydrOXYzine HCL (ATARAX) tablet 25 mg  25 mg Oral TID PRN    melatonin tablet 1.5 mg  1.5 mg Oral QHS PRN        Kamron Gamboa MD

## 2023-03-15 NOTE — PROGRESS NOTES
Problem: Self Care Deficits Care Plan (Adult)  Goal: *Acute Goals and Plan of Care (Insert Text)  Description: FUNCTIONAL STATUS PRIOR TO ADMISSION: Patient reports she was ambulatory with cane, but unclear if she is a reliable historian and no family present. Per CM/son interview She was walking at home with son with a cane and more recently a walker. Patient reports bilateral weakness of LEs and had a falls at home chart review; patient herself reports no falls in the home PTA per her recall; Chart indicates she was found down, crawling to get help. 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 Patient will perform grooming with supervision/set-up within 7 day(s). 2.  Patient will perform upper body dressing with supervision/set-up within 7 day(s). 3.  Patient will perform lower body dressing with moderate assistance  within 7 day(s). 4.  Patient will perform toilet transfers with moderate assistance  within 7 day(s). 5.  Patient will perform all aspects of toileting with moderate assistance  within 7 day(s). 6.  Patient will participate in upper extremity therapeutic exercise/activities with minimal assistance/contact guard assist within 7 day(s). 7.  Patient will demonstrate functional use of call bell during OT within 7 days  8. Patient will improve their Fugl Cardona score by 5 points in prep for ADLs within 7 days.      Outcome: Progressing Towards Goal   OCCUPATIONAL THERAPY EVALUATION  Patient: Radha Soliz (11 y.o. female)  Date: 3/15/2023  Primary Diagnosis: Bilateral leg weakness [R29.898]  Elevated troponin [R77.8] Precautions:   Bed Alarm, Contact, Fall, Skin, Seizure (B hhels and kimberly area with redness; pressure relief Boots in bed; roll belt; PMH PPM 2020; Brain tumor excision 2002, seizures 2002, 2015)    ASSESSMENT  Based on the objective data described below, the patient presents with general debility and irritability at times, roll belt in place on approach. Patient cooperative to a point but declined out of bed activity; Reports fatigue and pain in R LE as her perceived limiting factors. Perseverative on \"I don't want to be a bother to my son so I want to go in a nsg home\" and \"What do I do with this? \" Re Colin Manjarrez; asked more than 10 times this session with no obvious carryover of new information. Patient unable to use call bell effectively. Reported she was going to \"stop being nice/cooperative\" and referred to roll belt use and \" because I'm not allowed to do what I want. \"  No recall of fall or B LE weakness. Reports I in home for ADLs and mobility which is inaccurate per chart review. Using B UEs in functional manner; testing L UE for neuro deficits shows weakness/incoordination but feel testing not sensitive to patient's current cognition as functional use indicates B UE = R/L. Incontinent B&B, pressure redness B heels and sacral region. Current Level of Function Impacting Discharge (ADLs/self-care): S-D self care and functional mobility, very variable with desire this session, see below; min A bed mobility but not initialing on her own    Functional Outcome Measure: The patient scored Total A-D  Total A-D (Motor Function): 43/66 on the Fugl-Cardona Assessment which is indicative of moderate impairment in upper extremity functional status.      Other factors to consider for discharge:   Vitals:    03/15/23 1138 03/15/23 1200 03/15/23 1418 03/15/23 1430   BP: (!) 135/59  (!) 144/75 (!) 118/93   BP 1 Location:   Right upper arm Right upper arm   BP Patient Position:   Supine Semi fowlers  Comment: long sitting in bed; 75 degrees   Pulse: 65 78 63 62   Temp: 98.2 °F (36.8 °C)      Resp: 18      Height:       Weight:       SpO2: 97%  98%  Comment: room air 98%  Comment: room air         Patient will benefit from skilled therapy intervention to address the above noted impairments. PLAN :  Recommendations and Planned Interventions: self care training, functional mobility training, therapeutic exercise, balance training, visual/perceptual training, therapeutic activities, cognitive retraining, endurance activities, neuromuscular re-education, patient education, home safety training, and family training/education    Frequency/Duration: Patient will be followed by occupational therapy 4 times a week to address goals. Recommendation for discharge: (in order for the patient to meet his/her long term goals)  Therapy up to 5 days/week in SNF setting    This discharge recommendation:  A follow-up discussion with the attending provider and/or case management is planned    IF patient discharges home will need the following DME: bedside commode and transfer bench       SUBJECTIVE:   Patient stated I haven't had any falls that I know of.    OBJECTIVE DATA SUMMARY:   HISTORY:   Past Medical History:   Diagnosis Date    Arthritis     Cancer ([facility] 2002    brain tumor, benign (meningioma)    Diabetes ([facility] [address]     questionable?     Gastrointestinal disorder     diverticulitis    Hypothyroid 5/11/2015    Localization-related (focal) (partial) epilepsy and epileptic syndromes with complex partial seizures, without mention of intractable epilepsy 5/11/2015    Other ill-defined conditions(799.89)     loss of vision in left eye    Seizures ([facility] [address] 2002    caused by brain tumor    Thromboembolus [facility])     Thyroid disease      Past Surgical History:   Procedure Laterality Date    COLORECTAL SCRN; HI RISK IND  8/26/2014         HX BACK SURGERY  1970s    HX ORTHOPAEDIC  1990s    right wrist    NEUROLOGICAL PROCEDURE UNLISTED  2002    brain tumor removed    FL INS NEW/RPLCMT PRM PM W/TRANSV ELTRD ATRIAL&VENT N/A 10/6/2020    INSERT PPM DUAL performed by Zen Winters MD at [address] CATH LAB     Expanded or extensive additional review of patient history:     Home Situation  Home Environment: Private residence  # Steps to Enter: 4  One/Two Story Residence: One story  Living Alone: No  Support Systems: Child(morales)  Patient Expects to be Discharged to[de-identified] Skilled nursing facility  Current DME Used/Available at Home: Walker, rolling, [address], [place], straight  Tub or Shower Type: Shower    Hand dominance: Right    EXAMINATION OF PERFORMANCE DEFICITS:  Cognitive/Behavioral Status:  Neurologic State: Alert;Confused (repeated asked what to do w/Purwick device; no carryover demonstrated of new info)  Orientation Level: Oriented to person;Oriented to place; Disoriented to situation;Disoriented to time  Cognition: Follows commands; Impaired decision making; Impulsive;Memory loss (roll belt in use when alone for safety; attention good for full session)  Perception: Appears intact (with eating and grooming tasks)  Perseveration: Perseverates during conversation (repeatedly asking what to do w/purwick)  Safety/Judgement: Awareness of environment;Decreased insight into deficits; Decreased awareness of need for safety;Decreased awareness of need for assistance (\"hospital\")    Skin: B heels need pressure relief; boots in place; needs turn assist,     Edema: B LEs; see nsg    Hearing: Auditory  Auditory Impairment: None    Vision/Perceptual:                           Acuity:  (WFL.  able to read name tag, clock, white board;  note eyelid ptosis on L; patient not aware)    Corrective Lenses: Reading glasses    Range of Motion:  B UE  AROM: Generally decreased, functional  PROM: Generally decreased, functional      B LEs with limitations at hips and knees with B LE fx's PMH        Strength:  Admitted with decreased strength B LEs/inability to stand  Appears B UE functional for UE ADLs  Strength: Generally decreased, functional                Coordination:  Coordination: Generally decreased, functional (high level tasks challenging cog vs coordination; appears more cog as skills fairly effective w/UE ADLs)  Fine Motor Skills-Upper: Left Impaired;Right Impaired (im)    Gross Motor Skills-Upper: Left Intact; Right Intact    Tone & Sensation:    Tone: Normal  Sensation: Intact (appears intact with UE ADL tasks, not formally assessed)                      Balance:  Sitting: Impaired  Sitting - Static: Fair (occasional)  Sitting - Dynamic: Fair (occasional)  Standing:  (patient declined to come to edge of bed for sitting and standing with OT; \"I am too tired and my legs hurt , more on the right side. No I havent had any falls that I remember\")    Functional Mobility and Transfers for ADLs:  Bed Mobility:  Rolling: Minimum assistance  Supine to Sit: Minimum assistance  Sit to Supine: Minimum assistance  Scooting: Minimum assistance    Transfers:  Sit to Stand:  (patient refused)  Bed to Chair:  (patient refused citing R LE pain and fatigue)  Toilet Transfer :  (using purwick with marked confusion regarding purpose of purwick (retired RN))    ADL Assessment:  Feeding: Stand-by assistance    Oral Facial Hygiene/Grooming: Setup; Additional time;Contact guard assistance (fair attention to quality, aware she needs her nails cared for and that she had not combed her hair; attentive to B sides w/grooming without verbal cues)    Bathing: Maximum assistance (reports she bathes I PTA in walk in shower)    Type of Bath: Chlorhexidine (CHG); Partial    Upper Body Dressing: Maximum assistance; Moderate assistance    Lower Body Dressing: Total assistance;Maximum assistance (has physical limitations from B hip surgery, pain, internal rotation B hips and feet)    Toileting:  Total assistance (incontinent B&B, not aware; perseverating on purwick)                ADL Intervention and task modifications:     Patient training in grooming attention to detail, use of call bell to request assist, use of purwick with poor carryover of this unfamiliar device/information  Cognitive Retraining  Safety/Judgement: Awareness of environment;Decreased insight into deficits; Decreased awareness of need for safety;Decreased awareness of need for assistance (\"hospital\")      Functional Measure:  Fugl-Cardona Assessment of Motor Recovery after Stroke: appears more limited by cognition rather than neuro deficits; will continue to monitor  Upper Extremity Assessment: Yes (L UE; appears more limited by cognition rather than true incoordination/weakness)    Reflex Activity  Flexors/Biceps/Fingers: Can be elicited  Extensors/Triceps: Can be elicited  Reflex Subtotal: 4    Volitional Movement Within Synergies  Shoulder Retraction: Partial  Shoulder Elevation: Partial  Shoulder Abduction (90 degrees): Partial  Shoulder External Rotation: Partial  Elbow Flexion: Partial  Forearm Supination: Partial  Shoulder Adduction/Internal Rotation: Partial  Elbow Extension: Partial  Forearm Pronation: Partial  Subtotal: 9    Volitional Movement Mixing Synergies  Hand to Lumbar Spine: Partial  Shoulder Flexion (0-90 degrees): Full  Pronation-Supination: Partial  Subtotal: 4    Volitional Movement With Little or No Synergy  Shoulder Abduction (0-90 degrees): Full  Shoulder Flexion ( degrees): Partial  Pronation/Supination: Partial  Subtotal : 4    Normal Reflex Activity  Biceps, Triceps, Finger Flexors: None  Subtotal : 0    Upper Extremity Total   Upper Extremity Total: 21    Wrist  Stability at 15 Degree Dorsiflexion: Full  Repeated Dorsiflexion/ Volar Flexion: Full  Stability at 15 Degree Dorsiflexion: Full  Repeated Dorsiflexion/ Volar Flexion: Full  Circumduction: Partial  Wrist Total: 9    Hand  Mass Flexion: Full  Mass Extension: Full  Grasp A: Partial  Grasp B: Full  Grasp C: Partial  Grasp D: Partial  Grasp E: Full  Hand Total: 11    Coordination/Speed  Tremor: None  Dysmetria: Marked  Time: >5s  Coordination/Speed Total : 2    Total A-D  Total A-D (Motor Function): 43/66     This is a reliable/valid measure of arm function after a neurological event. It has established value to characterize functional status and for measuring spontaneous and therapy-induced recovery; tests proximal and distal motor functions. Fugl-Cardona Assessment - UE scores recorded between five and 30 days post neurologic event can be used to predict UE recovery at six months post neurologic event. Severe = 0-21 points   Moderately Severe = 22-33 points   Moderate = 34-47 points   Mild = 48-66 points  JOSE MIGUEL Dewitt, MARYLIN Wilson, & ZAIRA Hicks (1992). Measurement of motor recovery after stroke: Outcome assessment and sample size requirements.  Stroke, 23, pp. 4314-2605.   ------------------------------------------------------------------------------------------------------------------------------------------------------------------  MCID:  Stroke:   Mirtha Snyder et al, 2001; n = 171; mean age 79 (6) years; assessed within 16 (12) days of stroke, Acute Stroke)  FMA Motor Scores from Admission to Discharge   10 point increase in FMA Upper Extremity = 1.5 change in discharge FIM   10 point increase in FMA Lower Extremity = 1.9 change in discharge FIM  MDC:   Stroke:   Shirley Walker et al, 2008, n = 14, mean age = 59.9 (14.6) years, assessed on average 14 (6.5) months post stroke, Chronic Stroke)   FMA = 5.2 points for the Upper Extremity portion of the assessment     Occupational Therapy Evaluation Charge Determination   History Examination Decision-Making   MEDIUM Complexity : Expanded review of history including physical, cognitive and psychosocial  history  HIGH Complexity : 5 or more performance deficits relating to physical, cognitive , or psychosocial skils that result in activity limitations and / or participation restrictions MEDIUM Complexity : Patient may present with comorbidities that affect occupational performnce. Miniml to moderate modification of tasks or assistance (eg, physical or verbal ) with assesment(s) is necessary to enable patient to complete evaluation       Based on the above components, the patient evaluation is determined to be of the following complexity level: MEDIUM  Pain Rating:  Unable to rate; reports R LE pain with decline of out of bed activity    Activity Tolerance:   Poor, SpO2 stable on RA, requires frequent rest breaks, and signs and symptoms of orthostatic hypotension    After treatment patient left in no apparent distress:    Heels elevated for pressure relief, Call bell within reach, Bed / chair alarm activated, Side rails x 3, Restraints, and semi fowlers; heel relief boots, roll belt    COMMUNICATION/EDUCATION:   The patients plan of care was discussed with: Physical therapist and Registered nurse. Patient was educated regarding her deficit(s) of B LE weakness/fall in the home as this relates to her diagnosis of CVA rule out. She demonstrated Poor - understanding as evidenced by unaware of any fall or deficit but adamant that she does not want to be burden on her son. Patient and/or family was not verbally educated on the BE FAST acronym for signs/symptoms of CVA and TIA. Due to current cognition/risk for further confusion/perseveration  This patients plan of care is appropriate for delegation to [facility].     Thank you for this referral.  Alton Lam OTR/L  Time Calculation: 40 mins

## 2023-03-15 NOTE — PROGRESS NOTES
End of Shift Note     Bedside shift change report given to [facility], RN (oncoming nurse) by Tina Kelly RN (offgoing nurse). Report included the following information SBAR, Kardex, and Cardiac Rhythm       Shift worked:  days   Shift summary and any significant changes:      Wound care consult completed  Boots added to wound care regime  PT/OT worked with patient  CM following for DC needs  MRI still pending r/t pacemaker settings       Concerns for physician to address: Pt agitation   Zone phone for oncoming shift:   [phone]      Patient Information  Chandni Soriano  80 y.o.  3/13/2023  1:16 PM by Carmelo Pinon MD. Chandni Soriano was admitted from Home     Problem List       Patient Active Problem List     Diagnosis Date Noted    Closed left subtrochanteric femur fracture, initial encounter ([address] 01/15/2019    Elevated troponin 03/13/2023    Bilateral leg weakness 03/13/2023    Pacemaker 10/06/2020    Bilateral carotid artery stenosis 02/28/2020    Localization-related focal epilepsy with complex partial seizures ([address] 02/28/2020    Cerebral microvascular disease 02/28/2020    GI bleed due to NSAIDs 06/02/2019    GI bleed 06/01/2019    Diabetes ([address] 01/13/2019    Femur fracture, left ([address] 01/13/2019    Complex partial seizure 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 seizure ([address] 04/21/2017    Closed right hip fracture ([address] 08/17/2016    Lumbar post-laminectomy syndrome 05/11/2015    Hypothyroid 05/11/2015    H/O meningioma of the brain, left brain meningioma 05/11/2015           Past Medical History:   Diagnosis Date    Arthritis      Cancer ([address] 2002     brain tumor, benign (meningioma)    Diabetes ([address]       questionable?     Gastrointestinal disorder       diverticulitis    Hypothyroid 5/11/2015    Localization-related (focal) (partial) epilepsy and epileptic syndromes with complex partial seizures, without mention of intractable epilepsy 5/11/2015    Other ill-defined conditions(799.89)       loss of vision in left eye Seizures ([facility] [address] 2002     caused by brain tumor    Thromboembolus [facility])      Thyroid disease           Core Measures:  CVA: Yes Yes  CHF:No No  PNA:No No     Activity:  Activity Level: Up with Assistance  Number times ambulated in hallways past shift: 0  Number of times OOB to chair past shift: 0     Cardiac:   Cardiac Monitoring: Yes      Cardiac Rhythm: Atrial Paced     Access:   Current line(s): PIV   Central Line? No   PICC LINE? No      Genitourinary:   Urinary status: voiding and external catheter  Urinary Catheter? No      Respiratory:   O2 Device: None (Room air)  Chronic home O2 use?: NO  Incentive spirometer at bedside: NO     GI:  Last Bowel Movement Date: 03/15/23  Current diet:  ADULT DIET Regular  Passing flatus: YES  Tolerating current diet: YES     Pain Management:   Patient states pain is manageable on current regimen: YES     Skin:  Jan Score: 17  Interventions: float heels, increase time out of bed, PT/OT consult, and limit briefs    Patient Safety:  Fall Score: Total Score: 5  Interventions: bed/chair alarm, gripper socks, and pt to call before getting OOB  High Fall Risk: Yes  @Rollbelt  @dexterity to release roll belt  Yes/No ( must document dexterity  here by stating Yes or No here, otherwise this is a restraint and must follow restraint documentation policy.)     DVT prophylaxis:  DVT prophylaxis Med- Yes  DVT prophylaxis SCD or CAMRON- No      Wounds: (If Applicable)  Wounds- Yes  Location buttocks     Active Consults:  IP CONSULT TO NEUROLOGY  IP CONSULT TO CARDIOLOGY     Length of Stay:  Expected LOS: 2d 12h  Actual LOS: 2  Discharge Plan:  Yes

## 2023-03-15 NOTE — PROGRESS NOTES
End of Shift Note    Bedside shift change report given to Anusha Rowland RN (oncoming nurse) by Blair Houser RN (offgoing nurse). Report included the following information SBAR, Kardex, and Cardiac Rhythm      Shift worked:  Nights   Shift summary and any significant changes:     Pt AOX1 and VSS. Pt agitated throughout the night cursing at staff and attempting to hit staff while providing care. Concerns for physician to address: Pt agitation   Zone phone for oncoming shift:   [phone]     Patient Information  Tanisha Randle  80 y.o.  3/13/2023  1:16 PM by Cristiano Mc MD. Tanisha Randle was admitted from Home    Problem List  Patient Active Problem List    Diagnosis Date Noted    Closed left subtrochanteric femur fracture, initial encounter ([address] 01/15/2019    Elevated troponin 03/13/2023    Bilateral leg weakness 03/13/2023    Pacemaker 10/06/2020    Bilateral carotid artery stenosis 02/28/2020    Localization-related focal epilepsy with complex partial seizures ([address] 02/28/2020    Cerebral microvascular disease 02/28/2020    GI bleed due to NSAIDs 06/02/2019    GI bleed 06/01/2019    Diabetes ([address] 01/13/2019    Femur fracture, left ([address] 01/13/2019    Complex partial seizure 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 seizure ([address] 04/21/2017    Closed right hip fracture ([address] 08/17/2016    Lumbar post-laminectomy syndrome 05/11/2015    Hypothyroid 05/11/2015    H/O meningioma of the brain, left brain meningioma 05/11/2015     Past Medical History:   Diagnosis Date    Arthritis     Cancer ([address] 2002    brain tumor, benign (meningioma)    Diabetes ([address]     questionable?     Gastrointestinal disorder     diverticulitis    Hypothyroid 5/11/2015    Localization-related (focal) (partial) epilepsy and epileptic syndromes with complex partial seizures, without mention of intractable epilepsy 5/11/2015    Other ill-defined conditions(799.89)     loss of vision in left eye    Seizures ([address] 2002    caused by brain tumor    Thromboembolus [facility])     Thyroid disease        Core Measures:  CVA: Yes Yes  CHF:No No  PNA:No No    Activity:  Activity Level: Up with Assistance  Number times ambulated in hallways past shift: 0  Number of times OOB to chair past shift: 0    Cardiac:   Cardiac Monitoring: Yes      Cardiac Rhythm: Atrial Paced    Access:   Current line(s): PIV   Central Line? No   PICC LINE? No     Genitourinary:   Urinary status: voiding and external catheter  Urinary Catheter? No     Respiratory:   O2 Device: None (Room air)  Chronic home O2 use?: NO  Incentive spirometer at bedside: NO       GI:  Last Bowel Movement Date: 03/15/23  Current diet:  ADULT DIET Regular  Passing flatus: YES  Tolerating current diet: YES       Pain Management:   Patient states pain is manageable on current regimen: YES    Skin:  Jan Score: 17  Interventions: float heels, increase time out of bed, PT/OT consult, and limit briefs    Patient Safety:  Fall Score:  Total Score: 5  Interventions: bed/chair alarm, gripper socks, and pt to call before getting OOB  High Fall Risk: Yes  @Rollbelt  @dexterity to release roll belt  Yes/No ( must document dexterity  here by stating Yes or No here, otherwise this is a restraint and must follow restraint documentation policy.)    DVT prophylaxis:  DVT prophylaxis Med- Yes  DVT prophylaxis SCD or CAMRON- No     Wounds: (If Applicable)  Wounds- Yes  Location buttocks    Active Consults:  IP CONSULT TO NEUROLOGY  IP CONSULT TO CARDIOLOGY    Length of Stay:  Expected LOS: 2d 12h  Actual LOS: 2  Discharge Plan: Yes       Donnelly Babinski, RN

## 2023-03-15 NOTE — PROGRESS NOTES
Neurology Note    Patient ID:  Last Barrett  [id number]  85 y.o.  9/5/1932      Date of Consultation:  March 15, 2023        Assessment and Plan:    The patient is a pleasant 44-year-old female with acute onset of right upper and lower extremity weakness. There is also mild weakness noted in the left lower extremity. Acute right-sided hemiparesis:  Differential includes acute stroke, recrudescence of neurological symptoms due to metabolic abnormalities. Risk factors for stroke do include hypertension and recurrent DVTs with subtherapeutic INR. She should be on 81 mg aspirin daily  On anti-coagulation. Echocardiogram completed  Lipid panel pending  A1c was 5.3  Brain MRI ordered  MRA ordered       Lower extremity weakness:  Differential includes  lumbar spine disease, peripheral neuropathy, deconditioning, associated with possible stroke. 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 Last level was in 2020. There was a concern at last office visit of non-compliance. Level was 4.7.  she has received a couple of doses prior to this level. Suggests noncompliance at home. 200 mg twice a day of phenytoin    Hypothyroidism: On levothyroxine. Neurology will continue to follow closely in this complicated patient with ongoing neurological symptoms necessitating additional testing. Subjective: I am still weak       History of Present Illness:   Last Barrett is a 80 y.o. female with a past medical history of seizure disorder, hypothyroidism, recurrent DVTs who presented to the hospital with onset of bilateral lower extremities with the right side being weaker. Patient does continue to feel weak. No new numbness, tingling, or weakness.     Pertinent updated labs include a hemoglobin A1c of 5.3 and a total phenytoin level of 4.7. Her TSH was 3.90. She has no difficulty with her speech or swallowing.       head CT from March 13, 2023. There is no acute abnormalities there was severe chronic microvascular ischemic changes with diffuse severe atrophy        As noted previously, the patient was last seen in the neurology clinic in August 2022 by Gabe Espino NP. She had been seen by multiple other neurology providers. She does have a reported history of seizures and last seizure was greater than 3 years ago. She was continued on dilantin for a history of seizures. It is unclear if she has been compliant with her medication. There was a concern about progressive memory loss that was discussed at her most recent neurology visit. Her examination did reveal 4 out of 5 strength in her upper extremities. Her gait was not examined. It appears a brain MRI was discussed at that visit but she refused to get it done. It was noted there was questionable compliance with her Dilantin. It appears her seizure disorder was associated with a prior meningioma resection in 2002. A prior EEG in 2006 revealed mild left temporal slowing and due to this she was kept on medication. Past Medical History:   Diagnosis Date    Arthritis     Cancer ([address] 2002    brain tumor, benign (meningioma)    Diabetes ([address]     questionable?     Gastrointestinal disorder     diverticulitis    Hypothyroid 5/11/2015    Localization-related (focal) (partial) epilepsy and epileptic syndromes with complex partial seizures, without mention of intractable epilepsy 5/11/2015    Other ill-defined conditions(799.89)     loss of vision in left eye    Seizures ([address] 2002    caused by brain tumor    Thromboembolus [facility])     Thyroid disease         Past Surgical History:   Procedure Laterality Date    COLORECTAL SCRN; HI RISK IND  8/26/2014         HX BACK SURGERY  1970s    HX ORTHOPAEDIC  1990s    right wrist NEUROLOGICAL PROCEDURE UNLISTED  2002    brain tumor removed    NE INS NEW/RPLCMT PRM PM W/TRANSV ELTRD ATRIAL&VENT N/A 10/6/2020    INSERT PPM DUAL performed by Moody Javier MD at [facility] CARDIAC CATH LAB        Family History   Problem Relation Age of Onset    Cancer Mother         breast    Alzheimer's Disease Mother     Heart Disease Father     Kidney Disease Sister         dialysis    Seizures Daughter         Social History     Tobacco Use    Smoking status: Never    Smokeless tobacco: Never   Substance Use Topics    Alcohol use: Yes     Comment: glass of wine occasionally        Allergies   Allergen Reactions    Bactrim [Sulfamethoprim] Unable to Obtain    Penicillins Itching     Patient not sure she is allergic    Sulfa (Sulfonamide Antibiotics) Nausea and Vomiting          Current Facility-Administered Medications   Medication Dose Route Frequency    haloperidol lactate (HALDOL) injection 1 mg  1 mg IntraVENous Q8H PRN    warfarin (COUMADIN) tablet 5 mg  5 mg Oral ONCE    LORazepam (ATIVAN) injection 0.5 mg  0.5 mg IntraVENous ONCE    levothyroxine (SYNTHROID) tablet 88 mcg  88 mcg Oral 6am    balsam peru-castor oiL (VENELEX) ointment   Topical BID    furosemide (LASIX) tablet 20 mg  20 mg Oral DAILY    phenytoin ER (DILANTIN ER) ER capsule 200 mg  200 mg Oral BID    acetaminophen (TYLENOL) tablet 650 mg  650 mg Oral Q4H PRN    Or    acetaminophen (TYLENOL) solution 650 mg  650 mg Per NG tube Q4H PRN    Or    acetaminophen (TYLENOL) suppository 650 mg  650 mg Rectal Q4H PRN    aspirin chewable tablet 81 mg  81 mg Oral DAILY    labetaloL (NORMODYNE;TRANDATE) injection 5 mg  5 mg IntraVENous Q10MIN PRN    polyethylene glycol (MIRALAX) packet 17 g  17 g Oral DAILY PRN    WARFARIN INFORMATION NOTE (COUMADIN)   Other QPM    traMADoL (ULTRAM) tablet 50 mg  50 mg Oral Q6H PRN    hydrALAZINE (APRESOLINE) 20 mg/mL injection 10 mg  10 mg IntraVENous QID PRN    hydrOXYzine HCL (ATARAX) tablet 25 mg  25 mg Oral TID PRN    melatonin tablet 1.5 mg  1.5 mg Oral QHS PRN       Review of Systems:    General, constitutional: negative  Eyes, vision: negative  Ears, nose, throat: negative  Cardiovascular, heart: negative  Respiratory: negative  Gastrointestinal: negative  Genitourinary: negative  Musculoskeletal: negative  Skin and integumentary: negative  Psychiatric: negative  Endocrine: negative  Neurological: negative, except for HPI  Hematologic/lymphatic: negative  Allergy/immunology: negative    Objective:     Visit Vitals  BP (!) 122/55   Pulse 69   Temp 98.1 °F (36.7 °C)   Resp 20   Ht 5' 4.02\" (1.626 m)   Wt 119 lb (54 kg)   SpO2 95%   BMI 20.42 kg/m²       Physical Exam:      General:  appears well nourished in no acute distress  Neck: no carotid bruits  Lungs: clear to auscultation  Heart:  no murmurs, regular rate  Lower extremity: peripheral pulses palpable and no edema  Skin: intact    Neurological exam:  The patient is awake and alert. She did know the correct year. She got the month incorrect. She could perform simple calculations. She did know the name of the president. She could name objects correctly. She has decreased insight into her medical condition and medical history. She has decreased attention and concentration. There was no dysarthria or aphasia. Unchanged from yesterday    Cranial nerves:   II-XII were tested    Perrrla  Visual fields were full  Eomi, no evidence of nystagmus  Facial sensation:  normal and symmetric  Facial motor: normal and symmetric  Hearing intact  SCM strength intact  Tongue: midline without fasciculations    Motor: Tone normal  Mild right-sided pronator drift  No evidence of fasciculations    Strength testing:   deltoid triceps biceps Wrist ext. Wrist flex. intrinsics Hip flex. Hip ext. Knee ext.   Knee flex Dorsi flex Plantar flex   Right 4 4 4 4 5 4 4 4 4 4 4 4   Left 5 5 5 5 5 5 4+ 4+ 4+ 4+ 4+ 4+     There is a bilateral lower extremity weakness however asymmetry does exist.    Sensory:  Upper extremity: intact to pp, light touch, and vibration > 10 seconds  Lower extremity: Pinprick was decreased to mid shin bilaterally. There was an asymmetry with reduced sensation on the right. Reflexes:    Right Left  Biceps  2 2  Triceps 2 2  Brachiorad. 2 2  Patella  2 2  Achilles - -    Cerebellar testing:  no tremor apparent, finger/nose and fantasma were intact. Slower on the right    Gait: not assessed due to concerns over safety    Labs:     Lab Results   Component Value Date/Time    Hemoglobin A1c 5.3 03/14/2023 02:55 AM    Sodium 139 03/14/2023 02:55 AM    Potassium 3.6 03/14/2023 02:55 AM    Chloride 108 03/14/2023 02:55 AM    Glucose 99 03/14/2023 02:55 AM    BUN 22 (H) 03/14/2023 02:55 AM    Creatinine 0.84 03/14/2023 02:55 AM    Calcium 9.2 03/14/2023 02:55 AM    WBC 5.9 03/14/2023 02:55 AM    HCT 37.3 03/14/2023 02:55 AM    HGB 12.3 03/14/2023 02:55 AM    PLATELET 262 20/48/3498 02:55 AM       Imaging:    Results from Hospital Encounter encounter on 09/09/11    MRI BRAIN W AND W/O CONTRAST    Narrative  **Final Report**      ICD Codes / Adm. 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 Technical factors : diffusion imaging mass at the L T1-weighted pre- and  post-12 cc IV Magnevist, axial T1-weighted pre- enteric and postcontrast T2  and FLAIR gradient-echo coronal T1-weighted contrast and T2-weighted. No  prior . Diffusion imaging is negative. There is mild atrophy and white matter  disease. Major intracranial vessels are patent. There is no extra-axial  fluid collection hemorrhage or shift. There is chronic frontal atrophy. This  is nonspecific.it is likely  related to prior surgery. There is no  associated residual enhancement.  No masses or mass effect. IMPRESSION: Postsurgical changes. Atrophy and white matter disease but no  acute findings. Signing/Reading Doctor: Errol Melara ([id number])  Transcribed:  on 09/09/2011  Approved: Errol Melara ([id number])  09/09/2011        Distribution:  Attending Doctor: Rayo Rivas      Results from Hospital Encounter encounter on 03/13/23    CT HEAD WO CONT    Narrative  CLINICAL HISTORY: weakness RLE  INDICATION: weakness RLE  COMPARISON: 2015. CT dose reduction was achieved through use of a standardized protocol tailored  for this examination and automatic exposure control for dose modulation. TECHNIQUE: Serial axial images with a collimation of 5 mm were obtained from the  skull base through the vertex  FINDINGS:  There is sulcal and ventricular prominence. Confluent periventricular and  scattered foci of hypodensity in the cerebral white matter. There is no evidence  of an acute infarction, hemorrhage, or mass-effect. There is no evidence of  midline shift or hydrocephalus. Posterior fossa structures are unremarkable. No  extra-axial collections are seen. Mastoid air cells are well pneumatized and clear. 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 Postsurgical change on  the left. Impression  No acute intracranial process. Imaging findings consistent with severe chronic microvascular ischemic change. There is a severe degree of cerebral atrophy. I spent 35    minutes providing care to this  acutely ill inpatient with > 50% of the time counseling and assisting in the coordination of care of the patient on the patient's hospital floor/unit.              Active Problems:    Elevated troponin (3/13/2023)      Bilateral leg weakness (3/13/2023)                 Signed By:  Erum Soares DO FAAN    March 15, 2023

## 2023-03-15 NOTE — PROGRESS NOTES
Transition of Care Plan:    RUR:  10% low   Disposition:  snf   If SNF or IPR: Date FOC offered: snf 3-15   Date FOC received:  3-15-23   Date authorization started with reference number:  n/a  Date authorization received and expires:  Accepting facility: referral sent   Follow up appointments: to be made   DME needed: n/a   Transportation at Discharge: medical   [place] or means to access home:      n/a    Medicare Letter:  yes - 2nd letter   Is patient a [place] and connected with the [facility]? No   If yes, was [employer] transfer form completed and [facility] notified? Caregiver Contact:  Discharge Caregiver contacted prior to discharge? Care Conference needed?:            Reason for Admission:    weakness, elevated troponin                      RUR Score:           see above            Plan for utilizing home health:      n/a     PCP: First and Last name:  Debbi Cardona MD     Name of Practice:    Are you a current patient: Yes/No:  yes    Approximate date of last visit:  Jan 2023   Can you participate in a virtual visit with your PCP:                     Current Advanced Directive/Advance Care Plan: Full Code      Healthcare Decision Maker:   Click here to complete [address] including selection of the Healthcare Decision Maker Relationship (ie \"Primary\")                         Transition of Care Plan:            She came from home with son-  she deferred all questioning to her son when assessed yesterday. She has had home health in past per son with [facility]. She had a fx in 2016, then 2019 and per son refused rehab time. She was walking at home with son with a cane and more recently a walker. Left home only for MD begum. She saw her PCP in Jan 2023. There are 4 stairs to leave house-  none inside. She is not a . She uses the [facility] [address] or [address] away. She does not drive. He confirms the home address. She has never been hospitalized for psych per son. He states she had an episode when hospitalized in 2019 where she expressed suicidal ideation but she was never moved off medical floor and the incident was due to her being upset. Care management to follow as needed. Per son he has already been in touch with Minesh Murillo at [facility] and he would like the referral sent there. Choice form completed with son. Care manager to follow.     Jose Montenegro RN   308 pm   3-15-23

## 2023-03-15 NOTE — WOUND CARE
Wound care consulted for Redness on her bottom and on her legs and heels present on admission:  Chart reviewed and patient assessed. Pt. Is 80years old and she is admitted to [facility] for rule out CVA vs. Other causes of lower extremity weakness. Pt. Has a seizure disorder, hypothyroid Hx of meningioma and recurrent DVTs all of which can cause weakness in the extremities (sometimes due to the medications used to treat conditions). Assessment: Pt. Is alert, talkative and very sensitive to any touch from caregivers. She is completely incontinent. She has a Purewick connected to her and she had a BM. All of this was cleaned up with this exam. New purewick to be placed by nursing. Pt. Has a Roll belt on her to protect her from falling. The Sacrum and gluteal cleft is red  But blanchable. Fissure in cleft:   Treating with zinc oxide only      Both heels are red but blanchable:   Needs continuous floating by any method:  Heel boots placed today:   Use Venelex ointment only on the heels  And feet after cleansing skin well. Plan: Pressure injury prevention with turns every two hours and float the heels by any method that is effective. Educate any family about how to properly apply the boots each time. Mobilize as safely able to do so.    Claude Dominguez RN, BSN, [employer]

## 2023-03-15 NOTE — PROGRESS NOTES
Problem: Mobility Impaired (Adult and Pediatric)  Goal: *Acute Goals and Plan of Care (Insert Text)  Description:   FUNCTIONAL STATUS PRIOR TO ADMISSION: Patient reports she was ambulatory with cane, but unclear if she is a reliable historian and no family present. Patient reports bilateral weakness of LEs and had a falls at home, was found crawling in the hallway by family. HOME SUPPORT PRIOR TO ADMISSION: The patient lived with son and daughter and required minimal assistance/contact guard assist for ADLs. Physical Therapy Goals  Initiated 3/15/2023  1. Patient will move from supine to sit and sit to supine  in bed with supervision/set-up within 7 day(s). 2.  Patient will transfer from bed to chair and chair to bed with supervision/set-up using the least restrictive device within 7 day(s). 3.  Patient will perform sit to stand with minimal assistance/contact guard assist within 7 day(s). 4.  Patient will ambulate with minimal assistance/contact guard assist for 10 feet with the least restrictive device within 7 day(s). 5.  Patient will ascend/descend 4 stairs with  handrail(s) with minimal assistance/contact guard assist within 7 day(s). 6.  Patient will improve Rosales Balance score by 7 points within 7 days. Outcome: Not Met   PHYSICAL THERAPY EVALUATION- NEURO POPULATION  Patient: Tosha Olvera (41 y.o. female)  Date: 3/15/2023  Primary Diagnosis: Bilateral leg weakness [R29.898]  Elevated troponin [R77.8]       Precautions: falls         ASSESSMENT  Based on the objective data described below, the patient presents with bilateral LE weakness, impaired functional mobility, decreased tolerance of activity and high falls risk following admission to rule out CVA. Cardiology has seen patient and recommends conservative treatment of elevated troponins and nursing has cleared to see.   Patient received in bed and agreeable to participate, is able to follow simple commands and was pleasant throughout visit. LEs with bilateral weakness (difficult to assess active movement due to poor effort) and patient reports feeling stiff, has history of bilateral hip fxs and reports some pain in area of right thigh. Required min assist and verbal cuing for sequence to roll right and left, then able to come to sitting position. Maintained sitting with occasional posterior support, but fatigued quickly, declined coming to stand and requested to return to supine. Patient is below baseline and recommend rehab in SNF unless family is able to provide increased assistance. Current Level of Function Impacting Discharge (mobility/balance): min assist for bed mobility    Functional Outcome Measure: The patient scored Total: 1/56 on the [address] which is indicative of high fall risk. Other factors to consider for discharge: below baseline, history of falls, decline in function     Patient will benefit from skilled therapy intervention to address the above noted impairments. PLAN :  Recommendations and Planned Interventions: bed mobility training, transfer training, gait training, therapeutic exercises, neuromuscular re-education, patient and family training/education, and therapeutic activities      Frequency/Duration: Patient will be followed by physical therapy:  4 times a week to address goals. Recommendation for discharge: (in order for the patient to meet his/her long term goals)  Therapy up to 5 days/week in SNF setting    This discharge recommendation:  Has been made in collaboration with the attending provider and/or case management    IF patient discharges home will need the following DME: to be determined (TBD)         SUBJECTIVE:   Patient stated Brad grewal for coming.     OBJECTIVE DATA SUMMARY:   HISTORY:    Past Medical History:   Diagnosis Date    Arthritis     Cancer ([facility] 2002    brain tumor, benign (meningioma)    Diabetes ([facility]     questionable?     Gastrointestinal disorder diverticulitis    Hypothyroid 5/11/2015    Localization-related (focal) (partial) epilepsy and epileptic syndromes with complex partial seizures, without mention of intractable epilepsy 5/11/2015    Other ill-defined conditions(799.89)     loss of vision in left eye    Seizures ([address] 2002    caused by brain tumor    Thromboembolus [facility])     Thyroid disease      Past Surgical History:   Procedure Laterality Date    COLORECTAL SCRN; HI RISK IND  8/26/2014         HX BACK SURGERY  1970s    HX ORTHOPAEDIC  1990s    right wrist    NEUROLOGICAL PROCEDURE UNLISTED  2002    brain tumor removed    [place] INS NEW/RPLCMT PRM PM W/TRANSV ELTRD ATRIAL&VENT N/A 10/6/2020    INSERT PPM DUAL performed by Moody Javier MD at [facility] CARDIAC CATH LAB       Personal factors and/or comorbidities impacting plan of care: seizures,falls,brain tumor    Home Situation  Home Environment: Other (comment) (house)  # Steps to Enter: 4  One/Two Story Residence: One story  Living Alone: No  Support Systems: Child(morales)  Patient Expects to be Discharged to[de-identified] Skilled nursing facility  Current DME Used/Available at Home: [address], straight    EXAMINATION/PRESENTATION/DECISION MAKING:   Critical Behavior:  Neurologic State: Alert  Orientation Level: Oriented to person, Oriented to place, Disoriented to situation, Disoriented to time  Cognition: Follows commands  Safety/Judgement: Awareness of environment, Decreased insight into deficits  Hearing:   Auditory  Auditory Impairment: None  Skin:    Edema:   Range Of Motion:  AROM: Generally decreased, functional           PROM: Generally decreased, functional           Strength:    Strength: Generally decreased, functional                    Tone & Sensation:   Tone: Normal              Sensation: Intact               Coordination:  Coordination: Within functional limits  Vision:      Functional Mobility:  Bed Mobility:  Rolling: Minimum assistance  Supine to Sit: Minimum assistance  Sit to Supine: Minimum assistance  Scooting: Minimum assistance  Transfers:  Sit to Stand:  (patient declined coming to stand)                          Balance:   Sitting: Impaired  Sitting - Static: Fair (occasional)  Sitting - Dynamic: Fair (occasional)  Standing:  (patient declined coming to stand)  Ambulation/Gait Training:                                                         Stairs: Therapeutic Exercises:       Functional Measure  Rosales Balance Test:    Sitting to Standin  Standing Unsupported: 0  Sitting with Back Unsupported: 1  Standing to Sittin  Transfers: 0  Standing Unsupported with Eyes Closed: 0  Standing Unsupported with Feet Together: 0  Reach Forward with Outstretched Arm: 0   Object: 0  Turn to Look Over Shoulders: 0  Turn 360 Degrees: 0  Alternate Foot on Step/Stool: 0  Standing Unsupported One Foot in Front: 0  Stand on One Le  Total:          56=Maximum possible score;   0-20=High fall risk  21-40=Moderate fall risk   41-56=Low fall risk        Physical Therapy Evaluation Charge Determination   History Examination Presentation Decision-Making   MEDIUM  Complexity : 1-2 comorbidities / personal factors will impact the outcome/ POC  LOW Complexity : 1-2 Standardized tests and measures addressing body structure, function, activity limitation and / or participation in recreation  LOW Complexity : Stable, uncomplicated  LOW Complexity : FOTO score of       Based on the above components, the patient evaluation is determined to be of the following complexity level: LOW     Pain Rating:      Activity Tolerance:   Fair      After treatment patient left in no apparent distress:   Supine in bed, Call bell within reach, Bed / chair alarm activated, and Side rails x 3    COMMUNICATION/EDUCATION:   The patients plan of care was discussed with: Occupational therapist and Registered nurse.      Patient was educated regarding her deficit(s) of weakness as this relates to her diagnosis of r/o CVA. She demonstrated Good understanding as evidenced by discussion. Fall prevention education was provided and the patient/caregiver indicated understanding. and Patient/family agree to work toward stated goals and plan of care.     Thank you for this referral.  Diane Walker, PT   Time Calculation: 30 mins

## 2023-03-15 NOTE — PROGRESS NOTES
SPEECH PATHOLOGY BEDSIDE SWALLOW EVALUATION/DISCHARGE  Patient: Mary Umanzor (27 y.o. female)  Date: 3/15/2023  Primary Diagnosis: Bilateral leg weakness [R29.898]  Elevated troponin [R77.8]       Precautions:        ASSESSMENT :  Based on the objective data described below, the patient presents with presumed baseline oropharyngeal swallow function. Note Head CT showed \"No acute intracranial process. Imaging findings consistent with severe chronic microvascular ischemic change. There is a severe degree of cerebral atrophy. \" MRI pending. Patient demonstrated grossly functional mastication and oral clearance with solid trials. Patient tolerated single and sequential sips of thin liquid without signs of aspiration. Given patient's functional oral/pharyngeal swallow, recommend Regular/Thin Liquid diet with general aspiration precautions outlined below. Will await MRI results prior to signing off. Skilled acute therapy provided by a speech-language pathologist is not indicated at this time. PLAN :  Recommendations:  -- Regular/Thin Liquid diet  -- Medication as tolerated  -- Strict oral care 2-3x/day  -- General aspiration precautions: upright with all PO, small bites/sips, slow rate of intake, etc.    Discharge Recommendations: To Be Determined     SUBJECTIVE:   Patient stated you are just the sweetest thin around. OBJECTIVE:     Past Medical History:   Diagnosis Date    Arthritis     Cancer ([address] 2002    brain tumor, benign (meningioma)    Diabetes ([address]     questionable?     Gastrointestinal disorder     diverticulitis    Hypothyroid 5/11/2015    Localization-related (focal) (partial) epilepsy and epileptic syndromes with complex partial seizures, without mention of intractable epilepsy 5/11/2015    Other ill-defined conditions(799.89)     loss of vision in left eye    Seizures ([address] 2002    caused by brain tumor    Thromboembolus [facility])     Thyroid disease      Past Surgical History:   Procedure Laterality Date    COLORECTAL SCRN; HI RISK IND  8/26/2014         HX BACK SURGERY  1970s    HX ORTHOPAEDIC  1990s    right wrist    NEUROLOGICAL PROCEDURE UNLISTED  2002    brain tumor removed    [place] INS NEW/RPLCMT PRM PM W/TRANSV ELTRD ATRIAL&VENT N/A 10/6/2020    INSERT PPM DUAL performed by Ralph Aguilar MD at [facility] CARDIAC CATH LAB     Prior Level of Function/Home Situation:   Home Situation  Home Environment: Other (comment) (house)  # Steps to Enter: 4  One/Two Story Residence: One story  Living Alone: No  Support Systems: Child(morales)  Patient Expects to be Discharged to[de-identified] Skilled nursing facility  Current DME Used/Available at Home: [employer], straight    Diet prior to admission: Regular/Thin Liquid  Current Diet: Regular/Thin Liquid     Cognitive and Communication Status:  Neurologic State: Alert  Orientation Level: Oriented to person, Oriented to place, Disoriented to situation, Disoriented to time (Oriented to year when provided choices)  Cognition: Follows commands  Perception: Appears intact  Perseveration: Perseverates during conversation  Safety/Judgement: Awareness of environment, Decreased insight into deficits    Oral Assessment:  Oral Assessment  Labial: No impairment  Dentition: Full;Natural  Oral Hygiene: Moist oral mucosa  Lingual: No impairment  Velum: No impairment  Mandible: No impairment    P.O. Trials:  Patient Position: Upright in bed  Vocal quality prior to P.O.: No impairment  Consistency Presented: Thin liquid; Solid  How Presented: Self-fed/presented;Straw;Successive swallows     Bolus Acceptance: No impairment  Bolus Formation/Control: No impairment     Propulsion: No impairment  Oral Residue: None  Initiation of Swallow: No impairment  Laryngeal Elevation: Functional  Aspiration Signs/Symptoms: None  Pharyngeal Phase Characteristics: No impairment, issues, or problems              Oral Phase Severity: No impairment  Pharyngeal Phase Severity : No impairment    NOMS:   The NOMS functional outcome measure was used to quantify this patient's level of swallowing impairment. Based on the NOMS, the patient was determined to be at level 7 for swallow function. NOMS Swallowing Levels:  Level 1 (CN): NPO  Level 2 (CM): NPO but takes consistency in therapy  Level 3 (CL): Takes less than 50% of nutrition p.o. and continues with nonoral feedings; and/or safe with mod cues; and/or max diet restriction  Level 4 (CK): Safe swallow but needs mod cues; and/or mod diet restriction; and/or still requires some nonoral feeding/supplements  Level 5 (CJ): Safe swallow with min diet restriction; and/or needs min cues  Level 6 (CI): Independent with p.o.; rare cues; usually self cues; may need to avoid some foods or needs extra time  Level 7 ([address] [place] [address]): Independent for all p.o.  COLT. (2003). National Outcomes Measurement System (NOMS): Adult Speech-Language Pathology User's Guide. Pain:             After treatment:   Patient left in no apparent distress in bed, Call bell within reach, and Nursing notified    COMMUNICATION/EDUCATION:   Patient was educated regarding role of SLP and flow of SLP evaluation. She demonstrated fair understanding given history of dementia, but she verbalized understanding. The patient's plan of care including recommendations, planned interventions, and recommended diet changes were discussed with: Registered nurse.      Thank you for this referral.  Bulmaro Dye, SLP  Time Calculation: 15 mins

## 2023-03-15 NOTE — PROGRESS NOTES
Hospitalist Progress Note    NAME: Sam Avilez   :  1932   MRN:  [id number]       Assessment / Plan:    Bilateral lower extremity weakness right greater than left rule out CVA versus other causes  -presented with weakness on the right side is worse than left but not able to lift both legs against gravity  -MRI of the brain and ultrasound carotids ordered on admission, pending.  -Echo with normal EF and no PFO reported  -MRI cervical  spine pending, was ordered on admission  -If no clear cause found, consider lumbar puncture  -PT OT eval when able  -On admission started on aspirin 81 mg daily patient is already on Coumadin at home but INR is subtherapeutic. Warfarin need monitoring by INR    Troponin elevation likely demand ischemia versus type II non-STEMI  -Troponin around 380 on admission  - Patient is chest pain-free  - No acute ischemic changes on EKG  -Echo with normal EF no PFO reported  -Cardiology input is appreciated, recommendation for conservative management    Seizure disorder  Hypothyroidism  History of recurrent DVTs  History of meningioma  -Continue phenytoin, levothyroxine  -Consult pharmacy for warfarin. INR 1.1        Code Status: Full code  Surrogate Decision Maker: Child Skip Rubalcava     DVT Prophylaxis: Warfarin  GI Prophylaxis: not indicated    18.5 - 24.9 Normal weight / Body mass index is 20.42 kg/m². Estimated discharge date:   Barriers: brain MRI,      Subjective:     Patient was seen and examined. No acute events overnight. Discussed with RN overnight events. All patient's questions were answered.     \"Feeling very well\"      Review of Systems:  Symptom Y/N Comments  Symptom Y/N Comments   Fever/Chills n   Chest Pain n    Poor Appetite    Edema     Cough n   Abdominal Pain n    Sputum    Joint Pain     SOB/DORSEY n   Pruritis/Rash     Nausea/vomit n   Tolerating PT/OT     Diarrhea    Tolerating Diet y    Constipation    Other       Could NOT obtain due to: Objective:     VITALS:   Last 24hrs VS reviewed since prior progress note. Most recent are:  Patient Vitals for the past 24 hrs:   Temp Pulse Resp BP SpO2   03/15/23 1537 98.1 °F (36.7 °C) (!) 56 18 121/69 97 %   03/15/23 1430 -- 62 -- (!) 118/93 98 %   03/15/23 1418 -- 63 -- (!) 144/75 98 %   03/15/23 1200 -- 78 -- -- --   03/15/23 1138 98.2 °F (36.8 °C) 65 18 (!) 135/59 97 %   03/15/23 0800 -- 69 -- -- --   03/15/23 0244 98.1 °F (36.7 °C) 61 20 (!) 122/55 95 %   03/14/23 2355 98.5 °F (36.9 °C) 62 18 (!) 115/57 99 %   03/14/23 1949 98.4 °F (36.9 °C) 61 20 (!) 111/52 98 %   03/14/23 1717 98.2 °F (36.8 °C) 60 16 (!) 119/50 96 %       No intake or output data in the 24 hours ending 03/15/23 1608     I had a face to face encounter and independently examined this patient on 3/15/2023, as outlined below:  PHYSICAL EXAM:  General: WD, WN. Alert, cooperative, no acute distress    EENT:  EOMI. Anicteric sclerae. MMM  Resp:  CTA bilaterally, no wheezing or rales. No accessory muscle use  CV:  Regular  rhythm,  No edema  GI:  Soft, Non distended, Non tender. +Bowel sounds  Neurologic:  EOMs intact. No facial asymmetry. No aphasia or slurred speech. strength 5/5 all over except in the right lower extremity 3/5, Sensation grossly intact. Alert and oriented X 4.     Psych:   Good insight. Not anxious nor agitated  Skin:  No rashes. No jaundice    Reviewed most current lab test results and cultures  YES  Reviewed most current radiology test results   YES  Review and summation of old records today    NO  Reviewed patient's current orders and MAR    YES  PMH/SH reviewed - no change compared to H&P  ________________________________________________________________________  Care Plan discussed with:    Comments   Patient X    Family      RN X    Care Manager X    Consultant                       X Multidiciplinary team rounds were held today with , nursing, pharmacist and clinical coordinator.   Patient's plan of care was discussed; medications were reviewed and discharge planning was addressed. ________________________________________________________________________        Comments   >50% of visit spent in counseling and coordination of care X    ________________________________________________________________________  Anuj Watson MD     Procedures: see electronic medical records for all procedures/Xrays and details which were not copied into this note but were reviewed prior to creation of Plan. LABS:  I reviewed today's most current labs and imaging studies. Pertinent labs include:  Recent Labs     03/14/23  0255 03/13/23  1407   WBC 5.9 5.7   HGB 12.3 13.2   HCT 37.3 39.9    193       Recent Labs     03/15/23  0241 03/14/23  0255 03/13/23  1450 03/13/23  1407   NA  --  139  --  143   K  --  3.6  --  3.8   CL  --  108  --  110*   CO2  --  28  --  27   GLU  --  99  --  107*   BUN  --  22*  --  24*   CREA  --  0.84  --  1.01   CA  --  9.2  --  9.8   MG  --   --   --  2.3   ALB  --   --   --  3.7   TBILI  --   --   --  0.8   ALT  --   --   --  27   INR 1.1 1.1 1.1  --          Signed:  Anuj Watson MD

## 2023-03-16 LAB
ATRIAL RATE: 57 BPM
CALCULATED R AXIS, ECG10: -61 DEGREES
CALCULATED T AXIS, ECG11: 71 DEGREES
DIAGNOSIS, 93000: NORMAL
ERYTHROCYTE [DISTWIDTH] IN BLOOD BY AUTOMATED COUNT: 12.9 % (ref 11.5–14.5)
HCT VFR BLD AUTO: 37.5 % (ref 35–47)
HGB BLD-MCNC: 12.4 G/DL (ref 11.5–16)
INR PPP: 1.3 (ref 0.9–1.1)
MCH RBC QN AUTO: 32.8 PG (ref 26–34)
MCHC RBC AUTO-ENTMCNC: 33.1 G/DL (ref 30–36.5)
MCV RBC AUTO: 99.2 FL (ref 80–99)
NRBC # BLD: 0 K/UL (ref 0–0.01)
NRBC BLD-RTO: 0 PER 100 WBC
PLATELET # BLD AUTO: 149 K/UL (ref 150–400)
PMV BLD AUTO: 13 FL (ref 8.9–12.9)
PROTHROMBIN TIME: 13 SEC (ref 9–11.1)
Q-T INTERVAL, ECG07: 422 MS
QRS DURATION, ECG06: 76 MS
QTC CALCULATION (BEZET), ECG08: 442 MS
RBC # BLD AUTO: 3.78 M/UL (ref 3.8–5.2)
SARS-COV-2 RDRP RESP QL NAA+PROBE: NOT DETECTED
SOURCE, COVRS: NORMAL
VENTRICULAR RATE, ECG03: 66 BPM
WBC # BLD AUTO: 6.2 K/UL (ref 3.6–11)

## 2023-03-16 PROCEDURE — 36415 COLL VENOUS BLD VENIPUNCTURE: CPT

## 2023-03-16 PROCEDURE — 65270000046 HC RM TELEMETRY

## 2023-03-16 PROCEDURE — 99232 SBSQ HOSP IP/OBS MODERATE 35: CPT | Performed by: PSYCHIATRY & NEUROLOGY

## 2023-03-16 PROCEDURE — 85027 COMPLETE CBC AUTOMATED: CPT

## 2023-03-16 PROCEDURE — 74011250637 HC RX REV CODE- 250/637: Performed by: INTERNAL MEDICINE

## 2023-03-16 PROCEDURE — 85610 PROTHROMBIN TIME: CPT

## 2023-03-16 PROCEDURE — 87635 SARS-COV-2 COVID-19 AMP PRB: CPT

## 2023-03-16 RX ORDER — LORAZEPAM 2 MG/ML
0.5 INJECTION INTRAMUSCULAR
Status: ACTIVE | OUTPATIENT
Start: 2023-03-16 | End: 2023-03-17

## 2023-03-16 RX ORDER — WARFARIN SODIUM 5 MG/1
5 TABLET ORAL ONCE
Status: COMPLETED | OUTPATIENT
Start: 2023-03-16 | End: 2023-03-16

## 2023-03-16 RX ORDER — METOPROLOL SUCCINATE 25 MG/1
12.5 TABLET, EXTENDED RELEASE ORAL DAILY
Status: DISCONTINUED | OUTPATIENT
Start: 2023-03-16 | End: 2023-03-18 | Stop reason: HOSPADM

## 2023-03-16 RX ADMIN — ASPIRIN 81 MG: 81 TABLET, CHEWABLE ORAL at 09:19

## 2023-03-16 RX ADMIN — PHENYTOIN SODIUM 200 MG: 100 CAPSULE ORAL at 17:23

## 2023-03-16 RX ADMIN — PHENYTOIN SODIUM 200 MG: 100 CAPSULE ORAL at 09:19

## 2023-03-16 RX ADMIN — CASTOR OIL AND BALSAM, PERU: 788; 87 OINTMENT TOPICAL at 09:20

## 2023-03-16 RX ADMIN — WARFARIN SODIUM 5 MG: 5 TABLET ORAL at 17:23

## 2023-03-16 RX ADMIN — LEVOTHYROXINE SODIUM 88 MCG: 0.09 TABLET ORAL at 05:35

## 2023-03-16 RX ADMIN — FUROSEMIDE 20 MG: 20 TABLET ORAL at 09:19

## 2023-03-16 RX ADMIN — METOPROLOL SUCCINATE 12.5 MG: 25 TABLET, EXTENDED RELEASE ORAL at 11:46

## 2023-03-16 RX ADMIN — CASTOR OIL AND BALSAM, PERU: 788; 87 OINTMENT TOPICAL at 20:47

## 2023-03-16 NOTE — PROGRESS NOTES
End of Shift Note     Bedside shift change report given to Anju Medrano RN (oncoming nurse) by Ronny Rosales RN (offgoing nurse). Report included the following information SBAR, Kardex, and Cardiac Rhythm       Shift worked: 2214-5331   Shift summary and any significant changes:     No changes. shows dexterity to roll belt       Concerns for physician to address: none   Zone phone for oncoming shift:   [phone]      Patient Information  Alesha Hedrick  80 y.o.  3/13/2023  1:16 PM by Alex Patricia MD. Alesha Hedrick was admitted from Home     Problem List       Patient Active Problem List     Diagnosis Date Noted    Closed left subtrochanteric femur fracture, initial encounter ([address] 01/15/2019    Elevated troponin 03/13/2023    Bilateral leg weakness 03/13/2023    Pacemaker 10/06/2020    Bilateral carotid artery stenosis 02/28/2020    Localization-related focal epilepsy with complex partial seizures ([address] 02/28/2020    Cerebral microvascular disease 02/28/2020    GI bleed due to NSAIDs 06/02/2019    GI bleed 06/01/2019    Diabetes ([address] 01/13/2019    Femur fracture, left ([address] 01/13/2019    Complex partial seizure 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 seizure ([address] 04/21/2017    Closed right hip fracture ([address] 08/17/2016    Lumbar post-laminectomy syndrome 05/11/2015    Hypothyroid 05/11/2015    H/O meningioma of the brain, left brain meningioma 05/11/2015           Past Medical History:   Diagnosis Date    Arthritis      Cancer ([address] 2002     brain tumor, benign (meningioma)    Diabetes ([address]       questionable?     Gastrointestinal disorder       diverticulitis    Hypothyroid 5/11/2015    Localization-related (focal) (partial) epilepsy and epileptic syndromes with complex partial seizures, without mention of intractable epilepsy 5/11/2015    Other ill-defined conditions(799.89)       loss of vision in left eye    Seizures ([address] 2002     caused by brain tumor    Thromboembolus [facility])      Thyroid disease           Core Measures:  CVA: Yes Yes  CHF:No No  PNA:No No     Activity:  Activity Level: Up with Assistance  Number times ambulated in hallways past shift: 0  Number of times OOB to chair past shift: 0     Cardiac:   Cardiac Monitoring: Yes      Cardiac Rhythm: Atrial Paced     Access:   Current line(s): PIV   Central Line? No   PICC LINE? No      Genitourinary:   Urinary status: voiding and external catheter  Urinary Catheter? No      Respiratory:   O2 Device: None (Room air)  Chronic home O2 use?: NO  Incentive spirometer at bedside: NO     GI:  Last Bowel Movement Date: 03/15/23  Current diet:  ADULT DIET Regular  Passing flatus: YES  Tolerating current diet: YES     Pain Management:   Patient states pain is manageable on current regimen: YES     Skin:  Jan Score: 17  Interventions: float heels, increase time out of bed, PT/OT consult, and limit briefs    Patient Safety:  Fall Score: Total Score: 5  Interventions: bed/chair alarm, gripper socks, and pt to call before getting OOB  High Fall Risk: Yes  @Rollbelt  @dexterity to release roll belt  Yes/No ( must document dexterity  here by stating Yes or No here, otherwise this is a restraint and must follow restraint documentation policy.)     DVT prophylaxis:  DVT prophylaxis Med- Yes  DVT prophylaxis SCD or CAMRON- No      Wounds: (If Applicable)  Wounds- Yes  Location buttocks     Active Consults:  IP CONSULT TO NEUROLOGY  IP CONSULT TO CARDIOLOGY     Length of Stay:  Expected LOS: 2d 12h  Actual LOS: 2  Discharge Plan:  Yes

## 2023-03-16 NOTE — PROGRESS NOTES
Problem: Falls - Risk of  Goal: *Absence of Falls  Description: Document Marcos Ledesma Fall Risk and appropriate interventions in the flowsheet. Outcome: Progressing Towards Goal  Note: Fall Risk Interventions:  Mobility Interventions: Bed/chair exit alarm    Mentation Interventions: Bed/chair exit alarm    Medication Interventions: Bed/chair exit alarm    Elimination Interventions: Call light in reach, Bed/chair exit alarm    History of Falls Interventions: Bed/chair exit alarm, Room close to nurse's station         Problem: Patient Education: Go to Patient Education Activity  Goal: Patient/Family Education  Outcome: Progressing Towards Goal     Problem: Pressure Injury - Risk of  Goal: *Prevention of pressure injury  Description: Document Jan Scale and appropriate interventions in the flowsheet. Outcome: Progressing Towards Goal  Note: Pressure Injury Interventions:  Sensory Interventions: Assess changes in LOC, Float heels, Keep linens dry and wrinkle-free, Minimize linen layers, Pressure redistribution bed/mattress (bed type), Turn and reposition approx. every two hours (pillows and wedges if needed)    Moisture Interventions: Absorbent underpads, Apply protective barrier, creams and emollients, Check for incontinence Q2 hours and as needed, Internal/External urinary devices, Limit adult briefs, Minimize layers    Activity Interventions: Increase time out of bed, PT/OT evaluation    Mobility Interventions: Float heels, HOB 30 degrees or less, Turn and reposition approx.  every two hours(pillow and wedges)    Nutrition Interventions: Document food/fluid/supplement intake, Offer support with meals,snacks and hydration    Friction and Shear Interventions: Apply protective barrier, creams and emollients, HOB 30 degrees or less, Minimize layers                Problem: Patient Education: Go to Patient Education Activity  Goal: Patient/Family Education  Outcome: Progressing Towards Goal     Problem: Patient Education: Go to Patient Education Activity  Goal: Patient/Family Education  Outcome: Progressing Towards Goal     Problem: TIA/CVA Stroke: 0-24 hours  Goal: Off Pathway (Use only if patient is Off Pathway)  Outcome: Progressing Towards Goal  Goal: Activity/Safety  Outcome: Progressing Towards Goal  Goal: Consults, if ordered  Outcome: Progressing Towards Goal  Goal: Diagnostic Test/Procedures  Outcome: Progressing Towards Goal  Goal: Nutrition/Diet  Outcome: Progressing Towards Goal  Goal: Discharge Planning  Outcome: Progressing Towards Goal  Goal: Medications  Outcome: Progressing Towards Goal  Goal: Respiratory  Outcome: Progressing Towards Goal  Goal: Treatments/Interventions/Procedures  Outcome: Progressing Towards Goal  Goal: Minimize risk of bleeding post-thrombolytic infusion  Outcome: Progressing Towards Goal  Goal: Monitor for complications post-thrombolytic infusion  Outcome: Progressing Towards Goal  Goal: Psychosocial  Outcome: Progressing Towards Goal  Goal: *Hemodynamically stable  Outcome: Progressing Towards Goal  Goal: *Neurologically stable  Description: Absence of additional neurological deficits    Outcome: Progressing Towards Goal  Goal: *Verbalizes anxiety and depression are reduced or absent  Outcome: Progressing Towards Goal  Goal: *Absence of Signs of Aspiration on Current Diet  Outcome: Progressing Towards Goal  Goal: *Absence of deep venous thrombosis signs and symptoms(Stroke Metric)  Outcome: Progressing Towards Goal  Goal: *Ability to perform ADLs and demonstrates progressive mobility and function  Outcome: Progressing Towards Goal  Goal: *Stroke education started(Stroke Metric)  Outcome: Progressing Towards Goal  Goal: *Dysphagia screen performed(Stroke Metric)  Outcome: Progressing Towards Goal  Goal: *Rehab consulted(Stroke Metric)  Outcome: Progressing Towards Goal     Problem: TIA/CVA Stroke: Day 2 Until Discharge  Goal: Off Pathway (Use only if patient is Off Pathway)  Outcome: Progressing Towards Goal  Goal: Activity/Safety  Outcome: Progressing Towards Goal  Goal: Diagnostic Test/Procedures  Outcome: Progressing Towards Goal  Goal: Nutrition/Diet  Outcome: Progressing Towards Goal  Goal: Discharge Planning  Outcome: Progressing Towards Goal  Goal: Medications  Outcome: Progressing Towards Goal  Goal: Respiratory  Outcome: Progressing Towards Goal  Goal: Treatments/Interventions/Procedures  Outcome: Progressing Towards Goal  Goal: Psychosocial  Outcome: Progressing Towards Goal  Goal: *Verbalizes anxiety and depression are reduced or absent  Outcome: Progressing Towards Goal  Goal: *Absence of aspiration  Outcome: Progressing Towards Goal  Goal: *Absence of deep venous thrombosis signs and symptoms(Stroke Metric)  Outcome: Progressing Towards Goal  Goal: *Optimal pain control at patient's stated goal  Outcome: Progressing Towards Goal  Goal: *Tolerating diet  Outcome: Progressing Towards Goal  Goal: *Ability to perform ADLs and demonstrates progressive mobility and function  Outcome: Progressing Towards Goal  Goal: *Stroke education continued(Stroke Metric)  Outcome: Progressing Towards Goal     Problem: Ischemic Stroke: Discharge Outcomes  Goal: *Verbalizes anxiety and depression are reduced or absent  Outcome: Progressing Towards Goal  Goal: *Verbalize understanding of risk factor modification(Stroke Metric)  Outcome: Progressing Towards Goal  Goal: *Hemodynamically stable  Outcome: Progressing Towards Goal  Goal: *Absence of aspiration pneumonia  Outcome: Progressing Towards Goal  Goal: *Aware of needed dietary changes  Outcome: Progressing Towards Goal  Goal: *Verbalize understanding of prescribed medications including anti-coagulants, anti-lipid, and/or anti-platelets(Stroke Metric)  Outcome: Progressing Towards Goal  Goal: *Tolerating diet  Outcome: Progressing Towards Goal  Goal: *Aware of follow-up diagnostics related to anticoagulants  Outcome: Progressing Towards Goal  Goal: *Ability to perform ADLs and demonstrates progressive mobility and function  Outcome: Progressing Towards Goal  Goal: *Absence of DVT(Stroke Metric)  Outcome: Progressing Towards Goal  Goal: *Absence of aspiration  Outcome: Progressing Towards Goal  Goal: *Optimal pain control at patient's stated goal  Outcome: Progressing Towards Goal  Goal: *Home safety concerns addressed  Outcome: Progressing Towards Goal  Goal: *Describes available resources and support systems  Outcome: Progressing Towards Goal  Goal: *Verbalizes understanding of activation of EMS(911) for stroke symptoms(Stroke Metric)  Outcome: Progressing Towards Goal  Goal: *Understands and describes signs and symptoms to report to providers(Stroke Metric)  Outcome: Progressing Towards Goal  Goal: *Neurolgocially stable (absence of additional neurological deficits)  Outcome: Progressing Towards Goal  Goal: *Verbalizes importance of follow-up with primary care physician(Stroke Metric)  Outcome: Progressing Towards Goal  Goal: *Smoking cessation discussed,if applicable(Stroke Metric)  Outcome: Progressing Towards Goal  Goal: *Depression screening completed(Stroke Metric)  Outcome: Progressing Towards Goal     Problem: Patient Education: Go to Patient Education Activity  Goal: Patient/Family Education  Outcome: Progressing Towards Goal     Problem: Patient Education: Go to Patient Education Activity  Goal: Patient/Family Education  Outcome: Progressing Towards Goal

## 2023-03-16 NOTE — PROGRESS NOTES
End of Shift Note     Bedside shift change report given to Eloise Grier (oncoming nurse) by Mike Diaz RN (offgoing nurse). Report included the following information SBAR, Kardex, and Cardiac Rhythm       Shift worked: [id number]   Shift summary and any significant changes:     Pt pleasant and more oriented throughout shift. VS stable. MRI pending due to pacer compatibility. Poss DC to SNF      Concerns for physician to address: Pt agitation   Zone phone for oncoming shift:   [phone]      Patient Information  Helga العلي  80 y.o.  3/13/2023  1:16 PM by Tomy Griffith MD. Helga العلي was admitted from Home     Problem List       Patient Active Problem List     Diagnosis Date Noted    Closed left subtrochanteric femur fracture, initial encounter ([address] 01/15/2019    Elevated troponin 03/13/2023    Bilateral leg weakness 03/13/2023    Pacemaker 10/06/2020    Bilateral carotid artery stenosis 02/28/2020    Localization-related focal epilepsy with complex partial seizures ([address] 02/28/2020    Cerebral microvascular disease 02/28/2020    GI bleed due to NSAIDs 06/02/2019    GI bleed 06/01/2019    Diabetes ([address] 01/13/2019    Femur fracture, left ([address] 01/13/2019    Complex partial seizure 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 seizure ([address] 04/21/2017    Closed right hip fracture ([address] 08/17/2016    Lumbar post-laminectomy syndrome 05/11/2015    Hypothyroid 05/11/2015    H/O meningioma of the brain, left brain meningioma 05/11/2015           Past Medical History:   Diagnosis Date    Arthritis      Cancer ([address] 2002     brain tumor, benign (meningioma)    Diabetes ([address]       questionable?     Gastrointestinal disorder       diverticulitis    Hypothyroid 5/11/2015    Localization-related (focal) (partial) epilepsy and epileptic syndromes with complex partial seizures, without mention of intractable epilepsy 5/11/2015    Other ill-defined conditions(799.89)       loss of vision in left eye    Seizures ([address] 2002     caused by brain tumor Thromboembolus ([address]      Thyroid disease           Core Measures:  CVA: Yes Yes  CHF:No No  PNA:No No     Activity:  Activity Level: Up with Assistance  Number times ambulated in hallways past shift: 0  Number of times OOB to chair past shift: 0     Cardiac:   Cardiac Monitoring: Yes      Cardiac Rhythm: Atrial Paced     Access:   Current line(s): PIV   Central Line? No   PICC LINE? No      Genitourinary:   Urinary status: voiding and external catheter  Urinary Catheter? No      Respiratory:   O2 Device: None (Room air)  Chronic home O2 use?: NO  Incentive spirometer at bedside: NO     GI:  Last Bowel Movement Date: 03/15/23  Current diet:  ADULT DIET Regular  Passing flatus: YES  Tolerating current diet: YES     Pain Management:   Patient states pain is manageable on current regimen: YES     Skin:  Jna Score: 17  Interventions: float heels, increase time out of bed, PT/OT consult, and limit briefs    Patient Safety:  Fall Score: Total Score: 5  Interventions: bed/chair alarm, gripper socks, and pt to call before getting OOB  High Fall Risk: Yes  @Rollbelt  @dexterity to release roll belt  Yes/No ( must document dexterity  here by stating Yes or No here, otherwise this is a restraint and must follow restraint documentation policy.)     DVT prophylaxis:  DVT prophylaxis Med- Yes  DVT prophylaxis SCD or CAMRON- No      Wounds: (If Applicable)  Wounds- Yes  Location buttocks     Active Consults:  IP CONSULT TO NEUROLOGY  IP CONSULT TO CARDIOLOGY     Length of Stay:  Expected LOS: 2d 12h  Actual LOS: 2  Discharge Plan:  Yes

## 2023-03-16 NOTE — PROGRESS NOTES
Neurology Note    Patient ID:  Sam Avilez  [id number]  11 y.o.  9/5/1932      Date of Consultation:  March 16, 2023        Assessment and Plan:    The patient is a pleasant 77-year-old female with acute onset of right upper and lower extremity weakness. Symptoms are persisting. Acute right-sided hemiparesis - leg weaker than arm. Differential includes acute stroke, recrudescence of neurological symptoms due to metabolic abnormalities. Risk factors for stroke do include hypertension and recurrent DVTs with subtherapeutic INR. She should be on 81 mg aspirin daily  On anti-coagulation. 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 Last level was in 2020. There was a concern at last office visit of non-compliance. Level on 3/15/2023 was 4.7.  she has received a couple of doses prior to this level. Suggests noncompliance at home. 200 mg twice a day of phenytoin    Hypothyroidism: On levothyroxine. Neurology will continue to follow closely in this complicated patient with ongoing neurological symptoms necessitating additional testing. Subjective: I am still weak       History of Present Illness:   Sam Avilez is a 80 y.o. female with a past medical history of seizure disorder, hypothyroidism, recurrent DVTs who presented to the hospital with onset of bilateral lower extremities with the right side being weaker. Patient does continue to feel weak. No new numbness, tingling, or weakness. She has no difficulty with her speech or swallowing. Laboratory results include a white blood cell count of 6.2, hemoglobin of 12.4 and platelets of 307. INR of 1.3     head CT from March 13, 2023.   There is no acute abnormalities there was severe chronic microvascular ischemic changes with diffuse severe atrophy        As noted previously, the patient was last seen in the neurology clinic in August 2022 by Timo Meier NP. She had been seen by multiple other neurology providers. She does have a reported history of seizures and last seizure was greater than 3 years ago. She was continued on dilantin for a history of seizures. It is unclear if she has been compliant with her medication. There was a concern about progressive memory loss that was discussed at her most recent neurology visit. Her examination did reveal 4 out of 5 strength in her upper extremities. Her gait was not examined. It appears a brain MRI was discussed at that visit but she refused to get it done. It was noted there was questionable compliance with her Dilantin. It appears her seizure disorder was associated with a prior meningioma resection in 2002. A prior EEG in 2006 revealed mild left temporal slowing and due to this she was kept on medication. Past Medical History:   Diagnosis Date    Arthritis     Cancer ([address] 2002    brain tumor, benign (meningioma)    Diabetes ([address]     questionable?     Gastrointestinal disorder     diverticulitis    Hypothyroid 5/11/2015    Localization-related (focal) (partial) epilepsy and epileptic syndromes with complex partial seizures, without mention of intractable epilepsy 5/11/2015    Other ill-defined conditions(799.89)     loss of vision in left eye    Seizures ([address] 2002    caused by brain tumor    Thromboembolus [facility])     Thyroid disease         Past Surgical History:   Procedure Laterality Date    COLORECTAL SCRN; HI RISK IND  8/26/2014         HX BACK SURGERY  1970s    HX ORTHOPAEDIC  1990s    right wrist    NEUROLOGICAL PROCEDURE UNLISTED  2002    brain tumor removed    OK INS NEW/RPLCMT PRM PM W/TRANSV ELTRD ATRIAL&VENT N/A 10/6/2020    INSERT PPM DUAL performed by Mustapha Leonardo MD at [address] CATH LAB        Family History   Problem Relation Age of Onset    Cancer Mother breast    Alzheimer's Disease Mother     Heart Disease Father     Kidney Disease Sister         dialysis    Seizures Daughter         Social History     Tobacco Use    Smoking status: Never    Smokeless tobacco: Never   Substance Use Topics    Alcohol use: Yes     Comment: glass of wine occasionally        Allergies   Allergen Reactions    Bactrim [Sulfamethoprim] Unable to Obtain    Penicillins Itching     Patient not sure she is allergic    Sulfa (Sulfonamide Antibiotics) Nausea and Vomiting          Current Facility-Administered Medications   Medication Dose Route Frequency    haloperidol lactate (HALDOL) injection 1 mg  1 mg IntraVENous Q8H PRN    levothyroxine (SYNTHROID) tablet 88 mcg  88 mcg Oral 6am    balsam peru-castor oiL (VENELEX) ointment   Topical BID    furosemide (LASIX) tablet 20 mg  20 mg Oral DAILY    phenytoin ER (DILANTIN ER) ER capsule 200 mg  200 mg Oral BID    acetaminophen (TYLENOL) tablet 650 mg  650 mg Oral Q4H PRN    Or    acetaminophen (TYLENOL) solution 650 mg  650 mg Per NG tube Q4H PRN    Or    acetaminophen (TYLENOL) suppository 650 mg  650 mg Rectal Q4H PRN    aspirin chewable tablet 81 mg  81 mg Oral DAILY    labetaloL (NORMODYNE;TRANDATE) injection 5 mg  5 mg IntraVENous Q10MIN PRN    polyethylene glycol (MIRALAX) packet 17 g  17 g Oral DAILY PRN    WARFARIN INFORMATION NOTE (COUMADIN)   Other QPM    traMADoL (ULTRAM) tablet 50 mg  50 mg Oral Q6H PRN    hydrALAZINE (APRESOLINE) 20 mg/mL injection 10 mg  10 mg IntraVENous QID PRN    hydrOXYzine HCL (ATARAX) tablet 25 mg  25 mg Oral TID PRN    melatonin tablet 1.5 mg  1.5 mg Oral QHS PRN       Review of Systems:    General, constitutional: negative  Eyes, vision: negative  Ears, nose, throat: negative  Cardiovascular, heart: negative  Respiratory: negative  Gastrointestinal: negative  Genitourinary: negative  Musculoskeletal: negative  Skin and integumentary: negative  Psychiatric: negative  Endocrine: negative  Neurological: negative, except for HPI  Hematologic/lymphatic: negative  Allergy/immunology: negative    Objective:     Visit Vitals  BP (!) 141/73 (BP 1 Location: Right upper arm, BP Patient Position: Supine)   Pulse 77   Temp 97.9 °F (36.6 °C)   Resp 17   Ht 5' 4.02\" (1.626 m)   Wt 119 lb (54 kg)   SpO2 96%   BMI 20.42 kg/m²       Physical Exam:      General:  appears well nourished in no acute distress  Neck: no carotid bruits  Lungs: clear to auscultation  Heart:  no murmurs, regular rate  Lower extremity: peripheral pulses palpable and no edema  Skin: intact    Neurological exam:  The patient is awake and alert. She did know the correct year. She got the month incorrect. She could perform simple calculations. She did know the name of the president. She could name objects correctly. She has decreased insight into her medical condition and medical history. She has decreased attention and concentration. There was no dysarthria or aphasia. Unchanged from yesterday    Cranial nerves:   II-XII were tested    Perrrla  Visual fields were full  Eomi, no evidence of nystagmus  Facial sensation:  normal and symmetric  Facial motor: normal and symmetric  Hearing intact  SCM strength intact  Tongue: midline without fasciculations    Motor: Tone normal  Mild right-sided pronator drift  No evidence of fasciculations    Strength testing:   deltoid triceps biceps Wrist ext. Wrist flex. intrinsics Hip flex. Hip ext. Knee ext. Knee flex Dorsi flex Plantar flex   Right 4 4 4 4 5 4 4 4 4 4 4 4   Left 5 5 5 5 5 5 4+ 4+ 4+ 4+ 4+ 4+     There is a bilateral lower extremity weakness however asymmetry persists    Sensory:  Upper extremity: intact to pp, light touch, and vibration > 10 seconds  Lower extremity: Pinprick was decreased to mid shin bilaterally. There was an asymmetry with reduced sensation on the right. Unchanged. Reflexes:    Right Left  Biceps  2 2  Triceps 2 2  Brachiorad.  2 2  Patella  2 2  Achilles - -    Cerebellar testing:  no tremor apparent, finger/nose and fantasma were intact. Slower on the right    Gait: not assessed due to concerns over safety    Labs:     Lab Results   Component Value Date/Time    Hemoglobin A1c 5.3 03/14/2023 02:55 AM    Sodium 139 03/14/2023 02:55 AM    Potassium 3.6 03/14/2023 02:55 AM    Chloride 108 03/14/2023 02:55 AM    Glucose 99 03/14/2023 02:55 AM    BUN 22 (H) 03/14/2023 02:55 AM    Creatinine 0.84 03/14/2023 02:55 AM    Calcium 9.2 03/14/2023 02:55 AM    WBC 6.2 03/16/2023 02:48 AM    HCT 37.5 03/16/2023 02:48 AM    HGB 12.4 03/16/2023 02:48 AM    PLATELET 579 (L) 31/18/2058 02:48 AM       Imaging:    Results from Hospital Encounter encounter on 09/09/11    MRI BRAIN W AND W/O CONTRAST    Narrative  **Final Report**      ICD Codes / Adm. Diagnosis: 345.00  225.2 / PETIT MAL  BENIGN NEOPLASM OF  CEREBRAL ME  Examination:  MR BRAIN W AND WO CON  - [id number] - Sep  9 2011  7:23AM  Accession No:  [id number]  Reason:  EPILEPSY      REPORT:  Clinical Indication seizure right eye vision abnormalities. Technical factors : diffusion imaging mass at the L T1-weighted pre- and  post-12 cc IV Magnevist, axial T1-weighted pre- enteric and postcontrast T2  and FLAIR gradient-echo coronal T1-weighted contrast and T2-weighted. No  prior . Diffusion imaging is negative. There is mild atrophy and white matter  disease. Major intracranial vessels are patent. There is no extra-axial  fluid collection hemorrhage or shift. There is chronic frontal atrophy. This  is nonspecific.it is likely  related to prior surgery. There is no  associated residual enhancement. No masses or mass effect. IMPRESSION: Postsurgical changes. Atrophy and white matter disease but no  acute findings.         Signing/Reading Doctor: Garret Olson ([id number])  Transcribed:  on 09/09/2011  Approved: Garret Olson ([id number])  09/09/2011        Distribution:  Attending Doctor: Abdulkadir Quach      Results from [facility] Encounter encounter on 03/13/23    CT HEAD WO CONT    Narrative  CLINICAL HISTORY: weakness RLE  INDICATION: weakness RLE  COMPARISON: 2015. CT dose reduction was achieved through use of a standardized protocol tailored  for this examination and automatic exposure control for dose modulation. TECHNIQUE: Serial axial images with a collimation of 5 mm were obtained from the  skull base through the vertex  FINDINGS:  There is sulcal and ventricular prominence. Confluent periventricular and  scattered foci of hypodensity in the cerebral white matter. There is no evidence  of an acute infarction, hemorrhage, or mass-effect. There is no evidence of  midline shift or hydrocephalus. Posterior fossa structures are unremarkable. No  extra-axial collections are seen. Mastoid air cells are well pneumatized and clear. 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 Postsurgical change on  the left. Impression  No acute intracranial process. Imaging findings consistent with severe chronic microvascular ischemic change. There is a severe degree of cerebral atrophy. I spent  38    minutes providing care to this  acutely ill inpatient with > 50% of the time counseling and assisting in the coordination of care of the patient on the patient's hospital floor/unit.   The patient was discussed with nursing and the primary team who continue to work on obtaining the MRI          Active Problems:    Elevated troponin (3/13/2023)      Bilateral leg weakness (3/13/2023)                 Signed By:  Sammy Lee DO FAAN    March 16, 2023

## 2023-03-16 NOTE — PROGRESS NOTES
Problem: Falls - Risk of  Goal: *Absence of Falls  Description: Document [facility] Fall Risk and appropriate interventions in the flowsheet. Outcome: Progressing Towards Goal  Note: Fall Risk Interventions:  Mobility Interventions: Bed/chair exit alarm    Mentation Interventions: Bed/chair exit alarm    Medication Interventions: Bed/chair exit alarm    Elimination Interventions: Bed/chair exit alarm, Call light in reach    History of Falls Interventions: Bed/chair exit alarm, Room close to nurse's station         Problem: Patient Education: Go to Patient Education Activity  Goal: Patient/Family Education  Outcome: Progressing Towards Goal     Problem: Pressure Injury - Risk of  Goal: *Prevention of pressure injury  Description: Document Jan Scale and appropriate interventions in the flowsheet.   Outcome: Progressing Towards Goal  Note: Pressure Injury Interventions:  Sensory Interventions: Assess changes in LOC    Moisture Interventions: Absorbent underpads, Internal/External urinary devices    Activity Interventions: Increase time out of bed    Mobility Interventions: Float heels, PT/OT evaluation    Nutrition Interventions: Document food/fluid/supplement intake    Friction and Shear Interventions: Apply protective barrier, creams and emollients                Problem: Patient Education: Go to Patient Education Activity  Goal: Patient/Family Education  Outcome: Progressing Towards Goal     Problem: Patient Education: Go to Patient Education Activity  Goal: Patient/Family Education  Outcome: Progressing Towards Goal     Problem: TIA/CVA Stroke: 0-24 hours  Goal: Off Pathway (Use only if patient is Off Pathway)  Outcome: Progressing Towards Goal  Goal: Activity/Safety  Outcome: Progressing Towards Goal  Goal: Consults, if ordered  Outcome: Progressing Towards Goal  Goal: Diagnostic Test/Procedures  Outcome: Progressing Towards Goal  Goal: Nutrition/Diet  Outcome: Progressing Towards Goal  Goal: Discharge Planning  Outcome: Progressing Towards Goal  Goal: Medications  Outcome: Progressing Towards Goal  Goal: Respiratory  Outcome: Progressing Towards Goal  Goal: Treatments/Interventions/Procedures  Outcome: Progressing Towards Goal  Goal: Minimize risk of bleeding post-thrombolytic infusion  Outcome: Progressing Towards Goal  Goal: Monitor for complications post-thrombolytic infusion  Outcome: Progressing Towards Goal  Goal: Psychosocial  Outcome: Progressing Towards Goal  Goal: *Hemodynamically stable  Outcome: Progressing Towards Goal  Goal: *Neurologically stable  Description: Absence of additional neurological deficits    Outcome: Progressing Towards Goal  Goal: *Verbalizes anxiety and depression are reduced or absent  Outcome: Progressing Towards Goal  Goal: *Absence of Signs of Aspiration on Current Diet  Outcome: Progressing Towards Goal  Goal: *Absence of deep venous thrombosis signs and symptoms(Stroke Metric)  Outcome: Progressing Towards Goal  Goal: *Ability to perform ADLs and demonstrates progressive mobility and function  Outcome: Progressing Towards Goal  Goal: *Stroke education started(Stroke Metric)  Outcome: Progressing Towards Goal  Goal: *Dysphagia screen performed(Stroke Metric)  Outcome: Progressing Towards Goal  Goal: *Rehab consulted(Stroke Metric)  Outcome: Progressing Towards Goal     Problem: TIA/CVA Stroke: Day 2 Until Discharge  Goal: Off Pathway (Use only if patient is Off Pathway)  Outcome: Progressing Towards Goal  Goal: Activity/Safety  Outcome: Progressing Towards Goal  Goal: Diagnostic Test/Procedures  Outcome: Progressing Towards Goal  Goal: Nutrition/Diet  Outcome: Progressing Towards Goal  Goal: Discharge Planning  Outcome: Progressing Towards Goal  Goal: Medications  Outcome: Progressing Towards Goal  Goal: Respiratory  Outcome: Progressing Towards Goal  Goal: Treatments/Interventions/Procedures  Outcome: Progressing Towards Goal  Goal: Psychosocial  Outcome: Progressing Towards Goal  Goal: *Verbalizes anxiety and depression are reduced or absent  Outcome: Progressing Towards Goal  Goal: *Absence of aspiration  Outcome: Progressing Towards Goal  Goal: *Absence of deep venous thrombosis signs and symptoms(Stroke Metric)  Outcome: Progressing Towards Goal  Goal: *Optimal pain control at patient's stated goal  Outcome: Progressing Towards Goal  Goal: *Tolerating diet  Outcome: Progressing Towards Goal  Goal: *Ability to perform ADLs and demonstrates progressive mobility and function  Outcome: Progressing Towards Goal  Goal: *Stroke education continued(Stroke Metric)  Outcome: Progressing Towards Goal     Problem: Ischemic Stroke: Discharge Outcomes  Goal: *Verbalizes anxiety and depression are reduced or absent  Outcome: Progressing Towards Goal  Goal: *Verbalize understanding of risk factor modification(Stroke Metric)  Outcome: Progressing Towards Goal  Goal: *Hemodynamically stable  Outcome: Progressing Towards Goal  Goal: *Absence of aspiration pneumonia  Outcome: Progressing Towards Goal  Goal: *Aware of needed dietary changes  Outcome: Progressing Towards Goal  Goal: *Verbalize understanding of prescribed medications including anti-coagulants, anti-lipid, and/or anti-platelets(Stroke Metric)  Outcome: Progressing Towards Goal  Goal: *Tolerating diet  Outcome: Progressing Towards Goal  Goal: *Aware of follow-up diagnostics related to anticoagulants  Outcome: Progressing Towards Goal  Goal: *Ability to perform ADLs and demonstrates progressive mobility and function  Outcome: Progressing Towards Goal  Goal: *Absence of DVT(Stroke Metric)  Outcome: Progressing Towards Goal  Goal: *Absence of aspiration  Outcome: Progressing Towards Goal  Goal: *Optimal pain control at patient's stated goal  Outcome: Progressing Towards Goal  Goal: *Home safety concerns addressed  Outcome: Progressing Towards Goal  Goal: *Describes available resources and support systems  Outcome: Progressing Towards Goal  Goal: *Verbalizes understanding of activation of EMS(911) for stroke symptoms(Stroke Metric)  Outcome: Progressing Towards Goal  Goal: *Understands and describes signs and symptoms to report to providers(Stroke Metric)  Outcome: Progressing Towards Goal  Goal: *Neurolgocially stable (absence of additional neurological deficits)  Outcome: Progressing Towards Goal  Goal: *Verbalizes importance of follow-up with primary care physician(Stroke Metric)  Outcome: Progressing Towards Goal  Goal: *Smoking cessation discussed,if applicable(Stroke Metric)  Outcome: Progressing Towards Goal  Goal: *Depression screening completed(Stroke Metric)  Outcome: Progressing Towards Goal     Problem: Patient Education: Go to Patient Education Activity  Goal: Patient/Family Education  Outcome: Progressing Towards Goal     Problem: Patient Education: Go to Patient Education Activity  Goal: Patient/Family Education  Outcome: Progressing Towards Goal

## 2023-03-16 NOTE — PROGRESS NOTES
End of Shift Note     Bedside shift change report given to Ariel Babcock RN (oncoming nurse) by Ted Pantoja RN (offgoing nurse). Report included the following information SBAR, Kardex, and Cardiac Rhythm       Shift worked:  days   Shift summary and any significant changes:      CM following for DC needs  MRI still pending r/t pacemaker settings       Concerns for physician to address: None   Zone phone for oncoming shift:   [phone]      Patient Information  Adriana Wood  80 y.o.  3/13/2023  1:16 PM by Yury Velazquez MD. Adriana Wood was admitted from Home     Problem List       Patient Active Problem List     Diagnosis Date Noted    Closed left subtrochanteric femur fracture, initial encounter ([address] 01/15/2019    Elevated troponin 03/13/2023    Bilateral leg weakness 03/13/2023    Pacemaker 10/06/2020    Bilateral carotid artery stenosis 02/28/2020    Localization-related focal epilepsy with complex partial seizures ([address] 02/28/2020    Cerebral microvascular disease 02/28/2020    GI bleed due to NSAIDs 06/02/2019    GI bleed 06/01/2019    Diabetes ([address] 01/13/2019    Femur fracture, left ([address] 01/13/2019    Complex partial seizure 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 seizure ([address] 04/21/2017    Closed right hip fracture ([address] 08/17/2016    Lumbar post-laminectomy syndrome 05/11/2015    Hypothyroid 05/11/2015    H/O meningioma of the brain, left brain meningioma 05/11/2015           Past Medical History:   Diagnosis Date    Arthritis      Cancer ([address] 2002     brain tumor, benign (meningioma)    Diabetes ([address]       questionable?     Gastrointestinal disorder       diverticulitis    Hypothyroid 5/11/2015    Localization-related (focal) (partial) epilepsy and epileptic syndromes with complex partial seizures, without mention of intractable epilepsy 5/11/2015    Other ill-defined conditions(799.89)       loss of vision in left eye    Seizures ([address] 2002     caused by brain tumor    Thromboembolus [facility])      Thyroid disease Core Measures:  CVA: Yes Yes  CHF:No No  PNA:No No     Activity:  Activity Level: Up with Assistance  Number times ambulated in hallways past shift: 0  Number of times OOB to chair past shift: 0     Cardiac:   Cardiac Monitoring: Yes      Cardiac Rhythm: Atrial Paced     Access:   Current line(s): PIV   Central Line? No   PICC LINE? No      Genitourinary:   Urinary status: voiding and external catheter  Urinary Catheter? No      Respiratory:   O2 Device: None (Room air)  Chronic home O2 use?: NO  Incentive spirometer at bedside: NO     GI:  Last Bowel Movement Date: 03/15/23  Current diet:  ADULT DIET Regular  Passing flatus: YES  Tolerating current diet: YES     Pain Management:   Patient states pain is manageable on current regimen: YES     Skin:  Jan Score: 17  Interventions: float heels, increase time out of bed, PT/OT consult, and limit briefs    Patient Safety:  Fall Score: Total Score: 5  Interventions: bed/chair alarm, gripper socks, and pt to call before getting OOB  High Fall Risk: Yes  @Rollbelt  @dexterity to release roll belt  Yes/No ( must document dexterity  here by stating Yes or No here, otherwise this is a restraint and must follow restraint documentation policy.)     DVT prophylaxis:  DVT prophylaxis Med- Yes  DVT prophylaxis SCD or CAMRON- No      Wounds: (If Applicable)  Wounds- Yes  Location buttocks     Active Consults:  IP CONSULT TO NEUROLOGY  IP CONSULT TO CARDIOLOGY     Length of Stay:  Expected LOS: 2d 12h  Actual LOS: 2  Discharge Plan:  Yes

## 2023-03-16 NOTE — PROGRESS NOTES
Problem: Falls - Risk of  Goal: *Absence of Falls  Description: Document [employer] Fall Risk and appropriate interventions in the flowsheet. Outcome: Progressing Towards Goal  Note: Fall Risk Interventions:  Mobility Interventions: Bed/chair exit alarm    Mentation Interventions: Bed/chair exit alarm    Medication Interventions: Bed/chair exit alarm    Elimination Interventions: Call light in reach, Bed/chair exit alarm    History of Falls Interventions: Bed/chair exit alarm, Room close to nurse's station         Problem: Patient Education: Go to Patient Education Activity  Goal: Patient/Family Education  Outcome: Progressing Towards Goal     Problem: Pressure Injury - Risk of  Goal: *Prevention of pressure injury  Description: Document Jan Scale and appropriate interventions in the flowsheet.   Outcome: Progressing Towards Goal  Note: Pressure Injury Interventions:  Sensory Interventions: Assess need for specialty bed, Assess changes in LOC    Moisture Interventions: Absorbent underpads    Activity Interventions: Increase time out of bed    Mobility Interventions: Float heels, PT/OT evaluation    Nutrition Interventions: Document food/fluid/supplement intake    Friction and Shear Interventions: Apply protective barrier, creams and emollients                Problem: Patient Education: Go to Patient Education Activity  Goal: Patient/Family Education  Outcome: Progressing Towards Goal     Problem: Patient Education: Go to Patient Education Activity  Goal: Patient/Family Education  Outcome: Progressing Towards Goal     Problem: TIA/CVA Stroke: 0-24 hours  Goal: Off Pathway (Use only if patient is Off Pathway)  Outcome: Progressing Towards Goal  Goal: Activity/Safety  Outcome: Progressing Towards Goal  Goal: Consults, if ordered  Outcome: Progressing Towards Goal  Goal: Diagnostic Test/Procedures  Outcome: Progressing Towards Goal  Goal: Nutrition/Diet  Outcome: Progressing Towards Goal  Goal: Discharge Planning  Outcome: Progressing Towards Goal  Goal: Medications  Outcome: Progressing Towards Goal  Goal: Respiratory  Outcome: Progressing Towards Goal  Goal: Treatments/Interventions/Procedures  Outcome: Progressing Towards Goal  Goal: Minimize risk of bleeding post-thrombolytic infusion  Outcome: Progressing Towards Goal  Goal: Monitor for complications post-thrombolytic infusion  Outcome: Progressing Towards Goal  Goal: Psychosocial  Outcome: Progressing Towards Goal  Goal: *Hemodynamically stable  Outcome: Progressing Towards Goal  Goal: *Neurologically stable  Description: Absence of additional neurological deficits    Outcome: Progressing Towards Goal  Goal: *Verbalizes anxiety and depression are reduced or absent  Outcome: Progressing Towards Goal  Goal: *Absence of Signs of Aspiration on Current Diet  Outcome: Progressing Towards Goal  Goal: *Absence of deep venous thrombosis signs and symptoms(Stroke Metric)  Outcome: Progressing Towards Goal  Goal: *Ability to perform ADLs and demonstrates progressive mobility and function  Outcome: Progressing Towards Goal  Goal: *Stroke education started(Stroke Metric)  Outcome: Progressing Towards Goal  Goal: *Dysphagia screen performed(Stroke Metric)  Outcome: Progressing Towards Goal  Goal: *Rehab consulted(Stroke Metric)  Outcome: Progressing Towards Goal     Problem: TIA/CVA Stroke: Day 2 Until Discharge  Goal: Off Pathway (Use only if patient is Off Pathway)  Outcome: Progressing Towards Goal  Goal: Activity/Safety  Outcome: Progressing Towards Goal  Goal: Diagnostic Test/Procedures  Outcome: Progressing Towards Goal  Goal: Nutrition/Diet  Outcome: Progressing Towards Goal  Goal: Discharge Planning  Outcome: Progressing Towards Goal  Goal: Medications  Outcome: Progressing Towards Goal  Goal: Respiratory  Outcome: Progressing Towards Goal  Goal: Treatments/Interventions/Procedures  Outcome: Progressing Towards Goal  Goal: Psychosocial  Outcome: Progressing Towards Goal  Goal: *Verbalizes anxiety and depression are reduced or absent  Outcome: Progressing Towards Goal  Goal: *Absence of aspiration  Outcome: Progressing Towards Goal  Goal: *Absence of deep venous thrombosis signs and symptoms(Stroke Metric)  Outcome: Progressing Towards Goal  Goal: *Optimal pain control at patient's stated goal  Outcome: Progressing Towards Goal  Goal: *Tolerating diet  Outcome: Progressing Towards Goal  Goal: *Ability to perform ADLs and demonstrates progressive mobility and function  Outcome: Progressing Towards Goal  Goal: *Stroke education continued(Stroke Metric)  Outcome: Progressing Towards Goal     Problem: Ischemic Stroke: Discharge Outcomes  Goal: *Verbalizes anxiety and depression are reduced or absent  Outcome: Progressing Towards Goal  Goal: *Verbalize understanding of risk factor modification(Stroke Metric)  Outcome: Progressing Towards Goal  Goal: *Hemodynamically stable  Outcome: Progressing Towards Goal  Goal: *Absence of aspiration pneumonia  Outcome: Progressing Towards Goal  Goal: *Aware of needed dietary changes  Outcome: Progressing Towards Goal  Goal: *Verbalize understanding of prescribed medications including anti-coagulants, anti-lipid, and/or anti-platelets(Stroke Metric)  Outcome: Progressing Towards Goal  Goal: *Tolerating diet  Outcome: Progressing Towards Goal  Goal: *Aware of follow-up diagnostics related to anticoagulants  Outcome: Progressing Towards Goal  Goal: *Ability to perform ADLs and demonstrates progressive mobility and function  Outcome: Progressing Towards Goal  Goal: *Absence of DVT(Stroke Metric)  Outcome: Progressing Towards Goal  Goal: *Absence of aspiration  Outcome: Progressing Towards Goal  Goal: *Optimal pain control at patient's stated goal  Outcome: Progressing Towards Goal  Goal: *Home safety concerns addressed  Outcome: Progressing Towards Goal  Goal: *Describes available resources and support systems  Outcome: Progressing Towards Goal  Goal: *Verbalizes understanding of activation of EMS(911) for stroke symptoms(Stroke Metric)  Outcome: Progressing Towards Goal  Goal: *Understands and describes signs and symptoms to report to providers(Stroke Metric)  Outcome: Progressing Towards Goal  Goal: *Neurolgocially stable (absence of additional neurological deficits)  Outcome: Progressing Towards Goal  Goal: *Verbalizes importance of follow-up with primary care physician(Stroke Metric)  Outcome: Progressing Towards Goal  Goal: *Smoking cessation discussed,if applicable(Stroke Metric)  Outcome: Progressing Towards Goal  Goal: *Depression screening completed(Stroke Metric)  Outcome: Progressing Towards Goal     Problem: Patient Education: Go to Patient Education Activity  Goal: Patient/Family Education  Outcome: Progressing Towards Goal

## 2023-03-16 NOTE — PROGRESS NOTES
Pharmacist Daily Dosing of Warfarin    Indication & Goal INR: DVT, INR Goal 2-3    PTA Warfarin Dose: 2 mg daily    Notable concurrent conditions and medications: Phenytoin/Fosphenytoin (PTA med)    Labs:  Recent Labs     03/16/23  0248 03/15/23  0241 03/14/23  0255 03/13/23  1450 03/13/23  1407   INR 1.3* 1.1 1.1   < >  --    HGB 12.4  --  12.3  --  13.2   *  --  170  --  193   TBILI  --   --   --   --  0.8   ALB  --   --   --   --  3.7    < > = values in this interval not displayed. Date INR Dose   3/13 1.1 3 mg    3/14 1.1 4 mg    3/15 1.1 5 mg   3/16 1.3 5 mg                    Impression/Plan:   INR subtherapeutic, increasing   Will order warfarin 5 mg PO x 1 dose. Daily INR has been ordered  CBC w/o differential every other day has been ordered     Pharmacy will follow daily and adjust the dose as appropriate.     Thank you,  SCOTT Hoffman

## 2023-03-16 NOTE — PROGRESS NOTES
Cardiology Progress Note  3/16/2023     Admit Date: 3/13/2023  Admit Diagnosis: Bilateral leg weakness [R29.898]  Elevated troponin [R77.8]  CC: none currently  Cardiologist:  Dr Bernice Brennan (last OV w/ me was 6/2021). Cardiac Assessment/Plan:    1) HTN, Resolved in 2018; Off Rx. 2) DORSEY, variable since 2017: Neg MPI 7/2017; Mild-mod TR/mild MR; PAp 48 6/2017.  3) SSS: Bradycardia, since 2017: 3 sec pauses during sleep on holter 2017: ASx, observation. *Nocturnal dyspnea 9/2020: Sx pauses at night: Sx resolved w/ PPM (Medtronic) 10/2020; PET and echo prev rec but Sx resolved. 4) Atypical CP 9/2021 in ER; trop 110s; Med Rx. 5) Seizure d/o (after benign brain tumor resection; meningioma),  6) Osteoporosis. 7) Hypothyroidism,   8) Poor balance, prior fall. Uses cane. 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   12) RLE >>LLE weakness. Admitted w/ CVA Sx; neg head CT w/o contrast; MRI pending. Current cardiac meds: asa; lasix 20. Coumadin. Rec: echo pending; Med Rx rec rather than invasive testing. D/W Jhoan Menard (son) who agrees with medical Rx only (no invasive cardiac testing). Consider NOAC instead of coumadin. Echo 3/14/23:    Left Ventricle: Normal left ventricular systolic function with a visually estimated EF of 60 - 65%. Left ventricle size is normal. Mildly increased wall thickness. Normal wall motion. Right Ventricle: Right ventricle size is normal. Normal systolic function. No agitated-saline contrast images. 3/15: Alert; frustrated w/ soft restraints; No CP/resting dyspnea  BPs 100-120s; HR 60s; sinus; 95% RA  INR 1.1  Current cardiac meds: asa; lasix 20. Coumadin. No new cardiac recs; BP too low for BBlocker/ACEi/ARB. Stable cardiac status.   Dispo per primary team. Coumadin dosing per primary team; consider NOAC.    3/16: No CP/resting dyspnea  VSSAF;   Hg 12.4; Plt 149; INR 1.3    Current cardiac meds: asa; lasix 20. Coumadin. Will add low dose bblocker; watch BPs/HR. Dispo per primary team.  Coumadin dosing per primary team; consider NOAC.  ____________________________________________________________________  Cynthia Rios is a 80 y.o. female presents with Bilateral leg weakness [R29.898]  Elevated troponin [R77.8]. As noted in H&P: \"80 y.o.   female who presents with past medical history of seizure disorder, hypothyroidism, recurrent DVT is coming the hospital chief complaints of bilateral lower extremity weakness. Patient reports being usual travails until about yesterday when she woke up feeling funny in her head and started having bilateral lower extremity weakness but more on the right side. By evening, she was not able to walk and was dragging her feet. She did not report any associated weakness in her hands, facial deviation or slurred speech. Does not report any headache. Does not report any chest pain or shortness of breath. Does not report any recent cough or phlegm. No fever or chills. On arrival to ED, noted to have stable vitals. On labs CBC is normal.  BMP shows a troponin of 388. Creatinine is 1.01. LFTs are normal.  CT head shows chronic microvascular changes with the severe degree of cerebral atrophy. \"     ______________________________________________________________________     The patient is not a reliable historian; she does not know why she is in the hospital; believes it is 2020. Think she is visiting [place]. no current chest pain or dyspnea. No apparent PND, orthopnea, palpitations, pre-syncope, syncope, peripheral edema. No current complaints. ECG 3/13/23: sinus/PACs; LAFB. Tele: sinus; PACs; occ Atrial pacing. CXR: none  Head CT: no acute changes; chronic microvasc dz/  Notable labs: Hg 12.3; plt 170; K 3.6; Cr 0.8; trop 388/427; INR 1.1. ________________________________________________________  Notable prior cardiac history:  @ NP OV 5/10/22:  Ms Sarmiento Patient has a h/o:  1) HTN, Resolved in 2018; Off Rx. 2) DORSEY, variable since 2017: Neg MPI 7/2017; Mild-mod TR/mild MR; PAp 48 6/2017.  3) SSS: Bradycardia, since 2017: 3 sec pauses during sleep on holter 2017: ASx, observation. *Nocturnal dyspnea 9/2020: Sx pauses at night: Sx resolved w/ PPM ([employer]tr[employer]) 10/2020; PET and echo prev rec but Sx resolved. 4) DM (diet controlled),   5) Seizure d/o (after benign brain tumor resection),  6) Osteoporosis. 7) Hypothyroidism,   8) Poor balance, prior fall. Uses cane. 9) Dementia 2022  10) Neg RLE for VI 6/2022.  11) Atypical CP 9/2021 in ER; trop 110s; Med Rx. She has been very active until 5/2017. No added salt nor nicotine. Very rare ethanol. 6/2021:  No chest pain or DORSEY. Nocturnal dyspnea has resolved. Equivocal decreased fatigue but remains quite active. Continues to use a cane. NP OV 5/2022: Ms. Sarmiento Patient presents with her son. She has had some right lower leg swelling. [facility] on 4/17/22 for the same complaint. She had negative duplex. Her previous DVT was in the left leg (popliteal). She was treated with Coumadin because of this. She was started on Lasix 20 last week but does not weigh herself. She elevates her legs but does not like compression stockings. She complains of dizziness from time to time. She does not think its significant. She  denies chest pain suggestive of ischemia, shortness of breath, orthopnea, PND,  palpitations, lightheadedness,  near syncope, and syncope. No TIA or stroke like symptoms. Her son says she has dementia. IMPRESSION AND PLAN  01. Sinus node dysfunction (I49.5): The patient has a permanent pacemaker. Previously symptomatic. Interrogated Medtronic PPM today. AP 70.7%,  <0.1%, 0 episodes since last transmission. normal device function, 12.5 years battery life   02.  Edema (R60.9):  RLE mildly edematous. Negative venous duplex at ED [facility] ED 4/17. On Lasix 20 daily  Compression stockings and elevation Venous Insufficiency study to be done. Unilateral right. Continue Lasix   03. Shortness of breath (R06.02):  Neg MPI and unremarkable echo 7/2017. Resolved; hold on PET/echo for now. We discussed the signs and symptoms of unstable angina, myocardial infarction and malignant arrhythmia. The patient knows to seek immediate medical attention should they occur. 04. Essential (primary) hypertension (I10): On no Rx. Adequately controlled at home diary   05. Atrial flutter (I48.92):  PPM interrogation above   06. Seizure disorder (G40.909): Managed by: Other Physician. 07. Acquired hypothyroidism (E03.9):  Managed by: Other Physician. 08. Body mass index [BMI] 22.0-22.9, adult (F61.54):  weight down   09. Dementia (F03.90): Son brought note with dementia diagnosis and she can no longer manage her affairs. Scanned in chart. 10. DVT of left leg (I82.402):  in 2019, treated with Coumadin      ORDERS:  1 Venous Insufficiency study to be done. 5/10/22 MEDICATION LIST  Medication Sig Desc Non-VCS   biotin 10,000 mcg capsule   Y   Dilantin Extended 100 mg capsule take 1 capsule by oral route 2 times every day Y   Lasix 20 mg tablet take 1 tablet by oral route  every day N   multivitamin tablet take 1 tablet by oral route  every day with food Y   Synthroid 88 mcg tablet take 1 tablet by oral route  every day Y   Tylenol 325 mg tablet take 2 tablet by oral route  every 4 hours as needed as needed Y   Vitamin D3 1,000 unit tablet take 1 by Oral route  every day Y         ________________________________________________  CARDIAC HISTORY  CAD:  1 DORSEY.  [Non-Ischemic Stress, Nl echo.] - 6/2017      ARRHYTHMIA:  1 SSS, ASx, during sleep: observation. - 7/2017      RISK FACTORS:  1 Hypertension         CARDIOVASCULAR PROCEDURES  Procedure Date Results   Noah MPI 07/07/2017 EF 0.77 (77%), No Ischemia   Devices 10/06/2020 Dual Chamber PPM (Medtronic)   Echo 2017 EF 0.65 (85%), No Diastolic Dysfunction, Mild-Moderate TR, Mild MR, Est RVSP 48 mmHg, Normal RV. EKG 2021 Sinus Rhythm, PRWP, otherwise unremarkable. Holter 2020 Sinus Rhythm, HR 34 (sleep w/ 4.8 sec pauses x2)-122, ave 53; PAflutter (ASx) w/ VR 160s; D/W pt; EP OV rec. Holter 2017 Sinus Rhythm, Sinus Maury, No Malignant Arrhythmias, No Sx; HR 42-98, ave 56. Appropriate HR variability. Brief ASx sinus node arrest during sleep (HR to 40s; 3 sec pause with a junctional escape beat). For other plans, see orders. Hospital problem list     Active Hospital Problems    Diagnosis Date Noted    Elevated troponin 2023    Bilateral leg weakness 2023        Subjective: Morgan Aus reports       Chest pain X none  consistent with:  Non-cardiac CP         Atypical CP     None now  On going  Anginal CP     Dyspnea X none  at rest  with exertion         improved  unchanged  worse              PND X none  overnight       Orthopnea X none  improved  unchanged  worse   Presyncope X none  improved  unchanged  worse     Ambulated in hallway without symptoms   Yes   Ambulated in room without symptoms  Yes   Objective:     Physical Exam:  Overall VSSAF;  Visit Vitals  BP (!) 128/27 (BP 1 Location: Right upper arm)   Pulse 66   Temp 97.9 °F (36.6 °C)   Resp 18   Ht 5' 4.02\" (1.626 m)   Wt 54 kg (119 lb)   SpO2 96%   BMI 20.42 kg/m²     Temp (24hrs), Av °F (36.7 °C), Min:97.9 °F (36.6 °C), Max:98.2 °F (36.8 °C)    Patient Vitals for the past 8 hrs:   Pulse   23 0830 66   23 0800 64       Patient Vitals for the past 8 hrs:   Resp   23 0830 18       Patient Vitals for the past 8 hrs:   BP   23 0830 (!) 128/27       No intake or output data in the 24 hours ending 23 1032  General Appearance: Well developed, elderly, no acute distress.    Ears/Nose/Mouth/Throat:   Normal MM; anicteric. JVP: WNL   Resp:   Lungs clear to auscultation ant. Nl resp effort. Cardiovascular:  RRR, S1, S2 normal, no new murmur. No gallop or rub. Abdomen:   Soft, non-tender, bowel sounds are present. Extremities: No edema bilaterally. Skin:  Neuro: Warm and dry. A/dementia       cath site intact w/o hematoma or new bruit; distal pulse unchanged  Yes   Data Review:     Telemetry independently reviewed x sinus  chronic afib  parox afib  NSVT     ECG independently reviewed  NSR  afib  no significant changes  NSST-Tw chgs     x no new ECG provided for review   Lab results reviewed as noted below. Current medications reviewed as noted below. No results for input(s): PH, PCO2, PO2 in the last 72 hours. No results for input(s): CPK, CKMB, CKNDX, TROIQ in the last 72 hours.   Recent Labs     03/16/23 0248 03/14/23 0255 03/13/23  1407   NA  --  139 143   K  --  3.6 3.8   CL  --  108 110*   CO2  --  28 27   BUN  --  22* 24*   CREA  --  0.84 1.01   GLU  --  99 107*   CA  --  9.2 9.8   ALB  --   --  3.7   WBC 6.2 5.9 5.7   HGB 12.4 12.3 13.2   HCT 37.5 37.3 39.9   * 170 193       Lab Results   Component Value Date/Time    Cholesterol, total 226 (H) 03/14/2023 02:55 AM    HDL Cholesterol 108 03/14/2023 02:55 AM    LDL, calculated 96 03/14/2023 02:55 AM    Triglyceride 110 03/14/2023 02:55 AM    CHOL/HDL Ratio 2.1 03/14/2023 02:55 AM     Recent Labs     03/13/23  1407   AP 71   TP 7.3   ALB 3.7   GLOB 3.6       Recent Labs     03/16/23  0248 03/15/23  0241 03/14/23  0255   INR 1.3* 1.1 1.1   PTP 13.0* 11.0 11.1        No components found for: GLPOC    Current Facility-Administered Medications   Medication Dose Route Frequency    LORazepam (ATIVAN) injection 0.5 mg  0.5 mg IntraVENous ONCE PRN    haloperidol lactate (HALDOL) injection 1 mg  1 mg IntraVENous Q8H PRN    levothyroxine (SYNTHROID) tablet 88 mcg  88 mcg Oral 6am    balsam peru-castor oiL (VENELEX) ointment   Topical BID    furosemide (LASIX) tablet 20 mg  20 mg Oral DAILY    phenytoin ER (DILANTIN ER) ER capsule 200 mg  200 mg Oral BID    acetaminophen (TYLENOL) tablet 650 mg  650 mg Oral Q4H PRN    Or    acetaminophen (TYLENOL) solution 650 mg  650 mg Per NG tube Q4H PRN    Or    acetaminophen (TYLENOL) suppository 650 mg  650 mg Rectal Q4H PRN    aspirin chewable tablet 81 mg  81 mg Oral DAILY    labetaloL (NORMODYNE;TRANDATE) injection 5 mg  5 mg IntraVENous Q10MIN PRN    polyethylene glycol (MIRALAX) packet 17 g  17 g Oral DAILY PRN    WARFARIN INFORMATION NOTE (COUMADIN)   Other QPM    traMADoL (ULTRAM) tablet 50 mg  50 mg Oral Q6H PRN    hydrALAZINE (APRESOLINE) 20 mg/mL injection 10 mg  10 mg IntraVENous QID PRN    hydrOXYzine HCL (ATARAX) tablet 25 mg  25 mg Oral TID PRN    melatonin tablet 1.5 mg  1.5 mg Oral QHS PRN        Dianne Louise MD

## 2023-03-16 NOTE — PROGRESS NOTES
Speech Pathology Note    Chart reviewed and spoke with RN. Note patient evaluated on 3/15 revealing presumed baseline oropharyngeal swallow function. Regular/Thin Liquid diet was recommended. Note MRI still pending r/t pacemaker settings. RN denied further SLP needs at this time. Will complete orders. Please re-consult if needs arise. Thank you.     Vianney Ramon M.S., CCC-SLP

## 2023-03-16 NOTE — PROGRESS NOTES
Hospitalist Progress Note    NAME: Michelle Ramey   :  1932   MRN:  [id number]       Assessment / Plan:    Bilateral lower extremity weakness right greater than left rule out CVA versus other causes  -presented with weakness on the right side is worse than left but not able to lift both legs against gravity  -MRI of the brain and ultrasound carotids ordered on admission, pending.  -Echo with normal EF and no PFO reported  -MRI cervical  spine pending, was ordered on admission  -If no clear cause found, consider lumbar puncture  -PT OT eval when able  -On admission started on aspirin 81 mg daily patient is already on Coumadin at home but INR is subtherapeutic. Warfarin need monitoring by INR    Troponin elevation likely demand ischemia versus type II non-STEMI  -Troponin around 380 on admission  - Patient is chest pain-free  - No acute ischemic changes on EKG  -Echo with normal EF no PFO reported  -Cardiology input is appreciated, recommendation for conservative management    Seizure disorder  Hypothyroidism  History of recurrent DVTs  History of meningioma  -Continue phenytoin, levothyroxine  -Consult pharmacy for warfarin. INR 1.1        Code Status: Full code  Surrogate Decision Maker: Child [place]     DVT Prophylaxis: Warfarin  GI Prophylaxis: not indicated    18.5 - 24.9 Normal weight / Body mass index is 20.42 kg/m². Estimated discharge date:   Barriers: brain MRI,      Subjective:     Patient was seen and examined. No acute events overnight. Review of Systems:  Symptom Y/N Comments  Symptom Y/N Comments   Fever/Chills n   Chest Pain n    Poor Appetite    Edema     Cough n   Abdominal Pain n    Sputum    Joint Pain     SOB/DORSEY n   Pruritis/Rash     Nausea/vomit n   Tolerating PT/OT     Diarrhea    Tolerating Diet y    Constipation    Other       Could NOT obtain due to:          Objective:     VITALS:   Last 24hrs VS reviewed since prior progress note.  Most recent are:  Patient Vitals for the past 24 hrs:   Temp Pulse Resp BP SpO2   03/16/23 0830 97.9 °F (36.6 °C) 66 18 128/87 96 %   03/16/23 0800 -- 64 -- -- --   03/16/23 0228 97.9 °F (36.6 °C) 77 17 (!) 141/73 96 %   03/15/23 1938 98.1 °F (36.7 °C) 81 17 126/60 96 %   03/15/23 1600 -- 72 -- -- --   03/15/23 1537 98.1 °F (36.7 °C) (!) 56 18 121/69 97 %   03/15/23 1430 -- 62 -- (!) 118/93 98 %   03/15/23 1418 -- 63 -- (!) 144/75 98 %   03/15/23 1200 -- 78 -- -- --   03/15/23 1138 98.2 °F (36.8 °C) 65 18 (!) 135/59 97 %       No intake or output data in the 24 hours ending 03/16/23 1113     I had a face to face encounter and independently examined this patient on 3/16/2023, as outlined below:  PHYSICAL EXAM:  General: WD, WN. Alert, cooperative, no acute distress    EENT:  EOMI. Anicteric sclerae. MMM  Resp:  CTA bilaterally, no wheezing or rales. No accessory muscle use  CV:  Regular  rhythm,  No edema  GI:  Soft, Non distended, Non tender. +Bowel sounds  Neurologic:  EOMs intact. No facial asymmetry. No aphasia or slurred speech. strength 5/5 all over except in the right lower extremity 3/5, Sensation grossly intact. Alert and oriented X 4.     Psych:   Good insight. Not anxious nor agitated  Skin:  No rashes. No jaundice    Reviewed most current lab test results and cultures  YES  Reviewed most current radiology test results   YES  Review and summation of old records today    NO  Reviewed patient's current orders and MAR    YES  PMH/SH reviewed - no change compared to H&P  ________________________________________________________________________  Care Plan discussed with:    Comments   Patient X    Family      RN X    Care Manager X    Consultant                       X Multidiciplinary team rounds were held today with , nursing, pharmacist and clinical coordinator. Patient's plan of care was discussed; medications were reviewed and discharge planning was addressed. ________________________________________________________________________        Comments   >50% of visit spent in counseling and coordination of care X    ________________________________________________________________________  Gonzalo Maddox MD     Procedures: see electronic medical records for all procedures/Xrays and details which were not copied into this note but were reviewed prior to creation of Plan. LABS:  I reviewed today's most current labs and imaging studies. Pertinent labs include:  Recent Labs     03/16/23  0248 03/14/23  0255 03/13/23  1407   WBC 6.2 5.9 5.7   HGB 12.4 12.3 13.2   HCT 37.5 37.3 39.9   * 170 193       Recent Labs     03/16/23  0248 03/15/23  0241 03/14/23  0255 03/13/23  1450 03/13/23  1407   NA  --   --  139  --  143   K  --   --  3.6  --  3.8   CL  --   --  108  --  110*   CO2  --   --  28  --  27   GLU  --   --  99  --  107*   BUN  --   --  22*  --  24*   CREA  --   --  0.84  --  1.01   CA  --   --  9.2  --  9.8   MG  --   --   --   --  2.3   ALB  --   --   --   --  3.7   TBILI  --   --   --   --  0.8   ALT  --   --   --   --  27   INR 1.3* 1.1 1.1   < >  --     < > = values in this interval not displayed. Signed:  Gonzalo Maddox MD

## 2023-03-17 ENCOUNTER — APPOINTMENT (OUTPATIENT)
Dept: MRI IMAGING | Age: 88
End: 2023-03-17
Attending: INTERNAL MEDICINE
Payer: MEDICARE

## 2023-03-17 ENCOUNTER — APPOINTMENT (OUTPATIENT)
Dept: MRI IMAGING | Age: 88
End: 2023-03-17
Attending: PSYCHIATRY & NEUROLOGY
Payer: MEDICARE

## 2023-03-17 LAB
INR PPP: 2.1 (ref 0.9–1.1)
PHENYTOIN SERPL-MCNC: 12 UG/ML (ref 10–20)
PROTHROMBIN TIME: 21.1 SEC (ref 9–11.1)

## 2023-03-17 PROCEDURE — 72158 MRI LUMBAR SPINE W/O & W/DYE: CPT

## 2023-03-17 PROCEDURE — 36415 COLL VENOUS BLD VENIPUNCTURE: CPT

## 2023-03-17 PROCEDURE — 70547 MR ANGIOGRAPHY NECK W/O DYE: CPT

## 2023-03-17 PROCEDURE — 74011250636 HC RX REV CODE- 250/636: Performed by: INTERNAL MEDICINE

## 2023-03-17 PROCEDURE — 80185 ASSAY OF PHENYTOIN TOTAL: CPT

## 2023-03-17 PROCEDURE — 74011250637 HC RX REV CODE- 250/637: Performed by: INTERNAL MEDICINE

## 2023-03-17 PROCEDURE — 70544 MR ANGIOGRAPHY HEAD W/O DYE: CPT

## 2023-03-17 PROCEDURE — 74011250637 HC RX REV CODE- 250/637: Performed by: STUDENT IN AN ORGANIZED HEALTH CARE EDUCATION/TRAINING PROGRAM

## 2023-03-17 PROCEDURE — 65270000046 HC RM TELEMETRY

## 2023-03-17 PROCEDURE — A9576 INJ PROHANCE MULTIPACK: HCPCS | Performed by: INTERNAL MEDICINE

## 2023-03-17 PROCEDURE — 85610 PROTHROMBIN TIME: CPT

## 2023-03-17 PROCEDURE — 70551 MRI BRAIN STEM W/O DYE: CPT

## 2023-03-17 RX ORDER — LORAZEPAM 2 MG/ML
0.5 INJECTION INTRAMUSCULAR ONCE
Status: COMPLETED | OUTPATIENT
Start: 2023-03-17 | End: 2023-03-17

## 2023-03-17 RX ORDER — WARFARIN 2 MG/1
2 TABLET ORAL ONCE
Status: COMPLETED | OUTPATIENT
Start: 2023-03-17 | End: 2023-03-17

## 2023-03-17 RX ADMIN — GADOTERIDOL 12 ML: 279.3 INJECTION, SOLUTION INTRAVENOUS at 15:31

## 2023-03-17 RX ADMIN — FUROSEMIDE 20 MG: 20 TABLET ORAL at 09:16

## 2023-03-17 RX ADMIN — ASPIRIN 81 MG: 81 TABLET, CHEWABLE ORAL at 09:16

## 2023-03-17 RX ADMIN — PHENYTOIN SODIUM 200 MG: 100 CAPSULE ORAL at 09:17

## 2023-03-17 RX ADMIN — WARFARIN SODIUM 2 MG: 2 TABLET ORAL at 17:37

## 2023-03-17 RX ADMIN — LEVOTHYROXINE SODIUM 88 MCG: 0.09 TABLET ORAL at 05:35

## 2023-03-17 RX ADMIN — CASTOR OIL AND BALSAM, PERU: 788; 87 OINTMENT TOPICAL at 09:17

## 2023-03-17 RX ADMIN — HYDROXYZINE HYDROCHLORIDE 25 MG: 10 TABLET, FILM COATED ORAL at 20:22

## 2023-03-17 RX ADMIN — METOPROLOL SUCCINATE 12.5 MG: 25 TABLET, EXTENDED RELEASE ORAL at 09:16

## 2023-03-17 RX ADMIN — LORAZEPAM 0.5 MG: 2 INJECTION INTRAMUSCULAR; INTRAVENOUS at 14:08

## 2023-03-17 RX ADMIN — CASTOR OIL AND BALSAM, PERU: 788; 87 OINTMENT TOPICAL at 20:18

## 2023-03-17 RX ADMIN — PHENYTOIN SODIUM 200 MG: 100 CAPSULE ORAL at 17:37

## 2023-03-17 RX ADMIN — HALOPERIDOL LACTATE 1 MG: 5 INJECTION, SOLUTION INTRAMUSCULAR at 22:53

## 2023-03-17 NOTE — PROGRESS NOTES
Problem: Falls - Risk of  Goal: *Absence of Falls  Description: Document Dawn Elliot Fall Risk and appropriate interventions in the flowsheet. Outcome: Progressing Towards Goal  Note: Fall Risk Interventions:  Mobility Interventions: Bed/chair exit alarm, PT Consult for mobility concerns    Mentation Interventions: Bed/chair exit alarm, Door open when patient unattended, Room close to nurse's station    Medication Interventions: Bed/chair exit alarm    Elimination Interventions: Bed/chair exit alarm, Call light in reach    History of Falls Interventions: Bed/chair exit alarm, Room close to nurse's station         Problem: Patient Education: Go to Patient Education Activity  Goal: Patient/Family Education  Outcome: Progressing Towards Goal     Problem: Pressure Injury - Risk of  Goal: *Prevention of pressure injury  Description: Document Jan Scale and appropriate interventions in the flowsheet.   Outcome: Progressing Towards Goal  Note: Pressure Injury Interventions:  Sensory Interventions: Assess changes in LOC, Discuss PT/OT consult with provider, Float heels, Keep linens dry and wrinkle-free, Maintain/enhance activity level, Minimize linen layers, Use 30-degree side-lying position    Moisture Interventions: Limit adult briefs, Absorbent underpads, Moisture barrier, Minimize layers, Maintain skin hydration (lotion/cream), Internal/External urinary devices, Check for incontinence Q2 hours and as needed    Activity Interventions: PT/OT evaluation    Mobility Interventions: HOB 30 degrees or less, Float heels, PT/OT evaluation, Pressure redistribution bed/mattress (bed type)    Nutrition Interventions: Document food/fluid/supplement intake    Friction and Shear Interventions: HOB 30 degrees or less, Apply protective barrier, creams and emollients                Problem: Patient Education: Go to Patient Education Activity  Goal: Patient/Family Education  Outcome: Progressing Towards Goal     Problem: Patient Education: Go to Patient Education Activity  Goal: Patient/Family Education  Outcome: Progressing Towards Goal     Problem: TIA/CVA Stroke: 0-24 hours  Goal: Off Pathway (Use only if patient is Off Pathway)  Outcome: Progressing Towards Goal  Goal: Activity/Safety  Outcome: Progressing Towards Goal  Goal: Consults, if ordered  Outcome: Progressing Towards Goal  Goal: Diagnostic Test/Procedures  Outcome: Progressing Towards Goal  Goal: Nutrition/Diet  Outcome: Progressing Towards Goal  Goal: Discharge Planning  Outcome: Progressing Towards Goal  Goal: Medications  Outcome: Progressing Towards Goal  Goal: Respiratory  Outcome: Progressing Towards Goal  Goal: Treatments/Interventions/Procedures  Outcome: Progressing Towards Goal  Goal: Minimize risk of bleeding post-thrombolytic infusion  Outcome: Progressing Towards Goal  Goal: Monitor for complications post-thrombolytic infusion  Outcome: Progressing Towards Goal  Goal: Psychosocial  Outcome: Progressing Towards Goal  Goal: *Hemodynamically stable  Outcome: Progressing Towards Goal  Goal: *Neurologically stable  Description: Absence of additional neurological deficits    Outcome: Progressing Towards Goal  Goal: *Verbalizes anxiety and depression are reduced or absent  Outcome: Progressing Towards Goal  Goal: *Absence of Signs of Aspiration on Current Diet  Outcome: Progressing Towards Goal  Goal: *Absence of deep venous thrombosis signs and symptoms(Stroke Metric)  Outcome: Progressing Towards Goal  Goal: *Ability to perform ADLs and demonstrates progressive mobility and function  Outcome: Progressing Towards Goal  Goal: *Stroke education started(Stroke Metric)  Outcome: Progressing Towards Goal  Goal: *Dysphagia screen performed(Stroke Metric)  Outcome: Progressing Towards Goal  Goal: *Rehab consulted(Stroke Metric)  Outcome: Progressing Towards Goal     Problem: TIA/CVA Stroke: Day 2 Until Discharge  Goal: Off Pathway (Use only if patient is Off Pathway)  Outcome: Progressing Towards Goal  Goal: Activity/Safety  Outcome: Progressing Towards Goal  Goal: Diagnostic Test/Procedures  Outcome: Progressing Towards Goal  Goal: Nutrition/Diet  Outcome: Progressing Towards Goal  Goal: Discharge Planning  Outcome: Progressing Towards Goal  Goal: Medications  Outcome: Progressing Towards Goal  Goal: Respiratory  Outcome: Progressing Towards Goal  Goal: Treatments/Interventions/Procedures  Outcome: Progressing Towards Goal  Goal: Psychosocial  Outcome: Progressing Towards Goal  Goal: *Verbalizes anxiety and depression are reduced or absent  Outcome: Progressing Towards Goal  Goal: *Absence of aspiration  Outcome: Progressing Towards Goal  Goal: *Absence of deep venous thrombosis signs and symptoms(Stroke Metric)  Outcome: Progressing Towards Goal  Goal: *Optimal pain control at patient's stated goal  Outcome: Progressing Towards Goal  Goal: *Tolerating diet  Outcome: Progressing Towards Goal  Goal: *Ability to perform ADLs and demonstrates progressive mobility and function  Outcome: Progressing Towards Goal  Goal: *Stroke education continued(Stroke Metric)  Outcome: Progressing Towards Goal     Problem: Ischemic Stroke: Discharge Outcomes  Goal: *Verbalizes anxiety and depression are reduced or absent  Outcome: Progressing Towards Goal  Goal: *Verbalize understanding of risk factor modification(Stroke Metric)  Outcome: Progressing Towards Goal  Goal: *Hemodynamically stable  Outcome: Progressing Towards Goal  Goal: *Absence of aspiration pneumonia  Outcome: Progressing Towards Goal  Goal: *Aware of needed dietary changes  Outcome: Progressing Towards Goal  Goal: *Verbalize understanding of prescribed medications including anti-coagulants, anti-lipid, and/or anti-platelets(Stroke Metric)  Outcome: Progressing Towards Goal  Goal: *Tolerating diet  Outcome: Progressing Towards Goal  Goal: *Aware of follow-up diagnostics related to anticoagulants  Outcome: Progressing Towards Goal  Goal: *Ability to perform ADLs and demonstrates progressive mobility and function  Outcome: Progressing Towards Goal  Goal: *Absence of DVT(Stroke Metric)  Outcome: Progressing Towards Goal  Goal: *Absence of aspiration  Outcome: Progressing Towards Goal  Goal: *Optimal pain control at patient's stated goal  Outcome: Progressing Towards Goal  Goal: *Home safety concerns addressed  Outcome: Progressing Towards Goal  Goal: *Describes available resources and support systems  Outcome: Progressing Towards Goal  Goal: *Verbalizes understanding of activation of EMS(911) for stroke symptoms(Stroke Metric)  Outcome: Progressing Towards Goal  Goal: *Understands and describes signs and symptoms to report to providers(Stroke Metric)  Outcome: Progressing Towards Goal  Goal: *Neurolgocially stable (absence of additional neurological deficits)  Outcome: Progressing Towards Goal  Goal: *Verbalizes importance of follow-up with primary care physician(Stroke Metric)  Outcome: Progressing Towards Goal  Goal: *Smoking cessation discussed,if applicable(Stroke Metric)  Outcome: Progressing Towards Goal  Goal: *Depression screening completed(Stroke Metric)  Outcome: Progressing Towards Goal     Problem: Patient Education: Go to Patient Education Activity  Goal: Patient/Family Education  Outcome: Progressing Towards Goal     Problem: Patient Education: Go to Patient Education Activity  Goal: Patient/Family Education  Outcome: Progressing Towards Goal

## 2023-03-17 NOTE — PROGRESS NOTES
End of Shift Note    Bedside shift change report given to Mahad Lloyd (oncoming nurse) by Delaney Zhao RN (offgoing nurse). Report included the following information SBAR and Kard[employer]    Shift worked:  7a-7p     Shift summary and any significant changes:     MRI done       Concerns for physician to address:     [employer] phone for oncoming shift:   [phone]     Patient Information  Sam Avilez  80 y.o.  3/13/2023  1:16 PM by Yun Niño MD. Sam Avilez was admitted from Home    Problem List  Patient Active Problem List    Diagnosis Date Noted    Closed left subtrochanteric femur fracture, initial encounter ([address] 01/15/2019    Elevated troponin 03/13/2023    Bilateral leg weakness 03/13/2023    Pacemaker 10/06/2020    Bilateral carotid artery stenosis 02/28/2020    Localization-related focal epilepsy with complex partial seizures ([address] 02/28/2020    Cerebral microvascular disease 02/28/2020    GI bleed due to NSAIDs 06/02/2019    GI bleed 06/01/2019    Diabetes ([address] 01/13/2019    Femur fracture, left ([address] 01/13/2019    Complex partial seizure 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 seizure ([address] 04/21/2017    Closed right hip fracture ([address] 08/17/2016    Lumbar post-laminectomy syndrome 05/11/2015    Hypothyroid 05/11/2015    H/O meningioma of the brain, left brain meningioma 05/11/2015     Past Medical History:   Diagnosis Date    Arthritis     Cancer ([address] 2002    brain tumor, benign (meningioma)    Diabetes ([address]     questionable?     Gastrointestinal disorder     diverticulitis    Hypothyroid 5/11/2015    Localization-related (focal) (partial) epilepsy and epileptic syndromes with complex partial seizures, without mention of intractable epilepsy 5/11/2015    Other ill-defined conditions(799.89)     loss of vision in left eye    Seizures ([address] 2002    caused by brain tumor    Thromboembolus [facility])     Thyroid disease        Core Measures:  CVA: No No  CHF:No No  PNA:No No    Activity:  Activity Level: Bed Rest  Number times ambulated in hallways past shift: 0  Number of times OOB to chair past shift: 0    Cardiac:   Cardiac Monitoring: Yes      Cardiac Rhythm: Atrial Paced    Access:   Current line(s): PIV   Central Line? No   PICC LINE? No     Genitourinary:   Urinary status: voiding and incontinent  Urinary Catheter? no    Respiratory:   O2 Device: None (Room air)  Chronic home O2 use?: NO  Incentive spirometer at bedside: NO       GI:  Last Bowel Movement Date: 03/17/23  Current diet:  ADULT DIET Regular  Passing flatus: YES  Tolerating current diet: YES       Pain Management:   Patient states pain is manageable on current regimen: YES    Skin:  Jan Score: 15  Interventions: increase time out of bed, PT/OT consult, and limit briefs    Patient Safety:  Fall Score: Total Score: 5  Interventions: bed/chair alarm, gripper socks, and pt to call before getting OOB  High Fall Risk: Yes  @Rollbelt  @dexterity to release roll belt  Yes/No ( must document dexterity  here by stating Yes or No here, otherwise this is a restraint and must follow restraint documentation policy.)    DVT prophylaxis:  DVT prophylaxis Med- Yes  DVT prophylaxis SCD or CAMRON- No     Wounds: (If Applicable)  Wounds- Yes  Location buttocks    Active Consults:  IP CONSULT TO NEUROLOGY  IP CONSULT TO CARDIOLOGY    Length of Stay:  Expected LOS: 2d 21h  Actual LOS: 4  Discharge Plan:  Yes       Richard Monson RN

## 2023-03-17 NOTE — PROGRESS NOTES
Cardiology Progress Note  3/17/2023     Admit Date: 3/13/2023  Admit Diagnosis: Bilateral leg weakness [R29.898]  Elevated troponin [R77.8]  CC: none currently  Cardiologist:  Dr Lydia Lemon (last OV w/ me was 6/2021). Cardiac Assessment/Plan:    1) HTN, Resolved in 2018; Off Rx. 2) DORSEY, variable since 2017: Neg MPI 7/2017; Mild-mod TR/mild MR; PAp 48 6/2017.  3) SSS: Bradycardia, since 2017: 3 sec pauses during sleep on holter 2017: ASx, observation. *Nocturnal dyspnea 9/2020: Sx pauses at night: Sx resolved w/ PPM (Medtronic) 10/2020; PET and echo prev rec but Sx resolved. 4) Atypical CP 9/2021 in ER; trop 110s; Med Rx. 5) Seizure d/o (after benign brain tumor resection; meningioma),  6) Osteoporosis. 7) Hypothyroidism,   8) Poor balance, prior fall. Uses cane. 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   12) RLE >>LLE weakness. Admitted w/ CVA Sx; neg head CT w/o contrast; MRI pending. Current cardiac meds: asa; lasix 20. Coumadin. Rec: echo pending; Med Rx rec rather than invasive testing. D/W Wai Martin (son) who agrees with medical Rx only (no invasive cardiac testing). Consider NOAC instead of coumadin. Echo 3/14/23:    Left Ventricle: Normal left ventricular systolic function with a visually estimated EF of 60 - 65%. Left ventricle size is normal. Mildly increased wall thickness. Normal wall motion. Right Ventricle: Right ventricle size is normal. Normal systolic function. No agitated-saline contrast images. 3/15: Alert; frustrated w/ soft restraints; No CP/resting dyspnea  BPs 100-120s; HR 60s; sinus; 95% RA  INR 1.1  Current cardiac meds: asa; lasix 20. Coumadin. No new cardiac recs; BP too low for BBlocker/ACEi/ARB. Stable cardiac status.   Dispo per primary team. Coumadin dosing per primary team; consider NOAC.    3/16: No CP/resting dyspnea  VSSAF;   Hg 12.4; Plt 149; INR 1.3  Current cardiac meds: asa; lasix 20. Coumadin. Will add low dose bblocker; watch BPs/HR. Dispo per primary team.  Coumadin dosing per primary team; consider NOAC. 3/17: No CP/resting dyspnea; c/o being in bed. VSSAF; 96% RA. INR 2.1    Current cardiac meds: asa; lasix 20. Toprol XL 21.5 qday; Coumadin. Coumadin dosing per primary team; consider NOAC. BP too low for ACEI/ARB. Will sign-off: stable cardiac status; Can have routine F/U with me after d/c. Please call if questions/issues. Thanks. Dispo per primary team.    ____________________________________________________________________  Kevin Israel is a 80 y.o. female presents with Bilateral leg weakness [R29.898]  Elevated troponin [R77.8]. As noted in H&P: \"80 y.o.   female who presents with past medical history of seizure disorder, hypothyroidism, recurrent DVT is coming the hospital chief complaints of bilateral lower extremity weakness. Patient reports being usual travails until about yesterday when she woke up feeling funny in her head and started having bilateral lower extremity weakness but more on the right side. By evening, she was not able to walk and was dragging her feet. She did not report any associated weakness in her hands, facial deviation or slurred speech. Does not report any headache. Does not report any chest pain or shortness of breath. Does not report any recent cough or phlegm. No fever or chills. On arrival to ED, noted to have stable vitals. On labs CBC is normal.  BMP shows a troponin of 388. Creatinine is 1.01. LFTs are normal.  CT head shows chronic microvascular changes with the severe degree of cerebral atrophy. \"     ______________________________________________________________________     The patient is not a reliable historian; she does not know why she is in the hospital; believes it is 2020. Think she is visiting [place].   no current chest pain or dyspnea. No apparent PND, orthopnea, palpitations, pre-syncope, syncope, peripheral edema. No current complaints. ECG 3/13/23: sinus/PACs; LAFB. Tele: sinus; PACs; occ Atrial pacing. CXR: none  Head CT: no acute changes; chronic microvasc dz/  Notable labs: Hg 12.3; plt 170; K 3.6; Cr 0.8; trop 388/427; INR 1.1.   ________________________________________________________  Notable prior cardiac history:  @ NP OV 5/10/22:  Ms Oriana Dobbs has a h/o:  1) HTN, Resolved in 2018; Off Rx. 2) DORSEY, variable since 2017: Neg MPI 7/2017; Mild-mod TR/mild MR; PAp 48 6/2017.  3) SSS: Bradycardia, since 2017: 3 sec pauses during sleep on holter 2017: ASx, observation. *Nocturnal dyspnea 9/2020: Sx pauses at night: Sx resolved w/ PPM (Medtronic) 10/2020; PET and echo prev rec but Sx resolved. 4) DM (diet controlled),   5) Seizure d/o (after benign brain tumor resection),  6) Osteoporosis. 7) Hypothyroidism,   8) Poor balance, prior fall. Uses cane. 9) Dementia 2022  10) Neg RLE for VI 6/2022.  11) Atypical CP 9/2021 in ER; trop 110s; Med Rx. She has been very active until 5/2017. No added salt nor nicotine. Very rare ethanol. 6/2021:  No chest pain or DORSEY. Nocturnal dyspnea has resolved. Equivocal decreased fatigue but remains quite active. Continues to use a cane. NP OV 5/2022: Ms. Oriana Dobbs presents with her son. She has had some right lower leg swelling. She went to the [id number] [facility] ED on 4/17/22 for the same complaint. She had negative duplex. Her previous DVT was in the left leg (popliteal). She was treated with Coumadin because of this. She was started on Lasix 20 last week but does not weigh herself. She elevates her legs but does not like compression stockings. She complains of dizziness from time to time. She does not think its significant. She  denies chest pain suggestive of ischemia, shortness of breath, orthopnea, PND,  palpitations, lightheadedness,  near syncope, and syncope.   No TIA or stroke like symptoms. Her son says she has dementia. IMPRESSION AND PLAN  01. Sinus node dysfunction (I49.5): The patient has a permanent pacemaker. Previously symptomatic. Interrogated Medtronic PPM today. AP 70.7%,  <0.1%, 0 episodes since last transmission. normal device function, 12.5 years battery life   02. Edema (R60.9):  RLE mildly edematous. Negative venous duplex at [facility] ED 4/17. On Lasix 20 daily  Compression stockings and elevation Venous Insufficiency study to be done. Unilateral right. Continue Lasix   03. Shortness of breath (R06.02):  Neg MPI and unremarkable echo 7/2017. Resolved; hold on PET/echo for now. We discussed the signs and symptoms of unstable angina, myocardial infarction and malignant arrhythmia. The patient knows to seek immediate medical attention should they occur. 04. Essential (primary) hypertension (I10): On no Rx. Adequately controlled at home diary   05. Atrial flutter (I48.92):  PPM interrogation above   06. Seizure disorder (G40.909): Managed by: Other Physician. 07. Acquired hypothyroidism (E03.9):  Managed by: Other Physician. 08. Body mass index [BMI] 22.0-22.9, adult (J83.56):  weight down   09. Dementia (F03.90): Son brought note with dementia diagnosis and she can no longer manage her affairs. Scanned in chart. 10. DVT of left leg (I82.402):  in 2019, treated with Coumadin      ORDERS:  1 Venous Insufficiency study to be done.            5/10/22 MEDICATION LIST  Medication Sig Desc Non-VCS   biotin 10,000 mcg capsule   Y   Dilantin Extended 100 mg capsule take 1 capsule by oral route 2 times every day Y   Lasix 20 mg tablet take 1 tablet by oral route  every day N   multivitamin tablet take 1 tablet by oral route  every day with food Y   Synthroid 88 mcg tablet take 1 tablet by oral route  every day Y   Tylenol 325 mg tablet take 2 tablet by oral route  every 4 hours as needed as needed Y   Vitamin D3 1,000 unit tablet take 1 by Oral route  every day Y         ________________________________________________  CARDIAC HISTORY  CAD:  1 DORSEY. [Non-Ischemic Stress, Nl echo.] - 2017      ARRHYTHMIA:  1 SSS, ASx, during sleep: observation. - 2017      RISK FACTORS:  1 Hypertension         CARDIOVASCULAR PROCEDURES  Procedure Date Results   Noah MPI 2017 EF 0.77 (77%), No Ischemia   Devices 10/06/2020 Dual Chamber PPM (Medtronic)   Echo 2017 EF 0.65 (91%), No Diastolic Dysfunction, Mild-Moderate TR, Mild MR, Est RVSP 48 mmHg, Normal RV. EKG 2021 Sinus Rhythm, PRWP, otherwise unremarkable. Holter 2020 Sinus Rhythm, HR 34 (sleep w/ 4.8 sec pauses x2)-122, ave 53; PAflutter (ASx) w/ VR 160s; D/W pt; EP OV rec. Holter 2017 Sinus Rhythm, Sinus Maury, No Malignant Arrhythmias, No Sx; HR 42-98, ave 56. Appropriate HR variability. Brief ASx sinus node arrest during sleep (HR to 40s; 3 sec pause with a junctional escape beat). For other plans, see orders.   Hospital problem list     Active Hospital Problems    Diagnosis Date Noted    Elevated troponin 2023    Bilateral leg weakness 2023        Subjective: Noah Velez reports       Chest pain X none  consistent with:  Non-cardiac CP         Atypical CP     None now  On going  Anginal CP     Dyspnea X none  at rest  with exertion         improved  unchanged  worse              PND X none  overnight       Orthopnea X none  improved  unchanged  worse   Presyncope X none  improved  unchanged  worse     Ambulated in hallway without symptoms   Yes   Ambulated in room without symptoms  Yes   Objective:     Physical Exam:  Overall VSSAF;  Visit Vitals  BP (!) 134/58 (BP 1 Location: Left upper arm, BP Patient Position: Supine)   Pulse 69   Temp 97.7 °F (36.5 °C)   Resp 18   Ht 5' 4.02\" (1.626 m)   Wt 54 kg (119 lb)   SpO2 96%   BMI 20.42 kg/m²     Temp (24hrs), Av.9 °F (36.6 °C), Min:97.3 °F (36.3 °C), Max:98.4 °F (36.9 °C)    Patient Vitals for the past 8 hrs:   Pulse 03/17/23 0807 69   03/17/23 0440 66   03/17/23 0352 (!) 40       Patient Vitals for the past 8 hrs:   Resp   03/17/23 0807 18   03/17/23 0440 14   03/17/23 0352 16       Patient Vitals for the past 8 hrs:   BP   03/17/23 0807 (!) 134/58   03/17/23 0440 (!) 114/45   03/17/23 0352 (!) 106/40         Intake/Output Summary (Last 24 hours) at 3/17/2023 0838  Last data filed at 3/16/2023 1151  Gross per 24 hour   Intake --   Output 450 ml   Net -450 ml     General Appearance: Well developed, elderly, no acute distress. Ears/Nose/Mouth/Throat:   Normal MM; anicteric. JVP: WNL   Resp:   Lungs clear to auscultation ant. Nl resp effort. Cardiovascular:  RRR, S1, S2 normal, no new murmur. No gallop or rub. Abdomen:   Soft, non-tender, bowel sounds are present. Extremities: No edema bilaterally. Skin:  Neuro: Warm and dry. A/dementia       cath site intact w/o hematoma or new bruit; distal pulse unchanged  Yes   Data Review:     Telemetry independently reviewed x sinus  chronic afib  parox afib  NSVT     ECG independently reviewed  NSR  afib  no significant changes  NSST-Tw chgs     x no new ECG provided for review   Lab results reviewed as noted below. Current medications reviewed as noted below. No results for input(s): PH, PCO2, PO2 in the last 72 hours. No results for input(s): CPK, CKMB, CKNDX, TROIQ in the last 72 hours. Recent Labs     03/16/23  0248   WBC 6.2   HGB 12.4   HCT 37.5   *       Lab Results   Component Value Date/Time    Cholesterol, total 226 (H) 03/14/2023 02:55 AM    HDL Cholesterol 108 03/14/2023 02:55 AM    LDL, calculated 96 03/14/2023 02:55 AM    Triglyceride 110 03/14/2023 02:55 AM    CHOL/HDL Ratio 2.1 03/14/2023 02:55 AM     No results for input(s): AP, TBIL, TP, ALB, GLOB, GGT, AML, LPSE in the last 72 hours.     No lab exists for component: SGOT, GPT, AMYP, HLPSE    Recent Labs     03/17/23  0256 03/16/23  0248 03/15/23  0241   INR 2.1* 1.3* 1.1   PTP 21.1* 13.0* 11.0 No components found for: GLPOC    Current Facility-Administered Medications   Medication Dose Route Frequency    LORazepam (ATIVAN) injection 0.5 mg  0.5 mg IntraVENous ONCE PRN    metoprolol succinate (TOPROL-XL) XL tablet 12.5 mg  12.5 mg Oral DAILY    haloperidol lactate (HALDOL) injection 1 mg  1 mg IntraVENous Q8H PRN    levothyroxine (SYNTHROID) tablet 88 mcg  88 mcg Oral 6am    balsam peru-castor oiL (VENELEX) ointment   Topical BID    furosemide (LASIX) tablet 20 mg  20 mg Oral DAILY    phenytoin ER (DILANTIN ER) ER capsule 200 mg  200 mg Oral BID    acetaminophen (TYLENOL) tablet 650 mg  650 mg Oral Q4H PRN    Or    acetaminophen (TYLENOL) solution 650 mg  650 mg Per NG tube Q4H PRN    Or    acetaminophen (TYLENOL) suppository 650 mg  650 mg Rectal Q4H PRN    aspirin chewable tablet 81 mg  81 mg Oral DAILY    labetaloL (NORMODYNE;TRANDATE) injection 5 mg  5 mg IntraVENous Q10MIN PRN    polyethylene glycol (MIRALAX) packet 17 g  17 g Oral DAILY PRN    WARFARIN INFORMATION NOTE (COUMADIN)   Other QPM    traMADoL (ULTRAM) tablet 50 mg  50 mg Oral Q6H PRN    hydrALAZINE (APRESOLINE) 20 mg/mL injection 10 mg  10 mg IntraVENous QID PRN    hydrOXYzine HCL (ATARAX) tablet 25 mg  25 mg Oral TID PRN    melatonin tablet 1.5 mg  1.5 mg Oral QHS PRN        Mariza Ramey MD

## 2023-03-17 NOTE — PROGRESS NOTES
Physical Therapy    Chart reviewed and attempted visit but transport here to take patient to MRI. Will continue to follow.     Vicente Strange

## 2023-03-17 NOTE — PROGRESS NOTES
Hospitalist Progress Note    NAME: Paige Bundy   :  1932   MRN:  [id number]       Assessment / Plan:    Bilateral lower extremity weakness right greater than left rule out CVA versus other causes  -presented with weakness on the right side is worse than left but not able to lift both legs against gravity  -MRI of the brain and ultrasound carotids ordered on admission, pending.  -Echo with normal EF and no PFO reported  -MRI cervical  spine pending, was ordered on admission  -Neurology consult  -PT OT eval when able  -Continue aspirin    Troponin elevation likely demand ischemia versus type II non-STEMI  -Troponin around 380 on admission  - Patient is chest pain-free  - No acute ischemic changes on EKG  -Echo with normal EF no PFO reported  -Cardiology consult    Seizure disorder  Hypothyroidism  History of recurrent DVTs  History of meningioma  -Continue phenytoin, levothyroxine  -Consult pharmacy for warfarin. INR therapeutic 2.1        Code Status: Full code  Surrogate Decision Maker: Child Meryle Osgood     DVT Prophylaxis: Warfarin  GI Prophylaxis: not indicated    18.5 - 24.9 Normal weight / Body mass index is 20.42 kg/m². Estimated discharge date:   Barriers: brain MRI, lumbar MRI still pending     Subjective:     Patient was seen and examined. No acute events overnight. Review of Systems:  Symptom Y/N Comments  Symptom Y/N Comments   Fever/Chills n   Chest Pain n    Poor Appetite    Edema     Cough n   Abdominal Pain n    Sputum    Joint Pain     SOB/DORSEY n   Pruritis/Rash     Nausea/vomit n   Tolerating PT/OT     Diarrhea    Tolerating Diet y    Constipation    Other       Could NOT obtain due to:          Objective:     VITALS:   Last 24hrs VS reviewed since prior progress note.  Most recent are:  Patient Vitals for the past 24 hrs:   Temp Pulse Resp BP SpO2   23 1137 98.4 °F (36.9 °C) 64 16 (!) 101/56 98 %   23 0807 97.7 °F (36.5 °C) 69 18 (!) 134/58 96 %   23 0440 98.1 °F (36.7 °C) 66 14 (!) 114/45 --   03/17/23 0352 97.3 °F (36.3 °C) (!) 40 16 (!) 106/40 96 %   03/16/23 1929 98.2 °F (36.8 °C) (!) 59 16 (!) 135/52 97 %   03/16/23 1600 -- 73 -- -- --       No intake or output data in the 24 hours ending 03/17/23 1512     I had a face to face encounter and independently examined this patient on 3/17/2023, as outlined below:  PHYSICAL EXAM:  General: WD, WN. Alert, cooperative, no acute distress    EENT:  EOMI. Anicteric sclerae. MMM  Resp:  CTA bilaterally, no wheezing or rales. No accessory muscle use  CV:  Regular  rhythm,  No edema  GI:  Soft, Non distended, Non tender. +Bowel sounds  Neurologic:  EOMs intact. No facial asymmetry. No aphasia or slurred speech. strength 5/5 all over except in the right lower extremity 3/5, Sensation grossly intact. Alert and oriented X 4.     Psych:   Good insight. Not anxious nor agitated  Skin:  No rashes. No jaundice    Reviewed most current lab test results and cultures  YES  Reviewed most current radiology test results   YES  Review and summation of old records today    NO  Reviewed patient's current orders and MAR    YES  PMH/ reviewed - no change compared to H&P  ________________________________________________________________________  Care Plan discussed with:    Comments   Patient X    Family      RN X    Care Manager X    Consultant                       X Multidiciplinary team rounds were held today with , nursing, pharmacist and clinical coordinator. Patient's plan of care was discussed; medications were reviewed and discharge planning was addressed.      ________________________________________________________________________        Comments   >50% of visit spent in counseling and coordination of care X    ________________________________________________________________________  Reford MD Royce     Procedures: see electronic medical records for all procedures/Xrays and details which were not copied into this note but were reviewed prior to creation of Plan. LABS:  I reviewed today's most current labs and imaging studies. Pertinent labs include:  Recent Labs     03/16/23  0248   WBC 6.2   HGB 12.4   HCT 37.5   *       Recent Labs     03/17/23  0256 03/16/23  0248 03/15/23  0241   INR 2.1* 1.3* 1.1         Signed:  Harini Melara MD

## 2023-03-17 NOTE — PROGRESS NOTES
Pharmacist Daily Dosing of Warfarin    Indication & Goal INR: DVT, INR Goal 2-3    PTA Warfarin Dose: 2 mg daily    Notable concurrent conditions and medications: Phenytoin/Fosphenytoin (PTA med)    Labs:  Recent Labs     03/17/23  0256 03/16/23  0248 03/15/23  0241   INR 2.1* 1.3* 1.1   HGB  --  12.4  --    PLT  --  149*  --      Date INR Dose   3/13 1.1 3 mg    3/14 1.1 4 mg    3/15 1.1 5 mg   3/16 1.3 5 mg   3/17 2.1 2 mg               Impression/Plan:   INR therapeutic  Will order warfarin 2 mg PO x 1 dose. Daily INR has been ordered  CBC w/o differential every other day has been ordered     Pharmacy will follow daily and adjust the dose as appropriate.     Thank you,  SCOTT Concepcion

## 2023-03-17 NOTE — PROGRESS NOTES
Occupational Therapy   Chart reviewed, patient off unit for MRI; will retry later as able.  Teresa Keane OTR/L

## 2023-03-17 NOTE — PROGRESS NOTES
End of Shift Note     Bedside shift change report given to Salas Lucio (oncoming nurse) by Cristiane Mendenhall RN (offgoing nurse). Report included the following information SBAR, Kardex, and Cardiac Rhythm       Shift worked:  7p - 7a   Shift summary and any significant changes:     MRI still pending   Concerns for physician to address: None   Zone phone for oncoming shift:   [phone]      Patient Information  Nallely Hernandez  80 y.o.  3/13/2023  1:16 PM by Stewart Tyler MD. 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    GI bleed due to NSAIDs 06/02/2019    GI bleed 06/01/2019    Diabetes ([address] 01/13/2019    Femur fracture, left ([address] 01/13/2019    Complex partial seizure 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 seizure ([address] 04/21/2017    Closed right hip fracture ([address] 08/17/2016    Lumbar post-laminectomy syndrome 05/11/2015    Hypothyroid 05/11/2015    H/O meningioma of the brain, left brain meningioma 05/11/2015           Past Medical History:   Diagnosis Date    Arthritis      Cancer ([address] 2002     brain tumor, benign (meningioma)    Diabetes ([address]       questionable?     Gastrointestinal disorder       diverticulitis    Hypothyroid 5/11/2015    Localization-related (focal) (partial) epilepsy and epileptic syndromes with complex partial seizures, without mention of intractable epilepsy 5/11/2015    Other ill-defined conditions(799.89)       loss of vision in left eye    Seizures ([address] 2002     caused by brain tumor    Thromboembolus [facility])      Thyroid disease           Core Measures:  CVA: Yes Yes  CHF:No No  PNA:No No     Activity:  Activity Level: Up with Assistance  Number times ambulated in hallways past shift: 0  Number of times OOB to chair past shift: 0     Cardiac:   Cardiac Monitoring: Yes      Cardiac Rhythm: Atrial Paced     Access:   Current line(s): PIV   Central Line? No   PICC LINE? No      Genitourinary:   Urinary status: voiding and external catheter  Urinary Catheter? No      Respiratory:   O2 Device: None (Room air)  Chronic home O2 use?: NO  Incentive spirometer at bedside: NO     GI:  Last Bowel Movement Date: 03/15/23  Current diet:  ADULT DIET Regular  Passing flatus: YES  Tolerating current diet: YES     Pain Management:   Patient states pain is manageable on current regimen: YES     Skin:  Jan Score: 17  Interventions: float heels, increase time out of bed, PT/OT consult, and limit briefs    Patient Safety:  Fall Score:  Total Score: 5  Interventions: bed/chair alarm, gripper socks, and pt to call before getting OOB  High Fall Risk: Yes  @Rollbelt  @dexterity to release roll belt  Yes     DVT prophylaxis:  DVT prophylaxis Med- Yes  DVT prophylaxis SCD or CAMRON- No      Wounds: (If Applicable)  Wounds- Yes  Location buttocks     Active Consults:  IP CONSULT TO NEUROLOGY  IP CONSULT TO CARDIOLOGY     Length of Stay:  Expected LOS: 2d 12h  Actual LOS: 2  Discharge Plan: Yes        Amor Eagle RN

## 2023-03-17 NOTE — PROGRESS NOTES
Transition of Care Plan:     RUR:  10% low   Disposition:  snf   If SNF or IPR: Date FOC offered: snf 3-15   Date FOC received:  3-15-23   Date authorization started with reference number:  n/a  Date authorization received and expires:  Accepting facility: [facility] accepts when she is medically cleared   Follow up appointments: to be made   DME needed: n/a   Transportation at Discharge: medical   [place] or means to access home:      n/a   IM Medicare Letter:  yes - 2nd letter   Is patient a  and connected with the [address] [place] [address]? No   If yes, was [employer] transfer form completed and [facility] notified? Caregiver Contact:  Discharge Caregiver contacted prior to discharge? Care Conference needed?:             Reason for Admission:    weakness, elevated troponin                       RUR Score:           see above            Plan for utilizing home health:      n/a      PCP:    First and Last name:  Sari Lorenzo MD                 Name of Practice:               Are you a current patient: Yes/No:  yes               Approximate date of last visit:  Jan 2023              Can you participate in a virtual visit with your PCP:                     Current Advanced Directive/Advance Care Plan: Full Code      [facility] will accept when she is medically ready. Covid negative 3-16-23.     Jose Eduardo Phillips, RN   927 am   Care manager

## 2023-03-18 VITALS
BODY MASS INDEX: 20.32 KG/M2 | HEIGHT: 64 IN | HEART RATE: 69 BPM | OXYGEN SATURATION: 97 % | DIASTOLIC BLOOD PRESSURE: 50 MMHG | WEIGHT: 119 LBS | RESPIRATION RATE: 18 BRPM | TEMPERATURE: 97.9 F | SYSTOLIC BLOOD PRESSURE: 121 MMHG

## 2023-03-18 LAB
ERYTHROCYTE [DISTWIDTH] IN BLOOD BY AUTOMATED COUNT: 13.1 % (ref 11.5–14.5)
HCT VFR BLD AUTO: 38.9 % (ref 35–47)
HGB BLD-MCNC: 13 G/DL (ref 11.5–16)
INR PPP: 2.8 (ref 0.9–1.1)
MCH RBC QN AUTO: 32.7 PG (ref 26–34)
MCHC RBC AUTO-ENTMCNC: 33.4 G/DL (ref 30–36.5)
MCV RBC AUTO: 98 FL (ref 80–99)
NRBC # BLD: 0 K/UL (ref 0–0.01)
NRBC BLD-RTO: 0 PER 100 WBC
PLATELET # BLD AUTO: 169 K/UL (ref 150–400)
PMV BLD AUTO: 12.7 FL (ref 8.9–12.9)
PROTHROMBIN TIME: 27.5 SEC (ref 9–11.1)
RBC # BLD AUTO: 3.97 M/UL (ref 3.8–5.2)
WBC # BLD AUTO: 7.5 K/UL (ref 3.6–11)

## 2023-03-18 PROCEDURE — 74011250637 HC RX REV CODE- 250/637: Performed by: INTERNAL MEDICINE

## 2023-03-18 PROCEDURE — 85610 PROTHROMBIN TIME: CPT

## 2023-03-18 PROCEDURE — 99232 SBSQ HOSP IP/OBS MODERATE 35: CPT | Performed by: PSYCHIATRY & NEUROLOGY

## 2023-03-18 PROCEDURE — 85027 COMPLETE CBC AUTOMATED: CPT

## 2023-03-18 PROCEDURE — 36415 COLL VENOUS BLD VENIPUNCTURE: CPT

## 2023-03-18 RX ORDER — WARFARIN 2 MG/1
2 TABLET ORAL DAILY
Qty: 5 TABLET | Refills: 0 | Status: SHIPPED | OUTPATIENT
Start: 2023-03-18 | End: 2023-03-23

## 2023-03-18 RX ORDER — GUAIFENESIN 100 MG/5ML
81 LIQUID (ML) ORAL DAILY
Qty: 30 TABLET | Refills: 0 | Status: SHIPPED | OUTPATIENT
Start: 2023-03-19 | End: 2023-04-18

## 2023-03-18 RX ORDER — PHENYTOIN SODIUM 200 MG/1
200 CAPSULE, EXTENDED RELEASE ORAL 2 TIMES DAILY
Qty: 30 CAPSULE | Refills: 0 | Status: SHIPPED | OUTPATIENT
Start: 2023-03-18 | End: 2023-04-17

## 2023-03-18 RX ORDER — METOPROLOL SUCCINATE 25 MG/1
12.5 TABLET, EXTENDED RELEASE ORAL DAILY
Qty: 15 TABLET | Refills: 0 | Status: SHIPPED | OUTPATIENT
Start: 2023-03-19 | End: 2023-04-18

## 2023-03-18 RX ADMIN — FUROSEMIDE 20 MG: 20 TABLET ORAL at 09:12

## 2023-03-18 RX ADMIN — METOPROLOL SUCCINATE 12.5 MG: 25 TABLET, EXTENDED RELEASE ORAL at 09:11

## 2023-03-18 RX ADMIN — CASTOR OIL AND BALSAM, PERU: 788; 87 OINTMENT TOPICAL at 09:12

## 2023-03-18 RX ADMIN — ASPIRIN 81 MG: 81 TABLET, CHEWABLE ORAL at 09:11

## 2023-03-18 RX ADMIN — PHENYTOIN SODIUM 200 MG: 100 CAPSULE ORAL at 09:12

## 2023-03-18 RX ADMIN — ACETAMINOPHEN 325MG 650 MG: 325 TABLET ORAL at 10:52

## 2023-03-18 RX ADMIN — LEVOTHYROXINE SODIUM 88 MCG: 0.09 TABLET ORAL at 06:14

## 2023-03-18 NOTE — PROGRESS NOTES
Neurology Note    Patient ID:  Bishnu Gómez  [id number]  63 y.o.  9/5/1932      Date of Consultation:  March 18, 2023      Assessment and Plan:    The patient is a pleasant 60-year-old female with acute onset weakness that has improved. Acute onset of weakness: Leg greater than arm  improving  Brain mri with no evidence of an acute stroke however significant encephalomalacia and atrophy  Preliminary results of lumbar spine MRI with multilevel degenerative disease  Symptoms are most likely multifactorial and associated with recrudescence of neurological symptoms due to metabolic abnormalities with generalized deconditioning. Symptoms have improved since admission  Risk factors for stroke do include hypertension and recurrent DVTs with subtherapeutic INR. She should be on 81 mg aspirin daily  On anti-coagulation. Echocardiogram completed  Dyslipidemia: LDL 96, patient should be on statin therapy  A1c was 5.3  Continue pt/ot  Patient scheduled to go to skilled nursing facility. 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 Last level was in 2020. There was a concern at last office visit of non-compliance. Level on 3/15/2023 was 4.7.  she has received a couple of doses prior to this level. Suggests noncompliance at home. 200 mg twice a day of phenytoin    Hypothyroidism: On levothyroxine. There is no other additional neurology recommendations at this time. If questions arise, please do not hesitate to contact me and I will return to see the patient. The patient should follow-up in clinic with her primary neurology provider             Subjective: I am still weak       History of Present Illness:   Bishnu Gómez is a 80 y.o. female with a past medical history of seizure disorder, hypothyroidism, recurrent DVTs who presented to the hospital with onset of bilateral lower extremities with the right side being weaker.       Patient does continue to feel weak., however better than admission. No new numbness, tingling, or weakness. She has no difficulty with her speech or swallowing. Updated pertinent labs include a white blood cell count of 7.5, hemoglobin of 13, platelets of 841. Sodium of 139, creatinine of 0.84  I did independently review the brain MRI from March 17, 2023. There is no evidence of acute stroke. There was chronic microvascular ischemic disease and encephalomalacia predominantly in the bilateral anterior frontal lobes. MRA of the neck performed on March 17, 2023 revealed no large vessel flow-limiting stenosis. Preliminary results of the MRI of the lumbar spine reveals degenerative disease and a T11 mild compression deformity. As noted previously, the patient was last seen in the neurology clinic in August 2022 by Jonathan Melendez NP. She had been seen by multiple other neurology providers. She does have a reported history of seizures and last seizure was greater than 3 years ago. She was continued on dilantin for a history of seizures. It is unclear if she has been compliant with her medication. There was a concern about progressive memory loss that was discussed at her most recent neurology visit. Her examination did reveal 4 out of 5 strength in her upper extremities. Her gait was not examined. It appears a brain MRI was discussed at that visit but she refused to get it done. It was noted there was questionable compliance with her Dilantin. It appears her seizure disorder was associated with a prior meningioma resection in 2002. A prior EEG in 2006 revealed mild left temporal slowing and due to this she was kept on medication. Past Medical History:   Diagnosis Date    Arthritis     Cancer ([facility] [address] 2002    brain tumor, benign (meningioma)    Diabetes ([facility] [address]     questionable?     Gastrointestinal disorder     diverticulitis    Hypothyroid 5/11/2015    Localization-related (focal) (partial) epilepsy and epileptic syndromes with complex partial seizures, without mention of intractable epilepsy 5/11/2015    Other ill-defined conditions(799.89)     loss of vision in left eye    Seizures ([address] 2002    caused by brain tumor    Thromboembolus [facility])     Thyroid disease         Past Surgical History:   Procedure Laterality Date    COLORECTAL SCRN; HI RISK IND  8/26/2014         HX BACK SURGERY  1970s    HX ORTHOPAEDIC  1990s    right wrist    NEUROLOGICAL PROCEDURE UNLISTED  2002    brain tumor removed    OR INS NEW/RPLCMT PRM PM W/TRANSV ELTRD ATRIAL&VENT N/A 10/6/2020    INSERT PPM DUAL performed by Apolinar Moss MD at \[facility]\"" CARDIAC CATH LAB        Family History   Problem Relation Age of Onset    Cancer Mother         breast    Alzheimer's Disease Mother     Heart Disease Father     Kidney Disease Sister         dialysis    Seizures Daughter         Social History     Tobacco Use    Smoking status: Never    Smokeless tobacco: Never   Substance Use Topics    Alcohol use: Yes     Comment: glass of wine occasionally        Allergies   Allergen Reactions    Bactrim [Sulfamethoprim] Unable to Obtain    Penicillins Itching     Patient not sure she is allergic    Sulfa (Sulfonamide Antibiotics) Nausea and Vomiting          Current Facility-Administered Medications   Medication Dose Route Frequency    metoprolol succinate (TOPROL-XL) XL tablet 12.5 mg  12.5 mg Oral DAILY    haloperidol lactate (HALDOL) injection 1 mg  1 mg IntraVENous Q8H PRN    levothyroxine (SYNTHROID) tablet 88 mcg  88 mcg Oral 6am    balsam peru-castor oiL (VENELEX) ointment   Topical BID    furosemide (LASIX) tablet 20 mg  20 mg Oral DAILY    phenytoin ER (DILANTIN ER) ER capsule 200 mg  200 mg Oral BID    acetaminophen (TYLENOL) tablet 650 mg  650 mg Oral Q4H PRN    Or    acetaminophen (TYLENOL) solution 650 mg  650 mg Per NG tube Q4H PRN    Or    acetaminophen (TYLENOL) suppository 650 mg  650 mg Rectal Q4H PRN    aspirin chewable tablet 81 mg  81 mg Oral DAILY    labetaloL (NORMODYNE;TRANDATE) injection 5 mg  5 mg IntraVENous Q10MIN PRN    polyethylene glycol (MIRALAX) packet 17 g  17 g Oral DAILY PRN    WARFARIN INFORMATION NOTE (COUMADIN)   Other QPM    traMADoL (ULTRAM) tablet 50 mg  50 mg Oral Q6H PRN    hydrALAZINE (APRESOLINE) 20 mg/mL injection 10 mg  10 mg IntraVENous QID PRN    hydrOXYzine HCL (ATARAX) tablet 25 mg  25 mg Oral TID PRN    melatonin tablet 1.5 mg  1.5 mg Oral QHS PRN       Review of Systems:    General, constitutional: negative  Eyes, vision: negative  Ears, nose, throat: negative  Cardiovascular, heart: negative  Respiratory: negative  Gastrointestinal: negative  Genitourinary: negative  Musculoskeletal: negative  Skin and integumentary: negative  Psychiatric: negative  Endocrine: negative  Neurological: negative, except for HPI  Hematologic/lymphatic: negative  Allergy/immunology: negative    Objective:     Visit Vitals  BP (!) 121/50   Pulse 61   Temp 97.9 °F (36.6 °C)   Resp 18   Ht 5' 4.02\" (1.626 m)   Wt 119 lb (54 kg)   SpO2 97%   BMI 20.42 kg/m²       Physical Exam:      General:  appears well nourished in no acute distress  Neck: no carotid bruits  Lungs: clear to auscultation  Heart:  no murmurs, regular rate  Lower extremity: peripheral pulses palpable and no edema  Skin: intact    Neurological exam:  The patient is awake and alert. She did know the correct year. She got the month incorrect. She could perform simple calculations. She did know the name of the president. She could name objects correctly. She has decreased insight into her medical condition and medical history. She has decreased attention and concentration. There was no dysarthria or aphasia.  Unchanged     Cranial nerves:   II-XII were tested    Perrrla  Visual fields were full  Eomi, no evidence of nystagmus  Facial sensation:  normal and symmetric  Facial motor: normal and symmetric  Hearing intact  SCM strength intact  Tongue: midline without fasciculations    Motor: Tone normal  Mild right-sided pronator drift  No evidence of fasciculations    Strength testing:   deltoid triceps biceps Wrist ext. Wrist flex. intrinsics Hip flex. Hip ext. Knee ext. Knee flex Dorsi flex Plantar flex   Right 5 5 5 5 5 4 4 4 4 4 4 4   Left 5 5 5 5 5 5 4+ 4+ 4+ 4+ 4+ 4+     Sensory:  Upper extremity: intact to pp, light touch, and vibration > 10 seconds  Lower extremity: Pinprick was decreased to mid shin bilaterally. Reflexes:    Right Left  Biceps  2 2  Triceps 2 2  Brachiorad. 2 2  Patella  2 2  Achilles - -    Cerebellar testing:  no tremor apparent, finger/nose and fantasma were intact. Slower on the right    Gait: not assessed due to concerns over safety    Labs:     Lab Results   Component Value Date/Time    Hemoglobin A1c 5.3 03/14/2023 02:55 AM    Sodium 139 03/14/2023 02:55 AM    Potassium 3.6 03/14/2023 02:55 AM    Chloride 108 03/14/2023 02:55 AM    Glucose 99 03/14/2023 02:55 AM    BUN 22 (H) 03/14/2023 02:55 AM    Creatinine 0.84 03/14/2023 02:55 AM    Calcium 9.2 03/14/2023 02:55 AM    WBC 7.5 03/18/2023 02:15 AM    HCT 38.9 03/18/2023 02:15 AM    HGB 13.0 03/18/2023 02:15 AM    PLATELET 396 43/81/6869 02:15 AM       Imaging:    Results from Hospital Encounter encounter on 03/13/23    MRI LUMB SPINE W WO CONT (Preliminary)  This result has not been signed. Information might be incomplete. Narrative        Results from [place] encounter on 03/13/23    CT HEAD WO CONT    Narrative  CLINICAL HISTORY: weakness RLE  INDICATION: weakness RLE  COMPARISON: 2015. CT dose reduction was achieved through use of a standardized protocol tailored  for this examination and automatic exposure control for dose modulation. TECHNIQUE: Serial axial images with a collimation of 5 mm were obtained from the  skull base through the vertex  FINDINGS:  There is sulcal and ventricular prominence.  Confluent periventricular and  scattered foci of hypodensity in the cerebral white matter. There is no evidence  of an acute infarction, hemorrhage, or mass-effect. There is no evidence of  midline shift or hydrocephalus. Posterior fossa structures are unremarkable. No  extra-axial collections are seen. Mastoid air cells are well pneumatized and clear. 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 Postsurgical change on  the left. Impression  No acute intracranial process. Imaging findings consistent with severe chronic microvascular ischemic change. There is a severe degree of cerebral atrophy. I spent  35    minutes providing care to this  acutely ill inpatient with > 50% of the time counseling and assisting in the coordination of care of the patient on the patient's hospital floor/unit. I discussed the patient with the primary hospitalist, Dr. Sony Banda today.      Active Problems:    Elevated troponin (3/13/2023)      Bilateral leg weakness (3/13/2023)                 Signed By:  Erum Soares DO FAAN    March 18, 2023

## 2023-03-18 NOTE — PROGRESS NOTES
End of Shift Note    Bedside shift change report given to Michelle Brumfield, RN (oncoming nurse) by Phillip Mcdaniel, RN (offgoing nurse). Report included the following information SBAR and Kardex    Shift worked:  7p - 7a     Shift summary and any significant changes:    Patient anxious, agitated and restless night. Concerns for physician to address: Noted above   Zone phone for oncoming shift:  [phone]     Patient Information  Nicolas Contreras  80 y.o.  3/13/2023  1:16 PM by Burton Rodriguez MD. Nicolas Contreras was admitted from Home    Problem List  Patient Active Problem List    Diagnosis Date Noted    Closed left subtrochanteric femur fracture, initial encounter ([address] 01/15/2019    Elevated troponin 03/13/2023    Bilateral leg weakness 03/13/2023    Pacemaker 10/06/2020    Bilateral carotid artery stenosis 02/28/2020    Localization-related focal epilepsy with complex partial seizures ([address] 02/28/2020    Cerebral microvascular disease 02/28/2020    GI bleed due to NSAIDs 06/02/2019    GI bleed 06/01/2019    Diabetes ([address] 01/13/2019    Femur fracture, left ([address] 01/13/2019    Complex partial seizure 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 seizure ([address] 04/21/2017    Closed right hip fracture ([address] 08/17/2016    Lumbar post-laminectomy syndrome 05/11/2015    Hypothyroid 05/11/2015    H/O meningioma of the brain, left brain meningioma 05/11/2015     Past Medical History:   Diagnosis Date    Arthritis     Cancer ([address] 2002    brain tumor, benign (meningioma)    Diabetes ([address]     questionable?     Gastrointestinal disorder     diverticulitis    Hypothyroid 5/11/2015    Localization-related (focal) (partial) epilepsy and epileptic syndromes with complex partial seizures, without mention of intractable epilepsy 5/11/2015    Other ill-defined conditions(799.89)     loss of vision in left eye    Seizures ([address] 2002    caused by brain tumor    Thromboembolus [facility])     Thyroid disease        Core Measures:  CVA: No No  CHF:No No  PNA:No No    Activity:  Activity Level: Bed Rest  Number times ambulated in hallways past shift: 0  Number of times OOB to chair past shift: 0    Cardiac:   Cardiac Monitoring: Yes      Cardiac Rhythm: Atrial Paced    Access:   Current line(s): PIV   Central Line? No   PICC LINE? No     Genitourinary:   Urinary status: voiding and incontinent  Urinary Catheter? no    Respiratory:   O2 Device: None (Room air)  Chronic home O2 use?: NO  Incentive spirometer at bedside: NO       GI:  Last Bowel Movement Date: 03/17/23  Current diet:  ADULT DIET Regular  Passing flatus: YES  Tolerating current diet: YES       Pain Management:   Patient states pain is manageable on current regimen: YES    Skin:  Jan Score: 15  Interventions: increase time out of bed, PT/OT consult, and limit briefs    Patient Safety:  Fall Score:  Total Score: 5  Interventions: bed/chair alarm, gripper socks, and pt to call before getting OOB  High Fall Risk: Yes  @Rollbelt  @dexterity to release roll belt  Yes    DVT prophylaxis:  DVT prophylaxis Med- Yes  DVT prophylaxis SCD or CAMRON- No     Wounds: (If Applicable)  Wounds- Yes  Location buttocks    Active Consults:  IP CONSULT TO NEUROLOGY  IP CONSULT TO CARDIOLOGY    Length of Stay:  Expected LOS: 2d 21h  Actual LOS: 5  Discharge Plan: Yes       Mary Marquis RN

## 2023-03-18 NOTE — PROGRESS NOTES
Problem: Falls - Risk of  Goal: *Absence of Falls  Description: Document Ping Garrido Fall Risk and appropriate interventions in the flowsheet. Outcome: Progressing Towards Goal  Note: Fall Risk Interventions:  Mobility Interventions: Bed/chair exit alarm, PT Consult for mobility concerns    Mentation Interventions: Bed/chair exit alarm, Door open when patient unattended, Room close to nurse's station    Medication Interventions: Bed/chair exit alarm    Elimination Interventions: Bed/chair exit alarm, Call light in reach    History of Falls Interventions: Bed/chair exit alarm, Room close to nurse's station         Problem: Patient Education: Go to Patient Education Activity  Goal: Patient/Family Education  Outcome: Progressing Towards Goal     Problem: Pressure Injury - Risk of  Goal: *Prevention of pressure injury  Description: Document Jan Scale and appropriate interventions in the flowsheet. Outcome: Progressing Towards Goal  Note: Pressure Injury Interventions:  Sensory Interventions: Assess changes in LOC, Float heels, Keep linens dry and wrinkle-free, Minimize linen layers, Pad between skin to skin, Turn and reposition approx.  every two hours (pillows and wedges if needed)    Moisture Interventions: Absorbent underpads, Apply protective barrier, creams and emollients, Check for incontinence Q2 hours and as needed, Internal/External urinary devices, Minimize layers    Activity Interventions: Pressure redistribution bed/mattress(bed type)    Mobility Interventions: Float heels, HOB 30 degrees or less    Nutrition Interventions: Document food/fluid/supplement intake, Offer support with meals,snacks and hydration    Friction and Shear Interventions: Apply protective barrier, creams and emollients, HOB 30 degrees or less, Minimize layers                Problem: Patient Education: Go to Patient Education Activity  Goal: Patient/Family Education  Outcome: Progressing Towards Goal     Problem: Patient Education: [place] to Patient Education Activity  Goal: Patient/Family Education  Outcome: Progressing Towards Goal     Problem: TIA/CVA Stroke: 0-24 hours  Goal: Off Pathway (Use only if patient is Off Pathway)  Outcome: Progressing Towards Goal  Goal: Activity/Safety  Outcome: Progressing Towards Goal  Goal: Consults, if ordered  Outcome: Progressing Towards Goal  Goal: Diagnostic Test/Procedures  Outcome: Progressing Towards Goal  Goal: Nutrition/Diet  Outcome: Progressing Towards Goal  Goal: Discharge Planning  Outcome: Progressing Towards Goal  Goal: Medications  Outcome: Progressing Towards Goal  Goal: Respiratory  Outcome: Progressing Towards Goal  Goal: Treatments/Interventions/Procedures  Outcome: Progressing Towards Goal  Goal: Minimize risk of bleeding post-thrombolytic infusion  Outcome: Progressing Towards Goal  Goal: Monitor for complications post-thrombolytic infusion  Outcome: Progressing Towards Goal  Goal: Psychosocial  Outcome: Progressing Towards Goal  Goal: *Hemodynamically stable  Outcome: Progressing Towards Goal  Goal: *Neurologically stable  Description: Absence of additional neurological deficits    Outcome: Progressing Towards Goal  Goal: *Verbalizes anxiety and depression are reduced or absent  Outcome: Progressing Towards Goal  Goal: *Absence of Signs of Aspiration on Current Diet  Outcome: Progressing Towards Goal  Goal: *Absence of deep venous thrombosis signs and symptoms(Stroke Metric)  Outcome: Progressing Towards Goal  Goal: *Ability to perform ADLs and demonstrates progressive mobility and function  Outcome: Progressing Towards Goal  Goal: *Stroke education started(Stroke Metric)  Outcome: Progressing Towards Goal  Goal: *Dysphagia screen performed(Stroke Metric)  Outcome: Progressing Towards Goal  Goal: *Rehab consulted(Stroke Metric)  Outcome: Progressing Towards Goal     Problem: TIA/CVA Stroke: Day 2 Until Discharge  Goal: Off Pathway (Use only if patient is Off Pathway)  Outcome: Progressing Towards Goal  Goal: Activity/Safety  Outcome: Progressing Towards Goal  Goal: Diagnostic Test/Procedures  Outcome: Progressing Towards Goal  Goal: Nutrition/Diet  Outcome: Progressing Towards Goal  Goal: Discharge Planning  Outcome: Progressing Towards Goal  Goal: Medications  Outcome: Progressing Towards Goal  Goal: Respiratory  Outcome: Progressing Towards Goal  Goal: Treatments/Interventions/Procedures  Outcome: Progressing Towards Goal  Goal: Psychosocial  Outcome: Progressing Towards Goal  Goal: *Verbalizes anxiety and depression are reduced or absent  Outcome: Progressing Towards Goal  Goal: *Absence of aspiration  Outcome: Progressing Towards Goal  Goal: *Absence of deep venous thrombosis signs and symptoms(Stroke Metric)  Outcome: Progressing Towards Goal  Goal: *Optimal pain control at patient's stated goal  Outcome: Progressing Towards Goal  Goal: *Tolerating diet  Outcome: Progressing Towards Goal  Goal: *Ability to perform ADLs and demonstrates progressive mobility and function  Outcome: Progressing Towards Goal  Goal: *Stroke education continued(Stroke Metric)  Outcome: Progressing Towards Goal     Problem: Ischemic Stroke: Discharge Outcomes  Goal: *Verbalizes anxiety and depression are reduced or absent  Outcome: Progressing Towards Goal  Goal: *Verbalize understanding of risk factor modification(Stroke Metric)  Outcome: Progressing Towards Goal  Goal: *Hemodynamically stable  Outcome: Progressing Towards Goal  Goal: *Absence of aspiration pneumonia  Outcome: Progressing Towards Goal  Goal: *Aware of needed dietary changes  Outcome: Progressing Towards Goal  Goal: *Verbalize understanding of prescribed medications including anti-coagulants, anti-lipid, and/or anti-platelets(Stroke Metric)  Outcome: Progressing Towards Goal  Goal: *Tolerating diet  Outcome: Progressing Towards Goal  Goal: *Aware of follow-up diagnostics related to anticoagulants  Outcome: Progressing Towards Goal  Goal: *Ability to perform ADLs and demonstrates progressive mobility and function  Outcome: Progressing Towards Goal  Goal: *Absence of DVT(Stroke Metric)  Outcome: Progressing Towards Goal  Goal: *Absence of aspiration  Outcome: Progressing Towards Goal  Goal: *Optimal pain control at patient's stated goal  Outcome: Progressing Towards Goal  Goal: *Home safety concerns addressed  Outcome: Progressing Towards Goal  Goal: *Describes available resources and support systems  Outcome: Progressing Towards Goal  Goal: *Verbalizes understanding of activation of EMS(911) for stroke symptoms(Stroke Metric)  Outcome: Progressing Towards Goal  Goal: *Understands and describes signs and symptoms to report to providers(Stroke Metric)  Outcome: Progressing Towards Goal  Goal: *Neurolgocially stable (absence of additional neurological deficits)  Outcome: Progressing Towards Goal  Goal: *Verbalizes importance of follow-up with primary care physician(Stroke Metric)  Outcome: Progressing Towards Goal  Goal: *Smoking cessation discussed,if applicable(Stroke Metric)  Outcome: Progressing Towards Goal  Goal: *Depression screening completed(Stroke Metric)  Outcome: Progressing Towards Goal     Problem: Patient Education: Go to Patient Education Activity  Goal: Patient/Family Education  Outcome: Progressing Towards Goal     Problem: Patient Education: Go to Patient Education Activity  Goal: Patient/Family Education  Outcome: Progressing Towards Goal

## 2023-03-18 NOTE — PROGRESS NOTES
Spiritual Care Assessment/Progress Note  [facility]      NAME: Helga العلي      MRN: [id number]  AGE: 80 y.o.  SEX: female  Latter-day Affiliation: No Orthodox   Language: English     3/18/2023     Total Time (in minutes): 25     Spiritual Assessment begun in [facility] through conversation with:         [x]Patient        [] Family    [] Friend(s)        Reason for Consult: Initial/Spiritual assessment, patient floor, Initial visit     Spiritual beliefs: (Please include comment if needed)     [] Identifies with a kenisha tradition:         [] Supported by a kenisha community:            [] Claims no spiritual orientation:           [] Seeking spiritual identity:                [] Adheres to an individual form of spirituality:           [x] Not able to assess:                           Identified resources for coping:      [] Prayer                               [] Music                  [] Guided Imagery     [] Family/friends                 [] Pet visits     [] Devotional reading                         [] Unknown     [] Other:                                               Interventions offered during this visit: (See comments for more details)    Patient Interventions: Normalization of emotional/spiritual concerns, Prayer (actual), Latter-day beliefs/image of God discussed, Affirmation of emotions/emotional suffering, Initial visit, Affirmation of kenisha           Plan of Care:     [x] Support spiritual and/or cultural needs    [] Support AMD and/or advance care planning process      [] Support grieving process   [] Coordinate Rites and/or Rituals    [] Coordination with community clergy   [] No spiritual needs identified at this time   [] Detailed Plan of Care below (See Comments)  [] Make referral to Music Therapy  [] Make referral to Pet Therapy     [] Make referral to Addiction services  [] Make referral to [facility]  [] Make referral to Spiritual Care Partner  [] No future visits requested        [] Contact Spiritual Care for further referrals     Comments: Patient was laying in her bed with the lights on. Patient asked for help getting her nurse. I notified her nurse. Normalization of emotions     I offered a prayer. [address], [place]. [address] ([id number])

## 2023-03-18 NOTE — PROGRESS NOTES
Pt is cleared for d/c from a CM standpoint. Transition of Care Plan:     RUR:  11% low   Disposition:  [facility]  If SNF or IPR: Date FOC offered: snf 3-15   Date FOC received:  3-15-23   Date authorization started with reference number:  n/a  Date authorization received and expires:  Accepting facility: referral sent   Follow up appointments: to be made   DME needed: n/a   Transportation at Discharge: medical   [place] or means to access home:      n/a    Medicare Letter:  emailed  Is patient a [place] and connected with the [place]? No   If yes, was [employer] transfer form completed and VA notified? Caregiver Contact:  Gearld Duverney  [phone]  Discharge Caregiver contacted prior to discharge? Care Conference needed?:         [facility] will transport at 1 p.m. Paperwork on chart. 10:33 a.m. CM requested trip through [facility], waiting on time. CM acknowledged d/c order. CM called and spoke with Peggy Stephanie who stated pt can go into room 121A. Nursing can call report to [phone]. CM called and spoke with pt's son who is aware and in agreement. CM reviewed 2IM with son and will email it to him at [email or web address]. Care Management Interventions  PCP Verified by CM:  Yes  Palliative Care Criteria Met (RRAT>21 & CHF Dx)?: No  Mode of Transport at Discharge: BLS  Transition of Care Consult (CM Consult): SNF  Partner SNF: No  Reason Why Partner SNF Not Chosen: Location  Discharge Durable Medical Equipment: No  Physical Therapy Consult: Yes  Occupational Therapy Consult: Yes  Speech Therapy Consult: Yes  Support Systems: Child(morales)  Confirm Follow Up Transport: Family  The Patient and/or Patient Representative was Provided with a Choice of Provider and Agrees with the Discharge Plan?: Yes  Freedom of Choice List was Provided with Basic Dialogue that Supports the Patient's Individualized Plan of Care/Goals, Treatment Preferences and Shares the Quality Data Associated with the Providers?: Yes  The [employer] Information Provided?: No  Discharge Location  Patient Expects to be Discharged to[de-identified] Skilled nursing facility    Transition of Care Plan to SNF/Rehab    SNF/Rehab Transition:  Patient has been accepted to [facility] and meets criteria for admission. Patient will transported by [facility] and expected to leave at 1. Communication to Patient/Family:  Met with patient and son (identified care giver) and they are agreeable to the transition plan. Communication to SNF/Rehab:  Bedside RN has been notified to update the transition plan to the facility and call report (phone number [phone]  Discharge information has been updated on the AVS.     Discharge instructions to be fax'd to facility at [facility] # [phone]). Nursing Please include all hard scripts for controlled substances, med rec and dc summary, and AVS in packet. Reviewed and confirmed with facility, Jace Driscoll can manage the patient care needs for the following:     Neva Rosas with (X) only those applicable:    Medication:  [x]  Medications will be available at the facility  []  IV Antibiotics   []  Controlled Substance - hard copy to be sent with patient   []  Weekly Labs   Documents:  [x] Hard RX  [x] MAR  [x] Kardex  [x] AVS  [x]Transfer Summary  [x]Discharge   Equipment:  []  CPAP/BiPAP  []  Wound Vacuum  []  Rios or Urinary Device  []  PICC/Central Line  []  Nebulizer  []  Ventilator   Treatment:  []Isolation (for MRSA, VRE, etc.)  []Surgical Drain Management  []Tracheostomy Care  []Dressing Changes  []Dialysis with transportation and chair time   []PEG Care  []Oxygen  []Daily Weights for Heart Failure   Dietary:  []Any diet limitations  []Tube Feedings   []Total Parenteral Management (TPN)   Eligible for Medicaid Long Term Services and Supports  Yes:  [] Eligible for medical assistance or will become eligible within 180 days and UAI completed. [] Provider/Patient and/or support system has requested screening.   [] UAI copy provided to patient or responsible party  [] UAI unavailable at discharge will send once processed to SNF provider. [] UAI unavailable at discharged mailed to patient  No:   [x] Private pay and is not financially eligible for Medicaid within the next 180 days. [] Reside out-of-state.   [] A residents of a state owned/operated facility that is licensed  by [address] and [employer] or [facility]  [] Enrollment in [facility] hospice services  [] [facility]  [] Patient /Family declines to have screening completed or provide financial information for screening     Financial Resources:  Medicaid    [] Initiated and application pending   [] Full coverage     Advanced Care Plan:  []Surrogate Decision Maker of Care  []POA  [x]Communicated Code Status Full Code   Other         Nila Quintero LMSW  Supervisee in Social Work  Care Management, [address] [place] [address]

## 2023-03-18 NOTE — DISCHARGE SUMMARY
Hospitalist Discharge Summary     Patient ID:  Gunnar Trent  [id number]  88 y.o.  9/5/1932  3/13/2023    PCP on record: Nikki Allan MD    Admit date: 3/13/2023  Discharge date and time: 3/18/2023    DISCHARGE DIAGNOSIS:  Bilateral lower extremity weakness right greater than left  Troponin elevation likely demand ischemia versus type II non-STEMI  Seizure disorder  Hypothyroidism  History of recurrent DVTs  History of meningioma    CONSULTATIONS:  IP CONSULT TO NEUROLOGY  IP CONSULT TO CARDIOLOGY    Excerpted HPI from H&P of Russell Vaughn MD:    Lucy Cline is a 80 y.o.   female who presents with past medical history of seizure disorder, hypothyroidism, recurrent DVT is coming the hospital chief complaints of bilateral lower extremity weakness. Patient reports being usual travails until about yesterday when she woke up feeling funny in her head and started having bilateral lower extremity weakness but more on the right side. By evening, she was not able to walk and was dragging her feet. She did not report any associated weakness in her hands, facial deviation or slurred speech. Does not report any headache. Does not report any chest pain or shortness of breath. Does not report any recent cough or phlegm. No fever or chills. On arrival to ED, noted to have stable vitals. On labs CBC is normal.  BMP shows a troponin of 388. Creatinine is 1.01. LFTs are normal.  CT head shows chronic microvascular changes with the severe degree of cerebral atrophy  ______________________________________________________________________  DISCHARGE SUMMARY/HOSPITAL COURSE:  for full details see H&P, daily progress notes, labs, consult notes.      Bilateral lower extremity weakness right greater than left rule out CVA versus other causes  -presented with weakness on the right side is worse than left but not able to lift both legs against gravity  -MRI of the brain and ultrasound carotids ordered on admission, pending.  -Echo with normal EF and no PFO reported  -MRI brain  chronic right PCA occlusion. -MRI Lumbar   Thoracolumbar levocurvature, stable. L5 is transitional,  partially sacralized. Multilevel Schmorl's nodes and degenerative change. T11  mild compression deformity, loss of 50% height, the previously described  calcification in the spinal canal is stable at L2 at the level of the cauda  equina. Although this level was not well seen on prior studies, it may be stable  since the prior chest x-ray in 2017. No acute compression deformity. Multilevel  central stenosis. No contrast abnormality.  -Neurology consult  -PT OT eval when able  -Continue aspirin    Troponin elevation likely demand ischemia versus type II non-STEMI  -Troponin around 380 on admission  - Patient is chest pain-free  - No acute ischemic changes on EKG  -Echo with normal EF no PFO reported  -Cardiology consult    Seizure disorder  Hypothyroidism  History of recurrent DVTs  History of meningioma  -Continue phenytoin, levothyroxine  -Consult pharmacy for warfarin. INR therapeutic         _______________________________________________________________________  Patient seen and examined by me on discharge day. Pertinent Findings:  Gen:    Not in distress  Chest: Clear lungs  CVS:   Regular rhythm. No edema  Abd:  Soft, not distended, not tender  Neuro:  Alert,   _______________________________________________________________________  DISCHARGE MEDICATIONS:   Current Discharge Medication List        START taking these medications    Details   aspirin 81 mg chewable tablet Take 1 Tablet by mouth daily for 30 days. Qty: 30 Tablet, Refills: 0  Start date: 3/19/2023, End date: 4/18/2023      metoprolol succinate (TOPROL-XL) 25 mg XL tablet Take 0.5 Tablets by mouth daily for 30 days.   Qty: 15 Tablet, Refills: 0  Start date: 3/19/2023, End date: 4/18/2023           CONTINUE these medications which have CHANGED    Details   phenytoin ER (DILANTIN ER) 200 mg ER capsule Take 200 mg by mouth two (2) times a day for 30 days. Qty: 30 Capsule, Refills: 0  Start date: 3/18/2023, End date: 4/17/2023      warfarin (COUMADIN) 2 mg tablet Take 1 Tablet by mouth daily for 5 days. As directed currently every day omits saturday  Qty: 5 Tablet, Refills: 0  Start date: 3/18/2023, End date: 3/23/2023           CONTINUE these medications which have NOT CHANGED    Details   furosemide (LASIX) 20 mg tablet Take 20 mg by mouth in the morning. biotin 5,000 mcg TbDi Take 2 Tabs by mouth two (2) times a day. cholecalciferol (VITAMIN D3) 1,000 unit tablet Take 2 Tabs by mouth two (2) times a day. calcium carbonate (OS-ANGUS) 500 mg calcium (1,250 mg) tablet Take 1 Tablet by mouth two (2) times a day. levothyroxine (SYNTHROID) 88 mcg tablet Take 1 Tab by mouth daily. acetaminophen (TYLENOL) 325 mg tablet Take 2 Tabs by mouth every six (6) hours as needed for Pain or Fever. Qty: 40 Tab, Refills: 0           STOP taking these medications       ARIPiprazole (ABILIFY) 2 mg tablet Comments:   Reason for Stopping:         vit C/E/Zn/coppr/lutein/zeaxan (PRESERVISION AREDS-2 PO) Comments:   Reason for Stopping:                 Patient Follow Up Instructions: Activity: PT/OT Eval and Treat  Diet: Cardiac Diet and Low fat, Low cholesterol  Wound Care: As directed    Follow-up with PCP  in 5 days.   Follow-up tests/labs     Follow-up Information       Follow up With Specialties Details Why Contact Silvana Carrion MD Family Medicine Follow up  [address] [phone]      Brodie Ducarolyn, [address] [phone]      Fredo Ayala, [address] [facility] Vascular Surgery, Cardiovascular Disease Physician Follow up in 3 month(s)  [address]  [id number]  [facility]  [phone] ________________________________________________________________    Risk of deterioration: Moderate    Condition at Discharge:  Stable  __________________________________________________________________    Disposition  SNF/LTC    ____________________________________________________________________    Code Status: Full Code  ___________________________________________________________________      Total time in minutes spent coordinating this discharge (includes going over instructions, follow-up, prescriptions, and preparing report for sign off to her PCP) :  35 minutes    Signed:   Ra Burgess MD

## 2023-03-18 NOTE — PROGRESS NOTES
Problem: Falls - Risk of  Goal: *Absence of Falls  Description: Document Mary Scott Fall Risk and appropriate interventions in the flowsheet. 3/18/2023 1128 by Alcira Castaneda RN  Outcome: Resolved/Met  3/18/2023 1036 by Alcira Castaneda RN  Outcome: Progressing Towards Goal  Note: Fall Risk Interventions:  Mobility Interventions: Bed/chair exit alarm, PT Consult for mobility concerns    Mentation Interventions: Bed/chair exit alarm, Door open when patient unattended, Room close to nurse's station    Medication Interventions: Bed/chair exit alarm    Elimination Interventions: Bed/chair exit alarm, Call light in reach    History of Falls Interventions: Bed/chair exit alarm, Room close to nurse's station         Problem: Patient Education: Go to Patient Education Activity  Goal: Patient/Family Education  3/18/2023 1128 by Alcira Castaneda RN  Outcome: Resolved/Met  3/18/2023 1036 by Alcira Castaneda RN  Outcome: Progressing Towards Goal     Problem: Pressure Injury - Risk of  Goal: *Prevention of pressure injury  Description: Document Jan Scale and appropriate interventions in the flowsheet. 3/18/2023 1128 by Alcira Castaneda RN  Outcome: Resolved/Met  3/18/2023 1036 by Alcira Castaneda RN  Outcome: Progressing Towards Goal  Note: Pressure Injury Interventions:  Sensory Interventions: Assess changes in LOC, Float heels, Keep linens dry and wrinkle-free, Minimize linen layers, Pad between skin to skin, Turn and reposition approx.  every two hours (pillows and wedges if needed)    Moisture Interventions: Absorbent underpads, Apply protective barrier, creams and emollients, Check for incontinence Q2 hours and as needed, Internal/External urinary devices, Minimize layers    Activity Interventions: Pressure redistribution bed/mattress(bed type)    Mobility Interventions: Float heels, HOB 30 degrees or less    Nutrition Interventions: Document food/fluid/supplement intake, Offer support with meals,snacks and hydration    Friction and Shear Interventions: Apply protective barrier, creams and emollients, HOB 30 degrees or less, Minimize layers                Problem: Patient Education: Go to Patient Education Activity  Goal: Patient/Family Education  3/18/2023 1128 by Enio Abbasi RN  Outcome: Resolved/Met  3/18/2023 1036 by Enio Abbasi RN  Outcome: Progressing Towards Goal     Problem: Patient Education: Go to Patient Education Activity  Goal: Patient/Family Education  3/18/2023 1128 by Enio Abbasi RN  Outcome: Resolved/Met  3/18/2023 1036 by Enio Abbasi RN  Outcome: Progressing Towards Goal     Problem: TIA/CVA Stroke: 0-24 hours  Goal: Off Pathway (Use only if patient is Off Pathway)  3/18/2023 1128 by Enio Abbasi RN  Outcome: Resolved/Met  3/18/2023 1036 by Enio Abbasi RN  Outcome: Progressing Towards Goal  Goal: Activity/Safety  3/18/2023 1128 by Enio Abbasi RN  Outcome: Resolved/Met  3/18/2023 1036 by Enio Abbasi RN  Outcome: Progressing Towards Goal  Goal: Consults, if ordered  3/18/2023 1128 by Enio Abbasi RN  Outcome: Resolved/Met  3/18/2023 1036 by Enio Abbasi RN  Outcome: Progressing Towards Goal  Goal: Diagnostic Test/Procedures  3/18/2023 1128 by Enio Abbasi RN  Outcome: Resolved/Met  3/18/2023 1036 by Enio Abbasi RN  Outcome: Progressing Towards Goal  Goal: Nutrition/Diet  3/18/2023 1128 by Enio Abbasi RN  Outcome: Resolved/Met  3/18/2023 1036 by Enio Abbasi RN  Outcome: Progressing Towards Goal  Goal: Discharge Planning  3/18/2023 1128 by Enio Abbasi RN  Outcome: Resolved/Met  3/18/2023 1036 by Enio Abbasi RN  Outcome: Progressing Towards Goal  Goal: Medications  3/18/2023 1128 by Enio Abbasi, RN  Outcome: Resolved/Met  3/18/2023 1036 by Enio Abbasi RN  Outcome: Progressing Towards Goal  Goal: Respiratory  3/18/2023 1128 by Enio Abbasi, RN  Outcome: Resolved/Met  3/18/2023 1036 by Enio Abbasi RN  Outcome: Progressing Towards Goal  Goal: Treatments/Interventions/Procedures  3/18/2023 1128 by Erica Miller RN  Outcome: Resolved/Met  3/18/2023 1036 by Erica Miller RN  Outcome: Progressing Towards Goal  Goal: Minimize risk of bleeding post-thrombolytic infusion  3/18/2023 1128 by Erica Miller RN  Outcome: Resolved/Met  3/18/2023 1036 by Erica Miller RN  Outcome: Progressing Towards Goal  Goal: Monitor for complications post-thrombolytic infusion  3/18/2023 1128 by Erica Miller RN  Outcome: Resolved/Met  3/18/2023 1036 by Erica Miller RN  Outcome: Progressing Towards Goal  Goal: Psychosocial  3/18/2023 1128 by Erica Miller RN  Outcome: Resolved/Met  3/18/2023 1036 by Erica Miller RN  Outcome: Progressing Towards Goal  Goal: *Hemodynamically stable  3/18/2023 1128 by Erica Miller RN  Outcome: Resolved/Met  3/18/2023 1036 by Erica Miller RN  Outcome: Progressing Towards Goal  Goal: *Neurologically stable  Description: Absence of additional neurological deficits    3/18/2023 1128 by Erica Miller RN  Outcome: Resolved/Met  3/18/2023 1036 by Erica Miller RN  Outcome: Progressing Towards Goal  Goal: *Verbalizes anxiety and depression are reduced or absent  3/18/2023 1128 by Erica Miller RN  Outcome: Resolved/Met  3/18/2023 1036 by Erica Miller RN  Outcome: Progressing Towards Goal  Goal: *Absence of Signs of Aspiration on Current Diet  3/18/2023 1128 by Erica Miller RN  Outcome: Resolved/Met  3/18/2023 1036 by Erica Miller RN  Outcome: Progressing Towards Goal  Goal: *Absence of deep venous thrombosis signs and symptoms(Stroke Metric)  3/18/2023 1128 by Erica Miller RN  Outcome: Resolved/Met  3/18/2023 1036 by Erica Miller RN  Outcome: Progressing Towards Goal  Goal: *Ability to perform ADLs and demonstrates progressive mobility and function  3/18/2023 1128 by Erica Paul, RN  Outcome: Resolved/Met  3/18/2023 1036 by Erica Miller RN  Outcome: Progressing Towards Goal  Goal: *Stroke education started(Stroke Metric)  3/18/2023 1128 by Sydnee Fontana RN  Outcome: Resolved/Met  3/18/2023 1036 by Sydnee Fontana RN  Outcome: Progressing Towards Goal  Goal: *Dysphagia screen performed(Stroke Metric)  3/18/2023 1128 by Sydnee Fontana, RN  Outcome: Resolved/Met  3/18/2023 1036 by Sydnee Fontana RN  Outcome: Progressing Towards Goal  Goal: *Rehab consulted(Stroke Metric)  3/18/2023 1128 by Sydnee Fontana RN  Outcome: Resolved/Met  3/18/2023 1036 by Sydnee Fontana RN  Outcome: Progressing Towards Goal     Problem: TIA/CVA Stroke: Day 2 Until Discharge  Goal: Off Pathway (Use only if patient is Off Pathway)  3/18/2023 1128 by Sydnee Fontana, BORIS  Outcome: Resolved/Met  3/18/2023 1036 by Sydnee Fontana RN  Outcome: Progressing Towards Goal  Goal: Activity/Safety  3/18/2023 1128 by Sydnee Fontana RN  Outcome: Resolved/Met  3/18/2023 1036 by Sydnee Fontana RN  Outcome: Progressing Towards Goal  Goal: Diagnostic Test/Procedures  3/18/2023 1128 by Sydnee Fontana RN  Outcome: Resolved/Met  3/18/2023 1036 by Sydnee Fontana RN  Outcome: Progressing Towards Goal  Goal: Nutrition/Diet  3/18/2023 1128 by Sydnee Fontana RN  Outcome: Resolved/Met  3/18/2023 1036 by Sydnee Fontana RN  Outcome: Progressing Towards Goal  Goal: Discharge Planning  3/18/2023 1128 by Sydnee Fontana RN  Outcome: Resolved/Met  3/18/2023 1036 by Sydnee Fontana RN  Outcome: Progressing Towards Goal  Goal: Medications  3/18/2023 1128 by Sydnee Fontaan RN  Outcome: Resolved/Met  3/18/2023 1036 by Sydnee Fontana RN  Outcome: Progressing Towards Goal  Goal: Respiratory  3/18/2023 1128 by Sydnee Fontana RN  Outcome: Resolved/Met  3/18/2023 1036 by Sydnee Fontana RN  Outcome: Progressing Towards Goal  Goal: Treatments/Interventions/Procedures  3/18/2023 1128 by Sydnee Fontana, RN  Outcome: Resolved/Met  3/18/2023 1036 by Sydnee Fontana, RN  Outcome: Progressing Towards Goal  Goal: Psychosocial  3/18/2023 1128 by Salazar Judd RN  Outcome: Resolved/Met  3/18/2023 1036 by Salazar Judd RN  Outcome: Progressing Towards Goal  Goal: *Verbalizes anxiety and depression are reduced or absent  3/18/2023 1128 by Salazar Judd RN  Outcome: Resolved/Met  3/18/2023 1036 by Salazar Judd RN  Outcome: Progressing Towards Goal  Goal: *Absence of aspiration  3/18/2023 1128 by Salazar Judd RN  Outcome: Resolved/Met  3/18/2023 1036 by Salazar Judd RN  Outcome: Progressing Towards Goal  Goal: *Absence of deep venous thrombosis signs and symptoms(Stroke Metric)  3/18/2023 1128 by Salazar Judd RN  Outcome: Resolved/Met  3/18/2023 1036 by Salazar Judd RN  Outcome: Progressing Towards Goal  Goal: *Optimal pain control at patient's stated goal  3/18/2023 1128 by Salazar Judd RN  Outcome: Resolved/Met  3/18/2023 1036 by Salazar Judd RN  Outcome: Progressing Towards Goal  Goal: *Tolerating diet  3/18/2023 1128 by Salazar Judd RN  Outcome: Resolved/Met  3/18/2023 1036 by Salazar Judd RN  Outcome: Progressing Towards Goal  Goal: *Ability to perform ADLs and demonstrates progressive mobility and function  3/18/2023 1128 by Salazar Judd RN  Outcome: Resolved/Met  3/18/2023 1036 by Salazar Judd RN  Outcome: Progressing Towards Goal  Goal: *Stroke education continued(Stroke Metric)  3/18/2023 1128 by Salazar Judd RN  Outcome: Resolved/Met  3/18/2023 1036 by Salazar Judd RN  Outcome: Progressing Towards Goal     Problem: Ischemic Stroke: Discharge Outcomes  Goal: *Verbalizes anxiety and depression are reduced or absent  3/18/2023 1128 by Salazar Judd RN  Outcome: Resolved/Met  3/18/2023 1036 by Salazar Judd RN  Outcome: Progressing Towards Goal  Goal: *Verbalize understanding of risk factor modification(Stroke Metric)  3/18/2023 1128 by Salazar Judd RN  Outcome: Resolved/Met  3/18/2023 1036 by Salazar Judd RN  Outcome: Progressing Towards Goal  Goal: *Hemodynamically stable  3/18/2023 1128 by Enio Abbasi RN  Outcome: Resolved/Met  3/18/2023 1036 by Enio Abbasi RN  Outcome: Progressing Towards Goal  Goal: *Absence of aspiration pneumonia  3/18/2023 1128 by Enio Abbasi, RN  Outcome: Resolved/Met  3/18/2023 1036 by Enio Abbasi RN  Outcome: Progressing Towards Goal  Goal: *Aware of needed dietary changes  3/18/2023 1128 by Enio Abbasi, RN  Outcome: Resolved/Met  3/18/2023 1036 by Enio Abbasi RN  Outcome: Progressing Towards Goal  Goal: *Verbalize understanding of prescribed medications including anti-coagulants, anti-lipid, and/or anti-platelets(Stroke Metric)  3/18/2023 1128 by Enio Abbasi, RN  Outcome: Resolved/Met  3/18/2023 1036 by Enio Abbasi RN  Outcome: Progressing Towards Goal  Goal: *Tolerating diet  3/18/2023 1128 by Enio Abbasi, RN  Outcome: Resolved/Met  3/18/2023 1036 by Enio Abbasi RN  Outcome: Progressing Towards Goal  Goal: *Aware of follow-up diagnostics related to anticoagulants  3/18/2023 1128 by Enio Abbasi, RN  Outcome: Resolved/Met  3/18/2023 1036 by Enio Abbasi RN  Outcome: Progressing Towards Goal  Goal: *Ability to perform ADLs and demonstrates progressive mobility and function  3/18/2023 1128 by Enio Abbasi, RN  Outcome: Resolved/Met  3/18/2023 1036 by Enio Abbasi RN  Outcome: Progressing Towards Goal  Goal: *Absence of DVT(Stroke Metric)  3/18/2023 1128 by Enio Abbasi, RN  Outcome: Resolved/Met  3/18/2023 1036 by Enio Abbasi RN  Outcome: Progressing Towards Goal  Goal: *Absence of aspiration  3/18/2023 1128 by Enio Abbasi, RN  Outcome: Resolved/Met  3/18/2023 1036 by Enio Abbasi RN  Outcome: Progressing Towards Goal  Goal: *Optimal pain control at patient's stated goal  3/18/2023 1128 by Enio Abbasi, RN  Outcome: Resolved/Met  3/18/2023 1036 by Enio Abbasi RN  Outcome: Progressing Towards Goal  Goal: *Home safety concerns addressed  3/18/2023 1128 by Latanya Zamudio, RN  Outcome: Resolved/Met  3/18/2023 1036 by Latanya Zamudio RN  Outcome: Progressing Towards Goal  Goal: *Describes available resources and support systems  3/18/2023 1128 by Latanya Zamudio RN  Outcome: Resolved/Met  3/18/2023 1036 by Latanya Zamudio RN  Outcome: Progressing Towards Goal  Goal: *Verbalizes understanding of activation of EMS(911) for stroke symptoms(Stroke Metric)  3/18/2023 1128 by Latanya Zamudio RN  Outcome: Resolved/Met  3/18/2023 1036 by Latanya Zamudio RN  Outcome: Progressing Towards Goal  Goal: *Understands and describes signs and symptoms to report to providers(Stroke Metric)  3/18/2023 1128 by Latanya Zamudio, RN  Outcome: Resolved/Met  3/18/2023 1036 by Latanya Zamudio RN  Outcome: Progressing Towards Goal  Goal: *Neurolgocially stable (absence of additional neurological deficits)  3/18/2023 1128 by Latanya Zamudio, RN  Outcome: Resolved/Met  3/18/2023 1036 by Latanya Zamudio RN  Outcome: Progressing Towards Goal  Goal: *Verbalizes importance of follow-up with primary care physician(Stroke Metric)  3/18/2023 1128 by Latanya Zamudio RN  Outcome: Resolved/Met  3/18/2023 1036 by Latanya Zamudio RN  Outcome: Progressing Towards Goal  Goal: *Smoking cessation discussed,if applicable(Stroke Metric)  3/18/2023 1128 by Latanya Zamudio, RN  Outcome: Resolved/Met  3/18/2023 1036 by Latanya Zamudio RN  Outcome: Progressing Towards Goal  Goal: *Depression screening completed(Stroke Metric)  3/18/2023 1128 by Latanya Zamudio, RN  Outcome: Resolved/Met  3/18/2023 1036 by Latanya Zamudio RN  Outcome: Progressing Towards Goal     Problem: Patient Education: Go to Patient Education Activity  Goal: Patient/Family Education  3/18/2023 1128 by Latanya Zamudio RN  Outcome: Resolved/Met  3/18/2023 1036 by Latanya Zamudio RN  Outcome: Progressing Towards Goal     Problem: Patient Education: Go to Patient Education Activity  Goal: Patient/Family Education  3/18/2023 1128 by Allen Salcido, RN  Outcome: Resolved/Met  3/18/2023 1036 by Allen Salcido, RN  Outcome: Progressing Towards Goal

## 2023-03-18 NOTE — PROGRESS NOTES
TRANSFER - OUT REPORT:    Verbal report given to Gabrielle Ledezma RN (name) on Nuria Paris  being transferred to [place] (unit) for routine progression of care       Report consisted of patients Situation, Background, Assessment and   Recommendations(SBAR). Information from the following report(s) SBAR, MAR, and Recent Results was reviewed with the receiving nurse. Lines:       Opportunity for questions and clarification was provided.       Patient transported with:  Patient Belongings  Cierraphuong Leilani given to transport team

## 2023-03-18 NOTE — DISCHARGE INSTRUCTIONS
Weakness: Care Instructions  Your Care Instructions     Weakness is a lack of physical or muscle strength. You may feel that you need to make extra effort to move your arms, legs, or other muscles. Generalized weakness means that you feel weak in most areas of your body. Another type of weakness may affect just one muscle or group of muscles. You may feel weak and tired after you have done too much activity, such as taking an extra-long hike. This is not a serious problem. It often goes away on its own. Feeling weak can also be caused by medical conditions like thyroid problems, depression, or a virus. Sometimes the cause can be serious. Your doctor may want to do more tests to try to find the cause of the weakness. The doctor has checked you carefully, but problems can develop later. If you notice any problems or new symptoms, get medical treatment right away. Follow-up care is a key part of your treatment and safety. Be sure to make and go to all appointments, and call your doctor if you are having problems. It's also a good idea to know your test results and keep a list of the medicines you take. How can you care for yourself at home? Rest when you feel tired. Be safe with medicines. If your doctor prescribed medicine, take it exactly as prescribed. Call your doctor if you think you are having a problem with your medicine. You will get more details on the specific medicines your doctor prescribes. Do not skip meals. Eating a balanced diet may increase your energy level. Get some physical activity every day, but do not get too tired. When should you call for help? Call your doctor now or seek immediate medical care if:    You have new or worse weakness. You are dizzy or lightheaded, or you feel like you may faint. Watch closely for changes in your health, and be sure to contact your doctor if:    You do not get better as expected. Where can you learn more?   Go to http://www.gray.com/  Enter V492 in the search box to learn more about \"Weakness: Care Instructions. \"  Current as of: March 9, 2022               Content Version: 13.4  © 4660-5112 [employer]. Care instructions adapted under license by [employer] (which disclaims liability or warranty for this information). If you have questions about a medical condition or this instruction, always ask your healthcare professional. [place] [address] any warranty or liability for your use of this information. COMPARISON: CT abdomen and pelvis 6/1/2019. Two-view chest x-ray 2017. MRI lumbar   Preliminary findings: Thoracolumbar levocurvature, stable. L5 is transitional,  partially sacralized. Multilevel Schmorl's nodes and degenerative change. T11  mild compression deformity, loss of 50% height, the previously described  calcification in the spinal canal is stable at L2 at the level of the cauda  equina. Although this level was not well seen on prior studies, it may be stable  since the prior chest x-ray in 2017. No acute compression deformity. Multilevel  central stenosis. No contrast abnormality.     Patient will need repeat lumbar MRI to ensure stability of calcification

## 2023-03-20 ENCOUNTER — PATIENT OUTREACH (OUTPATIENT)
Dept: CASE MANAGEMENT | Age: 88
End: 2023-03-20

## 2023-03-20 NOTE — PROGRESS NOTES
[id number] Shae Daniel [facility] confirmed [facility] is currently admitted to their facility with no plans for discharge at this time. For future discharge planning discussions, they currently do not have a SW, but instead can call the intern, \" IMAN\" @ ext [phone]. Transition of care outreach postponed for 7 days due to patient's discharge to SNF.

## 2023-03-27 ENCOUNTER — PATIENT OUTREACH (OUTPATIENT)
Dept: CASE MANAGEMENT | Age: 88
End: 2023-03-27

## 2023-03-27 NOTE — PROGRESS NOTES
Transition of care outreach postponed for 7 days due to patient's discharge to [facility]. Spoke with Orin Peoples, confirmed patient is still admitting without discharge date.

## 2023-03-28 ENCOUNTER — EXTERNAL NURSING HOME DOCUMENTATION (OUTPATIENT)
Dept: INTERNAL MEDICINE CLINIC | Age: 88
End: 2023-03-28
Payer: MEDICARE

## 2023-03-28 DIAGNOSIS — G40.209 COMPLEX PARTIAL SEIZURE EVOLVING TO GENERALIZED SEIZURE (HCC): ICD-10-CM

## 2023-03-28 DIAGNOSIS — R29.898 WEAKNESS OF BOTH LOWER EXTREMITIES: Primary | ICD-10-CM

## 2023-03-28 DIAGNOSIS — G95.89 MASS OF SPINAL CORD (HCC): ICD-10-CM

## 2023-03-28 DIAGNOSIS — E03.9 HYPOTHYROIDISM, UNSPECIFIED TYPE: ICD-10-CM

## 2023-03-28 DIAGNOSIS — I67.89 CEREBRAL MICROVASCULAR DISEASE: ICD-10-CM

## 2023-03-28 DIAGNOSIS — R26.9 GAIT ABNORMALITY: ICD-10-CM

## 2023-03-28 PROCEDURE — 99304 1ST NF CARE SF/LOW MDM 25: CPT | Performed by: INTERNAL MEDICINE

## 2023-03-28 NOTE — Clinical Note
History of Present Illness:  Brittaney Black is a 49-year-old  female with past medical history significant for seizure disorder, recurrent DVT, and hypothyroidism coming to the hospital with bilateral lower extremity weakness. The patient felt pain in her head and started having bilateral lower extremity weakness, but more on the right side. She was also dragging her feet. She did not recall any associated weakness in her hands, facial deviation or slurred speech, did not have any headache, does not have chest pain or shortness of breath. In the emergency room, she had lab work done. BNP shows troponin up to 88. CT head shows chronic microvascular changes with severe degree of cerebral atrophy. She was seen by neurologist, Dr Aaron Pressley and she was diagnosed with right-sided hemiparesis and stroke need to be ruled out. She had order of MRI and MRA of the brain. MRI of the brain did not show any acute infarct, but showed right occipital encephalomalacia. MRA of the brain was also done, the result was pending upon discharge, but now I can see it. The result showed large vessel occlusion and she was also advised to have MRI of the lumbar spine because of ongoing bilateral leg weakness that showed lumbar 2 level mass inside, also marked DJD. Also she had moderate canal stenosis of lumbar 2 and lumbar 3 level and also moderate-to-severe bilateral foraminal stenosis. She was started on physical therapy and occupational therapy based on bilateral lower extremity weakness and was transferred to [facility]. I am seeing her in the rehab. She is doing well, still feeling weak. She started working with the therapist, very afraid to walk. No recent fall. She is able to stand up on her leg and able to walk few steps. Otherwise she is not in distress.     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 Past Surgical History:  Back surgery in 70s, right wrist surgery, colonoscopy, brain tumor removed and cardiac cath. Social History:  The patient has never been a smoker. She drinks alcohol occasionally. Family History: Mother had breast cancer and Alzheimer's disease. Father has heart disease and kidney disease. Brother has seizure disorder. Allergies:  She is allergic to Bactrim, penicillin, and sulfa. Medications:  She is on aspirin 81 mg once a day, metoprolol XL 25 mg half tablet every day, phenytoin  mg twice a day and warfarin 2 mg one tablet a day, Lasix 20 mg every day, Biotin 5000 mcg two tablets twice a day, vitamin D 1000 units two tablets twice a day, Os-Johnny 500 mg twice day, levothyroxine 88 mcg a day, Tylenol 325 mg two tablets every six hours as needed. Review of Systems:  Complete review of systems done. Right now significant for lower leg weakness. Physical Examination:  GENERAL:  This is a pleasant  female, not in apparent distress. VITALS:  T:  98 degrees Fahrenheit. P:  75 per minute. BP:  120/80 mmHg. SaO2:  95% on room air. HEENT:  No abnormality detected. NECK:  Supple, no JVD, no carotid bruits, no thyromegaly. CHEST:  Chest is clear to auscultation bilaterally. No rales, no rhonchi. CARDIOVASCULAR:  S1, S2 normal.  Regular rate and rhythm. ABDOMEN:  Soft, nontender, nondistended, bowel sounds present. EXTREMITIES:  No edema noticed. Dorsalis pedis pulse normal.  NEUROLOGICAL:  Alert and oriented x3. Cranial nerves II through XII grossly intact. Motor 3/5 bilaterally. Sensory within normal limits. Assessment/Plan:  1. Bilateral lower extremity weakness with the right greater than left.   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 We will refer the patient to neurosurgeon Dr. Kev Alcazar. She will continue physical therapy and occupational therapy. 2. Acute hemiparesis mostly on the right. I think stroke ruled out. The patient is already started on baby aspirin. We will continue it for now. She needs to follow up with neurologist also. 3. Elevated troponin, probably demand ischemia. No acute ischemic changes on EKG. Already seen by cardiologist.  The patient had echocardiogram done showed normal ejection fraction, no PFO reported. She is stable. 4. Seizure disorder. She is taking medication. She is seizure free. 5. Hypothyroid. She is on Synthroid. She will continue it. 6. History of recurrent DVT. The patient is on Coumadin. We will monitor PT/INR and continue Coumadin dosage. THIS IS NOT A COMPLETE MEDICAL RECORD ON THIS PATIENT. THIS IS A PATIENT AT [facility]. PLEASE CONTACT THE FACILITY LISTED BELOW FOR MORE INFORMATION ON THIS PATIENT.     THE COMPLETE RECORD RESIDES WITH THIS LONG TERM CARE FACILITY

## 2023-03-29 NOTE — PROGRESS NOTES
History of Present Illness:  Kit Olivares is a 80-year-old  female with past medical history significant for seizure disorder, recurrent DVT, and hypothyroidism coming to the hospital with bilateral lower extremity weakness. The patient felt pain in her head and started having bilateral lower extremity weakness, but more on the right side. She was also dragging her feet. She did not recall any associated weakness in her hands, facial deviation or slurred speech, did not have any headache, does not have chest pain or shortness of breath. In the emergency room, she had lab work done. BNP shows troponin up to 88. CT head shows chronic microvascular changes with severe degree of cerebral atrophy. She was seen by neurologist, Dr Kenji Hunter and she was diagnosed with right-sided hemiparesis and stroke need to be ruled out. She had order of MRI and MRA of the brain. MRI of the brain did not show any acute infarct, but showed right occipital encephalomalacia. MRA of the brain was also done, the result was pending upon discharge, but now I can see it. The result showed large vessel occlusion and she was also advised to have MRI of the lumbar spine because of ongoing bilateral leg weakness that showed lumbar 2 level mass inside, also marked DJD. Also she had moderate canal stenosis of lumbar 2 and lumbar 3 level and also moderate-to-severe bilateral foraminal stenosis. She was started on physical therapy and occupational therapy based on bilateral lower extremity weakness and was transferred to [facility]. I am seeing her in the rehab. She is doing well, still feeling weak. She started working with the therapist, very afraid to walk. No recent fall. She is able to stand up on her leg and able to walk few steps. Otherwise she is not in distress.     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 Past Surgical History:  Back surgery in 70s, right wrist surgery, colonoscopy, brain tumor removed and cardiac cath. Social History:  The patient has never been a smoker. She drinks alcohol occasionally. Family History: Mother had breast cancer and Alzheimer's disease. Father has heart disease and kidney disease. Brother has seizure disorder. Allergies:  She is allergic to Bactrim, penicillin, and sulfa. Medications:  She is on aspirin 81 mg once a day, metoprolol XL 25 mg half tablet every day, phenytoin  mg twice a day and warfarin 2 mg one tablet a day, Lasix 20 mg every day, Biotin 5000 mcg two tablets twice a day, vitamin D 1000 units two tablets twice a day, Os-Johnny 500 mg twice day, levothyroxine 88 mcg a day, Tylenol 325 mg two tablets every six hours as needed. Review of Systems:  Complete review of systems done. Right now significant for lower leg weakness. Physical Examination:  GENERAL:  This is a pleasant  female, not in apparent distress. VITALS:  T:  98 degrees Fahrenheit. P:  75 per minute. BP:  120/80 mmHg. SaO2:  95% on room air. HEENT:  No abnormality detected. NECK:  Supple, no JVD, no carotid bruits, no thyromegaly. CHEST:  Chest is clear to auscultation bilaterally. No rales, no rhonchi. CARDIOVASCULAR:  S1, S2 normal.  Regular rate and rhythm. ABDOMEN:  Soft, nontender, nondistended, bowel sounds present. EXTREMITIES:  No edema noticed. Dorsalis pedis pulse normal.  NEUROLOGICAL:  Alert and oriented x3. Cranial nerves II through XII grossly intact. Motor 3/5 bilaterally. Sensory within normal limits. Assessment/Plan:  1. Bilateral lower extremity weakness with the right greater than left.   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 We will refer the patient to neurosurgeon Dr. Jacobo Krabbe. She will continue physical therapy and occupational therapy. 2. Acute hemiparesis mostly on the right. I think stroke ruled out. The patient is already started on baby aspirin. We will continue it for now. She needs to follow up with neurologist also. 3. Elevated troponin, probably demand ischemia. No acute ischemic changes on EKG. Already seen by cardiologist.  The patient had echocardiogram done showed normal ejection fraction, no PFO reported. She is stable. 4. Seizure disorder. She is taking medication. She is seizure free. 5. Hypothyroid. She is on Synthroid. She will continue it. 6. History of recurrent DVT. The patient is on Coumadin. We will monitor PT/INR and continue Coumadin dosage.

## 2023-04-03 ENCOUNTER — PATIENT OUTREACH (OUTPATIENT)
Dept: CASE MANAGEMENT | Age: 88
End: 2023-04-03

## 2023-04-03 NOTE — PROGRESS NOTES
Transition of care outreach postponed for 7 days due to patient's discharge to [facility].  Confirmed patient is still admitted, LVM with S.W regarding possible discharge date

## 2023-04-04 ENCOUNTER — PATIENT OUTREACH (OUTPATIENT)
Dept: CASE MANAGEMENT | Age: 88
End: 2023-04-04

## 2023-04-04 NOTE — PROGRESS NOTES
Transition of care outreach postponed for 7 days due to patient's discharge to SNF. TANESHA Lakhani returned my voicemail stating patient has a possible discharge date of 4/14/2023 to an [facility], he is unsure which facility at this time.

## 2023-04-07 ENCOUNTER — OFFICE VISIT (OUTPATIENT)
Dept: INTERNAL MEDICINE CLINIC | Age: 88
End: 2023-04-07

## 2023-04-12 PROBLEM — R77.8 ELEVATED TROPONIN: Status: RESOLVED | Noted: 2023-03-13 | Resolved: 2023-04-12

## 2023-04-12 PROBLEM — R79.89 ELEVATED TROPONIN: Status: RESOLVED | Noted: 2023-03-13 | Resolved: 2023-04-12

## 2023-04-18 ENCOUNTER — EXTERNAL NURSING HOME DOCUMENTATION (OUTPATIENT)
Dept: INTERNAL MEDICINE CLINIC | Age: 88
End: 2023-04-18
Payer: MEDICARE

## 2023-04-18 DIAGNOSIS — I67.89 CEREBRAL MICROVASCULAR DISEASE: ICD-10-CM

## 2023-04-18 DIAGNOSIS — R26.9 GAIT ABNORMALITY: ICD-10-CM

## 2023-04-18 DIAGNOSIS — G40.209 COMPLEX PARTIAL SEIZURE EVOLVING TO GENERALIZED SEIZURE (HCC): ICD-10-CM

## 2023-04-18 DIAGNOSIS — M48.061 SPINAL STENOSIS OF LUMBAR REGION, UNSPECIFIED WHETHER NEUROGENIC CLAUDICATION PRESENT: Primary | ICD-10-CM

## 2023-04-18 DIAGNOSIS — F32.A DEPRESSION, UNSPECIFIED DEPRESSION TYPE: ICD-10-CM

## 2023-04-18 PROCEDURE — 99309 SBSQ NF CARE MODERATE MDM 30: CPT | Performed by: INTERNAL MEDICINE

## 2023-04-19 NOTE — PROGRESS NOTES
Progress Note    Subjective:  Ms. Emmanuelle Barboza is doing well in the rehab. She continued physical therapy and occupational therapy and she is ready to be transferred to another facility. Her depression seems better. I have placed her on Remeron which is working. She is eating slightly better. No other discomfort. Objective:    VITALS:  T:  96.4 degrees Fahrenheit. P:  76 per minute. BP:  124/65 mmHg. SaO2:  97% on room air. Weight is 113 pounds. HEENT:  No abnormality detected. NECK:  Supple, no JVD, no carotid bruits, no thyromegaly. CHEST:  Chest is clear to auscultation bilaterally. No rales, no rhonchi. CARDIOVASCULAR:  S1, S2 normal.  Regular rate and rhythm. ABDOMEN:  Soft, nontender, nondistended, bowel sounds present. EXTREMITIES:  No edema is noticed. Dorsalis pedis pulse normal.  NEUROLOGICAL:  Alert and oriented x2. Assessment and Plan:   1. Failure to thrive. I have placed the patient on Remeron. She is continuing it. Weight is still same. We will encourage three meals a day. 2. Bilateral lower extremity weakness secondary to spinal stenosis. The patient continued physical therapy and occupational therapy. She is trying to use walker as she will need more physical therapy. She will continue it probably with another facility. 3. Seizure disorder. She is on Dilantin. We will continue it for now. 4. History of recurrent DVT. She is taking Coumadin. We will continue it. 5. Hypertension with elevated troponin. The patient is on aspirin and metoprolol XL. We will continue it. 6. Hypothyroid. She is taking levothyroxine. We will continue it. 7. Gait weakness. The patient to continue physical therapy and occupational therapy.

## 2023-04-24 ENCOUNTER — PATIENT OUTREACH (OUTPATIENT)
Dept: CASE MANAGEMENT | Age: 88
End: 2023-04-24

## (undated) DEVICE — HANDLE LT SNAP ON ULT DURABLE LENS FOR TRUMPF ALC DISPOSABLE

## (undated) DEVICE — SUTURE VCRL SZ 2-0 L36IN ABSRB VLT L36MM CT-1 1/2 CIR J345H

## (undated) DEVICE — Device

## (undated) DEVICE — SLIM BODY SKIN STAPLER: Brand: APPOSE ULC

## (undated) DEVICE — INFECTION CONTROL KIT SYS

## (undated) DEVICE — COVER,TABLE,60X90,STERILE: Brand: MEDLINE

## (undated) DEVICE — GUIDEWIRE VASC L40CM DIA0018IN NDL 21GA L7CM Z S STL MAK

## (undated) DEVICE — SUTURE COAT VCRL SZ 4-0 L18IN ABSRB UD L19MM PS-2 1/2 CIR J496G

## (undated) DEVICE — SURGICAL PROCEDURE PACK BASIN MAJ SET CUST NO CAUT

## (undated) DEVICE — (D)PREP SKN CHLRAPRP APPL 26ML -- CONVERT TO ITEM 371833

## (undated) DEVICE — STERILE POLYISOPRENE POWDER-FREE SURGICAL GLOVES: Brand: PROTEXIS

## (undated) DEVICE — WATERPROOF, BACTERIA PROOF DRESSING WITH ABSORBENT SEE THROUGH PAD: Brand: OPSITE POST-OP VISIBLE 20X10CM CTN 20

## (undated) DEVICE — 3.2MM GUIDE WIRE 400MM

## (undated) DEVICE — REM POLYHESIVE ADULT PATIENT RETURN ELECTRODE: Brand: VALLEYLAB

## (undated) DEVICE — AIRLIFE™ ADULT CUSHION NASAL CANNULA 14 FOOT (4.3) CRUSH-RESISTANT OXYGEN TUBING, AND U/CONNECT-IT ADAPTER: Brand: AIRLIFE™

## (undated) DEVICE — PACEMAKER PACK: Brand: MEDLINE INDUSTRIES, INC.

## (undated) DEVICE — 3M™ IOBAN™ 2 ANTIMICROBIAL INCISE DRAPE 6650EZ: Brand: IOBAN™ 2

## (undated) DEVICE — BIT DRL L145MM DIA4.2MM NONSTERILE 3 FLUT NDL PNT QUIK CPL

## (undated) DEVICE — SUTURE ABSORBABLE MONOFILAMENT 2-0 WND CLOSURE GRN V-LOC 180 VLOCL0315

## (undated) DEVICE — GOWN,SIRUS,NONRNF,SETINSLV,XL,20/CS: Brand: MEDLINE

## (undated) DEVICE — 6619 2 PTNT ISO SYS INCISE AREA&LT;(&GT;&&LT;)&GT;P: Brand: STERI-DRAPE™ IOBAN™ 2

## (undated) DEVICE — INTRO SHTH 7FR 13X20CM -- TEARAWAY

## (undated) DEVICE — SCREW BNE L44MM DIA5MM NONSTERILE TIB LT GRN TI ST CANN LOK
Type: IMPLANTABLE DEVICE | Site: FEMUR | Status: NON-FUNCTIONAL
Removed: 2019-01-14

## (undated) DEVICE — SUT SLK 0 30IN SH BLK --

## (undated) DEVICE — STERILE POLYISOPRENE POWDER-FREE SURGICAL GLOVES WITH EMOLLIENT COATING: Brand: PROTEXIS

## (undated) DEVICE — SOLUTION IRRIG 1000ML H2O STRL BLT

## (undated) DEVICE — SUTURE V-LOC 180 SZ 0 L12IN ABSRB GRN L37MM GS-21 1/2 CIR VLOCL0316

## (undated) DEVICE — KENDALL SCD EXPRESS SLEEVES, KNEE LENGTH, MEDIUM: Brand: KENDALL SCD

## (undated) DEVICE — ROCKER SWITCH PENCIL HOLSTER: Brand: VALLEYLAB

## (undated) DEVICE — KIT ACCS INTRO 4FR L10CM NDL 21GA L7CM GWIRE L40CM

## (undated) DEVICE — SENSOR OXMTR AD PED DISP NSL ALAR SPO2 XHALE ASSURANCE

## (undated) DEVICE — ROD RMR L950MM DIA2.5MM W/ EXTN BALL TIP

## (undated) DEVICE — LIMB HOLDER, WRIST/ANKLE: Brand: DEROYAL

## (undated) DEVICE — TRAY,IRRIGATION,PISTON SYRINGE,60ML,STRL: Brand: MEDLINE

## (undated) DEVICE — SOLUTION IV 1000ML 0.9% SOD CHL

## (undated) DEVICE — (D)ADHESIVE TISS HI VISC 1ML -- DISC USE ITEM 346585

## (undated) DEVICE — SUTURE VCRL 2-0 L27IN ABSRB UD PS-2 L19MM 1/2 CIR J428H

## (undated) DEVICE — MEDI-TRACE CADENCE ADULT, DEFIBRILLATION ELECTRODE -RTS  (10 PR/PK) - PHYSIO-CONTROL: Brand: MEDI-TRACE CADENCE

## (undated) DEVICE — SUTURE VCRL SZ 0 L27IN ABSRB VLT L36MM CT-1 1/2 CIR J340H